# Patient Record
Sex: FEMALE | Race: WHITE | NOT HISPANIC OR LATINO | Employment: OTHER | URBAN - METROPOLITAN AREA
[De-identification: names, ages, dates, MRNs, and addresses within clinical notes are randomized per-mention and may not be internally consistent; named-entity substitution may affect disease eponyms.]

---

## 2017-03-24 ENCOUNTER — OFFICE VISIT (OUTPATIENT)
Dept: FAMILY MEDICINE | Facility: CLINIC | Age: 77
End: 2017-03-24
Payer: COMMERCIAL

## 2017-03-24 VITALS
RESPIRATION RATE: 17 BRPM | SYSTOLIC BLOOD PRESSURE: 157 MMHG | BODY MASS INDEX: 22.2 KG/M2 | DIASTOLIC BLOOD PRESSURE: 78 MMHG | OXYGEN SATURATION: 100 % | TEMPERATURE: 96.9 F | HEIGHT: 64 IN | WEIGHT: 130 LBS | HEART RATE: 58 BPM

## 2017-03-24 DIAGNOSIS — G47.30 SLEEP APNEA, UNSPECIFIED TYPE: ICD-10-CM

## 2017-03-24 DIAGNOSIS — I10 HYPERTENSION GOAL BP (BLOOD PRESSURE) < 140/90: Primary | ICD-10-CM

## 2017-03-24 LAB
ALBUMIN SERPL-MCNC: 4.2 G/DL (ref 3.4–5)
ALP SERPL-CCNC: 56 U/L (ref 40–150)
ALT SERPL W P-5'-P-CCNC: 23 U/L (ref 0–50)
ANION GAP SERPL CALCULATED.3IONS-SCNC: 5 MMOL/L (ref 3–14)
AST SERPL W P-5'-P-CCNC: 44 U/L (ref 0–45)
BASOPHILS # BLD AUTO: 0.1 10E9/L (ref 0–0.2)
BASOPHILS NFR BLD AUTO: 1.2 %
BILIRUB SERPL-MCNC: 0.8 MG/DL (ref 0.2–1.3)
BUN SERPL-MCNC: 10 MG/DL (ref 7–30)
CALCIUM SERPL-MCNC: 9.9 MG/DL (ref 8.5–10.1)
CHLORIDE SERPL-SCNC: 104 MMOL/L (ref 94–109)
CO2 SERPL-SCNC: 30 MMOL/L (ref 20–32)
CREAT SERPL-MCNC: 0.76 MG/DL (ref 0.52–1.04)
DIFFERENTIAL METHOD BLD: NORMAL
EOSINOPHIL # BLD AUTO: 0.4 10E9/L (ref 0–0.7)
EOSINOPHIL NFR BLD AUTO: 8 %
ERYTHROCYTE [DISTWIDTH] IN BLOOD BY AUTOMATED COUNT: 12.5 % (ref 10–15)
GFR SERPL CREATININE-BSD FRML MDRD: 75 ML/MIN/1.7M2
GLUCOSE SERPL-MCNC: 85 MG/DL (ref 70–99)
HCT VFR BLD AUTO: 41.3 % (ref 35–47)
HGB BLD-MCNC: 13.6 G/DL (ref 11.7–15.7)
LYMPHOCYTES # BLD AUTO: 1.8 10E9/L (ref 0.8–5.3)
LYMPHOCYTES NFR BLD AUTO: 36.3 %
MCH RBC QN AUTO: 30.2 PG (ref 26.5–33)
MCHC RBC AUTO-ENTMCNC: 32.9 G/DL (ref 31.5–36.5)
MCV RBC AUTO: 92 FL (ref 78–100)
MONOCYTES # BLD AUTO: 0.7 10E9/L (ref 0–1.3)
MONOCYTES NFR BLD AUTO: 13.3 %
NEUTROPHILS # BLD AUTO: 2 10E9/L (ref 1.6–8.3)
NEUTROPHILS NFR BLD AUTO: 41.2 %
PLATELET # BLD AUTO: 255 10E9/L (ref 150–450)
POTASSIUM SERPL-SCNC: 4.4 MMOL/L (ref 3.4–5.3)
PROT SERPL-MCNC: 7.6 G/DL (ref 6.8–8.8)
RBC # BLD AUTO: 4.5 10E12/L (ref 3.8–5.2)
SODIUM SERPL-SCNC: 139 MMOL/L (ref 133–144)
TSH SERPL DL<=0.005 MIU/L-ACNC: 2.31 MU/L (ref 0.4–4)
WBC # BLD AUTO: 4.9 10E9/L (ref 4–11)

## 2017-03-24 PROCEDURE — 80053 COMPREHEN METABOLIC PANEL: CPT | Performed by: PHYSICIAN ASSISTANT

## 2017-03-24 PROCEDURE — 36415 COLL VENOUS BLD VENIPUNCTURE: CPT | Performed by: PHYSICIAN ASSISTANT

## 2017-03-24 PROCEDURE — 84443 ASSAY THYROID STIM HORMONE: CPT | Performed by: PHYSICIAN ASSISTANT

## 2017-03-24 PROCEDURE — 85025 COMPLETE CBC W/AUTO DIFF WBC: CPT | Performed by: PHYSICIAN ASSISTANT

## 2017-03-24 PROCEDURE — 99213 OFFICE O/P EST LOW 20 MIN: CPT | Performed by: PHYSICIAN ASSISTANT

## 2017-03-24 RX ORDER — TRIAMTERENE AND HYDROCHLOROTHIAZIDE 37.5; 25 MG/1; MG/1
1 CAPSULE ORAL DAILY
Qty: 30 CAPSULE | Refills: 1 | Status: SHIPPED | OUTPATIENT
Start: 2017-03-24 | End: 2017-04-24

## 2017-03-24 NOTE — PROGRESS NOTES
"  SUBJECTIVE:                                                    Mely Guerra is a 77 year old female who presents to clinic today for the following health issues:      Hypertension Follow-up      Outpatient blood pressures 04/17/2017 at home 187//89,  fire station 150/96     Mainly taken in the afternoon 150-170/80-90    Low Salt Diet: no added salt       Amount of exercise or physical activity: 6-7 days/week for an average of 30-45 minutes    Problems taking medications regularly: No    Medication side effects: none    Diet: low salt no salt added           Problem list and histories reviewed & adjusted, as indicated.  Additional history: 78 y/o female here for blood pressure check up.  She does have hx of HYPERTENSION for the several years and is on ARB HTCZ combo.  She has noticed that her BP has been elevated at home when she checks.  She does think her machine may be giving slighting higher results.  She continues to feel well, no chest pains, SOB.  Is very active.    She does have hx of sleep apnea, has not had her machine checked or adjusted in many years.    BP Readings from Last 3 Encounters:   03/24/17 157/78   08/10/16 126/76   07/12/16 112/71    Wt Readings from Last 3 Encounters:   03/24/17 130 lb (59 kg)   08/10/16 130 lb (59 kg)   07/12/16 128 lb 1.6 oz (58.1 kg)                    Reviewed and updated as needed this visit by clinical staff  Allergies  Meds       Reviewed and updated as needed this visit by Provider         ROS:  Constitutional, HEENT, cardiovascular, pulmonary, gi and gu systems are negative, except as otherwise noted.    OBJECTIVE:                                                    /78  Pulse 58  Temp 96.9  F (36.1  C) (Oral)  Resp 17  Ht 5' 4\" (1.626 m)  Wt 130 lb (59 kg)  SpO2 100%  BMI 22.31 kg/m2  Body mass index is 22.31 kg/(m^2).  GENERAL: alert and no distress  EYES: Eyes grossly normal to inspection  HENT: ear canals and TM's normal, nose and mouth without " ulcers or lesions  NECK: no adenopathy, no asymmetry, masses, or scars and thyroid normal to palpation  RESP: lungs clear to auscultation - no rales, rhonchi or wheezes  CV: regular rate and rhythm, normal S1 S2, no S3 or S4, no murmur, click or rub, no peripheral edema and peripheral pulses strong  PSYCH: mentation appears normal, affect normal/bright    Diagnostic Test Results:  none      ASSESSMENT/PLAN:                                                            1. Hypertension goal BP (blood pressure) < 140/90  Not at goal.  Will stop hctz and start dyazide.    - CBC with platelets differential  - Comprehensive metabolic panel  - TSH with free T4 reflex  - triamterene-hydrochlorothiazide (DYAZIDE) 37.5-25 MG per capsule; Take 1 capsule by mouth daily  Dispense: 30 capsule; Refill: 1    2. Sleep apnea, unspecified type  Encouraged her to contact sleep health to see if machine needs adjusting, could be adding to her HYPERTENSION.  - CBC with platelets differential  - Comprehensive metabolic panel  - TSH with free T4 reflex        Chris Dhillon PA-C  [facility]

## 2017-03-24 NOTE — NURSING NOTE
"Chief Complaint   Patient presents with     Hypertension     /78  Pulse 58  Temp 96.9  F (36.1  C) (Oral)  Resp 17  Ht 5' 4\" (1.626 m)  Wt 130 lb (59 kg)  SpO2 100%  BMI 22.31 kg/m2 Estimated body mass index is 22.31 kg/(m^2) as calculated from the following:    Height as of this encounter: 5' 4\" (1.626 m).    Weight as of this encounter: 130 lb (59 kg).  bp completed using cuff size: regular       Health Maintenance addressed:  NONE    n/a    Azul Boucher MA     "

## 2017-03-24 NOTE — MR AVS SNAPSHOT
"              After Visit Summary   3/24/2017    Mely Guerra    MRN: [id number]           Patient Information     Date Of Birth          1940        Visit Information        Provider Department      3/24/2017 9:20 AM Chris Dhillon PA-C [facility]        Today's Diagnoses     Hypertension goal BP (blood pressure) < 140/90    -  1    Sleep apnea, unspecified type           Follow-ups after your visit        Who to contact     If you have questions or need follow up information about today's clinic visit or your schedule please contact [facility] directly at [phone].  Normal or non-critical lab and imaging results will be communicated to you by Bioxodeshart, letter or phone within 4 business days after the clinic has received the results. If you do not hear from us within 7 days, please contact the clinic through ONEPLEt or phone. If you have a critical or abnormal lab result, we will notify you by phone as soon as possible.  Submit refill requests through Piehole or call your pharmacy and they will forward the refill request to us. Please allow 3 business days for your refill to be completed.          Additional Information About Your Visit        MyChart Information     Piehole gives you secure access to your electronic health record. If you see a primary care provider, you can also send messages to your care team and make appointments. If you have questions, please call your primary care clinic.  If you do not have a primary care provider, please call [phone] and they will assist you.        Care EveryWhere ID     This is your Care EveryWhere ID. This could be used by other organizations to access your [place] medical records  [id number]        Your Vitals Were     Pulse Temperature Respirations Height Pulse Oximetry BMI (Body Mass Index)    58 96.9  F (36.1  C) (Oral) 17 5' 4\" (1.626 m) 100% 22.31 kg/m2       Blood Pressure from Last 3 Encounters:   03/24/17 157/78 "   08/10/16 126/76   07/12/16 112/71    Weight from Last 3 Encounters:   03/24/17 130 lb (59 kg)   08/10/16 130 lb (59 kg)   07/12/16 128 lb 1.6 oz (58.1 kg)              We Performed the Following     CBC with platelets differential     Comprehensive metabolic panel     TSH with free T4 reflex          Today's Medication Changes          These changes are accurate as of: 3/24/17  9:51 AM.  If you have any questions, ask your nurse or doctor.               Start taking these medicines.        Dose/Directions    triamterene-hydrochlorothiazide 37.5-25 MG per capsule   Commonly known as:  DYAZIDE   Used for:  Hypertension goal BP (blood pressure) < 140/90   Started by:  Chris Dhillon PA-C        Dose:  1 capsule   Take 1 capsule by mouth daily   Quantity:  30 capsule   Refills:  1         Stop taking these medicines if you haven't already. Please contact your care team if you have questions.     hydrochlorothiazide 25 MG tablet   Commonly known as:  HYDRODIURIL   Stopped by:  Chris Dhillon PA-C                Where to get your medicines      These medications were sent to [facility] [id number] [place] [address] & [address]  [address] [place] [address]     Phone:  [phone]     triamterene-hydrochlorothiazide 37.5-25 MG per capsule                Primary Care Provider Office Phone # Fax #    Sav Carpenter [phone]       [facility] [address] [address]        Thank you!     Thank you for choosing [facility]  for your care. Our goal is always to provide you with excellent care. Hearing back from our patients is one way we can continue to improve our services. Please take a few minutes to complete the written survey that you may receive in the mail after your visit with us. Thank you!             Your Updated Medication List - Protect others around you: Learn how to safely use,  store and throw away your medicines at www.disposemymeds.org.          This list is accurate as of: 3/24/17  9:51 AM.  Always use your most recent med list.                   Brand Name Dispense Instructions for use    ALEVE PO      Take 220 mg by mouth Takes every AM and PM when not taking ibuprofen.       calcium + D 600-200 MG-UNIT Tabs   Generic drug:  calcium carbonate-vitamin D      Take 2 tablets by mouth 2 times daily.       Chromium 200 MCG Tabs tablet      Take  by mouth.       fluticasone 50 MCG/ACT spray    FLONASE    48 g    Spray 2 sprays into both nostrils daily       GLUCOSAMINE 1500 COMPLEX Caps      Take 1 capsule by mouth daily.       hydrocortisone 1 % cream    CORTAID    30 g    Apply sparingly to affected area three times daily for 14 days.       IBUPROFEN PO      Takes every AM and PM when not taking Aleve.       levothyroxine 100 MCG tablet    SYNTHROID/LEVOTHROID    90 tablet    Take 1 tablet (100 mcg) by mouth daily       losartan 100 MG tablet    COZAAR    90 tablet    Take 1 tablet (100 mg) by mouth daily       multivitamin, therapeutic with minerals Tabs tablet     100 each    Take 1 tablet by mouth daily       triamterene-hydrochlorothiazide 37.5-25 MG per capsule    DYAZIDE    30 capsule    Take 1 capsule by mouth daily       ZANTAC PO      Take 150 mg by mouth

## 2017-03-27 NOTE — PROGRESS NOTES
Dear Mely    Your test results are attached, feel free to contact me via Wellcentivet.    Everything in your lab work looks great.      Everton Dhillon PA-C

## 2017-04-24 ENCOUNTER — OFFICE VISIT (OUTPATIENT)
Dept: FAMILY MEDICINE | Facility: CLINIC | Age: 77
End: 2017-04-24
Payer: COMMERCIAL

## 2017-04-24 VITALS
TEMPERATURE: 97.2 F | DIASTOLIC BLOOD PRESSURE: 82 MMHG | HEART RATE: 72 BPM | SYSTOLIC BLOOD PRESSURE: 120 MMHG | WEIGHT: 128 LBS | HEIGHT: 64 IN | OXYGEN SATURATION: 99 % | BODY MASS INDEX: 21.85 KG/M2

## 2017-04-24 DIAGNOSIS — I10 HYPERTENSION GOAL BP (BLOOD PRESSURE) < 140/90: ICD-10-CM

## 2017-04-24 PROCEDURE — 99213 OFFICE O/P EST LOW 20 MIN: CPT | Performed by: PHYSICIAN ASSISTANT

## 2017-04-24 RX ORDER — TRIAMTERENE AND HYDROCHLOROTHIAZIDE 37.5; 25 MG/1; MG/1
1 CAPSULE ORAL DAILY
Qty: 90 CAPSULE | Refills: 1 | Status: SHIPPED | OUTPATIENT
Start: 2017-04-24 | End: 2017-09-12

## 2017-04-24 RX ORDER — TRIAMTERENE AND HYDROCHLOROTHIAZIDE 37.5; 25 MG/1; MG/1
1 CAPSULE ORAL DAILY
Qty: 90 CAPSULE | Refills: 1 | Status: SHIPPED | OUTPATIENT
Start: 2017-04-24 | End: 2017-04-24

## 2017-04-24 NOTE — PROGRESS NOTES
"  SUBJECTIVE:                                                    Mely Guerra is a 77 year old female who presents to clinic today for the following health issues:      Hypertension Follow-up      Outpatient blood pressures are being checked at home.  Results are 140'S/70\"s.    Low Salt Diet: low salt       Amount of exercise or physical activity: Biking and walking everyday    Problems taking medications regularly: No    Medication side effects: none    Diet: low salt          Problem list and histories reviewed & adjusted, as indicated.  Additional history: 78 y/o female here for 1 month follow up.  We recently adjusted her BP meds to include dyazide along with her losartan.  She has been getting mostly 140s/70 in the am, rare checks other times.  No change in how she feels.  Still very active, exercises daily.    BP Readings from Last 3 Encounters:   04/24/17 120/82   03/24/17 157/78   08/10/16 126/76    Wt Readings from Last 3 Encounters:   04/24/17 128 lb (58.1 kg)   03/24/17 130 lb (59 kg)   08/10/16 130 lb (59 kg)                    Reviewed and updated as needed this visit by clinical staff       Reviewed and updated as needed this visit by Provider         ROS:  Constitutional, HEENT, cardiovascular, pulmonary, gi and gu systems are negative, except as otherwise noted.    OBJECTIVE:                                                    /82 (BP Location: Left arm, Patient Position: Left side, Cuff Size: Adult Regular)  Pulse 72  Temp 97.2  F (36.2  C) (Tympanic)  Ht 5' 4\" (1.626 m)  Wt 128 lb (58.1 kg)  SpO2 99%  Breastfeeding? No  BMI 21.97 kg/m2  Body mass index is 21.97 kg/(m^2).  GENERAL: alert and no distress  EYES: Eyes grossly normal to inspection  PSYCH: mentation appears normal, affect normal/bright    Diagnostic Test Results:  none      ASSESSMENT/PLAN:                                                            1. Hypertension goal BP (blood pressure) < 140/90  Will have her take her " losartan in the evening and dyazide in the am.  Since losartan is commonly not effective for full 24 hours, will move to pm to cover the important sleep wake blood pressure surge.  - triamterene-hydrochlorothiazide (DYAZIDE) 37.5-25 MG per capsule; Take 1 capsule by mouth daily  Dispense: 90 capsule; Refill: 1        Chris Dhillon PA-C  [facility]

## 2017-04-24 NOTE — MR AVS SNAPSHOT
"              After Visit Summary   4/24/2017    Mely Guerra    MRN: [id number]           Patient Information     Date Of Birth          1940        Visit Information        Provider Department      4/24/2017 8:40 AM Chris Dhillon PA-C [facility]        Today's Diagnoses     Hypertension goal BP (blood pressure) < 140/90           Follow-ups after your visit        Follow-up notes from your care team     Return in about 3 months (around 7/24/2017).      Who to contact     If you have questions or need follow up information about today's clinic visit or your schedule please contact [facility] directly at [phone].  Normal or non-critical lab and imaging results will be communicated to you by Voovio aka 3Ditizehart, letter or phone within 4 business days after the clinic has received the results. If you do not hear from us within 7 days, please contact the clinic through Voovio aka 3Ditizehart or phone. If you have a critical or abnormal lab result, we will notify you by phone as soon as possible.  Submit refill requests through Scary Mommy or call your pharmacy and they will forward the refill request to us. Please allow 3 business days for your refill to be completed.          Additional Information About Your Visit        MyChart Information     Scary Mommy gives you secure access to your electronic health record. If you see a primary care provider, you can also send messages to your care team and make appointments. If you have questions, please call your primary care clinic.  If you do not have a primary care provider, please call [phone] and they will assist you.        Care EveryWhere ID     This is your Care EveryWhere ID. This could be used by other organizations to access your [place] medical records  [id number]        Your Vitals Were     Pulse Temperature Height Pulse Oximetry Breastfeeding? BMI (Body Mass Index)    72 97.2  F (36.2  C) (Tympanic) 5' 4\" (1.626 m) 99% No 21.97 kg/m2       " Blood Pressure from Last 3 Encounters:   04/24/17 120/82   03/24/17 157/78   08/10/16 126/76    Weight from Last 3 Encounters:   04/24/17 128 lb (58.1 kg)   03/24/17 130 lb (59 kg)   08/10/16 130 lb (59 kg)              Today, you had the following     No orders found for display         Where to get your medicines      These medications were sent to [facility] [address]  [address], [facility] [address]     Phone:  [phone]     triamterene-hydrochlorothiazide 37.5-25 MG per capsule          Primary Care Provider Office Phone # Fax #    Sav Carpenter [phone]       [facility] [address] [address]        Thank you!     Thank you for choosing [facility]  for your care. Our goal is always to provide you with excellent care. Hearing back from our patients is one way we can continue to improve our services. Please take a few minutes to complete the written survey that you may receive in the mail after your visit with us. Thank you!             Your Updated Medication List - Protect others around you: Learn how to safely use, store and throw away your medicines at www.disposemymeds.org.          This list is accurate as of: 4/24/17  9:05 AM.  Always use your most recent med list.                   Brand Name Dispense Instructions for use    ALEVE PO      Take 220 mg by mouth Takes every AM and PM when not taking ibuprofen.       calcium + D 600-200 MG-UNIT Tabs   Generic drug:  calcium carbonate-vitamin D      Take 2 tablets by mouth 2 times daily.       Chromium 200 MCG Tabs tablet      Take  by mouth.       fluticasone 50 MCG/ACT spray    FLONASE    48 g    Spray 2 sprays into both nostrils daily       GLUCOSAMINE 1500 COMPLEX Caps      Take 1 capsule by mouth daily.       hydrocortisone 1 % cream    CORTAID    30 g    Apply sparingly to affected area three times daily for 14 days.        IBUPROFEN PO      Takes every AM and PM when not taking Aleve.       levothyroxine 100 MCG tablet    SYNTHROID/LEVOTHROID    90 tablet    Take 1 tablet (100 mcg) by mouth daily       losartan 100 MG tablet    COZAAR    90 tablet    Take 1 tablet (100 mg) by mouth daily       multivitamin, therapeutic with minerals Tabs tablet     100 each    Take 1 tablet by mouth daily       triamterene-hydrochlorothiazide 37.5-25 MG per capsule    DYAZIDE    90 capsule    Take 1 capsule by mouth daily       ZANTAC PO      Take 150 mg by mouth

## 2017-04-24 NOTE — NURSING NOTE
"Chief Complaint   Patient presents with     Medication Reconciliation     /82 (BP Location: Left arm, Patient Position: Left side, Cuff Size: Adult Regular)  Pulse 72  Temp 97.2  F (36.2  C) (Tympanic)  Ht 5' 4\" (1.626 m)  Wt 128 lb (58.1 kg)  SpO2 99%  Breastfeeding? No  BMI 21.97 kg/m2 Estimated body mass index is 21.97 kg/(m^2) as calculated from the following:    Height as of this encounter: 5' 4\" (1.626 m).    Weight as of this encounter: 128 lb (58.1 kg).  bp completed using cuff size: regular      Health Maintenance addressed:  NONE    n/a              "

## 2017-05-18 ENCOUNTER — OFFICE VISIT (OUTPATIENT)
Dept: FAMILY MEDICINE | Facility: CLINIC | Age: 77
End: 2017-05-18
Payer: COMMERCIAL

## 2017-05-18 VITALS
TEMPERATURE: 97.7 F | SYSTOLIC BLOOD PRESSURE: 130 MMHG | HEIGHT: 64 IN | DIASTOLIC BLOOD PRESSURE: 76 MMHG | WEIGHT: 127.1 LBS | BODY MASS INDEX: 21.7 KG/M2 | OXYGEN SATURATION: 98 % | HEART RATE: 72 BPM

## 2017-05-18 DIAGNOSIS — E03.9 ACQUIRED HYPOTHYROIDISM: ICD-10-CM

## 2017-05-18 DIAGNOSIS — I10 ESSENTIAL HYPERTENSION WITH GOAL BLOOD PRESSURE LESS THAN 140/90: ICD-10-CM

## 2017-05-18 DIAGNOSIS — L71.9 ROSACEA: Primary | ICD-10-CM

## 2017-05-18 PROCEDURE — 99213 OFFICE O/P EST LOW 20 MIN: CPT | Performed by: FAMILY MEDICINE

## 2017-05-18 RX ORDER — MINOCYCLINE HYDROCHLORIDE 100 MG/1
100 CAPSULE ORAL DAILY
Qty: 30 CAPSULE | Refills: 1 | Status: SHIPPED | OUTPATIENT
Start: 2017-05-18 | End: 2017-08-30

## 2017-05-18 RX ORDER — LOSARTAN POTASSIUM 100 MG/1
100 TABLET ORAL DAILY
Qty: 90 TABLET | Refills: 3 | Status: SHIPPED | OUTPATIENT
Start: 2017-05-18 | End: 2018-03-07

## 2017-05-18 RX ORDER — LEVOTHYROXINE SODIUM 100 UG/1
100 TABLET ORAL DAILY
Qty: 90 TABLET | Refills: 3 | Status: SHIPPED | OUTPATIENT
Start: 2017-05-18 | End: 2018-05-23

## 2017-05-18 NOTE — NURSING NOTE
"Chief Complaint   Patient presents with     Derm Problem     rash on face since Janurary - changed oup CPAP mask,, tried Metronidazole - has not been effective     /76  Pulse 72  Temp 97.7  F (36.5  C) (Oral)  Ht 5' 4\" (1.626 m)  Wt 127 lb 1.6 oz (57.7 kg)  SpO2 98%  BMI 21.82 kg/m2 Estimated body mass index is 21.82 kg/(m^2) as calculated from the following:    Height as of this encounter: 5' 4\" (1.626 m).    Weight as of this encounter: 127 lb 1.6 oz (57.7 kg).  BP completed using cuff size: regular       Health Maintenance due pending provider review:  NONE    n/a      Essence Santo CMA  "

## 2017-05-18 NOTE — PROGRESS NOTES
Subjective: This is a somewhat  Minor problem except for the fact that she has to wear a C Pap machine, and she has had a rash around her nose and it gets irritated. Apparently she saw dermatology who thought its rosacea and gave her metronidazole lotion but she has used it regularly and it hasn't made any improvement. She also needs refills of her thyroid medicine and blood pressure medicine and will begin next month for her physical.    Objective: There are reddish areas around her nose typical for mild rosacea.    Assessment and plan: Rosacea not responding to topical metronidazole. I would like her to try oral minocycline daily for a while and see if that calms it down. Perhaps this is something she could use periodically.  She can report when she comes back in for her physical as to whether it helped or not. If it doesn't she should go back to dermatology.

## 2017-05-18 NOTE — MR AVS SNAPSHOT
"              After Visit Summary   5/18/2017    Mely Guerra    MRN: [id number]           Patient Information     Date Of Birth          1940        Visit Information        Provider Department      5/18/2017 10:30 AM Sav Carpenter MD [facility]        Today's Diagnoses     Rosacea    -  1    Essential hypertension with goal blood pressure less than 140/90        Acquired hypothyroidism           Follow-ups after your visit        Who to contact     If you have questions or need follow up information about today's clinic visit or your schedule please contact [facility] directly at [phone].  Normal or non-critical lab and imaging results will be communicated to you by MyChart, letter or phone within 4 business days after the clinic has received the results. If you do not hear from us within 7 days, please contact the clinic through DesignLinehart or phone. If you have a critical or abnormal lab result, we will notify you by phone as soon as possible.  Submit refill requests through Cloverleaf Communications or call your pharmacy and they will forward the refill request to us. Please allow 3 business days for your refill to be completed.          Additional Information About Your Visit        MyChart Information     Cloverleaf Communications gives you secure access to your electronic health record. If you see a primary care provider, you can also send messages to your care team and make appointments. If you have questions, please call your primary care clinic.  If you do not have a primary care provider, please call [phone] and they will assist you.        Care EveryWhere ID     This is your Care EveryWhere ID. This could be used by other organizations to access your [place] medical records  [id number]        Your Vitals Were     Pulse Temperature Height Pulse Oximetry BMI (Body Mass Index)       72 97.7  F (36.5  C) (Oral) 5' 4\" (1.626 m) 98% 21.82 kg/m2        Blood Pressure from Last 3 Encounters: "   05/18/17 130/76   04/24/17 120/82   03/24/17 157/78    Weight from Last 3 Encounters:   05/18/17 127 lb 1.6 oz (57.7 kg)   04/24/17 128 lb (58.1 kg)   03/24/17 130 lb (59 kg)              Today, you had the following     No orders found for display         Today's Medication Changes          These changes are accurate as of: 5/18/17 11:06 AM.  If you have any questions, ask your nurse or doctor.               Start taking these medicines.        Dose/Directions    minocycline 100 MG capsule   Commonly known as:  MINOCIN/DYNACIN   Used for:  Rosacea   Started by:  Sav Carpenter MD        Dose:  100 mg   Take 1 capsule (100 mg) by mouth daily   Quantity:  30 capsule   Refills:  1            Where to get your medicines      These medications were sent to [facility] [id number] [place] [address] & [address]  [address] [place] [address]     Phone:  [phone]     minocycline 100 MG capsule         Some of these will need a paper prescription and others can be bought over the counter.  Ask your nurse if you have questions.     Bring a paper prescription for each of these medications     levothyroxine 100 MCG tablet    losartan 100 MG tablet                Primary Care Provider Office Phone # Fax #    Sav Carpenter [phone]       [facility] [address] [place] [address]        Thank you!     Thank you for choosing [facility]  for your care. Our goal is always to provide you with excellent care. Hearing back from our patients is one way we can continue to improve our services. Please take a few minutes to complete the written survey that you may receive in the mail after your visit with us. Thank you!             Your Updated Medication List - Protect others around you: Learn how to safely use, store and throw away your medicines at www.disposemymeds.org.          This list is accurate as of: 5/18/17  11:06 AM.  Always use your most recent med list.                   Brand Name Dispense Instructions for use    ALEVE PO      Take 220 mg by mouth Takes every AM and PM when not taking ibuprofen.       calcium + D 600-200 MG-UNIT Tabs   Generic drug:  calcium carbonate-vitamin D      Take 2 tablets by mouth 2 times daily.       Chromium 200 MCG Tabs tablet      Take  by mouth.       fluticasone 50 MCG/ACT spray    FLONASE    48 g    Spray 2 sprays into both nostrils daily       GLUCOSAMINE 1500 COMPLEX Caps      Take 1 capsule by mouth daily.       hydrocortisone 1 % cream    CORTAID    30 g    Apply sparingly to affected area three times daily for 14 days.       IBUPROFEN PO      Takes every AM and PM when not taking Aleve.       levothyroxine 100 MCG tablet    SYNTHROID/LEVOTHROID    90 tablet    Take 1 tablet (100 mcg) by mouth daily       losartan 100 MG tablet    COZAAR    90 tablet    Take 1 tablet (100 mg) by mouth daily       minocycline 100 MG capsule    MINOCIN/DYNACIN    30 capsule    Take 1 capsule (100 mg) by mouth daily       multivitamin, therapeutic with minerals Tabs tablet     100 each    Take 1 tablet by mouth daily       triamterene-hydrochlorothiazide 37.5-25 MG per capsule    DYAZIDE    90 capsule    Take 1 capsule by mouth daily       ZANTAC PO      Take 150 mg by mouth

## 2017-05-22 DIAGNOSIS — I10 HYPERTENSION GOAL BP (BLOOD PRESSURE) < 140/90: ICD-10-CM

## 2017-05-22 RX ORDER — TRIAMTERENE AND HYDROCHLOROTHIAZIDE 37.5; 25 MG/1; MG/1
1 CAPSULE ORAL DAILY
Start: 2017-05-22

## 2017-05-22 NOTE — TELEPHONE ENCOUNTER
Pending Prescriptions:                       Disp   Refills    triamterene-hydrochlorothiazide (DYAZIDE)*90 cap*1            Sig: Take 1 capsule by mouth daily          Last Written Prescription Date: 04/24/2017  Last Fill Quantity: 90, # refills: 1  Last Office Visit with [facility], [facility] or [facility] prescribing provider: 05/18/2017       Potassium   Date Value Ref Range Status   03/24/2017 4.4 3.4 - 5.3 mmol/L Final     Creatinine   Date Value Ref Range Status   03/24/2017 0.76 0.52 - 1.04 mg/dL Final     BP Readings from Last 3 Encounters:   05/18/17 130/76   04/24/17 120/82   03/24/17 157/78

## 2017-06-26 ENCOUNTER — OFFICE VISIT (OUTPATIENT)
Dept: FAMILY MEDICINE | Facility: CLINIC | Age: 77
End: 2017-06-26
Payer: COMMERCIAL

## 2017-06-26 VITALS
BODY MASS INDEX: 22.03 KG/M2 | SYSTOLIC BLOOD PRESSURE: 120 MMHG | HEIGHT: 64 IN | HEART RATE: 76 BPM | DIASTOLIC BLOOD PRESSURE: 74 MMHG | TEMPERATURE: 97.6 F | OXYGEN SATURATION: 100 % | WEIGHT: 129.06 LBS

## 2017-06-26 DIAGNOSIS — R13.10 SWALLOWING DISORDER: ICD-10-CM

## 2017-06-26 DIAGNOSIS — Z00.00 WELLNESS EXAMINATION: Primary | ICD-10-CM

## 2017-06-26 PROCEDURE — G0439 PPPS, SUBSEQ VISIT: HCPCS | Performed by: FAMILY MEDICINE

## 2017-06-26 NOTE — PROGRESS NOTES
Answers for HPI/ROS submitted by the patient on 6/23/2017   Annual Exam:  Getting at least 3 servings of Calcium per day:: Yes  Bi-annual eye exam:: Yes  Dental care twice a year:: Yes  Sleep apnea or symptoms of sleep apnea:: Sleep apnea  Diet:: Low salt  Frequency of exercise:: 4-5 days/week  Taking medications regularly:: Yes  Medication side effects:: Other  Additional concerns today:: YES  PHQ-2 Score: 0  Duration of exercise:: 45-60 minutes

## 2017-06-26 NOTE — NURSING NOTE
"Chief Complaint   Patient presents with     Medicare Visit     patient is fasting      /74 (BP Location: Right arm, Patient Position: Right side, Cuff Size: Adult Regular)  Pulse 76  Temp 97.6  F (36.4  C) (Oral)  Ht 5' 4\" (1.626 m)  Wt 129 lb 1 oz (58.5 kg)  SpO2 100%  Breastfeeding? No  BMI 22.15 kg/m2 Estimated body mass index is 22.15 kg/(m^2) as calculated from the following:    Height as of this encounter: 5' 4\" (1.626 m).    Weight as of this encounter: 129 lb 1 oz (58.5 kg).  bp completed using cuff size: regular      Health Maintenance addressed:  NONE    n/a              "

## 2017-06-26 NOTE — MR AVS SNAPSHOT
After Visit Summary   6/26/2017    Mely Guerra    MRN: [id number]           Patient Information     Date Of Birth          1940        Visit Information        Provider Department      6/26/2017 8:00 AM Nette Burk MD [facility] [place] [facility]        Today's Diagnoses     Wellness examination    -  1    Swallowing disorder          Care Instructions      Preventive Health Recommendations    Female Ages 65 +    Yearly exam:     See your health care provider every year in order to  o Review health changes.   o Discuss preventive care.    o Review your medicines if your doctor has prescribed any.      You no longer need a yearly Pap test unless you've had an abnormal Pap test in the past 10 years. If you have vaginal symptoms, such as bleeding or discharge, be sure to talk with your provider about a Pap test.      Every 1 to 2 years, have a mammogram.  If you are over 69, talk with your health care provider about whether or not you want to continue having screening mammograms.      Every 10 years, have a colonoscopy. Or, have a yearly FIT test (stool test). These exams will check for colon cancer.       Have a cholesterol test every 5 years, or more often if your doctor advises it.       Have a diabetes test (fasting glucose) every three years. If you are at risk for diabetes, you should have this test more often.       At age 65, have a bone density scan (DEXA) to check for osteoporosis (brittle bone disease).    Shots:    Get a flu shot each year.    Get a tetanus shot every 10 years.    Talk to your doctor about your pneumonia vaccines. There are now two you should receive - Pneumovax (PPSV 23) and Prevnar (PCV 13).    Talk to your doctor about the shingles vaccine.    Talk to your doctor about the hepatitis B vaccine.    Nutrition:     Eat at least 5 servings of fruits and vegetables each day.      Eat whole-grain bread, whole-wheat pasta and brown rice instead of white grains and  rice.      Talk to your provider about Calcium and Vitamin D.     Lifestyle    Exercise at least 150 minutes a week (30 minutes a day, 5 days a week). This will help you control your weight and prevent disease.      Limit alcohol to one drink per day.      No smoking.       Wear sunscreen to prevent skin cancer.       See your dentist twice a year for an exam and cleaning.      See your eye doctor every 1 to 2 years to screen for conditions such as glaucoma, macular degeneration and cataracts.          Follow-ups after your visit        Future tests that were ordered for you today     Open Future Orders        Priority Expected Expires Ordered    US Head Neck Soft Tissue Routine  6/26/2018 6/26/2017            Who to contact     If you have questions or need follow up information about today's clinic visit or your schedule please contact [facility] directly at [phone].  Normal or non-critical lab and imaging results will be communicated to you by [employer], letter or phone within 4 business days after the clinic has received the results. If you do not hear from us within 7 days, please contact the clinic through [employer] or phone. If you have a critical or abnormal lab result, we will notify you by phone as soon as possible.  Submit refill requests through [employer] or call your pharmacy and they will forward the refill request to us. Please allow 3 business days for your refill to be completed.          Additional Information About Your Visit        [employer] Information     [employer] gives you secure access to your electronic health record. If you see a primary care provider, you can also send messages to your care team and make appointments. If you have questions, please call your primary care clinic.  If you do not have a primary care provider, please call [phone] and they will assist you.        Care EveryWhere ID     This is your Care EveryWhere ID. This could be used by other organizations to  "access your [place] medical records  [id number]        Your Vitals Were     Pulse Temperature Height Pulse Oximetry Breastfeeding? BMI (Body Mass Index)    76 97.6  F (36.4  C) (Oral) 5' 4\" (1.626 m) 100% No 22.15 kg/m2       Blood Pressure from Last 3 Encounters:   06/26/17 120/74   05/18/17 130/76   04/24/17 120/82    Weight from Last 3 Encounters:   06/26/17 129 lb 1 oz (58.5 kg)   05/18/17 127 lb 1.6 oz (57.7 kg)   04/24/17 128 lb (58.1 kg)               Primary Care Provider Office Phone # Fax #    Sav Carpenter [phone]       [facility] [address] [address]        Equal Access to Services     CIRILO BAUER : Hadii chris davis hadasho Sogeovanna, waaxda luqadaha, qaybta kaalmada adeegyada, erik greene . So [facility] [phone].    ATENCIÓN: Si habla español, tiene a mcfadden disposición servicios gratuitos de asistencia lingüística. Llame al [phone].    We comply with applicable federal civil rights laws and [place] laws. We do not discriminate on the basis of race, color, national origin, age, disability sex, sexual orientation or gender identity.            Thank you!     Thank you for choosing [facility]  for your care. Our goal is always to provide you with excellent care. Hearing back from our patients is one way we can continue to improve our services. Please take a few minutes to complete the written survey that you may receive in the mail after your visit with us. Thank you!             Your Updated Medication List - Protect others around you: Learn how to safely use, store and throw away your medicines at www.disposemymeds.org.          This list is accurate as of: 6/26/17  8:39 AM.  Always use your most recent med list.                   Brand Name Dispense Instructions for use Diagnosis    ALEVE PO      Take 220 mg by mouth Takes every AM and PM when not taking ibuprofen.        calcium + D 600-200 " MG-UNIT Tabs   Generic drug:  calcium carbonate-vitamin D      Take 2 tablets by mouth 2 times daily.        Chromium 200 MCG Tabs tablet      Take  by mouth.        fluticasone 50 MCG/ACT spray    FLONASE    48 g    Spray 2 sprays into both nostrils daily    Other allergic rhinitis       GLUCOSAMINE 1500 COMPLEX Caps      Take 1 capsule by mouth daily.    Osteoporosis, unspecified       IBUPROFEN PO      Takes every AM and PM when not taking Aleve.        levothyroxine 100 MCG tablet    SYNTHROID/LEVOTHROID    90 tablet    Take 1 tablet (100 mcg) by mouth daily    Acquired hypothyroidism       losartan 100 MG tablet    COZAAR    90 tablet    Take 1 tablet (100 mg) by mouth daily    Essential hypertension with goal blood pressure less than 140/90       minocycline 100 MG capsule    MINOCIN/DYNACIN    30 capsule    Take 1 capsule (100 mg) by mouth daily    Rosacea       multivitamin, therapeutic with minerals Tabs tablet     100 each    Take 1 tablet by mouth daily    Nutritional deficiency       triamterene-hydrochlorothiazide 37.5-25 MG per capsule    DYAZIDE    90 capsule    Take 1 capsule by mouth daily    Hypertension goal BP (blood pressure) < 140/90       ZANTAC PO      Take 150 mg by mouth

## 2017-06-26 NOTE — PROGRESS NOTES
SUBJECTIVE:                                                            Mely Guerra is a 77 year old female who presents for Preventive Visit.  Are you in the first 12 months of your Medicare coverage?  No    Physical   Annual:     Getting at least 3 servings of Calcium per day::  Yes    Bi-annual eye exam::  Yes    Dental care twice a year::  Yes    Sleep apnea or symptoms of sleep apnea::  Sleep apnea    Diet::  Low salt    Frequency of exercise::  4-5 days/week    Duration of exercise::  45-60 minutes    Taking medications regularly::  Yes    Medication side effects::  Other    Additional concerns today::  YES    Pt concerned about:  1) had eye exam - she noted change in retina and are related MD noted.  Did genetic testing for this  Has F/U on this next week. [facility].    2)  Throat: has seen ENT for this  Has had concerns for enlarged thyroid  Us in the past shows nodule  Has a dry cough  Talking and eating causes cough  Has seen ENT specialty care     3)  Has FLAVIA: uses CPAP  Uses lotion for this - gets irritation from mask  switched to nasal device  Has used HC cream  And more recently given topical ABX cream to face - helps some  Still has this around rim of nostrils from CPAP nasal device      COGNITIVE SCREEN  1) Repeat 3 items (Banana, Sunrise, Chair)    2) Clock draw: NORMAL  3) 3 item recall: Recalls 3 objects  Results: 3 items recalled: COGNITIVE IMPAIRMENT LESS LIKELY    Mini-CogTM Copyright PINKY Mendieta. Licensed by the author for use in [facility]; reprinted with permission (aileen@.[email or web address]). All rights reserved.        Reviewed and updated as needed this visit by clinical staff         Reviewed and updated as needed this visit by Provider        Social History   Substance Use Topics     Smoking status: Never Smoker     Smokeless tobacco: Never Used     Alcohol use No       The patient does not drink >3 drinks per day nor >7 drinks per week.            Today's PHQ-2 Score:   PHQ-2  ( 1999 Pfizer) 6/23/2017   Q1: Little interest or pleasure in doing things 0   Q2: Feeling down, depressed or hopeless 0   PHQ-2 Score 0   Q1: Little interest or pleasure in doing things Not at all   Q2: Feeling down, depressed or hopeless Not at all   PHQ-2 Score 0       Do you feel safe in your environment - Yes    Do you have a Health Care Directive?: Yes: Patient states has Advance Directive and will bring in a copy to clinic.    Current providers sharing in care for this patient include:   Patient Care Team:  Sav Carpenter MD as PCP - General      Hearing impairment: No    Ability to successfully perform activities of daily living: Yes, no assistance needed     Fall risk:       Home safety:  none identified      The following health maintenance items are reviewed in Epic and correct as of today:  Health Maintenance   Topic Date Due     ADVANCE DIRECTIVE PLANNING Q5 YRS  04/02/2017     INFLUENZA VACCINE (SYSTEM ASSIGNED)  09/01/2017     TSH Q1 YEAR  03/24/2018     FALL RISK ASSESSMENT  04/24/2018     LIPID SCREEN Q5 YR FEMALE (SYSTEM ASSIGNED)  06/03/2019     TETANUS IMMUNIZATION (SYSTEM ASSIGNED)  01/10/2023     COLONOSCOPY Q10 YR  04/01/2024     DEXA SCAN SCREENING (SYSTEM ASSIGNED)  Completed     PNEUMOCOCCAL  Completed              ROS:  Constitutional, HEENT, cardiovascular, pulmonary, gi and gu systems are negative, except as otherwise noted.    Problem list, Medication list, Allergies, and Medical/Social/Surgical histories reviewed in EPIC and updated as appropriate.  BP Readings from Last 3 Encounters:   06/26/17 120/74   05/18/17 130/76   04/24/17 120/82    Wt Readings from Last 3 Encounters:   06/26/17 129 lb 1 oz (58.5 kg)   05/18/17 127 lb 1.6 oz (57.7 kg)   04/24/17 128 lb (58.1 kg)                  Patient Active Problem List   Diagnosis     Acquired hypothyroidism     Personal history of alcoholism (H)     Allergic rhinitis     Osteoporosis     Osteoarthritis     Hyperlipidemia LDL goal <160  "    Hypertension goal BP (blood pressure) < 140/90     Advanced directives, counseling/discussion     Otitis externa     Sleep apnea     Dermatitis     Rosacea     Past Surgical History:   Procedure Laterality Date     C NONSPECIFIC PROCEDURE  1974    s/p Vaginal hysterectomy       Social History   Substance Use Topics     Smoking status: Never Smoker     Smokeless tobacco: Never Used     Alcohol use No     Family History   Problem Relation Age of Onset     CANCER Sister          Current Outpatient Prescriptions   Medication Sig Dispense Refill     losartan (COZAAR) 100 MG tablet Take 1 tablet (100 mg) by mouth daily 90 tablet 3     levothyroxine (SYNTHROID/LEVOTHROID) 100 MCG tablet Take 1 tablet (100 mcg) by mouth daily 90 tablet 3     minocycline (MINOCIN/DYNACIN) 100 MG capsule Take 1 capsule (100 mg) by mouth daily 30 capsule 1     triamterene-hydrochlorothiazide (DYAZIDE) 37.5-25 MG per capsule Take 1 capsule by mouth daily 90 capsule 1     fluticasone (FLONASE) 50 MCG/ACT nasal spray Spray 2 sprays into both nostrils daily 48 g prn     Ranitidine HCl (ZANTAC PO) Take 150 mg by mouth       IBUPROFEN PO Takes every AM and PM when not taking Aleve.       Naproxen Sodium (ALEVE PO) Take 220 mg by mouth Takes every AM and PM when not taking ibuprofen.       multivitamin, therapeutic with minerals (MULTI-VITAMIN) TABS Take 1 tablet by mouth daily 100 each prn     Chromium Picolinate 200 MCG TABS Take  by mouth.       Glucosamine-Chondroit-Vit C-Mn (GLUCOSAMINE 1500 COMPLEX) CAPS Take 1 capsule by mouth daily.       Calcium Carbonate-Vitamin D (CALCIUM + D) 600-200 MG-UNIT per tablet Take 2 tablets by mouth 2 times daily.       OBJECTIVE:                                                            /74 (BP Location: Right arm, Patient Position: Right side, Cuff Size: Adult Regular)  Pulse 76  Temp 97.6  F (36.4  C) (Oral)  Ht 5' 4\" (1.626 m)  Wt 129 lb 1 oz (58.5 kg)  SpO2 100%  Breastfeeding? No  BMI " "22.15 kg/m2 Estimated body mass index is 21.82 kg/(m^2) as calculated from the following:    Height as of 5/18/17: 5' 4\" (1.626 m).    Weight as of 5/18/17: 127 lb 1.6 oz (57.7 kg).  EXAM:   GENERAL: healthy, alert and no distress  EYES: Eyes grossly normal to inspection, PERRL and conjunctivae and sclerae normal  HENT: ear canals and TM's normal, nose and mouth without ulcers or lesions  NECK: no adenopathy, no asymmetry, masses, or scars and thyroid normal to palpation  RESP: lungs clear to auscultation - no rales, rhonchi or wheezes  BREAST: normal without masses, tenderness or nipple discharge and no palpable axillary masses or adenopathy  CV: regular rate and rhythm, normal S1 S2, no S3 or S4, no murmur, click or rub, no peripheral edema and peripheral pulses strong  ABDOMEN: soft, nontender, no hepatosplenomegaly, no masses and bowel sounds normal  MS: no gross musculoskeletal defects noted, no edema  SKIN: no suspicious lesions or rashes  NEURO: Normal strength and tone, mentation intact and speech normal  PSYCH: mentation appears normal, affect normal/bright    ASSESSMENT / PLAN:                                                            1. Wellness examination  See AVS  UTD on vaccines and labs and imaging    2. Swallowing disorder  Will send for US given thyroid nodules in past  Consider seeing ENT again of US is not revealing  Increase fluids - diuretics can be drying as well as cpap  Has humidifier on device  - US Head Neck Soft Tissue; Future    End of Life Planning:  Patient currently has an advanced directive: Yes.  Practitioner is supportive of decision.    COUNSELING:  Reviewed preventive health counseling, as reflected in patient instructions        Estimated body mass index is 21.82 kg/(m^2) as calculated from the following:    Height as of 5/18/17: 5' 4\" (1.626 m).    Weight as of 5/18/17: 127 lb 1.6 oz (57.7 kg).     reports that she has never smoked. She has never used smokeless " tobacco.      Appropriate preventive services were discussed with this patient, including applicable screening as appropriate for cardiovascular disease, diabetes, osteopenia/osteoporosis, and glaucoma.  As appropriate for age/gender, discussed screening for colorectal cancer, prostate cancer, breast cancer, and cervical cancer. Checklist reviewing preventive services available has been given to the patient.    Reviewed patients plan of care and provided an AVS. The Basic Care Plan (routine screening as documented in Health Maintenance) for Mely meets the Care Plan requirement. This Care Plan has been established and reviewed with the Patient.    Counseling Resources:  ATP IV Guidelines  Pooled Cohorts Equation Calculator  Breast Cancer Risk Calculator  FRAX Risk Assessment  ICSI Preventive Guidelines  Dietary Guidelines for Americans, 2010  USDA's MyPlate  ASA Prophylaxis  Lung CA Screening    Nette Burk MD  [facility]  Answers for HPI/ROS submitted by the patient on 6/23/2017   PHQ-2 Score: 0

## 2017-07-08 ENCOUNTER — MYC MEDICAL ADVICE (OUTPATIENT)
Dept: FAMILY MEDICINE | Facility: CLINIC | Age: 77
End: 2017-07-08

## 2017-07-08 DIAGNOSIS — M94.9 DISORDER OF BONE AND CARTILAGE: Primary | ICD-10-CM

## 2017-07-08 DIAGNOSIS — M81.0 AGE-RELATED OSTEOPOROSIS WITHOUT CURRENT PATHOLOGICAL FRACTURE: ICD-10-CM

## 2017-07-08 DIAGNOSIS — M89.9 DISORDER OF BONE AND CARTILAGE: Primary | ICD-10-CM

## 2017-07-10 PROBLEM — M81.0 AGE-RELATED OSTEOPOROSIS WITHOUT CURRENT PATHOLOGICAL FRACTURE: Status: ACTIVE | Noted: 2017-07-10

## 2017-07-10 PROBLEM — M89.9 DISORDER OF BONE AND CARTILAGE: Status: ACTIVE | Noted: 2017-07-10

## 2017-07-10 PROBLEM — M94.9 DISORDER OF BONE AND CARTILAGE: Status: ACTIVE | Noted: 2017-07-10

## 2017-07-10 NOTE — TELEPHONE ENCOUNTER
SN,  Please see below and advise.  Pended dexa order, last done 8/5/14.  Please advise.  Thanks,  Luann Urias RN

## 2017-07-10 NOTE — TELEPHONE ENCOUNTER
Called and notified pt to call [place], [place] , [facility] or [facility] to schedule DEXA.PK Sanders CMA

## 2017-07-11 ENCOUNTER — HOSPITAL ENCOUNTER (OUTPATIENT)
Dept: ULTRASOUND IMAGING | Facility: CLINIC | Age: 77
Discharge: HOME | End: 2017-07-11
Attending: FAMILY MEDICINE | Admitting: FAMILY MEDICINE
Payer: MEDICARE

## 2017-07-11 DIAGNOSIS — R13.10 SWALLOWING DISORDER: ICD-10-CM

## 2017-07-11 PROCEDURE — 76536 US EXAM OF HEAD AND NECK: CPT

## 2017-07-13 ENCOUNTER — TELEPHONE (OUTPATIENT)
Dept: FAMILY MEDICINE | Facility: CLINIC | Age: 77
End: 2017-07-13

## 2017-07-13 DIAGNOSIS — E04.1 THYROID NODULE: Primary | ICD-10-CM

## 2017-07-13 NOTE — TELEPHONE ENCOUNTER
Reason for Call:  Returning Call    Detailed comments: Mely Guerra is returning phone call from Dr. Burk. Please give her a call back on her home phone when you have a minute.     Phone Number Patient can be reached at: Home number on file [phone] (home)    Best Time: ASAP     Can we leave a detailed message on this number? YES    Call taken on 7/13/2017 at 2:55 PM by Claudia Upton

## 2017-07-13 NOTE — TELEPHONE ENCOUNTER
I called pt and relayed ultrasound results  Nodule along thyroid is noted and follow up with endocrinology is advised    I placed referral for this    Can you send thi to her [email or web address] account?    SN

## 2017-07-17 ENCOUNTER — TRANSFERRED RECORDS (OUTPATIENT)
Dept: HEALTH INFORMATION MANAGEMENT | Facility: CLINIC | Age: 77
End: 2017-07-17

## 2017-08-10 ENCOUNTER — RADIANT APPOINTMENT (OUTPATIENT)
Dept: BONE DENSITY | Facility: CLINIC | Age: 77
End: 2017-08-10
Attending: FAMILY MEDICINE
Payer: COMMERCIAL

## 2017-08-10 DIAGNOSIS — M89.9 DISORDER OF BONE AND CARTILAGE: ICD-10-CM

## 2017-08-10 DIAGNOSIS — M94.9 DISORDER OF BONE AND CARTILAGE: ICD-10-CM

## 2017-08-10 DIAGNOSIS — M81.0 AGE-RELATED OSTEOPOROSIS WITHOUT CURRENT PATHOLOGICAL FRACTURE: ICD-10-CM

## 2017-08-10 PROCEDURE — 77080 DXA BONE DENSITY AXIAL: CPT | Performed by: INTERNAL MEDICINE

## 2017-08-23 NOTE — PROGRESS NOTES
Hello,    The bone density showed osteopenia:  Lumbar Spine (L1-L4)      T-score:  -0.2    Significant degenerative and/or osteosclerotic changes are present, falsely improving result.                Left Femoral Neck            T-score:  NA metal               Right Femoral Neck         T-score:  NA Metal               Forearm (radius 33%)      T-score:  -1.9    Continue the calcium and D and weight bearing    Nette Burk MD

## 2017-08-27 ASSESSMENT — ENCOUNTER SYMPTOMS
SHORTNESS OF BREATH: 0
FEVER: 0
SPUTUM PRODUCTION: 0
SINUS CONGESTION: 0
HOARSE VOICE: 0
COUGH DISTURBING SLEEP: 1
POLYPHAGIA: 0
ALTERED TEMPERATURE REGULATION: 1
EYE REDNESS: 0
RESPIRATORY PAIN: 0
DOUBLE VISION: 0
NECK MASS: 0
NIGHT SWEATS: 0
HEMOPTYSIS: 0
SNORES LOUDLY: 0
HALLUCINATIONS: 0
POLYDIPSIA: 0
INCREASED ENERGY: 0
SORE THROAT: 0
COUGH: 0
POSTURAL DYSPNEA: 0
TROUBLE SWALLOWING: 0
DECREASED APPETITE: 0
SINUS PAIN: 0
WHEEZING: 0
FATIGUE: 0
DYSPNEA ON EXERTION: 0
EYE WATERING: 0
TASTE DISTURBANCE: 0
WEIGHT GAIN: 1
SMELL DISTURBANCE: 0
EYE IRRITATION: 0
EYE PAIN: 0
WEIGHT LOSS: 0
CHILLS: 0

## 2017-08-30 ENCOUNTER — OFFICE VISIT (OUTPATIENT)
Dept: ENDOCRINOLOGY | Facility: CLINIC | Age: 77
End: 2017-08-30

## 2017-08-30 VITALS
SYSTOLIC BLOOD PRESSURE: 147 MMHG | WEIGHT: 133.3 LBS | BODY MASS INDEX: 22.76 KG/M2 | DIASTOLIC BLOOD PRESSURE: 75 MMHG | HEART RATE: 66 BPM | HEIGHT: 64 IN

## 2017-08-30 DIAGNOSIS — M81.0 SENILE OSTEOPOROSIS: ICD-10-CM

## 2017-08-30 DIAGNOSIS — E04.1 THYROID NODULE: Primary | ICD-10-CM

## 2017-08-30 PROBLEM — R13.10 DYSPHAGIA: Status: ACTIVE | Noted: 2017-08-30

## 2017-08-30 RX ORDER — MV-MIN/FA/VIT K/LUTEIN/ZEAXANT 200MCG-5MG
CAPSULE ORAL
COMMUNITY
Start: 2017-06-26 | End: 2018-01-11

## 2017-08-30 RX ORDER — ZOLEDRONIC ACID 5 MG/100ML
5 INJECTION, SOLUTION INTRAVENOUS ONCE
Status: CANCELLED
Start: 2017-08-30 | End: 2017-08-30

## 2017-08-30 ASSESSMENT — PAIN SCALES - GENERAL: PAINLEVEL: NO PAIN (0)

## 2017-08-30 NOTE — PROGRESS NOTES
- Endocrinology Initial Consultation -    Reason for visit/consult: thyroid nodule and osteopenia    Primary care provider: Nette Burk    HPI: A 76yo female here for the evaluation of her thyroid nodule.  She has symptoms of dysphagia over 5 years, she noted when she eats or talks. At that time, her previous PMD sent her to ENT where scope was done which was normal. She has new primary physician Dr. Burk recently and she was sent for thyroid sonogram, which showed solitary right lobe thyroid nodule sub centimeter.   Hypothyrodism since age 16 found out due to amenorrhea.   Appetite: good, gaining weight over past 6 month, energy level: good,   Exercise: bike 20 miles, Sleep: sleep apnea CPAP for 4 years  Hair loss: no, no constipation, forgetfullness: no      She has had osteopenia. Bone density was done this summer, currently taking OTC calcium.     Ethnicity background: [facility]  Prior fragility fracture:no  Parental history of hip fracture:no but hip and knee replacement done due to arthritis  Rheumatoid arthritis:no  Secondary cause of osteoporosis:no steroid  Body habitus (BMI less than or equal to 20):no     Sedentary lifestyle:no  Current tabacco smoking:no  History of thyroid disorder:yes  Calcuim and Vitmin D supplements:OTC    Past Medical/Surgical History:  Past Medical History:   Diagnosis Date     Allergic rhinitis, cause unspecified      Personal history of alcoholism (H)      Undiagnosed cardiac murmurs      Unspecified essential hypertension      Unspecified hypothyroidism        Past Surgical History:   Procedure Laterality Date     C NONSPECIFIC PROCEDURE  1974    s/p Vaginal hysterectomy   bilateral hip and knee 2007, 2011, 2013 hip replacement.     Allergies:  Allergies   Allergen Reactions     Cats      Hylan G-F 20      Seasonal Allergies      Vioxx        Current Medications   Current Outpatient Prescriptions   Medication  "    Omega-3 Fatty Acids (FISH OIL PO)     Multiple Vitamins-Minerals (PRESERVISION AREDS 2+MULTI VIT) CAPS     losartan (COZAAR) 100 MG tablet     levothyroxine (SYNTHROID/LEVOTHROID) 100 MCG tablet     triamterene-hydrochlorothiazide (DYAZIDE) 37.5-25 MG per capsule     fluticasone (FLONASE) 50 MCG/ACT nasal spray     IBUPROFEN PO     Naproxen Sodium (ALEVE PO)     multivitamin, therapeutic with minerals (MULTI-VITAMIN) TABS     Chromium Picolinate 200 MCG TABS     Glucosamine-Chondroit-Vit C-Mn (GLUCOSAMINE 1500 COMPLEX) CAPS     Calcium Carbonate-Vitamin D (CALCIUM + D) 600-200 MG-UNIT per tablet     No current facility-administered medications for this visit.        Family History:  Family History   Problem Relation Age of Onset     CANCER Sister    father: graves, paternal aunt: hypothyroidism,     Social History:  Social History   Substance Use Topics     Smoking status: Never Smoker     Smokeless tobacco: Never Used     Alcohol use No   Lives with , Job: catering, foods.     ROS:  Full review of systems taken with the help of the intake sheet. Otherwise a complete 14 point review of systems was taken and is negative unless stated in the history above.    Physical Exam:     Blood pressure 147/75, pulse 66, height 1.626 m (5' 4\"), weight 60.5 kg (133 lb 4.8 oz)  General: well appearing, no acute distress, pleasant and conversant,   Mental Status/neuro: alert and oriented  Face: symmetrical, normal facial color  Eyes: anicteric, PERRL, no proptosis or lid lag  Neck: suppler, no lymphadenopahty  Thyroid: normal size and texture, no nodule palpable, no bruits  Back: no kyphosis, no scoliosis  Heart: regular rhythm, S1S2, no murmur appreciated  Lung: clear to auscultation bilaterally  Abdomen: soft, NT/ND, no hepatomegaly  Legs: no swelling or edema      Labs : I reviewed data from epic and extract and summarize here.         6/1/2015 10:41 5/2/2016 15:42 3/24/2017 09:54   TSH 2.21 2.54 2.31     Lab Results "   Component Value Date     03/24/2017      Lab Results   Component Value Date    POTASSIUM 4.4 03/24/2017     Lab Results   Component Value Date    CHLORIDE 104 03/24/2017     Lab Results   Component Value Date    CEASAR 9.9 03/24/2017     Lab Results   Component Value Date    CO2 30 03/24/2017     Lab Results   Component Value Date    BUN 10 03/24/2017     Lab Results   Component Value Date    CR 0.76 03/24/2017     Lab Results   Component Value Date    GLC 85 03/24/2017     Lab Results   Component Value Date    TSH 2.31 03/24/2017     BONE DENSITOMETRY: I also reviewed the original images of bone density.   8/10/2017         RISK FACTORS:  Post-menopausal, Early menopause before age 45, Follow-up osteopenia  CURRENT TREATMENT:  Calcium with Vitamin D      FINDINGS:               Lumbar Spine (L1-L4)      T-score:  -0.2    Significant degenerative and/or osteosclerotic changes are present, falsely improving result.                Left Femoral Neck            T-score:  NA metal               Right Femoral Neck         T-score:  NA Metal               Forearm (radius 33%)      T-score:  -1.9                             Lumbar (L1-L4) BMD: 1.168            Previous: 1.142                                              Total Hip Mean BMD: na                 Previous: na      Comparison is made to another DXA performed on the same Lunar Prodigy  machine on 8/5/2014.        IMPRESSION  Osteopenia (low bone mass)  Degenerative changes of the spine     Compared to previous bone densitometry performed on this patient, there is the suggestion of no significant change of the lumbar spine     Thyroid sonogram 7/11/2017 I also reviewed original images and went over origianl images and explained to the patient.         PROCEDURE COMMENT: Ultrasound of the thyroid performed with grayscale  imaging and color Doppler.     FINDINGS:  The right lobe of the thyroid measures 2.8 x 1.3 x 1.2 cm. There is a  well-circumscribed, slightly  hyperechoic, solid-appearing nodule  within the right lobe of the thyroid measuring 0.5 x 0.6 x 0.5 cm.  There are no peripheral rim calcifications or internal echogenic foci.  The nodule demonstrates increased vascularity on color Doppler.      The left lobe of the thyroid measures 0.6 x 0.9 x 0.8 cm and is  homogeneous in echotexture without nodule or mass.      The isthmus measures 0.3 cm in width.      IMPRESSION:  Solid, hypervascular and subcentimeter nodule within the right thyroid  lobe, without microcalcifications. Follow-up recommended.     I have personally reviewed the examination and initial interpretation  and I agree with the findings.    Assessment and Plan  77 year old female with solitary sub centimeter thyroid nodule.     # thyroid nodule:  - It is small but tall shape, solid, she has symptoms for 5 years, we decided to do FNA, which she preferred rather than follow up.     # osteoporosis  - Caclium citrate 1200 mg Vitd 1000 ID daily  - Reclast infusion to set up  - Next bone density 1 year after Reclast  - RTC with me in 1 year.      I spent 60 minutes with this patient face to face and explained the conditions and plans (more than 50% of time was counseling/coordination of care, explained bone physiology, treatment plan, went over the thyroid images together) . The patient understood and is satisfied with today's visit. Return to clinic with me in 1 year.         Mirna Manning MD  Staff Physician  Endocrinology and Metabolism  License: [id number]

## 2017-08-30 NOTE — PATIENT INSTRUCTIONS
IMAGING [phone]    To expedite your medication refill(s), please contact your pharmacy and have them fax a refill request to: [phone].  *Please allow 3 business days for routine medication refills.  *Please allow 5 business days for controlled substance medication refills.  --------------------  For scheduling appointments (including lab work), please request an appointment through [facility], or call: [phone].    For questions for your provider or the endocrine nurse, please send a [facility] message.  For after-hours urgent issues, please dial [phone], and ask to speak with the Endocrinologist On-Call.  --------------------  Please Note: If you are active on [facility], all future test results will be sent by [facility] message only and will no longer be sent by mail. You may also receive communication directly from your physician.

## 2017-08-30 NOTE — MR AVS SNAPSHOT
After Visit Summary   2017    Mely Guerar    MRN: [id number]           Patient Information     Date Of Birth          1940        Visit Information        Provider Department      2017 9:00 AM Mirna Manning MD [facility]        Today's Diagnoses     Thyroid nodule    -  1      Care Instructions    IMAGING [phone]    To expedite your medication refill(s), please contact your pharmacy and have them fax a refill request to: [phone].  *Please allow 3 business days for routine medication refills.  *Please allow 5 business days for controlled substance medication refills.  --------------------  For scheduling appointments (including lab work), please request an appointment through [facility], or call: [phone].    For questions for your provider or the endocrine nurse, please send a [facility] message.  For after-hours urgent issues, please dial [phone], and ask to speak with the Endocrinologist On-Call.  --------------------  Please Note: If you are active on [facility], all future test results will be sent by [facility] message only and will no longer be sent by mail. You may also receive communication directly from your physician.            Follow-ups after your visit        Future tests that were ordered for you today     Open Future Orders        Priority Expected Expires Ordered    US guided thyroid FNA Routine  2018            Who to contact     Please call your clinic at [phone] to:    Ask questions about your health    Make or cancel appointments    Discuss your medicines    Learn about your test results    Speak to your doctor   If you have compliments or concerns about an experience at your clinic, or if you wish to file a complaint, please contact [facility] Patient Relations at [phone] or email us at [email or web address]         Additional Information About Your Visit       "  MyChart Information     The Online 401 gives you secure access to your electronic health record. If you see a primary care provider, you can also send messages to your care team and make appointments. If you have questions, please call your primary care clinic.  If you do not have a primary care provider, please call [phone] and they will assist you.      The Online 401 is an electronic gateway that provides easy, online access to your medical records. With [facility], you can request a clinic appointment, read your test results, renew a prescription or communicate with your care team.     To access your existing account, please contact your [facility] or call [phone] for assistance.        Care EveryWhere ID     This is your Care EveryWhere ID. This could be used by other organizations to access your [facility] medical records  [id number]        Your Vitals Were     Pulse Height BMI (Body Mass Index)             66 1.626 m (5' 4\") 22.88 kg/m2          Blood Pressure from Last 3 Encounters:   08/30/17 147/75   06/26/17 120/74   05/18/17 130/76    Weight from Last 3 Encounters:   08/30/17 60.5 kg (133 lb 4.8 oz)   06/26/17 58.5 kg (129 lb 1 oz)   05/18/17 57.7 kg (127 lb 1.6 oz)                 Today's Medication Changes          These changes are accurate as of: 8/30/17 10:04 AM.  If you have any questions, ask your nurse or doctor.               Stop taking these medicines if you haven't already. Please contact your care team if you have questions.     minocycline 100 MG capsule   Commonly known as:  MINOCIN/DYNACIN           ZANTAC PO                    Primary Care Provider Office Phone # Fax #    WyocenaAster Burk [phone] [address] [address]        Equal Access to Services     CIRILO BAUER AH: Eliot Hutchinson, africa sinclair, qaerik loving. So wa " [phone].    ATENCIÓN: Si rosemary gillespie, tiene a mcfadden disposición servicios gratuitos de asistencia lingüística. Jostin locke [phone].    We comply with applicable federal civil rights laws and [place] laws. We do not discriminate on the basis of race, color, national origin, age, disability sex, sexual orientation or gender identity.            Thank you!     Thank you for choosing [facility]  for your care. Our goal is always to provide you with excellent care. Hearing back from our patients is one way we can continue to improve our services. Please take a few minutes to complete the written survey that you may receive in the mail after your visit with us. Thank you!             Your Updated Medication List - Protect others around you: Learn how to safely use, store and throw away your medicines at www.disposemymeds.org.          This list is accurate as of: 8/30/17 10:04 AM.  Always use your most recent med list.                   Brand Name Dispense Instructions for use Diagnosis    ALEVE PO      Take 220 mg by mouth Takes every AM and PM when not taking ibuprofen.        calcium + D 600-200 MG-UNIT Tabs   Generic drug:  calcium carbonate-vitamin D      Take 2 tablets by mouth 2 times daily.        Chromium 200 MCG Tabs tablet      Take  by mouth.        FISH OIL PO      1,000 mg 2 times daily        fluticasone 50 MCG/ACT spray    FLONASE    48 g    Spray 2 sprays into both nostrils daily    Other allergic rhinitis       GLUCOSAMINE 1500 COMPLEX Caps      Take 1 capsule by mouth daily.    Osteoporosis, unspecified       IBUPROFEN PO      Takes every AM and PM when not taking Aleve.        levothyroxine 100 MCG tablet    SYNTHROID/LEVOTHROID    90 tablet    Take 1 tablet (100 mcg) by mouth daily    Acquired hypothyroidism       losartan 100 MG tablet    COZAAR    90 tablet    Take 1 tablet (100 mg) by mouth daily    Essential hypertension with goal blood pressure less than 140/90       *  multivitamin, therapeutic with minerals Tabs tablet     100 each    Take 1 tablet by mouth daily    Nutritional deficiency       * PRESERVISION AREDS 2+MULTI VIT Caps           triamterene-hydrochlorothiazide 37.5-25 MG per capsule    DYAZIDE    90 capsule    Take 1 capsule by mouth daily    Hypertension goal BP (blood pressure) < 140/90       * Notice:  This list has 2 medication(s) that are the same as other medications prescribed for you. Read the directions carefully, and ask your doctor or other care provider to review them with you.

## 2017-08-30 NOTE — LETTER
8/30/2017     RE: Melyjosé miguel [address]  [address]   [place] [address]     Dear Colleague,    Thank you for referring your patient, Mely Guerra, to the [facility] ENDOCRINOLOGY at [facility]. Please see a copy of my visit note below.                                                   - Endocrinology Initial Consultation -    Reason for visit/consult: thyroid nodule and osteopenia    Primary care provider: Nette Burk    HPI: A 76yo female here for the evaluation of her thyroid nodule.  She has symptoms of dysphagia over 5 years, she noted when she eats or talks. At that time, her previous PMD sent her to ENT where scope was done which was normal. She has new primary physician Dr. Burk recently and she was sent for thyroid sonogram, which showed solitary right lobe thyroid nodule sub centimeter.   Hypothyrodism since age 16 found out due to amenorrhea.   Appetite: good, gaining weight over past 6 month, energy level: good,   Exercise: bike 20 miles, Sleep: sleep apnea CPAP for 4 years  Hair loss: no, no constipation, forgetfullness: no      She has had osteopenia. Bone density was done this summer, currently taking OTC calcium.     Ethnicity background: [employer]  Prior fragility fracture:no  Parental history of hip fracture:no but hip and knee replacement done due to arthritis  Rheumatoid arthritis:no  Secondary cause of osteoporosis:no steroid  Body habitus (BMI less than or equal to 20):no     Sedentary lifestyle:no  Current tabacco smoking:no  History of thyroid disorder:yes  Calcuim and Vitmin D supplements:OTC    Past Medical/Surgical History:  Past Medical History:   Diagnosis Date     Allergic rhinitis, cause unspecified      Personal history of alcoholism (H)      Undiagnosed cardiac murmurs      Unspecified essential hypertension      Unspecified hypothyroidism        Past Surgical History:   Procedure Laterality Date     C NONSPECIFIC  "PROCEDURE  1974    s/p Vaginal hysterectomy   bilateral hip and knee 2007, 2011, 2013 hip replacement.     Allergies:  Allergies   Allergen Reactions     Cats      Hylan G-F 20      Seasonal Allergies      Vioxx        Current Medications   Current Outpatient Prescriptions   Medication     Omega-3 Fatty Acids (FISH OIL PO)     Multiple Vitamins-Minerals (PRESERVISION AREDS 2+MULTI VIT) CAPS     losartan (COZAAR) 100 MG tablet     levothyroxine (SYNTHROID/LEVOTHROID) 100 MCG tablet     triamterene-hydrochlorothiazide (DYAZIDE) 37.5-25 MG per capsule     fluticasone (FLONASE) 50 MCG/ACT nasal spray     IBUPROFEN PO     Naproxen Sodium (ALEVE PO)     multivitamin, therapeutic with minerals (MULTI-VITAMIN) TABS     Chromium Picolinate 200 MCG TABS     Glucosamine-Chondroit-Vit C-Mn (GLUCOSAMINE 1500 COMPLEX) CAPS     Calcium Carbonate-Vitamin D (CALCIUM + D) 600-200 MG-UNIT per tablet     No current facility-administered medications for this visit.        Family History:  Family History   Problem Relation Age of Onset     CANCER Sister    father: beata, paternal aunt: hypothyroidism,     Social History:  Social History   Substance Use Topics     Smoking status: Never Smoker     Smokeless tobacco: Never Used     Alcohol use No   Lives with , Job: [employer], foods.     ROS:  Full review of systems taken with the help of the intake sheet. Otherwise a complete 14 point review of systems was taken and is negative unless stated in the history above.    Physical Exam:     Blood pressure 147/75, pulse 66, height 1.626 m (5' 4\"), weight 60.5 kg (133 lb 4.8 oz)  General: well appearing, no acute distress, pleasant and conversant,   Mental Status/neuro: alert and oriented  Face: symmetrical, normal facial color  Eyes: anicteric, PERRL, no proptosis or lid lag  Neck: suppler, no lymphadenopahty  Thyroid: normal size and texture, no nodule palpable, no bruits  Back: no kyphosis, no scoliosis  Heart: regular rhythm, S1S2, no " murmur appreciated  Lung: clear to auscultation bilaterally  Abdomen: soft, NT/ND, no hepatomegaly  Legs: no swelling or edema      Labs : I reviewed data from epic and extract and summarize here.         6/1/2015 10:41 5/2/2016 15:42 3/24/2017 09:54   TSH 2.21 2.54 2.31     Lab Results   Component Value Date     03/24/2017      Lab Results   Component Value Date    POTASSIUM 4.4 03/24/2017     Lab Results   Component Value Date    CHLORIDE 104 03/24/2017     Lab Results   Component Value Date    CEASAR 9.9 03/24/2017     Lab Results   Component Value Date    CO2 30 03/24/2017     Lab Results   Component Value Date    BUN 10 03/24/2017     Lab Results   Component Value Date    CR 0.76 03/24/2017     Lab Results   Component Value Date    GLC 85 03/24/2017     Lab Results   Component Value Date    TSH 2.31 03/24/2017     BONE DENSITOMETRY: I also reviewed the original images of bone density.   8/10/2017         RISK FACTORS:  Post-menopausal, Early menopause before age 47, Follow-up osteopenia  CURRENT TREATMENT:  Calcium with Vitamin D      FINDINGS:               Lumbar Spine (L1-L4)      T-score:  -0.2    Significant degenerative and/or osteosclerotic changes are present, falsely improving result.                Left Femoral Neck            T-score:  NA metal               Right Femoral Neck         T-score:  NA Metal               Forearm (radius 33%)      T-score:  -1.9                             Lumbar (L1-L4) BMD: 1.168            Previous: 1.142                                              Total Hip Mean BMD: na                 Previous: na      Comparison is made to another DXA performed on the same Lunar [employer]dig[employer]  machine on 8/5/2014.        IMPRESSION  Osteopenia (low bone mass)  Degenerative changes of the spine     Compared to previous bone densitometry performed on this patient, there is the suggestion of no significant change of the lumbar spine     Thyroid sonogram 7/11/2017  I also reviewed  original images and went over origianl images and explained to the patient.         PROCEDURE COMMENT: Ultrasound of the thyroid performed with grayscale  imaging and color Doppler.     FINDINGS:  The right lobe of the thyroid measures 2.8 x 1.3 x 1.2 cm. There is a  well-circumscribed, slightly hyperechoic, solid-appearing nodule  within the right lobe of the thyroid measuring 0.5 x 0.6 x 0.5 cm.  There are no peripheral rim calcifications or internal echogenic foci.  The nodule demonstrates increased vascularity on color Doppler.      The left lobe of the thyroid measures 0.6 x 0.9 x 0.8 cm and is  homogeneous in echotexture without nodule or mass.      The isthmus measures 0.3 cm in width.      IMPRESSION:  Solid, hypervascular and subcentimeter nodule within the right thyroid  lobe, without microcalcifications. Follow-up recommended.     I have personally reviewed the examination and initial interpretation  and I agree with the findings.    Assessment and Plan  77 year old female with solitary sub centimeter thyroid nodule.     # thyroid nodule:  - It is small but tall shape, solid, she has symptoms for 5 years, we decided to do FNA, which she preferred rather than follow up.     # osteoporosis  - Caclium citrate 1200 mg Vitd 1000 ID daily  - Reclast infusion to set up  - Next bone density 1 year after Reclast  - RTC with me in 1 year.      I spent 60 minutes with this patient face to face and explained the conditions and plans (more than 50% of time was counseling/coordination of care, explained bone physiology, treatment plan, went over the thyroid images together) . The patient understood and is satisfied with today's visit. Return to clinic with me in 1 year.       Mirna Manning MD  Staff Physician  Endocrinology and Metabolism  License: [id number]

## 2017-08-30 NOTE — NURSING NOTE
"Chief Complaint   Patient presents with     Consult     NEW THYROID NODULE        Initial /75 (BP Location: Right arm, Patient Position: Sitting, Cuff Size: Adult Regular)  Pulse 66  Ht 1.626 m (5' 4\")  Wt 60.5 kg (133 lb 4.8 oz)  BMI 22.88 kg/m2 Estimated body mass index is 22.88 kg/(m^2) as calculated from the following:    Height as of this encounter: 1.626 m (5' 4\").    Weight as of this encounter: 60.5 kg (133 lb 4.8 oz).  Medication Reconciliation: complete       Baylee Parks CMA     "

## 2017-09-06 ENCOUNTER — OFFICE VISIT (OUTPATIENT)
Dept: FAMILY MEDICINE | Facility: CLINIC | Age: 77
End: 2017-09-06
Payer: COMMERCIAL

## 2017-09-06 VITALS
HEIGHT: 64 IN | WEIGHT: 130.6 LBS | SYSTOLIC BLOOD PRESSURE: 130 MMHG | BODY MASS INDEX: 22.3 KG/M2 | HEART RATE: 71 BPM | DIASTOLIC BLOOD PRESSURE: 78 MMHG | TEMPERATURE: 98.1 F | OXYGEN SATURATION: 100 %

## 2017-09-06 DIAGNOSIS — H91.92 ACUTE HEARING LOSS OF LEFT EAR: Primary | ICD-10-CM

## 2017-09-06 PROCEDURE — 99213 OFFICE O/P EST LOW 20 MIN: CPT | Performed by: FAMILY MEDICINE

## 2017-09-06 NOTE — PROGRESS NOTES
SUBJECTIVE:   Mely Guerra is a 77 year old female who presents to clinic today for the following health issues:      Ear problem      Duration: today    Description (location/character/radiation): L ear feels like plugged    Intensity:  moderate    Accompanying signs and symptoms: n/a    History (similar episodes/previous evaluation): None    Precipitating or alleviating factors: None    Therapies tried and outcome: None     Awoke this AM feeling like the left ear was plugged  Came to clinic thinking it was wax for removal  She states her hearing is less in the left ear.  She denies any HA -  No dizziness  No neurologic symptoms  No recent infections or colds  No tick bites but has outdoor exposure      HEARING FREQUENCY:   Right Ear:  500 Hz: 20 db HL   1000 Hz: 20 db HL   2000 Hz: 20 db HL   4000 Hz: 25 db HL  Left Ear:  500 Hz: 50 db HL   1000 Hz: 30 db HL   2000 Hz: 20 db HL   4000 Hz: 30 db HL      -------------------------------------    Problem list and histories reviewed & adjusted, as indicated.  Additional history: as documented    Patient Active Problem List   Diagnosis     Acquired hypothyroidism     Personal history of alcoholism (H)     Allergic rhinitis     Osteoporosis     Osteoarthritis     Hyperlipidemia LDL goal <160     Hypertension goal BP (blood pressure) < 140/90     Advanced directives, counseling/discussion     Otitis externa     Sleep apnea     Dermatitis     Rosacea     Age-related osteoporosis without current pathological fracture     Disorder of bone and cartilage     Dysphagia     Past Surgical History:   Procedure Laterality Date     C NONSPECIFIC PROCEDURE  1974    s/p Vaginal hysterectomy       Social History   Substance Use Topics     Smoking status: Never Smoker     Smokeless tobacco: Never Used     Alcohol use No     Family History   Problem Relation Age of Onset     CANCER Sister              Reviewed and updated as needed this visit by clinical staffTobacco  Allergies   "Meds  Problems  Med Hx  Surg Hx  Fam Hx  Soc Hx        Reviewed and updated as needed this visit by Provider  Allergies  Meds  Problems         ROS:  Constitutional, HEENT, cardiovascular, pulmonary, GI, , musculoskeletal, neuro, skin, endocrine and psych systems are negative, except as otherwise noted.      OBJECTIVE:   /78  Pulse 71  Temp 98.1  F (36.7  C) (Oral)  Ht 5' 4\" (1.626 m)  Wt 130 lb 9.6 oz (59.2 kg)  SpO2 100%  BMI 22.42 kg/m2  Body mass index is 22.42 kg/(m^2).  GENERAL: healthy, alert and no distress  HENT: ear canals and TM's normal, nose and mouth without ulcers or lesions  NECK: no adenopathy, no asymmetry, masses, or scars and thyroid normal to palpation   Domingo test - sound lateralizes to the right ear    Diagnostic Test Results:  none     ASSESSMENT/PLAN:     1. Acute hearing loss of left ear  Acute onset sensorineural hearing loss in the left ear   Etiology unclear - no specific or other symptoms that could be associated  Discussed having her be evaluated by ENT for possible glucosteroid injection into the left ear?  Scheduled pt for outpatient appt ASAP - 11:00 am tomorrow  Pt will call or RTC if symptoms worsen or do not improve.     Vika Cornelius, DO  [facility]  '  "

## 2017-09-06 NOTE — NURSING NOTE
"Chief Complaint   Patient presents with     Ear Problem     pt c/o loss of hearing in L ear, started this morning, does have hx of wax build up      /78  Pulse 71  Temp 98.1  F (36.7  C) (Oral)  Ht 5' 4\" (1.626 m)  Wt 130 lb 9.6 oz (59.2 kg)  SpO2 100%  BMI 22.42 kg/m2 Estimated body mass index is 22.42 kg/(m^2) as calculated from the following:    Height as of this encounter: 5' 4\" (1.626 m).    Weight as of this encounter: 130 lb 9.6 oz (59.2 kg).  Medication Reconciliation: complete      Health Maintenance due pending provider review:  NONE    n/a    Amita Velazquez CMA  "

## 2017-09-06 NOTE — MR AVS SNAPSHOT
After Visit Summary   9/6/2017    Mely Guerra    MRN: [id number]           Patient Information     Date Of Birth          1940        Visit Information        Provider Department      9/6/2017 4:00 PM Vika Cornelius, DO [facility]        Today's Diagnoses     Acute hearing loss of left ear    -  1       Follow-ups after your visit        Your next 10 appointments already scheduled     Oct 05, 2017  9:30 AM CDT   Nurse Only with [facility] NURSE   [facility] ([facility])    [address]  [facility] [address] [phone]              Who to contact     If you have questions or need follow up information about today's clinic visit or your schedule please contact [facility] directly at [phone].  Normal or non-critical lab and imaging results will be communicated to you by MyChart, letter or phone within 4 business days after the clinic has received the results. If you do not hear from us within 7 days, please contact the clinic through [facility] or phone. If you have a critical or abnormal lab result, we will notify you by phone as soon as possible.  Submit refill requests through [email or web address] or call your pharmacy and they will forward the refill request to us. Please allow 3 business days for your refill to be completed.          Additional Information About Your Visit        MyChart Information     [email or web address] gives you secure access to your electronic health record. If you see a primary care provider, you can also send messages to your care team and make appointments. If you have questions, please call your primary care clinic.  If you do not have a primary care provider, please call [phone] and they will assist you.        Care EveryWhere ID     This is your Care EveryWhere ID. This could be used by other organizations to access your [place] medical records  [id number]        Your Vitals Were     Pulse Temperature Height  "Pulse Oximetry BMI (Body Mass Index)       71 98.1  F (36.7  C) (Oral) 5' 4\" (1.626 m) 100% 22.42 kg/m2        Blood Pressure from Last 3 Encounters:   09/20/17 122/61   09/06/17 130/78   08/30/17 147/75    Weight from Last 3 Encounters:   09/20/17 132 lb 9.6 oz (60.1 kg)   09/06/17 130 lb 9.6 oz (59.2 kg)   08/30/17 133 lb 4.8 oz (60.5 kg)              Today, you had the following     No orders found for display         Today's Medication Changes          These changes are accurate as of: 9/6/17 11:59 PM.  If you have any questions, ask your nurse or doctor.               These medicines have changed or have updated prescriptions.        Dose/Directions    PRESERVISION AREDS 2+MULTI VIT Caps   This may have changed:  Another medication with the same name was removed. Continue taking this medication, and follow the directions you see here.   Changed by:  Mirna Manning MD        Refills:  0         Stop taking these medicines if you haven't already. Please contact your care team if you have questions.     calcium + D 600-200 MG-UNIT Tabs   Generic drug:  calcium carbonate-vitamin D   Stopped by:  Vika Cornelius,                     Primary Care Provider Office Phone # Fax #    [place] [phone] [address] [address]        Equal Access to Services     [facility] AH: Eliot gong Sogeovanna, waaxda luqadaha, qaybta kaalmaisaías zazueta, erik anderson adechantelle greene . So [facility] [phone].    ATENCIÓN: Si habla español, tiene a mcfadden disposición servicios gratuitos de asistencia lingüística. Llame al [phone].    We comply with applicable federal civil rights laws and [place] laws. We do not discriminate on the basis of race, color, national origin, age, disability, sex, sexual orientation, or gender identity.            Thank you!     Thank you for choosing [facility]  for your care. Our goal is always to provide you with " excellent care. Hearing back from our patients is one way we can continue to improve our services. Please take a few minutes to complete the written survey that you may receive in the mail after your visit with us. Thank you!             Your Updated Medication List - Protect others around you: Learn how to safely use, store and throw away your medicines at www.disposemymeds.org.          This list is accurate as of: 9/6/17 11:59 PM.  Always use your most recent med list.                   Brand Name Dispense Instructions for use Diagnosis    ALEVE PO      Take 220 mg by mouth Takes every AM and PM when not taking ibuprofen.        CALCIUM CITRATE + D PO      2 tablets 2 times daily        Chromium 200 MCG Tabs tablet      Take  by mouth.        FISH OIL PO      1,000 mg 2 times daily        fluticasone 50 MCG/ACT spray    FLONASE    48 g    Spray 2 sprays into both nostrils daily    Other allergic rhinitis       GLUCOSAMINE 1500 COMPLEX Caps      Take 1 capsule by mouth daily.    Osteoporosis, unspecified       IBUPROFEN PO      Takes every AM and PM when not taking Aleve.        levothyroxine 100 MCG tablet    SYNTHROID/LEVOTHROID    90 tablet    Take 1 tablet (100 mcg) by mouth daily    Acquired hypothyroidism       losartan 100 MG tablet    COZAAR    90 tablet    Take 1 tablet (100 mg) by mouth daily    Essential hypertension with goal blood pressure less than 140/90       PRESERVISION AREDS 2+MULTI VIT Caps

## 2017-09-07 ENCOUNTER — TRANSFERRED RECORDS (OUTPATIENT)
Dept: HEALTH INFORMATION MANAGEMENT | Facility: CLINIC | Age: 77
End: 2017-09-07

## 2017-09-19 RX ORDER — ZOLEDRONIC ACID 5 MG/100ML
5 INJECTION, SOLUTION INTRAVENOUS ONCE
Status: CANCELLED
Start: 2017-09-19 | End: 2017-09-19

## 2017-09-20 ENCOUNTER — APPOINTMENT (OUTPATIENT)
Dept: LAB | Facility: CLINIC | Age: 77
End: 2017-09-20
Attending: INTERNAL MEDICINE
Payer: MEDICARE

## 2017-09-20 ENCOUNTER — INFUSION THERAPY VISIT (OUTPATIENT)
Dept: INFUSION THERAPY | Facility: CLINIC | Age: 77
End: 2017-09-20
Attending: INTERNAL MEDICINE
Payer: MEDICARE

## 2017-09-20 VITALS
OXYGEN SATURATION: 95 % | DIASTOLIC BLOOD PRESSURE: 61 MMHG | TEMPERATURE: 98.8 F | HEART RATE: 67 BPM | RESPIRATION RATE: 18 BRPM | SYSTOLIC BLOOD PRESSURE: 122 MMHG | WEIGHT: 132.6 LBS | BODY MASS INDEX: 22.76 KG/M2

## 2017-09-20 DIAGNOSIS — M81.0 AGE-RELATED OSTEOPOROSIS WITHOUT CURRENT PATHOLOGICAL FRACTURE: ICD-10-CM

## 2017-09-20 DIAGNOSIS — R13.10 DYSPHAGIA: Primary | ICD-10-CM

## 2017-09-20 LAB
CALCIUM SERPL-MCNC: 10.2 MG/DL (ref 8.5–10.1)
CREAT SERPL-MCNC: 0.81 MG/DL (ref 0.52–1.04)
GFR SERPL CREATININE-BSD FRML MDRD: 69 ML/MIN/1.7M2

## 2017-09-20 PROCEDURE — 25000128 H RX IP 250 OP 636: Mod: ZF | Performed by: INTERNAL MEDICINE

## 2017-09-20 PROCEDURE — 82565 ASSAY OF CREATININE: CPT | Performed by: INTERNAL MEDICINE

## 2017-09-20 PROCEDURE — 96365 THER/PROPH/DIAG IV INF INIT: CPT

## 2017-09-20 PROCEDURE — 82310 ASSAY OF CALCIUM: CPT | Performed by: INTERNAL MEDICINE

## 2017-09-20 PROCEDURE — 36415 COLL VENOUS BLD VENIPUNCTURE: CPT

## 2017-09-20 RX ORDER — ZOLEDRONIC ACID 5 MG/100ML
5 INJECTION, SOLUTION INTRAVENOUS ONCE
Status: COMPLETED | OUTPATIENT
Start: 2017-09-20 | End: 2017-09-20

## 2017-09-20 RX ADMIN — ZOLEDRONIC ACID 5 MG: 0.05 INJECTION, SOLUTION INTRAVENOUS at 14:13

## 2017-09-20 ASSESSMENT — PAIN SCALES - GENERAL: PAINLEVEL: NO PAIN (0)

## 2017-09-20 NOTE — PATIENT INSTRUCTIONS
Dear Mely Guerra    Thank you for choosing [facility] ([facility]) for your infusion.  The following information is a summary of our appointment as well as important reminders.          Additional information: you received your reclast infusion today.       Patient Education    Zoledronic Acid Solution for injection    Zoledronic Acid Solution for injection [Hypercalcemia of Malignancy]    Zoledronic Acid Solution for injection [Pagets Disease]  Zoledronic Acid Solution for injection  What is this medicine?  ZOLEDRONIC ACID (JAYLEN le dron ik AS id) lowers the amount of calcium loss from bone. It is used to treat Paget's disease and osteoporosis in women.  This medicine may be used for other purposes; ask your health care provider or pharmacist if you have questions.  What should I tell my health care provider before I take this medicine?  They need to know if you have any of these conditions:    aspirin-sensitive asthma    cancer, especially if you are receiving medicines used to treat cancer    dental disease or wear dentures    infection    kidney disease    low levels of calcium in the blood    past surgery on the parathyroid gland or intestines    receiving corticosteroids like dexamethasone or prednisone    an unusual or allergic reaction to zoledronic acid, other medicines, foods, dyes, or preservatives    pregnant or trying to get pregnant    breast-feeding  How should I use this medicine?  This medicine is for infusion into a vein. It is given by a health care professional in a hospital or clinic setting.  Talk to your pediatrician regarding the use of this medicine in children. This medicine is not approved for use in children.  Overdosage: If you think you have taken too much of this medicine contact a poison control center or emergency room at once.  NOTE: This medicine is only for you. Do not share this medicine with others.  What if I miss a  dose?  It is important not to miss your dose. Call your doctor or health care professional if you are unable to keep an appointment.  What may interact with this medicine?    certain antibiotics given by injection    NSAIDs, medicines for pain and inflammation, like ibuprofen or naproxen    some diuretics like bumetanide, furosemide    teriparatide  This list may not describe all possible interactions. Give your health care provider a list of all the medicines, herbs, non-prescription drugs, or dietary supplements you use. Also tell them if you smoke, drink alcohol, or use illegal drugs. Some items may interact with your medicine.  What should I watch for while using this medicine?  Visit your doctor or health care professional for regular checkups. It may be some time before you see the benefit from this medicine. Do not stop taking your medicine unless your doctor tells you to. Your doctor may order blood tests or other tests to see how you are doing.  Women should inform their doctor if they wish to become pregnant or think they might be pregnant. There is a potential for serious side effects to an unborn child. Talk to your health care professional or pharmacist for more information.  You should make sure that you get enough calcium and vitamin D while you are taking this medicine. Discuss the foods you eat and the vitamins you take with your health care professional.  Some people who take this medicine have severe bone, joint, and/or muscle pain. This medicine may also increase your risk for jaw problems or a broken thigh bone. Tell your doctor right away if you have severe pain in your jaw, bones, joints, or muscles. Tell your doctor if you have any pain that does not go away or that gets worse.  Tell your dentist and dental surgeon that you are taking this medicine. You should not have major dental surgery while on this medicine. See your dentist to have a dental exam and fix any dental problems before  starting this medicine. Take good care of your teeth while on this medicine. Make sure you see your dentist for regular follow-up appointments.  What side effects may I notice from receiving this medicine?  Side effects that you should report to your doctor or health care professional as soon as possible:    allergic reactions like skin rash, itching or hives, swelling of the face, lips, or tongue    anxiety, confusion, or depression    breathing problems    changes in vision    eye pain    feeling faint or lightheaded, falls    jaw pain, especially after dental work    mouth sores    muscle cramps, stiffness, or weakness    redness, blistering, peeling or loosening of the skin, including inside the mouth    trouble passing urine or change in the amount of urine  Side effects that usually do not require medical attention (report to your doctor or health care professional if they continue or are bothersome):    bone, joint, or muscle pain    constipation    diarrhea    fever    hair loss    irritation at site where injected    loss of appetite    nausea, vomiting    stomach upset    trouble sleeping    trouble swallowing    weak or tired  This list may not describe all possible side effects. Call your doctor for medical advice about side effects. You may report side effects to FDA at 7-342-HXU-9703.  Where should I keep my medicine?  This drug is given in a hospital or clinic and will not be stored at home.  NOTE:This sheet is a summary. It may not cover all possible information. If you have questions about this medicine, talk to your doctor, pharmacist, or health care provider. Copyright  2016 Gold Standard            We look forward in seeing you on your next appointment here at [facility].  Please don t hesitate to call us at [phone] to reschedule any of your appointments or to speak with one of the [facility] registered nurses.  It was a pleasure taking care of you today.    Sincerely,    [facility]  [address] [place] [address]  [address]  Phone:  [phone]

## 2017-09-20 NOTE — PROGRESS NOTES
Nursing Note  Mely Guerra [facility] for:   Chief Complaint   Patient presents with     Blood Draw     Labs drawn via /PIV placed. VS taken. Patient was checked in for infusion.     During today's [facility] appointment, orders from Dr. Bradley were completed.  Frequency: once    Progress note:  Patient identification verified by name and date of birth.  Assessment completed.  Vitals recorded in Doc Flowsheets.  Patient was provided with education regarding infusion and possible side effects.  Patient verbalized understanding.      needed: No  Premedications: were not ordered.  Infusion Rates: infusion given over approximately 30 minutes.  Approximate Infusion length:30 minutes.   Labs: were [facility].  Vascular access: peripheral IV placed today.  Treatment Conditions: patient's creatinine and calcium WNL. Patient verified she is taking a calcium and Vitamin D supplement.   Patient tolerated infusion: well.    Discharge Plan:   Follow up plan of care with: primary care physician  Discharge instructions were reviewed with patient.  Patient/representative verbalized understanding of discharge instructions and all questions answered.  Patient discharged [facility] in stable condition.    Administrations This Visit     zoledronic Acid (RECLAST) infusion 5 mg     Admin Date Action Dose Rate Route Administered By          09/20/2017 New Bag 5 mg 200 mL/hr Intravenous Lyudmila Pollard, RN                         Lyudmila Pollard, RN        /73 (BP Location: Right arm, Patient Position: Sitting, Cuff Size: Adult Regular)  Pulse 91  Temp 98.8  F (37.1  C) (Oral)  Resp 18  Wt 60.1 kg (132 lb 9.6 oz)  SpO2 95%  BMI 22.76 kg/m2

## 2017-09-20 NOTE — NURSING NOTE
Chief Complaint   Patient presents with     Blood Draw     Labs drawn via /PIV placed. VS taken. Patient was checked in for infusion.     Sammie Mejia RN

## 2017-09-24 ENCOUNTER — DOCUMENTATION ONLY (OUTPATIENT)
Dept: ENDOCRINOLOGY | Facility: CLINIC | Age: 77
End: 2017-09-24

## 2017-09-24 ENCOUNTER — MYC MEDICAL ADVICE (OUTPATIENT)
Dept: ENDOCRINOLOGY | Facility: CLINIC | Age: 77
End: 2017-09-24

## 2017-09-24 DIAGNOSIS — E83.52 HYPERCALCEMIA: Primary | ICD-10-CM

## 2017-09-25 NOTE — TELEPHONE ENCOUNTER
Ref. Range 9/20/2017 13:22   Creatinine Latest Ref Range: 0.52 - 1.04 mg/dL 0.81   GFR Estimate Latest Ref Range: >60 mL/min/1.7m2 69   GFR Estimate If Black Latest Ref Range: >60 mL/min/1.7m2 83   Calcium Latest Ref Range: 8.5 - 10.1 mg/dL 10.2 (H)    She has osteoporosis, also previous record showed milld hypercalcemia.     Sent message as follows.      Hi    Blood work results on 9/20 showed slightly elevated Ca levels.     I would like to make sure    1. Whether you have history of kidney stone or not    2. Next blood work would be in 2month, we will check Ca and parathyroid hormone level.    Take care      Mirna Manning MD

## 2017-09-25 NOTE — PROGRESS NOTES
Resulted Orders   Fine needle aspiration   Result Value Ref Range    Copath Report       Patient Name: THELMA VELÁSQUEZ  MR#: [id number]  Specimen #: [id number]  Collected: 9/15/2017  Received: 9/15/2017  Reported: 9/18/2017 12:32  Ordering Phy(s): JM CASTAÑEDA    For improved result formatting, select 'View Enhanced Report Format'  under Linked Documents section.    SPECIMEN/STAIN PROCESS:  FNA-thyroid, right #1       Pap-Cyto x 3, Diff Quick Stain-cyto x 3    ----------------------------------------------------------------    CYTOLOGIC INTERPRETATION:    Thyroid, right, nodule #1, ultrasound guided fine needle aspiration:  Benign  - Consistent with a benign nodule (includes adenomatoid nodule, colloid  nodule, etc.)  Specimen Adequacy: Satisfactory for evaluation.    The [place] implied risk of malignancy and recommended clinical  management:  Benign has a 0-3% risk of malignancy, recommended management is clinical  follow-up    I have personally reviewed all specimens and/or slides, including the  listed special stains, and used them with my medical judgement to  determ ine or confirm the final diagnosis.    Electronically signed out by:  Derrick Garcia M.D., Physicians    Processed and screened at [facility]    CLINICAL HISTORY:  77-year-old female with a history of dysphagia and thyroid nodule.    ,    GROSS:  FNA-thyroid, right #1:  Received are 3 fixed slides, processed for Pap  stain, and 3 air dried slides, processed for Diff Quik stain. Afirma  tube held.    INTRAOPERATIVE CONSULTATION:  FNA Performance: Fine needle aspiration was not performed by [facility],   Pathology staff.    Immediate Adequacy: On site specimen adequacy evaluation was performed  by Dr. DAMIEN Dee MD via telepathology.    Onsite adequacy/interpretation:  Pass A1: Adequate; Pass A2: Put directly into Afirma tube; Passes A3-A4:  Adequate.    MICROSCOPIC:  Microscopic examination  is performed.    Zraia Dent MD, Pathology Resident; Neelam Dasilva MD, Cytopathology  Fellow; Derrick Garcia MD.    CPT Codes:   77682-UJF A-IMP, 34414-JJUX-MJY  A: 52563-DDOK    TESTING LAB LOCATION:  [facility], [address]   [id number] [phone]    COLLECTION SITE:  Client:  [facility]  Location:  [facility] (B)    Resident  [id number]       I sent a letter to the patient. Negative resutls.    Mirna Manning MD  Staff Physician  Endocrinology and Metabolism  [facility]  License: [place] [id number]  Pager: [phone]

## 2017-10-04 ENCOUNTER — TRANSFERRED RECORDS (OUTPATIENT)
Dept: HEALTH INFORMATION MANAGEMENT | Facility: CLINIC | Age: 77
End: 2017-10-04

## 2017-11-14 ENCOUNTER — MYC MEDICAL ADVICE (OUTPATIENT)
Dept: FAMILY MEDICINE | Facility: CLINIC | Age: 77
End: 2017-11-14

## 2017-11-27 DIAGNOSIS — E83.52 HYPERCALCEMIA: ICD-10-CM

## 2017-11-27 LAB
ANION GAP SERPL CALCULATED.3IONS-SCNC: 7 MMOL/L (ref 3–14)
BUN SERPL-MCNC: 10 MG/DL (ref 7–30)
CALCIUM SERPL-MCNC: 9.8 MG/DL (ref 8.5–10.1)
CHLORIDE SERPL-SCNC: 97 MMOL/L (ref 94–109)
CO2 SERPL-SCNC: 25 MMOL/L (ref 20–32)
CREAT SERPL-MCNC: 0.7 MG/DL (ref 0.52–1.04)
GFR SERPL CREATININE-BSD FRML MDRD: 82 ML/MIN/1.7M2
GLUCOSE SERPL-MCNC: 100 MG/DL (ref 70–99)
POTASSIUM SERPL-SCNC: 3.9 MMOL/L (ref 3.4–5.3)
PTH-INTACT SERPL-MCNC: 42 PG/ML (ref 12–72)
SODIUM SERPL-SCNC: 129 MMOL/L (ref 133–144)

## 2017-11-27 PROCEDURE — 80048 BASIC METABOLIC PNL TOTAL CA: CPT | Performed by: INTERNAL MEDICINE

## 2017-11-27 PROCEDURE — 83970 ASSAY OF PARATHORMONE: CPT | Performed by: INTERNAL MEDICINE

## 2017-11-27 NOTE — NURSING NOTE
Chief Complaint   Patient presents with     Labs Only     Labs drawn by RN from VPT.      SAMANTHA GAMING RN

## 2017-12-12 ENCOUNTER — OFFICE VISIT (OUTPATIENT)
Dept: FAMILY MEDICINE | Facility: CLINIC | Age: 77
End: 2017-12-12
Payer: COMMERCIAL

## 2017-12-12 VITALS
SYSTOLIC BLOOD PRESSURE: 157 MMHG | HEIGHT: 64 IN | BODY MASS INDEX: 22.2 KG/M2 | WEIGHT: 130 LBS | TEMPERATURE: 97 F | OXYGEN SATURATION: 100 % | HEART RATE: 66 BPM | RESPIRATION RATE: 18 BRPM | DIASTOLIC BLOOD PRESSURE: 92 MMHG

## 2017-12-12 DIAGNOSIS — E03.9 ACQUIRED HYPOTHYROIDISM: ICD-10-CM

## 2017-12-12 DIAGNOSIS — I10 HYPERTENSION GOAL BP (BLOOD PRESSURE) < 140/90: Primary | ICD-10-CM

## 2017-12-12 PROCEDURE — 84439 ASSAY OF FREE THYROXINE: CPT | Performed by: FAMILY MEDICINE

## 2017-12-12 PROCEDURE — 84443 ASSAY THYROID STIM HORMONE: CPT | Performed by: FAMILY MEDICINE

## 2017-12-12 PROCEDURE — 80048 BASIC METABOLIC PNL TOTAL CA: CPT | Performed by: FAMILY MEDICINE

## 2017-12-12 PROCEDURE — 36415 COLL VENOUS BLD VENIPUNCTURE: CPT | Performed by: FAMILY MEDICINE

## 2017-12-12 PROCEDURE — 84481 FREE ASSAY (FT-3): CPT | Performed by: FAMILY MEDICINE

## 2017-12-12 PROCEDURE — 99214 OFFICE O/P EST MOD 30 MIN: CPT | Performed by: FAMILY MEDICINE

## 2017-12-12 RX ORDER — AMLODIPINE BESYLATE 5 MG/1
5 TABLET ORAL DAILY
Qty: 30 TABLET | Refills: 3 | Status: SHIPPED | OUTPATIENT
Start: 2017-12-12 | End: 2018-01-11 | Stop reason: DRUGHIGH

## 2017-12-12 NOTE — MR AVS SNAPSHOT
After Visit Summary   12/12/2017    Mely Guerra    MRN: [id number]           Patient Information     Date Of Birth          1940        Visit Information        Provider Department      12/12/2017 11:30 AM Tiffany Messer MD [facility]        Today's Diagnoses     Hypertension goal BP (blood pressure) < 140/90    -  1    Acquired hypothyroidism           Follow-ups after your visit        Your next 10 appointments already scheduled     Jan 11, 2018  2:00 PM CST   Office Visit with Nette Burk MD   [facility] ([facility])    [address]  [facility] [id number] [phone]           Bring a current list of meds and any records pertaining to this visit. For Physicals, please bring immunization records and any forms needing to be filled out. Please arrive 10 minutes early to complete paperwork.            Jan 11, 2018  2:30 PM CST   Office Visit with Mary Bustos [facility] ([facility])    [address]  [address]  [facility] [id number] [phone]           Bring a current list of meds and any records pertaining to this visit. For Physicals, please bring immunization records and any forms needing to be filled out. Please arrive 10 minutes early to complete paperwork.              Who to contact     If you have questions or need follow up information about today's clinic visit or your schedule please contact [facility] directly at [phone].  Normal or non-critical lab and imaging results will be communicated to you by MyChart, letter or phone within 4 business days after the clinic has received the results. If you do not hear from us within 7 days, please contact the clinic through "[employer]hart or phone. If you have a critical or abnormal lab result, we will notify you by phone as soon as possible.  Submit refill requests through [employer] or call your pharmacy  "and they will forward the refill request to us. Please allow 3 business days for your refill to be completed.          Additional Information About Your Visit        [employer] Information     [employer] gives you secure access to your electronic health record. If you see a primary care provider, you can also send messages to your care team and make appointments. If you have questions, please call your primary care clinic.  If you do not have a primary care provider, please call [phone] and they will assist you.        Care EveryWhere ID     This is your Care EveryWhere ID. This could be used by other organizations to access your [place] medical records  [id number]        Your Vitals Were     Pulse Temperature Respirations Height Pulse Oximetry BMI (Body Mass Index)    66 97  F (36.1  C) 18 5' 4\" (1.626 m) 100% 22.31 kg/m2       Blood Pressure from Last 3 Encounters:   12/26/17 134/84   12/12/17 (!) 157/92   09/20/17 122/61    Weight from Last 3 Encounters:   12/26/17 131 lb 12.8 oz (59.8 kg)   12/12/17 130 lb (59 kg)   09/20/17 132 lb 9.6 oz (60.1 kg)              We Performed the Following     Basic metabolic panel  (Ca, Cl, CO2, Creat, Gluc, K, Na, BUN)     T3, Free     T4, free     TSH with free T4 reflex          Today's Medication Changes          These changes are accurate as of: 12/12/17 11:59 PM.  If you have any questions, ask your nurse or doctor.               Start taking these medicines.        Dose/Directions    amLODIPine 5 MG tablet   Commonly known as:  NORVASC   Used for:  Hypertension goal BP (blood pressure) < 140/90   Started by:  Tiffany Messer MD        Dose:  5 mg   Take 1 tablet (5 mg) by mouth daily   Quantity:  30 tablet   Refills:  3            Where to get your medicines      These medications were sent to [facility] [id number] [place] [address] & [address]  [address] [place] [id number]     Phone:  [phone]     amLODIPine 5 MG tablet "                Primary Care Provider Office Phone # Fax #    [place] [phone] [address] [address]        Equal Access to Services     CIRILO BAUER : Eliot davis farideh Sogeovanna, waaxda luqadaha, qaybta kaalmada adepolo, erik flores jc odom. So [facility] [phone].    ATENCIÓN: Si habla español, tiene a mcfadden disposición servicios gratuitos de asistencia lingüística. Lindaame al [phone].    We comply with applicable federal civil rights laws and [place] laws. We do not discriminate on the basis of race, color, national origin, age, disability, sex, sexual orientation, or gender identity.            Thank you!     Thank you for choosing [facility]  for your care. Our goal is always to provide you with excellent care. Hearing back from our patients is one way we can continue to improve our services. Please take a few minutes to complete the written survey that you may receive in the mail after your visit with us. Thank you!             Your Updated Medication List - Protect others around you: Learn how to safely use, store and throw away your medicines at www.disposemymeds.org.          This list is accurate as of: 12/12/17 11:59 PM.  Always use your most recent med list.                   Brand Name Dispense Instructions for use Diagnosis    ALEVE PO      Take 220 mg by mouth Takes every AM and PM when not taking ibuprofen.        amLODIPine 5 MG tablet    NORVASC    30 tablet    Take 1 tablet (5 mg) by mouth daily    Hypertension goal BP (blood pressure) < 140/90       CALCIUM CITRATE + D PO      2 tablets 2 times daily        Chromium 200 MCG Tabs tablet      Take  by mouth.        FISH OIL PO      1,000 mg 2 times daily        fluticasone 50 MCG/ACT spray    FLONASE    48 g    Spray 2 sprays into both nostrils daily    Other allergic rhinitis       GLUCOSAMINE 1500 COMPLEX Caps      Take 1 capsule by mouth daily.     Osteoporosis, unspecified       IBUPROFEN PO      Takes every AM and PM when not taking Aleve.        levothyroxine 100 MCG tablet    SYNTHROID/LEVOTHROID    90 tablet    Take 1 tablet (100 mcg) by mouth daily    Acquired hypothyroidism       losartan 100 MG tablet    COZAAR    90 tablet    Take 1 tablet (100 mg) by mouth daily    Essential hypertension with goal blood pressure less than 140/90       PRESERVISION AREDS 2+MULTI VIT Caps           triamterene-hydrochlorothiazide 37.5-25 MG per capsule    DYAZIDE    90 capsule    Take 1 capsule by mouth daily    Hypertension goal BP (blood pressure) < 140/90

## 2017-12-12 NOTE — PROGRESS NOTES
SUBJECTIVE:   Meyl Guerra is a 77 year old female who presents to clinic today for the following health issues:      Hypertension Follow-up      Outpatient blood pressures are being checked at home and cheyanne.  Results are 11/20/2017 161/86    12/10/0217 148/86    12/11/2017 129/85    12/12/2017 147/81.    Low Salt Diet: low salt    She does not have any symptoms, no headaches, no chest pressure, no vision changes etc     Amount of exercise or physical activity: 4-5 days/week for an average of 30-45 minutes    Problems taking medications regularly: No    Medication side effects: none    Diet: low salt            Problem list and histories reviewed & adjusted, as indicated.  Additional history: as documented    Patient Active Problem List   Diagnosis     Acquired hypothyroidism     Personal history of alcoholism (H)     Allergic rhinitis     Osteoporosis     Osteoarthritis     Hyperlipidemia LDL goal <160     Hypertension goal BP (blood pressure) < 140/90     Advanced directives, counseling/discussion     Otitis externa     Sleep apnea     Dermatitis     Rosacea     Age-related osteoporosis without current pathological fracture     Disorder of bone and cartilage     Dysphagia     Past Surgical History:   Procedure Laterality Date     C NONSPECIFIC PROCEDURE  1974    s/p Vaginal hysterectomy       Social History   Substance Use Topics     Smoking status: Never Smoker     Smokeless tobacco: Never Used     Alcohol use No     Family History   Problem Relation Age of Onset     CANCER Sister          Current Outpatient Prescriptions   Medication Sig Dispense Refill     amLODIPine (NORVASC) 5 MG tablet Take 1 tablet (5 mg) by mouth daily 30 tablet 3     triamterene-hydrochlorothiazide (DYAZIDE) 37.5-25 MG per capsule Take 1 capsule by mouth daily 90 capsule 1     Calcium Citrate-Vitamin D (CALCIUM CITRATE + D PO) 2 tablets 2 times daily       Omega-3 Fatty Acids (FISH OIL PO) 1,000 mg 2 times daily       Multiple  Vitamins-Minerals (PRESERVISION AREDS 2+MULTI VIT) CAPS        losartan (COZAAR) 100 MG tablet Take 1 tablet (100 mg) by mouth daily 90 tablet 3     levothyroxine (SYNTHROID/LEVOTHROID) 100 MCG tablet Take 1 tablet (100 mcg) by mouth daily 90 tablet 3     fluticasone (FLONASE) 50 MCG/ACT nasal spray Spray 2 sprays into both nostrils daily 48 g prn     IBUPROFEN PO Takes every AM and PM when not taking Aleve.       Naproxen Sodium (ALEVE PO) Take 220 mg by mouth Takes every AM and PM when not taking ibuprofen.       Chromium Picolinate 200 MCG TABS Take  by mouth.       Glucosamine-Chondroit-Vit C-Mn (GLUCOSAMINE 1500 COMPLEX) CAPS Take 1 capsule by mouth daily.       Allergies   Allergen Reactions     Cats      Hylan G-F 20      Seasonal Allergies      Vioxx      Recent Labs   Lab Test  12/12/17   1210  11/27/17   1119   03/24/17   0954   06/03/14   0908   03/30/11   0931  04/08/10   0937   LDL   --    --    --    --    --   126   --   140*  149*   HDL   --    --    --    --    --   99   --   86  89   TRIG   --    --    --    --    --   70   --   73  48   ALT   --    --    --   23   --    --    --    --    --    CR  0.69  0.70   < >  0.76   < >  0.78   < >  0.75  0.76   GFRESTIMATED  82  82   < >  75   < >  73   < >  77  76   GFRESTBLACK  >90  >90   < >  >90   GFR Calc     < >  88   < >  >90  >90   POTASSIUM  4.5  3.9   --   4.4   < >  4.3   < >  4.9  5.1   TSH  2.13   --    --   2.31   < >  2.87   < >  0.89  1.60    < > = values in this interval not displayed.      BP Readings from Last 3 Encounters:   12/26/17 134/84   12/12/17 (!) 157/92   09/20/17 122/61    Wt Readings from Last 3 Encounters:   12/26/17 131 lb 12.8 oz (59.8 kg)   12/12/17 130 lb (59 kg)   09/20/17 132 lb 9.6 oz (60.1 kg)                  Labs reviewed in EPIC          Reviewed and updated as needed this visit by clinical staff     Reviewed and updated as needed this visit by Provider     ROS:  Constitutional, HEENT,  "cardiovascular, pulmonary, GI, , musculoskeletal, neuro, skin, endocrine and psych systems are negative, except as otherwise noted.      OBJECTIVE:   BP (!) 157/92  Pulse 66  Temp 97  F (36.1  C)  Resp 18  Ht 5' 4\" (1.626 m)  Wt 130 lb (59 kg)  SpO2 100%  BMI 22.31 kg/m2  Body mass index is 22.31 kg/(m^2).  GENERAL: healthy, alert and no distress  EYES: Eyes grossly normal to inspection, PERRL and conjunctivae and sclerae normal  NECK: no adenopathy, no asymmetry, masses, or scars and thyroid normal to palpation  RESP: lungs clear to auscultation - no rales, rhonchi or wheezes  CV: regular rate and rhythm, normal S1 S2, no S3 or S4, no murmur, click or rub, no peripheral edema and peripheral pulses strong  ABDOMEN: soft, nontender, no hepatosplenomegaly, no masses and bowel sounds normal  MS: no gross musculoskeletal defects noted, no edema  NEURO: Normal strength and tone, mentation intact and speech normal    Diagnostic Test Results:  Results for orders placed or performed in visit on 12/12/17   TSH with free T4 reflex   Result Value Ref Range    TSH 2.13 0.40 - 4.00 mU/L   T4, free   Result Value Ref Range    T4 Free 1.42 0.76 - 1.46 ng/dL   T3, Free   Result Value Ref Range    Free T3 2.6 2.3 - 4.2 pg/mL   Basic metabolic panel  (Ca, Cl, CO2, Creat, Gluc, K, Na, BUN)   Result Value Ref Range    Sodium 135 133 - 144 mmol/L    Potassium 4.5 3.4 - 5.3 mmol/L    Chloride 99 94 - 109 mmol/L    Carbon Dioxide 28 20 - 32 mmol/L    Anion Gap 8 3 - 14 mmol/L    Glucose 82 70 - 99 mg/dL    Urea Nitrogen 10 7 - 30 mg/dL    Creatinine 0.69 0.52 - 1.04 mg/dL    GFR Estimate 82 >60 mL/min/1.7m2    GFR Estimate If Black >90 >60 mL/min/1.7m2    Calcium 10.1 8.5 - 10.1 mg/dL       ASSESSMENT/PLAN:       1. Hypertension goal BP (blood pressure) < 140/90  We discussed adding amlodipine as her BP is high and she has been on the losartan 100mg and also is on the Dyazide 37.5-25 daily and has been on ti for a while , will " need to come back for recheck in 2 to 3 weeks sooner if any concerns   - TSH with free T4 reflex  - Basic metabolic panel  (Ca, Cl, CO2, Creat, Gluc, K, Na, BUN)  - amLODIPine (NORVASC) 5 MG tablet; Take 1 tablet (5 mg) by mouth daily  Dispense: 30 tablet; Refill: 3    2. Acquired hypothyroidism  As above   - TSH with free T4 reflex  - T4, free  - T3, Free  - Basic metabolic panel  (Ca, Cl, CO2, Creat, Gluc, K, Na, BUN)    F/u in 2 to 3 weeks     Tiffany Messer MD  [facility]

## 2017-12-12 NOTE — NURSING NOTE
"Chief Complaint   Patient presents with     Hypertension     BP (!) 157/92  Pulse 66  Temp 97  F (36.1  C)  Resp 18  Ht 5' 4\" (1.626 m)  Wt 130 lb (59 kg)  SpO2 100%  BMI 22.31 kg/m2 Estimated body mass index is 22.31 kg/(m^2) as calculated from the following:    Height as of this encounter: 5' 4\" (1.626 m).    Weight as of this encounter: 130 lb (59 kg).  bp completed using cuff size: regular       Health Maintenance addressed:  BP was high, used pink card, recheck manually    Possibly completing today per provider review.    Azul Boucher MA       "

## 2017-12-13 LAB
ANION GAP SERPL CALCULATED.3IONS-SCNC: 8 MMOL/L (ref 3–14)
BUN SERPL-MCNC: 10 MG/DL (ref 7–30)
CALCIUM SERPL-MCNC: 10.1 MG/DL (ref 8.5–10.1)
CHLORIDE SERPL-SCNC: 99 MMOL/L (ref 94–109)
CO2 SERPL-SCNC: 28 MMOL/L (ref 20–32)
CREAT SERPL-MCNC: 0.69 MG/DL (ref 0.52–1.04)
GFR SERPL CREATININE-BSD FRML MDRD: 82 ML/MIN/1.7M2
GLUCOSE SERPL-MCNC: 82 MG/DL (ref 70–99)
POTASSIUM SERPL-SCNC: 4.5 MMOL/L (ref 3.4–5.3)
SODIUM SERPL-SCNC: 135 MMOL/L (ref 133–144)
T3FREE SERPL-MCNC: 2.6 PG/ML (ref 2.3–4.2)
T4 FREE SERPL-MCNC: 1.42 NG/DL (ref 0.76–1.46)
TSH SERPL DL<=0.005 MIU/L-ACNC: 2.13 MU/L (ref 0.4–4)

## 2017-12-26 ENCOUNTER — OFFICE VISIT (OUTPATIENT)
Dept: FAMILY MEDICINE | Facility: CLINIC | Age: 77
End: 2017-12-26
Payer: COMMERCIAL

## 2017-12-26 VITALS
WEIGHT: 131.8 LBS | SYSTOLIC BLOOD PRESSURE: 134 MMHG | DIASTOLIC BLOOD PRESSURE: 84 MMHG | HEIGHT: 64 IN | HEART RATE: 91 BPM | BODY MASS INDEX: 22.5 KG/M2 | OXYGEN SATURATION: 98 %

## 2017-12-26 DIAGNOSIS — I10 HYPERTENSION GOAL BP (BLOOD PRESSURE) < 140/90: Primary | ICD-10-CM

## 2017-12-26 PROCEDURE — 99214 OFFICE O/P EST MOD 30 MIN: CPT | Performed by: FAMILY MEDICINE

## 2017-12-26 NOTE — PROGRESS NOTES
SUBJECTIVE:   Mely Guerra is a 77 year old female who presents to clinic today for the following health issues:      Hypertension Follow-up- seems better than two weeks ago she started the Norvasc 5 mg on top of the HCTZ- Triamterene and losartan that she is already taking , she had her labs checked at the last OV and they are normal, creatinine , lytes etc .  She denies any side effects from the new medication, she has been checking her BPs  at home and for the most part they are under 150 over 90 but there were a few ( thee ) systolic in the 160 and diastolic was under 90. She denies any symptoms, not sure bout any FH of hTN as her parents  in their 50s.      Outpatient blood pressures are being checked at home.  Results are 156/82--averages, once daily.    Low Salt Diet: no added salt        Amount of exercise or physical activity: 4 days    Problems taking medications regularly: No    Medication side effects: none    Diet: regular (no restrictions)            Problem list and histories reviewed & adjusted, as indicated.  Additional history: as documented    Patient Active Problem List   Diagnosis     Acquired hypothyroidism     Personal history of alcoholism (H)     Allergic rhinitis     Osteoporosis     Osteoarthritis     Hyperlipidemia LDL goal <160     Hypertension goal BP (blood pressure) < 140/90     Advanced directives, counseling/discussion     Otitis externa     Sleep apnea     Dermatitis     Rosacea     Age-related osteoporosis without current pathological fracture     Disorder of bone and cartilage     Dysphagia     Past Surgical History:   Procedure Laterality Date     C NONSPECIFIC PROCEDURE      s/p Vaginal hysterectomy       Social History   Substance Use Topics     Smoking status: Never Smoker     Smokeless tobacco: Never Used     Alcohol use No     Family History   Problem Relation Age of Onset     CANCER Sister          Current Outpatient Prescriptions   Medication Sig Dispense  Refill     amLODIPine (NORVASC) 5 MG tablet Take 1 tablet (5 mg) by mouth daily 30 tablet 3     triamterene-hydrochlorothiazide (DYAZIDE) 37.5-25 MG per capsule Take 1 capsule by mouth daily 90 capsule 1     Calcium Citrate-Vitamin D (CALCIUM CITRATE + D PO) 2 tablets 2 times daily       Omega-3 Fatty Acids (FISH OIL PO) 1,000 mg 2 times daily       Multiple Vitamins-Minerals (PRESERVISION AREDS 2+MULTI VIT) CAPS        losartan (COZAAR) 100 MG tablet Take 1 tablet (100 mg) by mouth daily 90 tablet 3     levothyroxine (SYNTHROID/LEVOTHROID) 100 MCG tablet Take 1 tablet (100 mcg) by mouth daily 90 tablet 3     fluticasone (FLONASE) 50 MCG/ACT nasal spray Spray 2 sprays into both nostrils daily 48 g prn     IBUPROFEN PO Takes every AM and PM when not taking Aleve.       Naproxen Sodium (ALEVE PO) Take 220 mg by mouth Takes every AM and PM when not taking ibuprofen.       Glucosamine-Chondroit-Vit C-Mn (GLUCOSAMINE 1500 COMPLEX) CAPS Take 1 capsule by mouth daily.       Chromium Picolinate 200 MCG TABS Take  by mouth.       Allergies   Allergen Reactions     Cats      Hylan G-F 20      Seasonal Allergies      Vioxx      Recent Labs   Lab Test  12/12/17   1210  11/27/17   1119   03/24/17   0954   06/03/14   0908   03/30/11   0931  04/08/10   0937   LDL   --    --    --    --    --   126   --   140*  149*   HDL   --    --    --    --    --   99   --   86  89   TRIG   --    --    --    --    --   70   --   73  48   ALT   --    --    --   23   --    --    --    --    --    CR  0.69  0.70   < >  0.76   < >  0.78   < >  0.75  0.76   GFRESTIMATED  82  82   < >  75   < >  73   < >  77  76   GFRESTBLACK  >90  >90   < >  >90   GFR Calc     < >  88   < >  >90  >90   POTASSIUM  4.5  3.9   --   4.4   < >  4.3   < >  4.9  5.1   TSH  2.13   --    --   2.31   < >  2.87   < >  0.89  1.60    < > = values in this interval not displayed.      BP Readings from Last 3 Encounters:   12/26/17 134/84   12/12/17 (!) 157/92  "  09/20/17 122/61    Wt Readings from Last 3 Encounters:   12/26/17 131 lb 12.8 oz (59.8 kg)   12/12/17 130 lb (59 kg)   09/20/17 132 lb 9.6 oz (60.1 kg)                  Labs reviewed in EPIC          Reviewed and updated as needed this visit by clinical staff     Reviewed and updated as needed this visit by Provider         ROS:  Constitutional, HEENT, cardiovascular, pulmonary, GI, , musculoskeletal, neuro, skin, endocrine and psych systems are negative, except as otherwise noted.      OBJECTIVE:   /84  Pulse 91  Ht 5' 4\" (1.626 m)  Wt 131 lb 12.8 oz (59.8 kg)  SpO2 98%  BMI 22.62 kg/m2  Body mass index is 22.62 kg/(m^2).  GENERAL: healthy, alert and no distress  EYES: Eyes grossly normal to inspection, PERRL and conjunctivae and sclerae normal  NECK: no adenopathy, no asymmetry, masses, or scars and thyroid normal to palpation  RESP: lungs clear to auscultation - no rales, rhonchi or wheezes  CV: regular rate and rhythm, normal S1 S2, no S3 or S4, no murmur, click or rub, no peripheral edema and peripheral pulses strong  MS: no gross musculoskeletal defects noted, no edema  NEURO: Normal strength and tone, mentation intact and speech normal    Diagnostic Test Results:  none     ASSESSMENT/PLAN:       1. Hypertension goal BP (blood pressure) < 140/90  We went over her home BP readings , seems most of them are under 140 over 90 but there were a few in the 160s range , discussed continuing to monitor , she is currently on three different anti HTN meds, and recent one ( amlodipine ) she has had only for a couple of weeks, she denies any side effects form it .  She exercises regualrly , daily , does not smoke, no salty foods etc , will keep treck of the Bp at home for two weeks and then if she still has values over 140 and 90 would make an appointment with Fara Alexandra to discuss MTM  , sooner appointment with me if any new symptoms.   Pt is aware  and comfortable with the current plan.  Total time " spent with pt was over 25 min of which more than 50% in counselling    RTC if no improving or worsening.    Tiffany Messer MD  [facility]

## 2017-12-26 NOTE — MR AVS SNAPSHOT
"              After Visit Summary   12/26/2017    Mely Guerra    MRN: [id number]           Patient Information     Date Of Birth          1940        Visit Information        Provider Department      12/26/2017 2:30 PM Tiffany Messer MD [facility]        Today's Diagnoses     Hypertension goal BP (blood pressure) < 140/90    -  1       Follow-ups after your visit        Who to contact     If you have questions or need follow up information about today's clinic visit or your schedule please contact [facility] directly at [phone].  Normal or non-critical lab and imaging results will be communicated to you by Sefas Innovationhart, letter or phone within 4 business days after the clinic has received the results. If you do not hear from us within 7 days, please contact the clinic through QikServet or phone. If you have a critical or abnormal lab result, we will notify you by phone as soon as possible.  Submit refill requests through SaludFÃCIL or call your pharmacy and they will forward the refill request to us. Please allow 3 business days for your refill to be completed.          Additional Information About Your Visit        MyChart Information     SaludFÃCIL gives you secure access to your electronic health record. If you see a primary care provider, you can also send messages to your care team and make appointments. If you have questions, please call your primary care clinic.  If you do not have a primary care provider, please call [phone] and they will assist you.        Care EveryWhere ID     This is your Care EveryWhere ID. This could be used by other organizations to access your [facility] medical records  [id number]        Your Vitals Were     Pulse Height Pulse Oximetry BMI (Body Mass Index)          91 5' 4\" (1.626 m) 98% 22.62 kg/m2         Blood Pressure from Last 3 Encounters:   12/26/17 134/84   12/12/17 (!) 157/92   09/20/17 122/61    Weight from Last 3 Encounters:   12/26/17 131 lb " 12.8 oz (59.8 kg)   12/12/17 130 lb (59 kg)   09/20/17 132 lb 9.6 oz (60.1 kg)              Today, you had the following     No orders found for display       Primary Care Provider Office Phone # Fax #    Nette Burk [phone] [address] [address]        Equal Access to Services     CIRILO BAUER : Hadii aad ku hadasho Soomaali, waaxda luqadaha, qaybta kaalmada adeegyada, waxay idiin hayaan adeeg jeanettexiomara lathea . So [facility] [phone].    ATENCIÓN: Si habla espallison, tiene a mcfadden disposición servicios gratuitos de asistencia lingüística. Llame al [phone].    We comply with applicable federal civil rights laws and [place] laws. We do not discriminate on the basis of race, color, national origin, age, disability, sex, sexual orientation, or gender identity.            Thank you!     Thank you for choosing [facility]  for your care. Our goal is always to provide you with excellent care. Hearing back from our patients is one way we can continue to improve our services. Please take a few minutes to complete the written survey that you may receive in the mail after your visit with us. Thank you!             Your Updated Medication List - Protect others around you: Learn how to safely use, store and throw away your medicines at www.disposemymeds.org.          This list is accurate as of: 12/26/17  7:11 PM.  Always use your most recent med list.                   Brand Name Dispense Instructions for use Diagnosis    ALEVE PO      Take 220 mg by mouth Takes every AM and PM when not taking ibuprofen.        amLODIPine 5 MG tablet    NORVASC    30 tablet    Take 1 tablet (5 mg) by mouth daily    Hypertension goal BP (blood pressure) < 140/90       CALCIUM CITRATE + D PO      2 tablets 2 times daily        Chromium 200 MCG Tabs tablet      Take  by mouth.        FISH OIL PO      1,000 mg 2 times daily        fluticasone 50 MCG/ACT spray    FLONASE    48  g    Spray 2 sprays into both nostrils daily    Other allergic rhinitis       GLUCOSAMINE 1500 COMPLEX Caps      Take 1 capsule by mouth daily.    Osteoporosis, unspecified       IBUPROFEN PO      Takes every AM and PM when not taking Aleve.        levothyroxine 100 MCG tablet    SYNTHROID/LEVOTHROID    90 tablet    Take 1 tablet (100 mcg) by mouth daily    Acquired hypothyroidism       losartan 100 MG tablet    COZAAR    90 tablet    Take 1 tablet (100 mg) by mouth daily    Essential hypertension with goal blood pressure less than 140/90       PRESERVISION AREDS 2+MULTI VIT Caps           triamterene-hydrochlorothiazide 37.5-25 MG per capsule    DYAZIDE    90 capsule    Take 1 capsule by mouth daily    Hypertension goal BP (blood pressure) < 140/90

## 2017-12-26 NOTE — NURSING NOTE
"Chief Complaint   Patient presents with     Recheck Medication     blood pressure     There were no vitals taken for this visit. Estimated body mass index is 22.31 kg/(m^2) as calculated from the following:    Height as of 12/12/17: 5' 4\" (1.626 m).    Weight as of 12/12/17: 130 lb (59 kg).  Medication Reconciliation: complete      Health Maintenance due pending provider review:  NONE    n/a    Amita Velazquez CMA  "

## 2018-01-11 ENCOUNTER — OFFICE VISIT (OUTPATIENT)
Dept: PHARMACY | Facility: CLINIC | Age: 78
End: 2018-01-11
Payer: COMMERCIAL

## 2018-01-11 ENCOUNTER — OFFICE VISIT (OUTPATIENT)
Dept: FAMILY MEDICINE | Facility: CLINIC | Age: 78
End: 2018-01-11
Payer: COMMERCIAL

## 2018-01-11 VITALS
WEIGHT: 131.9 LBS | TEMPERATURE: 97.1 F | OXYGEN SATURATION: 98 % | DIASTOLIC BLOOD PRESSURE: 78 MMHG | SYSTOLIC BLOOD PRESSURE: 137 MMHG | HEIGHT: 64 IN | HEART RATE: 74 BPM | BODY MASS INDEX: 22.52 KG/M2

## 2018-01-11 DIAGNOSIS — H35.30 MACULAR DEGENERATION (SENILE) OF RETINA: ICD-10-CM

## 2018-01-11 DIAGNOSIS — E03.9 ACQUIRED HYPOTHYROIDISM: ICD-10-CM

## 2018-01-11 DIAGNOSIS — J30.89 OTHER ALLERGIC RHINITIS: ICD-10-CM

## 2018-01-11 DIAGNOSIS — I10 HYPERTENSION GOAL BP (BLOOD PRESSURE) < 140/90: Primary | ICD-10-CM

## 2018-01-11 DIAGNOSIS — M81.0 AGE-RELATED OSTEOPOROSIS WITHOUT CURRENT PATHOLOGICAL FRACTURE: Primary | ICD-10-CM

## 2018-01-11 DIAGNOSIS — I10 HYPERTENSION GOAL BP (BLOOD PRESSURE) < 140/90: ICD-10-CM

## 2018-01-11 DIAGNOSIS — M15.0 PRIMARY OSTEOARTHRITIS INVOLVING MULTIPLE JOINTS: ICD-10-CM

## 2018-01-11 DIAGNOSIS — G47.00 INSOMNIA, UNSPECIFIED TYPE: ICD-10-CM

## 2018-01-11 PROCEDURE — 99605 MTMS BY PHARM NP 15 MIN: CPT | Performed by: PHARMACIST

## 2018-01-11 PROCEDURE — 99607 MTMS BY PHARM ADDL 15 MIN: CPT | Performed by: PHARMACIST

## 2018-01-11 PROCEDURE — 99214 OFFICE O/P EST MOD 30 MIN: CPT | Performed by: FAMILY MEDICINE

## 2018-01-11 RX ORDER — FLUTICASONE PROPIONATE 50 MCG
2 SPRAY, SUSPENSION (ML) NASAL DAILY
Qty: 48 G | Status: SHIPPED | OUTPATIENT
Start: 2018-01-11 | End: 2019-06-06

## 2018-01-11 RX ORDER — AMLODIPINE BESYLATE 10 MG/1
10 TABLET ORAL DAILY
Qty: 90 TABLET | Refills: 1 | Status: SHIPPED | OUTPATIENT
Start: 2018-01-11 | End: 2018-06-12

## 2018-01-11 NOTE — PROGRESS NOTES
SUBJECTIVE/OBJECTIVE:                           Mely Guerra is a 77 year old female coming in for an initial visit for Medication Therapy Management.  She was referred to me from Dr. Messer.     Chief Complaint: Here for BP management.    Allergies/ADRs: Reviewed in Epic  Tobacco: No tobacco use    Alcohol: not currently using  Caffeine: 4 cups/day of coffee  Activity: very active  PMH: Reviewed in Epic    Medication Adherence/Access: No issues identified - uses a pill box, no missed doses.     Hypertension: Current medications include amlodipine 5mg daily, triamterene-HCTZ 37.5-25mg daily, and losartan 100mg daily. The amlodipine was started on 12/26/17 and pt has seen maybe a small improvement in BP's. Patient does self-monitor BP. Home BP monitoring in range of 120-170's systolic over 70-90's diastolic, average 148/81 mmHg over last 2 weeks. She has recently had her home BP cuff checked in clinic for accuracy. Patient reports no current medication side effects.    Osteopenia: Current therapy includes: calcium citrate/vitamin D 2 tabs BID and fish oil 1000mg daily (as recommended by endocrinologist). Pt also had Reclast infusion on 9/20/17. Pt is not experiencing side effects.  DEXA History: 8/10/17  Risk factors: post-menopausal    Macular Degeneration: Pt is currently taking Preservision Areds2 1 tab BID and separate MVI daily as recommended by ophthamologist. Pt is not experiencing side effects.    Hypothyroidism: Patient is taking levothyroxine 100 mcg daily. Patient is having the following symptoms: none. Pt was seen by endocrinology and found to have a nodule which was determined to be noncancerous.    Osteoarthritis/Insomnia: Pt is currently taking naproxen 2 tabs QAM and ibuprofen PM 2 tabs QHS.  She has trouble falling asleep and also has sleep apnea. She feels that this regimen is effective and denies side effects. She has tried melatonin in the past, but it was not helpful.     Current labs  include:  BP Readings from Last 3 Encounters:   01/11/18 137/78   12/26/17 134/84   12/12/17 (!) 157/92     Today's Vitals: There were no vitals taken for this visit. - taken at PCP appt just prior  Lab Results   Component Value Date    CHOL 239 06/03/2014     Lab Results   Component Value Date    TRIG 70 06/03/2014     Lab Results   Component Value Date    HDL 99 06/03/2014     Lab Results   Component Value Date     06/03/2014       Liver Function Studies -   Recent Labs   Lab Test  03/24/17   0954   PROTTOTAL  7.6   ALBUMIN  4.2   BILITOTAL  0.8   ALKPHOS  56   AST  44   ALT  23       Lab Results   Component Value Date    UCRR 70 06/06/2016    MICROL <5 06/06/2016    UMALCR Unable to calculate due to low value 06/06/2016       Last Basic Metabolic Panel:  Lab Results   Component Value Date     12/12/2017      Lab Results   Component Value Date    POTASSIUM 4.5 12/12/2017     Lab Results   Component Value Date    CHLORIDE 99 12/12/2017     Lab Results   Component Value Date    BUN 10 12/12/2017     Lab Results   Component Value Date    CR 0.69 12/12/2017     GFR Estimate   Date Value Ref Range Status   12/12/2017 82 >60 mL/min/1.7m2 Final     Comment:     Non  GFR Calc   11/27/2017 82 >60 mL/min/1.7m2 Final     Comment:     Non  GFR Calc   09/20/2017 69 >60 mL/min/1.7m2 Final     Comment:     Non  GFR Calc     TSH   Date Value Ref Range Status   12/12/2017 2.13 0.40 - 4.00 mU/L Final   ]    Most Recent Immunizations   Administered Date(s) Administered     HEPA 07/02/1998     Influenza (High Dose) 3 valent vaccine 09/28/2017     Influenza (IIV3) PF 09/09/2014     Pneumo Conj 13-V (2010&after) 06/01/2015     Pneumococcal 23 valent 04/17/2013     TD (ADULT, 7+) 02/13/2008     TDAP Vaccine (Adacel) 01/10/2013     Tetanus 01/23/1998     Zoster vaccine, live 02/27/2008       ASSESSMENT:                             Current medications were reviewed today.      Medication Adherence: no issues identified    Hypertension: Needs Improvement. Patient is currently using appropriate technique for testing BP and counseled on effect of caffeine on BP. Patient's BP fluctuates above and below goal of <140/90 mmHg, but is averaging above goal at home. Pt may benefit from additional therapy - discussed pros and cons of increasing amlodipine or triamterene-HCTZ. Pt would like to try increasing amlodipine to 10mg daily and will monitor for lower extremity edema.    Osteopenia: Stable.    Macular Degeneration: Stable.    Hypothyroidism: Stable.    Osteoarthritis/Insomnia: Needs improvement. Discussed the risks of diphenhydramine in older adults - pt doesn't feel ibuprofen PM interferes with her cognition and chooses to continue therapy at this time.    PLAN:                            1. Sent order for amlodipine 10mg daily.  2. Encouraged pt to limit use of diphenhydramine - pt declines today.    I spent 30 minutes with this patient today. All changes were made via verbal approval with Nette Burk. A copy of the visit note was provided to the patient's primary care provider.    Will follow up in 2 weeks by Joyce to check on amlodipine tolerability.    The patient was given a summary of these recommendations as an after visit summary.     Lilia Cole PharmD  Pharmaceutical Care Resident  Pager: [phone]    Tie-in: Mely Guerra was seen independently by Dr. Cole. I have reviewed the assessment and plan. Mary Bustos, FaraD, SYEDA, BCACP

## 2018-01-11 NOTE — NURSING NOTE
"Chief Complaint   Patient presents with     Hypertension       Initial /78  Pulse 74  Temp 97.1  F (36.2  C) (Oral)  Ht 5' 4\" (1.626 m)  Wt 131 lb 14.4 oz (59.8 kg)  SpO2 98%  BMI 22.64 kg/m2 Estimated body mass index is 22.64 kg/(m^2) as calculated from the following:    Height as of this encounter: 5' 4\" (1.626 m).    Weight as of this encounter: 131 lb 14.4 oz (59.8 kg).  Medication Reconciliation: complete      Health Maintenance that is potentially due pending provider review:  NONE    n/a    MORALES Naqvi  "

## 2018-01-11 NOTE — MR AVS SNAPSHOT
"              After Visit Summary   1/11/2018    Mely Guerra    MRN: [id number]           Patient Information     Date Of Birth          1940        Visit Information        Provider Department      1/11/2018 2:00 PM Nette Burk MD [facility]        Today's Diagnoses     Hypertension goal BP (blood pressure) < 140/90    -  1    Other allergic rhinitis           Follow-ups after your visit        Who to contact     If you have questions or need follow up information about today's clinic visit or your schedule please contact [facility] directly at [phone].  Normal or non-critical lab and imaging results will be communicated to you by MyChart, letter or phone within 4 business days after the clinic has received the results. If you do not hear from us within 7 days, please contact the clinic through ScalIThart or phone. If you have a critical or abnormal lab result, we will notify you by phone as soon as possible.  Submit refill requests through AppleTreeBook or call your pharmacy and they will forward the refill request to us. Please allow 3 business days for your refill to be completed.          Additional Information About Your Visit        MyChart Information     AppleTreeBook gives you secure access to your electronic health record. If you see a primary care provider, you can also send messages to your care team and make appointments. If you have questions, please call your primary care clinic.  If you do not have a primary care provider, please call [phone] and they will assist you.        Care EveryWhere ID     This is your Care EveryWhere ID. This could be used by other organizations to access your [place] medical records  [id number]        Your Vitals Were     Pulse Temperature Height Pulse Oximetry BMI (Body Mass Index)       74 97.1  F (36.2  C) (Oral) 5' 4\" (1.626 m) 98% 22.64 kg/m2        Blood Pressure from Last 3 Encounters:   01/11/18 137/78   12/26/17 134/84 "   12/12/17 (!) 157/92    Weight from Last 3 Encounters:   01/11/18 131 lb 14.4 oz (59.8 kg)   12/26/17 131 lb 12.8 oz (59.8 kg)   12/12/17 130 lb (59 kg)              Today, you had the following     No orders found for display         Today's Medication Changes          These changes are accurate as of: 1/11/18  3:55 PM.  If you have any questions, ask your nurse or doctor.               These medicines have changed or have updated prescriptions.        Dose/Directions    amLODIPine 10 MG tablet   Commonly known as:  NORVASC   This may have changed:    - medication strength  - how much to take   Used for:  Hypertension goal BP (blood pressure) < 140/90   Changed by:  Mary Bustos, [facility]        Dose:  10 mg   Take 1 tablet (10 mg) by mouth daily   Quantity:  90 tablet   Refills:  1            Where to get your medicines      These medications were sent to [facility] [address] [place] [address] & [address]  [address] [place] [id number]     Phone:  [phone]     amLODIPine 10 MG tablet         Some of these will need a paper prescription and others can be bought over the counter.  Ask your nurse if you have questions.     Bring a paper prescription for each of these medications     fluticasone 50 MCG/ACT spray                Primary Care Provider Office Phone # Fax #    [place] [phone] [address] [address]        Equal Access to Services     CIRILO BAUER AH: Hadii chris ku hadasho Soomaali, waaxda luqadaha, qaybta kaalmada adeegyada, waxay idibrian odom. So [facility] [phone].    ATENCIÓN: Si habla español, tiene a mcfadden disposición servicios gratuitos de asistencia lingüística. Llame al [phone].    We comply with applicable federal civil rights laws and [place] laws. We do not discriminate on the basis of race, color, national origin, age, disability, sex, sexual  orientation, or gender identity.            Thank you!     Thank you for choosing [facility]  for your care. Our goal is always to provide you with excellent care. Hearing back from our patients is one way we can continue to improve our services. Please take a few minutes to complete the written survey that you may receive in the mail after your visit with us. Thank you!             Your Updated Medication List - Protect others around you: Learn how to safely use, store and throw away your medicines at www.disposemymeds.org.          This list is accurate as of: 1/11/18  3:55 PM.  Always use your most recent med list.                   Brand Name Dispense Instructions for use Diagnosis    ALEVE PO      Take 220 mg by mouth Takes every AM and PM when not taking ibuprofen.        amLODIPine 10 MG tablet    NORVASC    90 tablet    Take 1 tablet (10 mg) by mouth daily    Hypertension goal BP (blood pressure) < 140/90       CALCIUM CITRATE + D PO      2 tablets 2 times daily        FISH OIL PO      1,000 mg 2 times daily        fluticasone 50 MCG/ACT spray    FLONASE    48 g    Spray 2 sprays into both nostrils daily    Other allergic rhinitis       IBUPROFEN PO      Takes every AM and PM when not taking Aleve.        levothyroxine 100 MCG tablet    SYNTHROID/LEVOTHROID    90 tablet    Take 1 tablet (100 mcg) by mouth daily    Acquired hypothyroidism       losartan 100 MG tablet    COZAAR    90 tablet    Take 1 tablet (100 mg) by mouth daily    Essential hypertension with goal blood pressure less than 140/90       PRESERVISION AREDS 2+MULTI VIT Caps           triamterene-hydrochlorothiazide 37.5-25 MG per capsule    DYAZIDE    90 capsule    Take 1 capsule by mouth daily    Hypertension goal BP (blood pressure) < 140/90

## 2018-01-11 NOTE — PROGRESS NOTES
SUBJECTIVE:   Mely Guerra is a 77 year old female who presents to clinic today for the following health issues:      Hypertension Follow-up      Outpatient blood pressures are being checked at home.  Results are averaging at 148/81.    Low Salt Diet: no added salt        Amount of exercise or physical activity: 5-7 days/week for an average of greater than 60 minutes    Problems taking medications regularly: No    Medication side effects: none    Diet: regular (no restrictions)      Tolerating medications well. Recently started amlodipine 5 mg tablet. No side effects.  Pressure still at upper limit of normal. She is active she cycles 12-15 miles several times a week. Is leaving town for a few months and we'll be cycling while she is out of town. Very active. Diet is healthy she has gained a little bit of weight but overall weight has been stable avoid salt. She has no lower extremity swelling no shortness of breath no exercise intolerance.        Problem list and histories reviewed & adjusted, as indicated.  Additional history: as documented    Patient Active Problem List   Diagnosis     Acquired hypothyroidism     Personal history of alcoholism (H)     Allergic rhinitis     Osteoporosis     Osteoarthritis     Hyperlipidemia LDL goal <160     Hypertension goal BP (blood pressure) < 140/90     Advanced directives, counseling/discussion     Otitis externa     Sleep apnea     Dermatitis     Rosacea     Age-related osteoporosis without current pathological fracture     Disorder of bone and cartilage     Dysphagia     Past Surgical History:   Procedure Laterality Date     C NONSPECIFIC PROCEDURE  1974    s/p Vaginal hysterectomy       Social History   Substance Use Topics     Smoking status: Never Smoker     Smokeless tobacco: Never Used     Alcohol use No     Family History   Problem Relation Age of Onset     CANCER Sister          Current Outpatient Prescriptions   Medication Sig Dispense Refill     [DISCONTINUED]  "amLODIPine (NORVASC) 5 MG tablet Take 1 tablet (5 mg) by mouth daily 30 tablet 3     triamterene-hydrochlorothiazide (DYAZIDE) 37.5-25 MG per capsule Take 1 capsule by mouth daily 90 capsule 1     Calcium Citrate-Vitamin D (CALCIUM CITRATE + D PO) 2 tablets 2 times daily       Omega-3 Fatty Acids (FISH OIL PO) 1,000 mg 2 times daily       Multiple Vitamins-Minerals (PRESERVISION AREDS 2+MULTI VIT) CAPS        losartan (COZAAR) 100 MG tablet Take 1 tablet (100 mg) by mouth daily 90 tablet 3     levothyroxine (SYNTHROID/LEVOTHROID) 100 MCG tablet Take 1 tablet (100 mcg) by mouth daily 90 tablet 3     fluticasone (FLONASE) 50 MCG/ACT nasal spray Spray 2 sprays into both nostrils daily 48 g prn     IBUPROFEN PO Takes every AM and PM when not taking Aleve.       Naproxen Sodium (ALEVE PO) Take 220 mg by mouth Takes every AM and PM when not taking ibuprofen.       amLODIPine (NORVASC) 10 MG tablet Take 1 tablet (10 mg) by mouth daily 90 tablet 1     BP Readings from Last 3 Encounters:   01/11/18 137/78   12/26/17 134/84   12/12/17 (!) 157/92    Wt Readings from Last 3 Encounters:   01/11/18 131 lb 14.4 oz (59.8 kg)   12/26/17 131 lb 12.8 oz (59.8 kg)   12/12/17 130 lb (59 kg)                        [facility] and [facility]  M[facility]       Reviewed and updated as needed this visit by Provider         ROS:  Constitutional, HEENT, cardiovascular, pulmonary, gi and gu systems are negative, except as otherwise noted.      OBJECTIVE:                                                    /78  Pulse 74  Temp 97.1  F (36.2  C) (Oral)  Ht 5' 4\" (1.626 m)  Wt 131 lb 14.4 oz (59.8 kg)  SpO2 98%  BMI 22.64 kg/m2  Body mass index is 22.64 kg/(m^2).  GENERAL APPEARANCE: healthy, alert and no distress  RESP: lungs clear to auscultation - no rales, rhonchi or wheezes  CV: regular rates and rhythm, normal S1 S2, no S3 or S4 and no murmur, click or rub    Diagnostic test " results:  Diagnostic Test Results:  Reviewed all labs       ASSESSMENT/PLAN:                                                    1. HTN:  Discussed that her blood pressure did have significant improvement with adding amlodipine. Could try increasing the dose to 10 mg to truly get pressures under control. Discussed possibility of lower extremity swelling. Tolerating other medications well including losartan triamterene hydrochlorothiazide.      Other allergic rhinitis    - fluticasone (FLONASE) 50 MCG/ACT spray; Spray 2 sprays into both nostrils daily  Dispense: 48 g; Refill: prn      Follow up with Provider - recheck blood pressure in the next 3-6 months when she is back in town     Nette Burk MD  [facility]

## 2018-01-11 NOTE — MR AVS SNAPSHOT
After Visit Summary   1/11/2018    Mely Guerra    MRN: [id number]           Patient Information     Date Of Birth          1940        Visit Information        Provider Department      1/11/2018 2:30 PM Mary Bustos [facility] MTM        Today's Diagnoses     Hypertension goal BP (blood pressure) < 140/90          Care Instructions    Recommendations from today's MTM visit:                                                    MTM (medication therapy management) is a service provided by a clinical pharmacist designed to help you get the most of out of your medicines.   Today we reviewed what your medicines are for, how to know if they are working, that your medicines are safe and how to make your medicine regimen as easy as possible.     1. Sent order for amlodipine 10mg daily to your pharmacy.    2. Try to limit your use of Benadryl (diphenhydramine) containing products. These can put you at increased risk for confusion and falls.     Next MTM visit: I will check in with you on MyChart in a couple weeks, sooner if needed.     To schedule another MTM appointment, please call the clinic directly or you may call the MTM scheduling line at [phone] or toll-free at [phone].     My Clinical Pharmacist's contact information:                                                      It was a pleasure seeing you today!  Please feel free to contact me with any questions or concerns you have.      Lilia Cole, PharmD  Pharmaceutical Care Resident  Pager: [phone]    You may receive a survey about the MTM services you received.  I would appreciate your feedback to help me serve you better in the future. Please fill it out and return it when you can. Your comments will be anonymous.            Follow-ups after your visit        Who to contact     If you have questions or need follow up information about today's clinic visit or your schedule please contact [facility]  CLIN[facility] directly at [phone].  Normal or non-critical lab and imaging results will be communicated to you by MyChart, letter or phone within 4 business days after the clinic has received the results. If you do not hear from us within 7 days, please contact the clinic through ITeamhart or phone. If you have a critical or abnormal lab result, we will notify you by phone as soon as possible.  Submit refill requests through [email or web address] or call your pharmacy and they will forward the refill request to us. Please allow 3 business days for your refill to be completed.          Additional Information About Your Visit        ITeamharTribunat Information     [email or web address] gives you secure access to your electronic health record. If you see a primary care provider, you can also send messages to your care team and make appointments. If you have questions, please call your primary care clinic.  If you do not have a primary care provider, please call [phone] and they will assist you.        Care EveryWhere ID     This is your Care EveryWhere ID. This could be used by other organizations to access your [place] medical records  [id number]         Blood Pressure from Last 3 Encounters:   01/11/18 137/78   12/26/17 134/84   12/12/17 (!) 157/92    Weight from Last 3 Encounters:   01/11/18 131 lb 14.4 oz (59.8 kg)   12/26/17 131 lb 12.8 oz (59.8 kg)   12/12/17 130 lb (59 kg)              Today, you had the following     No orders found for display         Today's Medication Changes          These changes are accurate as of: 1/11/18  2:59 PM.  If you have any questions, ask your nurse or doctor.               These medicines have changed or have updated prescriptions.        Dose/Directions    amLODIPine 10 MG tablet   Commonly known as:  NORVASC   This may have changed:    - medication strength  - how much to take   Used for:  Hypertension goal BP (blood pressure) < 140/90   Changed by:  Mary Bustos, [facility]        Dose:  10 mg   Take 1  tablet (10 mg) by mouth daily   Quantity:  90 tablet   Refills:  1            Where to get your medicines      These medications were sent to [id number] [address]  [address] [facility] [address]     Phone:  [phone]     amLODIPine 10 MG tablet                Primary Care Provider Office Phone # Fax #    Nette Burk [phone] [address] [address]        Equal Access to Services     [facility]: Hadii aad ku hadasho Soomaali, waaxda luqadaha, qaybta kaalmada adeegyada, waxkhushi greene . So [facility] [phone].    ATENCIÓN: Si habla español, tiene a mcfadden disposición servicios gratuitos de asistencia lingüística. Jostin al [phone].    We comply with applicable federal civil rights laws and [place] laws. We do not discriminate on the basis of race, color, national origin, age, disability, sex, sexual orientation, or gender identity.            Thank you!     Thank you for choosing [facility] [place] [facility]  for your care. Our goal is always to provide you with excellent care. Hearing back from our patients is one way we can continue to improve our services. Please take a few minutes to complete the written survey that you may receive in the mail after your visit with us. Thank you!             Your Updated Medication List - Protect others around you: Learn how to safely use, store and throw away your medicines at www.disposemymeds.org.          This list is accurate as of: 1/11/18  2:59 PM.  Always use your most recent med list.                   Brand Name Dispense Instructions for use Diagnosis    ALEVE PO      Take 220 mg by mouth Takes every AM and PM when not taking ibuprofen.        amLODIPine 10 MG tablet    NORVASC    90 tablet    Take 1 tablet (10 mg) by mouth daily    Hypertension goal BP (blood pressure) < 140/90       CALCIUM CITRATE + D PO       2 tablets 2 times daily        FISH OIL PO      1,000 mg 2 times daily        fluticasone 50 MCG/ACT spray    FLONASE    48 g    Spray 2 sprays into both nostrils daily    Other allergic rhinitis       IBUPROFEN PO      Takes every AM and PM when not taking Aleve.        levothyroxine 100 MCG tablet    SYNTHROID/LEVOTHROID    90 tablet    Take 1 tablet (100 mcg) by mouth daily    Acquired hypothyroidism       losartan 100 MG tablet    COZAAR    90 tablet    Take 1 tablet (100 mg) by mouth daily    Essential hypertension with goal blood pressure less than 140/90       PRESERVISION AREDS 2+MULTI VIT Caps           triamterene-hydrochlorothiazide 37.5-25 MG per capsule    DYAZIDE    90 capsule    Take 1 capsule by mouth daily    Hypertension goal BP (blood pressure) < 140/90

## 2018-01-11 NOTE — PATIENT INSTRUCTIONS
Recommendations from today's MTM visit:                                                    MTM (medication therapy management) is a service provided by a clinical pharmacist designed to help you get the most of out of your medicines.   Today we reviewed what your medicines are for, how to know if they are working, that your medicines are safe and how to make your medicine regimen as easy as possible.     1. Sent order for amlodipine 10mg daily to your pharmacy.    2. Try to limit your use of Benadryl (diphenhydramine) containing products. These can put you at increased risk for confusion and falls.     Next MTM visit: I will check in with you on MyChart in a couple weeks, sooner if needed.     To schedule another MTM appointment, please call the clinic directly or you may call the MTM scheduling line at [phone] or toll-free at [phone].     My Clinical Pharmacist's contact information:                                                      It was a pleasure seeing you today!  Please feel free to contact me with any questions or concerns you have.      Lilia Cole, PharmD  Pharmaceutical Care Resident  Pager: [phone]    You may receive a survey about the MTM services you received.  I would appreciate your feedback to help me serve you better in the future. Please fill it out and return it when you can. Your comments will be anonymous.

## 2018-02-07 ENCOUNTER — TELEPHONE (OUTPATIENT)
Dept: FAMILY MEDICINE | Facility: CLINIC | Age: 78
End: 2018-02-07

## 2018-02-07 DIAGNOSIS — E03.9 ACQUIRED HYPOTHYROIDISM: Primary | ICD-10-CM

## 2018-02-07 RX ORDER — LEVOTHYROXINE SODIUM 100 UG/1
100 TABLET ORAL DAILY
Qty: 30 TABLET | Refills: 0 | Status: SHIPPED | OUTPATIENT
Start: 2018-02-07 | End: 2018-03-21

## 2018-02-07 NOTE — TELEPHONE ENCOUNTER
Reason for Call:  Medication or medication refill:    Do you use a [facility]?  Name of the pharmacy and phone number for the current request:     [facility] Fax: [phone]      Name of the medication requested: Levothyroxine     Other request: pt is in [place] for a month and has lost her levothyroxine and would like a one month supply     Can we leave a detailed message on this number? YES    Phone number patient can be reached at: Cell number on file:    Telephone Information:   Mobile [phone]       Best Time: anytime     Call taken on 2/7/2018 at 10:03 AM by Vilam Millan

## 2018-03-07 ENCOUNTER — MYC MEDICAL ADVICE (OUTPATIENT)
Dept: FAMILY MEDICINE | Facility: CLINIC | Age: 78
End: 2018-03-07

## 2018-03-07 DIAGNOSIS — I10 ESSENTIAL HYPERTENSION WITH GOAL BLOOD PRESSURE LESS THAN 140/90: ICD-10-CM

## 2018-03-07 DIAGNOSIS — E03.9 ACQUIRED HYPOTHYROIDISM: ICD-10-CM

## 2018-03-07 RX ORDER — LOSARTAN POTASSIUM 100 MG/1
100 TABLET ORAL DAILY
Qty: 90 TABLET | Refills: 1 | Status: SHIPPED | OUTPATIENT
Start: 2018-03-07 | End: 2018-10-12

## 2018-03-07 RX ORDER — LEVOTHYROXINE SODIUM 100 UG/1
TABLET ORAL
Start: 2018-03-07

## 2018-03-07 NOTE — TELEPHONE ENCOUNTER
Has refills.  Rx 5/19/17 for #90 with 3 refills.  Was given one time Rx for #30 2/7 due to being in [place] and losing med.  Nayana Alcaraz RN

## 2018-03-21 DIAGNOSIS — E03.9 ACQUIRED HYPOTHYROIDISM: ICD-10-CM

## 2018-03-21 RX ORDER — LEVOTHYROXINE SODIUM 100 UG/1
TABLET ORAL
Qty: 30 TABLET | Refills: 8 | Status: SHIPPED | OUTPATIENT
Start: 2018-03-21 | End: 2018-03-22

## 2018-03-21 NOTE — TELEPHONE ENCOUNTER
LS,  Please authorize if appropriate.  Pt had multiple thyroid tests done on 12/12 but no notes associated.  Please authorize if appropriate.  Thanks,  Luann Urias RN

## 2018-03-22 ENCOUNTER — MYC MEDICAL ADVICE (OUTPATIENT)
Dept: FAMILY MEDICINE | Facility: CLINIC | Age: 78
End: 2018-03-22

## 2018-03-22 DIAGNOSIS — E03.9 ACQUIRED HYPOTHYROIDISM: ICD-10-CM

## 2018-03-22 RX ORDER — LEVOTHYROXINE SODIUM 100 UG/1
TABLET ORAL
Qty: 90 TABLET | Refills: 1 | Status: SHIPPED | OUTPATIENT
Start: 2018-03-22 | End: 2018-06-30

## 2018-04-18 ENCOUNTER — MYC REFILL (OUTPATIENT)
Dept: FAMILY MEDICINE | Facility: CLINIC | Age: 78
End: 2018-04-18

## 2018-04-18 DIAGNOSIS — I10 HYPERTENSION GOAL BP (BLOOD PRESSURE) < 140/90: ICD-10-CM

## 2018-04-18 RX ORDER — TRIAMTERENE AND HYDROCHLOROTHIAZIDE 37.5; 25 MG/1; MG/1
1 CAPSULE ORAL DAILY
Qty: 90 CAPSULE | Refills: 0 | Status: SHIPPED | OUTPATIENT
Start: 2018-04-18 | End: 2018-06-27

## 2018-04-18 NOTE — TELEPHONE ENCOUNTER
Message from MyChart:  Original authorizing provider: MAGDALENA Babcock would like a refill of the following medications:  triamterene-hydrochlorothiazide (DYAZIDE) 37.5-25 MG per capsule [Chris Dhillon PA-C]    Preferred pharmacy: [facility] MAIL [facility]    Comment:  No more refills so please fax [facility] in [place] for mail order refills. Thank you and please respond when this has been done. Mely Guerra

## 2018-04-18 NOTE — TELEPHONE ENCOUNTER
"Prescription approved per [facility] Refill Protocol.  Nayana Alcaraz RN  Requested Prescriptions   Signed Prescriptions Disp Refills     triamterene-hydrochlorothiazide (DYAZIDE) 37.5-25 MG per capsule 90 capsule 0     Sig: Take 1 capsule by mouth daily    Diuretics (Including Combos) Protocol Passed    4/18/2018  9:51 AM       Passed - Blood pressure under 140/90 in past 12 months    BP Readings from Last 3 Encounters:   01/11/18 137/78   12/26/17 134/84   12/12/17 (!) 157/92                Passed - Recent (12 mo) or future (30 days) visit within the authorizing provider's specialty    Patient had office visit in the last 12 months or has a visit in the next 30 days with authorizing provider or within the authorizing provider's specialty.  See \"Patient Info\" tab in inbasket, or \"Choose Columns\" in Meds & Orders section of the refill encounter.           Passed - Patient is age 18 or older       Passed - No active pregancy on record       Passed - Normal serum creatinine on file in past 12 months    Recent Labs   Lab Test  12/12/17   1210   CR  0.69             Passed - Normal serum potassium on file in past 12 months    Recent Labs   Lab Test  12/12/17   1210   POTASSIUM  4.5                   Passed - Normal serum sodium on file in past 12 months    Recent Labs   Lab Test  12/12/17   1210   NA  135             Passed - No positive pregnancy test in past 12 months          "

## 2018-05-23 ENCOUNTER — OFFICE VISIT (OUTPATIENT)
Dept: FAMILY MEDICINE | Facility: CLINIC | Age: 78
End: 2018-05-23
Payer: COMMERCIAL

## 2018-05-23 VITALS
HEIGHT: 64 IN | OXYGEN SATURATION: 97 % | BODY MASS INDEX: 22.36 KG/M2 | WEIGHT: 131 LBS | SYSTOLIC BLOOD PRESSURE: 106 MMHG | HEART RATE: 67 BPM | TEMPERATURE: 97.4 F | DIASTOLIC BLOOD PRESSURE: 64 MMHG | RESPIRATION RATE: 14 BRPM
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
## 2018-05-23 DIAGNOSIS — A69.20 ERYTHEMA MIGRANS (LYME DISEASE): ICD-10-CM

## 2018-05-23 DIAGNOSIS — S30.860A TICK BITE OF BUTTOCK, INITIAL ENCOUNTER: Primary | ICD-10-CM

## 2018-05-23 PROCEDURE — 99213 OFFICE O/P EST LOW 20 MIN: CPT | Performed by: FAMILY MEDICINE

## 2018-05-23 RX ORDER — DOXYCYCLINE 100 MG/1
100 CAPSULE ORAL 2 TIMES DAILY
Qty: 28 CAPSULE | Refills: 0 | Status: SHIPPED | OUTPATIENT
Start: 2018-05-23 | End: 2018-08-07

## 2018-05-23 NOTE — PROGRESS NOTES
SUBJECTIVE:   Mely Guerra is a 78 year old female who presents to clinic today for the following health issues:      Chief Complaint   Patient presents with     Insect Bites     tick bites left gluteus wear underwear rubs-fnoticed May 14-may 20th swelling, red, itch-saw tick on body, pulled off and thinks she did get the head-     Was [place] cabin in [place] [facility] [place] [facility]- on mothers day  Noted a itching at a local spt on left buttokc,  a size of a temi- & it itched  Followed more itching at a different spoke on left buttock & also pulled out a  5/20- but reports it looked like a wood tick  Now a very itchiy rash    PROBLEMS TO ADD ON...    Problem list and histories reviewed & adjusted, as indicated.  Additional history: as documented    Patient Active Problem List   Diagnosis     Acquired hypothyroidism     Personal history of alcoholism (H)     Allergic rhinitis     Osteoporosis     Osteoarthritis     Hyperlipidemia LDL goal <160     Hypertension goal BP (blood pressure) < 140/90     Advanced directives, counseling/discussion     Otitis externa     Sleep apnea     Dermatitis     Rosacea     Age-related osteoporosis without current pathological fracture     Disorder of bone and cartilage     Dysphagia     Past Surgical History:   Procedure Laterality Date     C NONSPECIFIC PROCEDURE  1974    s/p Vaginal hysterectomy       Social History   Substance Use Topics     Smoking status: Never Smoker     Smokeless tobacco: Never Used     Alcohol use No     Family History   Problem Relation Age of Onset     CANCER Sister            Reviewed and updated as needed this visit by clinical staff  Tobacco  Allergies  Meds  Problems  Med Hx  Surg Hx  Fam Hx  Soc Hx        Reviewed and updated as needed this visit by Provider         ROS:  Constitutional, HEENT, cardiovascular, pulmonary, gi and gu systems are negative, except as otherwise noted.    OBJECTIVE:     /64  Pulse 67  Temp 97.4  F (36.3  C) (Oral)  Resp  "14  Ht 5' 4\" (1.626 m)  Wt 131 lb (59.4 kg)  SpO2 97%  Breastfeeding? No  BMI 22.49 kg/m2  Body mass index is 22.49 kg/(m^2).  GENERAL: healthy, alert and no distress  SKIN: localized rash at tick bit on the left gluteal region  There are 2 annular  lesions with darkened induration and central clearing.-Typical bull eye rash- picture taken for patient on her iphone   NECK: no adenopathy  RESP: lungs clear to auscultation - no rales, rhonchi or wheezes  CV: regular rates and rhythm  ABDOMEN: soft, nontender, no hepatosplenomegaly, no masses and bowel sounds normal  MS: no gross musculoskeletal defects noted, no edema        ASSESSMENT/PLAN:   1. Tick bite of buttock, initial encounter- rash typical of  Erythema migrans   -rash on left gluteal region  Plan: doxycycline (VIBRAMYCIN) 100 MG capsule; Take 1 capsule (100 mg) by mouth 2 times daily  Dispense: 28 capsule; Refill: 0  -wear protective clothes  -whole body check if out in woods, or nature    -signs & symptoms of Lyme's disease discussed   If more concerns follow up as needed       Potential medication side effects were discussed with the patient; let me know if any occur.    Alessia Serrano MD  [facility]    "

## 2018-05-23 NOTE — MR AVS SNAPSHOT
After Visit Summary   5/23/2018    Mely Guerra    MRN: [id number]           Patient Information     Date Of Birth          1940        Visit Information        Provider Department      5/23/2018 2:40 PM Alessia Serrano MD [facility]        Today's Diagnoses     Tick bite of buttock, initial encounter    -  1    Erythema migrans (Lyme disease)          Care Instructions      - doxycycline (VIBRAMYCIN) 100 MG capsule; Take 1 capsule (100 mg) by mouth 2 times daily  Dispense: 28 capsule; Refill: 0  -wear protective clothes  -whole body check if out in woods, or nature    2. Erythema migrans (Lyme disease)  -rash on left gluteal region  -Typical bull eye rash- picture taken for patient on her iphone               Follow-ups after your visit        Who to contact     If you have questions or need follow up information about today's clinic visit or your schedule please contact [facility] directly at [phone].  Normal or non-critical lab and imaging results will be communicated to you by Internet REIThart, letter or phone within 4 business days after the clinic has received the results. If you do not hear from us within 7 days, please contact the clinic through OX FACTORYt or phone. If you have a critical or abnormal lab result, we will notify you by phone as soon as possible.  Submit refill requests through [employer] or call your pharmacy and they will forward the refill request to us. Please allow 3 business days for your refill to be completed.          Additional Information About Your Visit        MyChart Information     [employer] gives you secure access to your electronic health record. If you see a primary care provider, you can also send messages to your care team and make appointments. If you have questions, please call your primary care clinic.  If you do not have a primary care provider, please call [phone] and they will assist you.        Care EveryWhere ID     This is  "your Care EveryWhere ID. This could be used by other organizations to access your [place] medical records  [id number]        Your Vitals Were     Pulse Temperature Respirations Height Pulse Oximetry Breastfeeding?    67 97.4  F (36.3  C) (Oral) 14 5' 4\" (1.626 m) 97% No    BMI (Body Mass Index)                   22.49 kg/m2            Blood Pressure from Last 3 Encounters:   05/23/18 106/64   01/11/18 137/78   12/26/17 134/84    Weight from Last 3 Encounters:   05/23/18 131 lb (59.4 kg)   01/11/18 131 lb 14.4 oz (59.8 kg)   12/26/17 131 lb 12.8 oz (59.8 kg)              Today, you had the following     No orders found for display         Today's Medication Changes          These changes are accurate as of 5/23/18  6:48 PM.  If you have any questions, ask your nurse or doctor.               Start taking these medicines.        Dose/Directions    doxycycline 100 MG capsule   Commonly known as:  VIBRAMYCIN   Used for:  Tick bite of buttock, initial encounter   Started by:  Alessia Serrano MD        Dose:  100 mg   Take 1 capsule (100 mg) by mouth 2 times daily   Quantity:  28 capsule   Refills:  0            Where to get your medicines      These medications were sent to [facility] [id number] [place] [address] [address]  [address] [place] [address]     Phone:  [phone]     doxycycline 100 MG capsule                Primary Care Provider Office Phone # Fax #    [place] Lakes[place] Burk [phone] [address] [address]        Equal Access to Services     PLACIDO BAUER : Eliot Hutchinson, africa sinclair, erik adhikari. So [facility] [phone].    ATENCIÓN: Si habla español, tiene a mcfadden disposición servicios gratuitos de asistencia lingüística. Llame al [phone].    We comply with applicable federal civil rights laws and [place] laws. We do not " discriminate on the basis of race, color, national origin, age, disability, sex, sexual orientation, or gender identity.            Thank you!     Thank you for choosing [facility]  for your care. Our goal is always to provide you with excellent care. Hearing back from our patients is one way we can continue to improve our services. Please take a few minutes to complete the written survey that you may receive in the mail after your visit with us. Thank you!             Your Updated Medication List - Protect others around you: Learn how to safely use, store and throw away your medicines at www.disposemymeds.org.          This list is accurate as of 5/23/18  6:48 PM.  Always use your most recent med list.                   Brand Name Dispense Instructions for use Diagnosis    ADVIL PM PO      Take 2 tablets by mouth At Bedtime        ALEVE PO      Take 220 mg by mouth Takes every AM and PM when not taking ibuprofen.        amLODIPine 10 MG tablet    NORVASC    90 tablet    Take 1 tablet (10 mg) by mouth daily    Hypertension goal BP (blood pressure) < 140/90       CALCIUM CITRATE + D PO      Take 2 tablets by mouth 2 times daily        doxycycline 100 MG capsule    VIBRAMYCIN    28 capsule    Take 1 capsule (100 mg) by mouth 2 times daily    Tick bite of buttock, initial encounter       FISH OIL PO      1,000 mg 2 times daily        fluticasone 50 MCG/ACT spray    FLONASE    48 g    Spray 2 sprays into both nostrils daily    Other allergic rhinitis       levothyroxine 100 MCG tablet    SYNTHROID/LEVOTHROID    90 tablet    TAKE 1 TABLET (100 MCG) BY MOUTH DAILY    Acquired hypothyroidism       losartan 100 MG tablet    COZAAR    90 tablet    Take 1 tablet (100 mg) by mouth daily    Essential hypertension with goal blood pressure less than 140/90       * PRESERVISION AREDS 2 PO      Take 1 tablet by mouth 2 times daily        * MULTIVITAMIN ADULT PO      Take 1 tablet by mouth daily         triamterene-hydrochlorothiazide 37.5-25 MG per capsule    DYAZIDE    90 capsule    Take 1 capsule by mouth daily    Hypertension goal BP (blood pressure) < 140/90       * Notice:  This list has 2 medication(s) that are the same as other medications prescribed for you. Read the directions carefully, and ask your doctor or other care provider to review them with you.

## 2018-05-23 NOTE — PATIENT INSTRUCTIONS
- doxycycline (VIBRAMYCIN) 100 MG capsule; Take 1 capsule (100 mg) by mouth 2 times daily  Dispense: 28 capsule; Refill: 0  -wear protective clothes  -whole body check if out in woods, or nature    2. Erythema migrans (Lyme disease)  -rash on left gluteal region  -Typical bull eye rash- picture taken for patient on her iphone

## 2018-05-23 NOTE — NURSING NOTE
"Chief Complaint   Patient presents with     Insect Bites     tick bites left gluteus wear underwear rubs-fnoticed May 14-may 20th swelling, red, itch-saw tick on body, pulled off and thinks she did get the head-       Initial /64  Pulse 67  Temp 97.4  F (36.3  C) (Oral)  Resp 14  Ht 5' 4\" (1.626 m)  Wt 131 lb (59.4 kg)  SpO2 97%  Breastfeeding? No  BMI 22.49 kg/m2 Estimated body mass index is 22.49 kg/(m^2) as calculated from the following:    Height as of this encounter: 5' 4\" (1.626 m).    Weight as of this encounter: 131 lb (59.4 kg).  BP completed using cuff size: regular    Health Maintenance that is potentially due pending provider review:  Health Maintenance Due   Topic Date Due     ADVANCE DIRECTIVE PLANNING Q5 YRS  04/02/2017     FALL RISK ASSESSMENT  04/24/2018         Fall risk done  "

## 2018-06-12 ENCOUNTER — MYC MEDICAL ADVICE (OUTPATIENT)
Dept: FAMILY MEDICINE | Facility: CLINIC | Age: 78
End: 2018-06-12

## 2018-06-12 DIAGNOSIS — I10 HYPERTENSION GOAL BP (BLOOD PRESSURE) < 140/90: ICD-10-CM

## 2018-06-12 RX ORDER — AMLODIPINE BESYLATE 10 MG/1
10 TABLET ORAL DAILY
Qty: 90 TABLET | Refills: 1 | Status: SHIPPED | OUTPATIENT
Start: 2018-06-12 | End: 2018-10-12

## 2018-06-12 NOTE — TELEPHONE ENCOUNTER
"Prescription approved per [facility] Refill Protocol.  Chore[employer]ster message sent to pt.  Bernadine BROWN RN    Requested Prescriptions   Pending Prescriptions Disp Refills     amLODIPine (NORVASC) 10 MG tablet 90 tablet 1     Sig: Take 1 tablet (10 mg) by mouth daily    Calcium Channel Blockers Protocol  Passed    6/12/2018  1:53 PM       Passed - Blood pressure under 140/90 in past 12 months    BP Readings from Last 3 Encounters:   05/23/18 106/64   01/11/18 137/78   12/26/17 134/84                Passed - Recent (12 mo) or future (30 days) visit within the authorizing provider's specialty    Patient had office visit in the last 12 months or has a visit in the next 30 days with authorizing provider or within the authorizing provider's specialty.  See \"Patient Info\" tab in inbasket, or \"Choose Columns\" in Meds & Orders section of the refill encounter.           Passed - Patient is age 18 or older       Passed - No active pregnancy on record       Passed - Normal serum creatinine on file in past 12 months    Recent Labs   Lab Test  12/12/17   1210   CR  0.69            Passed - No positive pregnancy test in past 12 months            "

## 2018-06-27 DIAGNOSIS — I10 HYPERTENSION GOAL BP (BLOOD PRESSURE) < 140/90: ICD-10-CM

## 2018-06-28 RX ORDER — TRIAMTERENE AND HYDROCHLOROTHIAZIDE 37.5; 25 MG/1; MG/1
CAPSULE ORAL
Qty: 90 CAPSULE | Refills: 1 | Status: SHIPPED | OUTPATIENT
Start: 2018-06-28 | End: 2018-10-12

## 2018-06-28 NOTE — TELEPHONE ENCOUNTER
"Requested Prescriptions   Pending Prescriptions Disp Refills     triamterene-hydrochlorothiazide (DYAZIDE) 37.5-25 MG per capsule [Pharmacy Med Name: TRIAMTERENE/HYDROCHLOROTHIAZIDE 37.5-25 MG Capsule] 90 capsule 0     Sig: TAKE 1 CAPSULE EVERY DAY    Diuretics (Including Combos) Protocol Passed    6/27/2018  3:36 AM       Passed - Blood pressure under 140/90 in past 12 months    BP Readings from Last 3 Encounters:   05/23/18 106/64   01/11/18 137/78   12/26/17 134/84                Passed - Recent (12 mo) or future (30 days) visit within the authorizing provider's specialty    Patient had office visit in the last 12 months or has a visit in the next 30 days with authorizing provider or within the authorizing provider's specialty.  See \"Patient Info\" tab in inbasket, or \"Choose Columns\" in Meds & Orders section of the refill encounter.           Passed - Patient is age 18 or older       Passed - No active pregancy on record       Passed - Normal serum creatinine on file in past 12 months    Recent Labs   Lab Test  12/12/17   1210   CR  0.69             Passed - Normal serum potassium on file in past 12 months    Recent Labs   Lab Test  12/12/17   1210   POTASSIUM  4.5                   Passed - Normal serum sodium on file in past 12 months    Recent Labs   Lab Test  12/12/17   1210   NA  135             Passed - No positive pregnancy test in past 12 months      Prescription approved per [facility] Refill Protocol.  "

## 2018-06-30 ENCOUNTER — MYC REFILL (OUTPATIENT)
Dept: FAMILY MEDICINE | Facility: CLINIC | Age: 78
End: 2018-06-30

## 2018-06-30 DIAGNOSIS — E03.9 ACQUIRED HYPOTHYROIDISM: ICD-10-CM

## 2018-07-02 RX ORDER — LEVOTHYROXINE SODIUM 100 UG/1
TABLET ORAL
Qty: 90 TABLET | Refills: 0 | Status: SHIPPED | OUTPATIENT
Start: 2018-07-02 | End: 2018-09-04

## 2018-07-02 NOTE — TELEPHONE ENCOUNTER
Message from [employer]:  Original authorizing provider: MD Mely Benoit would like a refill of the following medications:  levothyroxine (SYNTHROID/LEVOTHROID) 100 MCG tablet [Nette Burk MD]    Preferred pharmacy: [facility] [address]    Comment:  My refills have  for my thyroid pills so please contact mail order [facility] to OK them. Kindly send a confirmation when this has been done. Mely Guerra

## 2018-07-25 DIAGNOSIS — I10 ESSENTIAL HYPERTENSION WITH GOAL BLOOD PRESSURE LESS THAN 140/90: ICD-10-CM

## 2018-07-25 RX ORDER — LOSARTAN POTASSIUM 100 MG/1
TABLET ORAL
Refills: 0
Start: 2018-07-25

## 2018-08-07 ENCOUNTER — OFFICE VISIT (OUTPATIENT)
Dept: PHARMACY | Facility: CLINIC | Age: 78
End: 2018-08-07
Payer: COMMERCIAL

## 2018-08-07 VITALS
SYSTOLIC BLOOD PRESSURE: 130 MMHG | BODY MASS INDEX: 22.31 KG/M2 | WEIGHT: 130 LBS | HEART RATE: 50 BPM | DIASTOLIC BLOOD PRESSURE: 70 MMHG

## 2018-08-07 DIAGNOSIS — M81.0 AGE-RELATED OSTEOPOROSIS WITHOUT CURRENT PATHOLOGICAL FRACTURE: ICD-10-CM

## 2018-08-07 DIAGNOSIS — E03.9 ACQUIRED HYPOTHYROIDISM: ICD-10-CM

## 2018-08-07 DIAGNOSIS — J30.9 ALLERGIC RHINITIS, UNSPECIFIED CHRONICITY, UNSPECIFIED SEASONALITY, UNSPECIFIED TRIGGER: ICD-10-CM

## 2018-08-07 DIAGNOSIS — M15.0 PRIMARY OSTEOARTHRITIS INVOLVING MULTIPLE JOINTS: ICD-10-CM

## 2018-08-07 DIAGNOSIS — H35.30 MACULAR DEGENERATION (SENILE) OF RETINA: ICD-10-CM

## 2018-08-07 DIAGNOSIS — I10 HYPERTENSION GOAL BP (BLOOD PRESSURE) < 140/90: Primary | ICD-10-CM

## 2018-08-07 DIAGNOSIS — G47.00 INSOMNIA, UNSPECIFIED TYPE: ICD-10-CM

## 2018-08-07 PROCEDURE — 99606 MTMS BY PHARM EST 15 MIN: CPT | Performed by: PHARMACIST

## 2018-08-07 PROCEDURE — 99607 MTMS BY PHARM ADDL 15 MIN: CPT | Performed by: PHARMACIST

## 2018-08-07 NOTE — MR AVS SNAPSHOT
After Visit Summary   8/7/2018    Mely Guerra    MRN: [id number]           Patient Information     Date Of Birth          1940        Visit Information        Provider Department      8/7/2018 1:30 PM Mary Bustos [facility] [place]        Today's Diagnoses     Hypertension goal BP (blood pressure) < 140/90    -  1    Age-related osteoporosis without current pathological fracture        Macular degeneration (senile) of retina        Acquired hypothyroidism        Primary osteoarthritis involving multiple joints        Insomnia, unspecified type        Allergic rhinitis, unspecified chronicity, unspecified seasonality, unspecified trigger           Follow-ups after your visit        Who to contact     If you have questions or need follow up information about today's clinic visit or your schedule please contact [facility] [place] directly at [phone].  Normal or non-critical lab and imaging results will be communicated to you by MyChart, letter or phone within 4 business days after the clinic has received the results. If you do not hear from us within 7 days, please contact the clinic through [employer] or phone. If you have a critical or abnormal lab result, we will notify you by phone as soon as possible.  Submit refill requests through [employer] or call your pharmacy and they will forward the refill request to us. Please allow 3 business days for your refill to be completed.          Additional Information About Your Visit        MyChart Information     [employer] gives you secure access to your electronic health record. If you see a primary care provider, you can also send messages to your care team and make appointments. If you have questions, please call your primary care clinic.  If you do not have a primary care provider, please call [phone] and they will assist you.        Care EveryWhere ID     This is your Care EveryWhere ID. This could be used by other  organizations to access your [facility] medical records  [id number]        Your Vitals Were     Pulse BMI (Body Mass Index)                50 22.31 kg/m2           Blood Pressure from Last 3 Encounters:   08/07/18 130/70   05/23/18 106/64   01/11/18 137/78    Weight from Last 3 Encounters:   08/07/18 130 lb (59 kg)   05/23/18 131 lb (59.4 kg)   01/11/18 131 lb 14.4 oz (59.8 kg)              Today, you had the following     No orders found for display       Primary Care Provider Office Phone # Fax #    [place] [phone] [address] [address]        Equal Access to Services     CIRILO BAUER : Hadii chris sánchezo Socitlalyali, waaxda luqadaha, qaybta kaalmada adeegyada, erik greene . So [facility] [phone].    ATENCIÓN: Si habla español, tiene a mcfadden disposición servicios gratuitos de asistencia lingüística. Llame al [phone].    We comply with applicable federal civil rights laws and [place] laws. We do not discriminate on the basis of race, color, national origin, age, disability, sex, sexual orientation, or gender identity.            Thank you!     Thank you for choosing [facility]  for your care. Our goal is always to provide you with excellent care. Hearing back from our patients is one way we can continue to improve our services. Please take a few minutes to complete the written survey that you may receive in the mail after your visit with us. Thank you!             Your Updated Medication List - Protect others around you: Learn how to safely use, store and throw away your medicines at www.disposemymeds.org.          This list is accurate as of 8/7/18 11:59 PM.  Always use your most recent med list.                   Brand Name Dispense Instructions for use Diagnosis    ADVIL PM PO      Take 2 tablets by mouth At Bedtime        ALEVE PO      Take 220 mg by mouth Takes every AM and PM when not taking  ibuprofen.        amLODIPine 10 MG tablet    NORVASC    90 tablet    Take 1 tablet (10 mg) by mouth daily    Hypertension goal BP (blood pressure) < 140/90       CALCIUM CITRATE + D PO      Take 2 tablets by mouth 2 times daily        FISH OIL PO      1,000 mg 2 times daily        fluticasone 50 MCG/ACT spray    FLONASE    48 g    Spray 2 sprays into both nostrils daily    Other allergic rhinitis       levothyroxine 100 MCG tablet    SYNTHROID/LEVOTHROID    90 tablet    TAKE 1 TABLET (100 MCG) BY MOUTH DAILY    Acquired hypothyroidism       losartan 100 MG tablet    COZAAR    90 tablet    Take 1 tablet (100 mg) by mouth daily    Essential hypertension with goal blood pressure less than 140/90       * PRESERVISION AREDS 2 PO      Take 1 tablet by mouth 2 times daily        * MULTIVITAMIN ADULT PO      Take 1 tablet by mouth daily        triamterene-hydrochlorothiazide 37.5-25 MG per capsule    DYAZIDE    90 capsule    TAKE 1 CAPSULE EVERY DAY    Hypertension goal BP (blood pressure) < 140/90       * Notice:  This list has 2 medication(s) that are the same as other medications prescribed for you. Read the directions carefully, and ask your doctor or other care provider to review them with you.

## 2018-08-07 NOTE — PROGRESS NOTES
SUBJECTIVE/OBJECTIVE:                Mely Guerra is a 78 year old female coming in for a follow-up visit for Medication Therapy Management.  She was referred to me from Dr. Burk.     Chief Complaint: Follow up from our visit on 1/11.  Frustrated by the number of medications she takes for her blood pressure.     Tobacco: No tobacco use  Alcohol: not currently using    Medication Adherence/Access:  no issues reported  Patient takes medications directly from bottles.  Patient takes medications 2 time(s) per day.   Per patient, misses medication 0 times per week.   Medication barriers: none.     Hypertension: Current medications include amlodipine 10mg daily, triamterene-HCTZ 37.5-25mg daily, and losartan 100mg daily. Patient does self-monitor BP. Patient reports no current medication side effects.  Home readings over the last few weeks:   136/76  139/79  139/70  113/64  138/75  139/83  In discussing lifestyle interventions - she is exercising regularly, watching salt in her diet and avoiding alcohol use. She feels she is getting a good amount of fruits/vegetables in her diet.     Osteopenia: Current therapy includes: calcium citrate/vitamin D 2 tabs BID and fish oil 1000mg daily (as recommended by endocrinologist). Pt also had Reclast infusion on 9/20/17. Pt is not experiencing side effects.  DEXA History: 8/10/17  Risk factors: post-menopausal    Macular Degeneration: Pt is currently taking Preservision Areds2 1 tab BID and separate MVI daily, as well as fish oil as recommended by ophthamologist. Pt is not experiencing side effects.    Hypothyroidism: Patient is taking levothyroxine 100 mcg daily. She denies SE from levothyroxine, or any s/sx of hypo/hyperthyroidism.     Osteoarthritis/Insomnia: Pt is currently taking naproxen 2 tabs QAM and ibuprofen PM 2 tabs QHS.  She has trouble falling asleep and also has sleep apnea. She feels that this regimen is effective and denies side effects. She has tried  melatonin in the past, but it was not helpful.     Allergies: Uses Flonase to help with allergy sx without problems. Denies SE    Today's Vitals: /70  Pulse 50  Wt 130 lb (59 kg)  BMI 22.31 kg/m2    ASSESSMENT:              Current medications were reviewed today as discussed above.      Medication Adherence: no issues identified    Hypertension: BP at goal today. Many of her home readings are just below the goal of <140/90 but given her age, will not make changes to her BP meds. Some guidelines indicate that a BP of <150/90 is acceptable for her age range - she is consistently meeting this goal.     Osteopenia: Stable - should have another DEXA in 2020 or later    Macular Degeneration: Stable    Hypothyroidism: Stable - next set of labs due in December    Osteoarthritis/Insomnia: We discussed role NSAIDs may play in increasing her blood pressure, but she feels these are necessary for her to continue to function during the day. We also discussed diphenhydramine again, but she is not willing to stop this.     Allergies: Stable     PLAN:                  1. No changes today - follow-up in one year.   2. Pt was advised regarding risks of diphenhydramine use    I spent 30 minutes with this patient today. A copy of the visit note was provided to the patient's primary care provider.     The patient declined a summary of these recommendations as an after visit summary.    Mary Bustos, FaraD, SYEDA, BCACP  [facility] Pharmacist, [facility]

## 2018-09-04 DIAGNOSIS — E03.9 ACQUIRED HYPOTHYROIDISM: ICD-10-CM

## 2018-09-05 RX ORDER — LEVOTHYROXINE SODIUM 100 UG/1
TABLET ORAL
Qty: 90 TABLET | Refills: 0 | Status: SHIPPED | OUTPATIENT
Start: 2018-09-05 | End: 2018-10-12

## 2018-09-05 NOTE — TELEPHONE ENCOUNTER
"Requested Prescriptions   Pending Prescriptions Disp Refills     levothyroxine (SYNTHROID/LEVOTHROID) 100 MCG tablet [Pharmacy Med Name: LEVOTHYROXINE SODIUM 100 MCG Tablet] 90 tablet 0     Sig: TAKE 1 TABLET EVERY DAY    Thyroid Protocol Passed    9/4/2018  8:05 PM       Passed - Patient is 12 years or older       Passed - Recent (12 mo) or future (30 days) visit within the authorizing provider's specialty    Patient had office visit in the last 12 months or has a visit in the next 30 days with authorizing provider or within the authorizing provider's specialty.  See \"Patient Info\" tab in inbasket, or \"Choose Columns\" in Meds & Orders section of the refill encounter.           Passed - Normal TSH on file in past 12 months    Recent Labs   Lab Test  12/12/17   1210   TSH  2.13             Passed - No active pregnancy on record    If patient is pregnant or has had a positive pregnancy test, please check TSH.         Passed - No positive pregnancy test in past 12 months    If patient is pregnant or has had a positive pregnancy test, please check TSH.        Prescription approved per [facility] Refill Protocol.  "

## 2018-10-12 ENCOUNTER — RADIANT APPOINTMENT (OUTPATIENT)
Dept: GENERAL RADIOLOGY | Facility: CLINIC | Age: 78
End: 2018-10-12
Attending: FAMILY MEDICINE
Payer: COMMERCIAL

## 2018-10-12 ENCOUNTER — OFFICE VISIT (OUTPATIENT)
Dept: FAMILY MEDICINE | Facility: CLINIC | Age: 78
End: 2018-10-12
Payer: COMMERCIAL

## 2018-10-12 VITALS
SYSTOLIC BLOOD PRESSURE: 126 MMHG | BODY MASS INDEX: 21.8 KG/M2 | OXYGEN SATURATION: 99 % | HEART RATE: 64 BPM | TEMPERATURE: 97.8 F | DIASTOLIC BLOOD PRESSURE: 69 MMHG | HEIGHT: 64 IN | WEIGHT: 127.7 LBS

## 2018-10-12 DIAGNOSIS — R00.2 PALPITATIONS: ICD-10-CM

## 2018-10-12 DIAGNOSIS — K21.00 GASTROESOPHAGEAL REFLUX DISEASE WITH ESOPHAGITIS: ICD-10-CM

## 2018-10-12 DIAGNOSIS — I10 ESSENTIAL HYPERTENSION WITH GOAL BLOOD PRESSURE LESS THAN 140/90: ICD-10-CM

## 2018-10-12 DIAGNOSIS — R19.7 DIARRHEA, UNSPECIFIED TYPE: ICD-10-CM

## 2018-10-12 DIAGNOSIS — R06.2 WHEEZING: ICD-10-CM

## 2018-10-12 DIAGNOSIS — E03.9 ACQUIRED HYPOTHYROIDISM: ICD-10-CM

## 2018-10-12 DIAGNOSIS — Z00.00 ENCOUNTER FOR ROUTINE ADULT HEALTH EXAMINATION WITHOUT ABNORMAL FINDINGS: Primary | ICD-10-CM

## 2018-10-12 DIAGNOSIS — E78.5 HYPERLIPIDEMIA LDL GOAL <160: ICD-10-CM

## 2018-10-12 LAB
ALBUMIN SERPL-MCNC: 3.8 G/DL (ref 3.4–5)
ALP SERPL-CCNC: 64 U/L (ref 40–150)
ALT SERPL W P-5'-P-CCNC: 32 U/L (ref 0–50)
ANION GAP SERPL CALCULATED.3IONS-SCNC: 9 MMOL/L (ref 3–14)
AST SERPL W P-5'-P-CCNC: 55 U/L (ref 0–45)
BILIRUB SERPL-MCNC: 0.6 MG/DL (ref 0.2–1.3)
BUN SERPL-MCNC: 9 MG/DL (ref 7–30)
CALCIUM SERPL-MCNC: 10.5 MG/DL (ref 8.5–10.1)
CHLORIDE SERPL-SCNC: 99 MMOL/L (ref 94–109)
CHOLEST SERPL-MCNC: 191 MG/DL
CO2 SERPL-SCNC: 26 MMOL/L (ref 20–32)
CREAT SERPL-MCNC: 0.73 MG/DL (ref 0.52–1.04)
GFR SERPL CREATININE-BSD FRML MDRD: 78 ML/MIN/1.7M2
GLUCOSE SERPL-MCNC: 80 MG/DL (ref 70–99)
HDLC SERPL-MCNC: 85 MG/DL
LDLC SERPL CALC-MCNC: 97 MG/DL
NONHDLC SERPL-MCNC: 106 MG/DL
POTASSIUM SERPL-SCNC: 4.3 MMOL/L (ref 3.4–5.3)
PROT SERPL-MCNC: 8.1 G/DL (ref 6.8–8.8)
SODIUM SERPL-SCNC: 134 MMOL/L (ref 133–144)
T3FREE SERPL-MCNC: 2.2 PG/ML (ref 2.3–4.2)
T4 FREE SERPL-MCNC: 1.5 NG/DL (ref 0.76–1.46)
TRIGL SERPL-MCNC: 39 MG/DL
TSH SERPL DL<=0.005 MIU/L-ACNC: 1.59 MU/L (ref 0.4–4)

## 2018-10-12 PROCEDURE — 80053 COMPREHEN METABOLIC PANEL: CPT | Performed by: FAMILY MEDICINE

## 2018-10-12 PROCEDURE — 99397 PER PM REEVAL EST PAT 65+ YR: CPT | Mod: 25 | Performed by: FAMILY MEDICINE

## 2018-10-12 PROCEDURE — 99214 OFFICE O/P EST MOD 30 MIN: CPT | Mod: 25 | Performed by: FAMILY MEDICINE

## 2018-10-12 PROCEDURE — 36415 COLL VENOUS BLD VENIPUNCTURE: CPT | Performed by: FAMILY MEDICINE

## 2018-10-12 PROCEDURE — 84443 ASSAY THYROID STIM HORMONE: CPT | Performed by: FAMILY MEDICINE

## 2018-10-12 PROCEDURE — 90662 IIV NO PRSV INCREASED AG IM: CPT | Performed by: FAMILY MEDICINE

## 2018-10-12 PROCEDURE — 84481 FREE ASSAY (FT-3): CPT | Performed by: FAMILY MEDICINE

## 2018-10-12 PROCEDURE — 93000 ELECTROCARDIOGRAM COMPLETE: CPT | Performed by: FAMILY MEDICINE

## 2018-10-12 PROCEDURE — G0008 ADMIN INFLUENZA VIRUS VAC: HCPCS | Performed by: FAMILY MEDICINE

## 2018-10-12 PROCEDURE — 84439 ASSAY OF FREE THYROXINE: CPT | Performed by: FAMILY MEDICINE

## 2018-10-12 PROCEDURE — 71046 X-RAY EXAM CHEST 2 VIEWS: CPT | Mod: FY

## 2018-10-12 PROCEDURE — 80061 LIPID PANEL: CPT | Performed by: FAMILY MEDICINE

## 2018-10-12 RX ORDER — LEVOTHYROXINE SODIUM 100 UG/1
TABLET ORAL
Qty: 90 TABLET | Refills: 0 | Status: SHIPPED | OUTPATIENT
Start: 2018-10-12 | End: 2019-01-07

## 2018-10-12 RX ORDER — LOSARTAN POTASSIUM 100 MG/1
100 TABLET ORAL DAILY
Qty: 90 TABLET | Refills: 1 | Status: SHIPPED | OUTPATIENT
Start: 2018-10-12 | End: 2019-05-15

## 2018-10-12 RX ORDER — FLUTICASONE PROPIONATE 110 UG/1
1 AEROSOL, METERED RESPIRATORY (INHALATION) 2 TIMES DAILY
Qty: 1 INHALER | Refills: 1 | Status: SHIPPED | OUTPATIENT
Start: 2018-10-12 | End: 2018-10-15

## 2018-10-12 RX ORDER — AMLODIPINE BESYLATE 10 MG/1
10 TABLET ORAL DAILY
Qty: 90 TABLET | Refills: 1 | Status: SHIPPED | OUTPATIENT
Start: 2018-10-12 | End: 2019-03-26

## 2018-10-12 RX ORDER — TRIAMTERENE AND HYDROCHLOROTHIAZIDE 37.5; 25 MG/1; MG/1
CAPSULE ORAL
Qty: 90 CAPSULE | Refills: 1 | Status: SHIPPED | OUTPATIENT
Start: 2018-10-12 | End: 2019-08-08

## 2018-10-12 ASSESSMENT — ACTIVITIES OF DAILY LIVING (ADL)
CURRENT_FUNCTION: NO ASSISTANCE NEEDED
I_NEED_ASSISTANCE_FOR_THE_FOLLOWING_DAILY_ACTIVITIES:: NO ASSISTANCE IS NEEDED

## 2018-10-12 NOTE — PATIENT INSTRUCTIONS

## 2018-10-12 NOTE — PROGRESS NOTES
SUBJECTIVE:   Mely Guerra is a 78 year old female who presents for Preventive Visit.    Are you in the first 12 months of your Medicare coverage?  No    Physical   Annual:     Getting at least 3 servings of Calcium per day:  Yes    Bi-annual eye exam:  Yes    Dental care twice a year:  Yes    Sleep apnea or symptoms of sleep apnea:  Sleep apnea    Frequency of exercise:  2-3 days/week    Duration of exercise:  Greater than 60 minutes    Taking medications regularly:  Yes    Additional concerns today:  No    Ability to successfully perform activities of daily living: no assistance needed    Home Safety:  Throw rugs in the hallway and lack of grab bars in the bathroom    Hearing Impairment: difficulty following a conversation in a noisy restaurant or crowded room, need to ask people to speak up or repeat themselves and find that men's voices are easier to understand than woman's        Fall risk:       COGNITIVE SCREEN  1) Repeat 3 items (Leader, Season, Table)    2) Clock draw: NORMAL  3) 3 item recall: Recalls 2 objects   Results: NORMAL clock, 1-2 items recalled: COGNITIVE IMPAIRMENT LESS LIKELY    Mini-CogTM Copyright PINKY Mendieta. Licensed by the author for use in [facility]; reprinted with permission (aileen@[facility]). All rights reserved.        Reviewed and updated as needed this visit by clinical staff         Reviewed and updated as needed this visit by Provider        Social History   Substance Use Topics     Smoking status: Never Smoker     Smokeless tobacco: Never Used     Alcohol use No       1) diarrhea , for three weeks, has two to three loose stools a day , she denies any abd pain, no recent travels , no recent Abx  No one is sick around her . She does not have any fever no chills , no vomiting , no nausea.  2) GERD , she has had that in the past but now feels that she has to cough when or after eating and this is not a sensation of choking , just a light cough , even when she is starting to  talk , no SOB, no sputum expectoration  3) HTN- she is on norvasc 10 mg , cozaar 100mg  Triamterene 37.5 mg -hydrochlorothiazide 25 mg. Her BP is well controlled on those     Today's PHQ-2 Score:   PHQ-2 ( 1999 [employer]) 8/7/2018   Q1: Little interest or pleasure in doing things 0   Q2: Feeling down, depressed or hopeless 0   PHQ-2 Score 0   Q1: Little interest or pleasure in doing things -   Q2: Feeling down, depressed or hopeless -   PHQ-2 Score -       Do you feel safe in your environment - Yes    Do you have a Health Care Directive?: Yes: Patient states has Advance Directive and will bring in a copy to clinic.    Current providers sharing in care for this patient include:   Patient Care Team:  Nette Burk MD as PCP - General (Family Practice)  Mirna Manning MD as MD (INTERNAL MEDICINE - ENDOCRINOLOGY, DIABETES & METABOLISM)    The following health maintenance items are reviewed in Epic and correct as of today:  Health Maintenance   Topic Date Due     ADVANCE DIRECTIVE PLANNING Q5 YRS  04/02/2017     INFLUENZA VACCINE (1) 09/01/2018     TSH Q1 YEAR  12/12/2018     LIPID SCREEN Q5 YR FEMALE (SYSTEM ASSIGNED)  06/03/2019     FALL RISK ASSESSMENT  08/07/2019     PHQ-2 Q1 YR  08/07/2019     TETANUS IMMUNIZATION (SYSTEM ASSIGNED)  01/10/2023     COLONOSCOPY Q10 YR  04/01/2024     DEXA SCAN SCREENING (SYSTEM ASSIGNED)  Completed     PNEUMOCOCCAL  Completed     Labs reviewed in EPIC  BP Readings from Last 3 Encounters:   10/12/18 126/69   08/07/18 130/70   05/23/18 106/64    Wt Readings from Last 3 Encounters:   10/12/18 127 lb 11.2 oz (57.9 kg)   08/07/18 130 lb (59 kg)   05/23/18 131 lb (59.4 kg)                  Patient Active Problem List   Diagnosis     Acquired hypothyroidism     Personal history of alcoholism (H)     Allergic rhinitis     Osteoporosis     Osteoarthritis     Hyperlipidemia LDL goal <160     Hypertension goal BP (blood pressure) < 140/90     Advanced directives, counseling/discussion      Otitis externa     Sleep apnea     Dermatitis     Rosacea     Age-related osteoporosis without current pathological fracture     Disorder of bone and cartilage     Dysphagia     Past Surgical History:   Procedure Laterality Date     C NONSPECIFIC PROCEDURE  1974    s/p Vaginal hysterectomy       Social History   Substance Use Topics     Smoking status: Never Smoker     Smokeless tobacco: Never Used     Alcohol use No     Family History   Problem Relation Age of Onset     Cancer Sister          Current Outpatient Prescriptions   Medication Sig Dispense Refill     amLODIPine (NORVASC) 10 MG tablet Take 1 tablet (10 mg) by mouth daily 90 tablet 1     Calcium Citrate-Vitamin D (CALCIUM CITRATE + D PO) Take 2 tablets by mouth 2 times daily        fluticasone (FLONASE) 50 MCG/ACT spray Spray 2 sprays into both nostrils daily 48 g prn     Ibuprofen-Diphenhydramine Cit (ADVIL PM PO) Take 2 tablets by mouth At Bedtime       levothyroxine (SYNTHROID/LEVOTHROID) 100 MCG tablet TAKE 1 TABLET EVERY DAY 90 tablet 0     losartan (COZAAR) 100 MG tablet Take 1 tablet (100 mg) by mouth daily 90 tablet 1     Multiple Vitamins-Minerals (MULTIVITAMIN ADULT PO) Take 1 tablet by mouth daily       Multiple Vitamins-Minerals (PRESERVISION AREDS 2 PO) Take 1 tablet by mouth 2 times daily       Naproxen Sodium (ALEVE PO) Take 220 mg by mouth Takes every AM and PM when not taking ibuprofen.       Omega-3 Fatty Acids (FISH OIL PO) 1,000 mg 2 times daily       RaNITidine HCl (ZANTAC 150 MAXIMUM STRENGTH PO)        triamterene-hydrochlorothiazide (DYAZIDE) 37.5-25 MG per capsule TAKE 1 CAPSULE EVERY DAY 90 capsule 1     fluticasone (FLOVENT HFA) 110 MCG/ACT Inhaler Inhale 1 puff into the lungs 2 times daily 1 Inhaler 1     Allergies   Allergen Reactions     Cats      Hylan G-F 20      Lisinopril Cough     Seasonal Allergies      Vioxx      Recent Labs   Lab Test  10/12/18   1103  12/12/17   1210   03/24/17   0954   06/03/14   0908   03/30/11    "0931   LDL  97   --    --    --    --   126   --   140*   HDL  85   --    --    --    --   99   --   86   TRIG  39   --    --    --    --   70   --   73   ALT  32   --    --   23   --    --    --    --    CR  0.73  0.69   < >  0.76   < >  0.78   < >  0.75   GFRESTIMATED  78  82   < >  75   < >  73   < >  77   GFRESTBLACK  >90  >90   < >  >90   GFR Calc     < >  88   < >  >90   POTASSIUM  4.3  4.5   < >  4.4   < >  4.3   < >  4.9   TSH  1.59  2.13   --   2.31   < >  2.87   < >  0.89    < > = values in this interval not displayed.        Pneumonia Vaccine:up to date   Mammogram Screening: Patient over age 75, has elected to continue with mammography screening.    Review of Systems  Constitutional, HEENT, cardiovascular, pulmonary, GI, , musculoskeletal, neuro, skin, endocrine and psych systems are negative, except as otherwise noted.    OBJECTIVE:   There were no vitals taken for this visit. Estimated body mass index is 22.31 kg/(m^2) as calculated from the following:    Height as of 5/23/18: 5' 4\" (1.626 m).    Weight as of 8/7/18: 130 lb (59 kg).  Physical Exam  GENERAL: healthy, alert and no distress  EYES: Eyes grossly normal to inspection, PERRL and conjunctivae and sclerae normal  HENT: ear canals and TM's normal, nose and mouth without ulcers or lesions  NECK: no adenopathy, no asymmetry, masses, or scars and thyroid normal to palpation  RESP: lungs clear to auscultation - no rales, rhonchi or wheezes  BREAST: normal without masses, tenderness or nipple discharge and no palpable axillary masses or adenopathy  CV: regular rate and rhythm ( except occasional extra beats ) , normal S1 S2, no S3 or S4, no murmur, click or rub, no peripheral edema and peripheral pulses strong  ABDOMEN: soft, nontender, no hepatosplenomegaly, no masses and bowel sounds normal  MS: no gross musculoskeletal defects noted, no edema  SKIN: no suspicious lesions or rashes  NEURO: Normal strength and tone, mentation " intact and speech normal  PSYCH: mentation appears normal, affect normal/bright    Diagnostic Test Results:  No results found for this or any previous visit (from the past 24 hour(s)).    ASSESSMENT / PLAN:   1. Encounter for routine adult health examination without abnormal findings  Discussed diet,calcium,exercise.Went over self breast exam.Thin prep was NOT done.Eyes and teeth UTD.No immunizations needed today.See orders below for tests ordered and screening needed.    - VACCINE ADMINISTRATION, INITIAL  - FLU VACCINE, INCREASED ANTIGEN, PRESV FREE    2. Acquired hypothyroidism  She is on synthroid and would need to check thyroid levels as she has palpitations to make sure there is no need to adjust the dose.  Also, she will need to see cardiology as well , see below.   - TSH  - T3, Free  - T4, free  - levothyroxine (SYNTHROID/LEVOTHROID) 100 MCG tablet; TAKE 1 TABLET EVERY DAY  Dispense: 90 tablet; Refill: 0    3. Hyperlipidemia LDL goal <160  Will check her fasting lipids today .  - Lipid Profile (Chol, Trig, HDL, LDL calc)    4. Essential hypertension with goal blood pressure less than 140/90  Seems that BP is well controlled - continue with current meds , will check her CMP today.  - triamterene-hydrochlorothiazide (DYAZIDE) 37.5-25 MG per capsule; TAKE 1 CAPSULE EVERY DAY  Dispense: 90 capsule; Refill: 1  - losartan (COZAAR) 100 MG tablet; Take 1 tablet (100 mg) by mouth daily  Dispense: 90 tablet; Refill: 1  - amLODIPine (NORVASC) 10 MG tablet; Take 1 tablet (10 mg) by mouth daily  Dispense: 90 tablet; Refill: 1  - Comprehensive metabolic panel (BMP + Alb, Alk Phos, ALT, AST, Total. Bili, TP)    5. Diarrhea, unspecified type  She has not traveled out of the country and will check stool studies as diarrhea for over three weeks now , no recent Abx use   - Ova and Parasite Exam Routine; Future  - Enteric Bacteria and Virus Panel by ANNEI Stool; Future  - Clostridium difficile Toxin B PCR; Future  -  "GASTROENTEROLOGY ADULT REF PROCEDURE ONLY [facility] [phone]; No Provider Preference    6. Gastroesophageal reflux disease with esophagitis  Will do the upper endoscopy as of worsening GERD   - GASTROENTEROLOGY ADULT REF PROCEDURE ONLY [facility] [phone]; No Provider Preference    7. Wheezing  Normal CXR , discussed using Claritin for her allergies , also flovent as this is a bad allergy season for her .RTC if no improving or worsening.    - XR Chest 2 Views; Future  - CARDIOLOGY EVAL ADULT REFERRAL  - fluticasone (FLOVENT HFA) 110 MCG/ACT Inhaler; Inhale 1 puff into the lungs 2 times daily  Dispense: 1 Inhaler; Refill: 1    8. Palpitations  She does have occasional PACs but overall sinus rhythm, I have discussed the values of her TSH ( normal ) , free T4 ( slightly elevated ) and free T 3 ( slightly low) with endocrinology and they recommend to not change her synthroid levels. She denies any chest pain, no SOB , but I have referred her to cardiology , could be that the wheezing is causing the rare PACs , she is asymptomatic   - EKG 12-lead complete w/read - Clinics  - CARDIOLOGY EVAL ADULT REFERRAL    End of Life Planning:  Patient currently has an advanced directive: Yes.  Practitioner is supportive of decision.    COUNSELING:  Reviewed preventive health counseling, as reflected in patient instructions       Regular exercise       Healthy diet/nutrition       Vision screening       Hearing screening       Dental care       Osteoporosis Prevention/Bone Health    BP Readings from Last 1 Encounters:   08/07/18 130/70     Estimated body mass index is 22.31 kg/(m^2) as calculated from the following:    Height as of 5/23/18: 5' 4\" (1.626 m).    Weight as of 8/7/18: 130 lb (59 kg).           reports that she has never smoked. She has never used smokeless tobacco.      Appropriate preventive services were discussed with this patient, including applicable screening as appropriate for " cardiovascular disease, diabetes, osteopenia/osteoporosis, and glaucoma.  As appropriate for age/gender, discussed screening for colorectal cancer, prostate cancer, breast cancer, and cervical cancer. Checklist reviewing preventive services available has been given to the patient.    Reviewed patients plan of care and provided an AVS. The Intermediate Care Plan ( asthma action plan, low back pain action plan, and migraine action plan) for Mely meets the Care Plan requirement. This Care Plan has been established and reviewed with the Patient.    Counseling Resources:  ATP IV Guidelines  Pooled Cohorts Equation Calculator  Breast Cancer Risk Calculator  FRAX Risk Assessment  ICSI Preventive Guidelines  Dietary Guidelines for Americans, 2010  USDA's MyPlate  ASA Prophylaxis  Lung CA Screening    Tiffany Messer MD  [facility]  Answers for HPI/ROS submitted by the patient on 10/12/2018   PHQ-2 Score: 0

## 2018-10-12 NOTE — NURSING NOTE
"Chief Complaint   Patient presents with     Medicare Visit     /69  Pulse 64  Temp 97.8  F (36.6  C) (Oral)  Ht 5' 4\" (1.626 m)  Wt 127 lb 11.2 oz (57.9 kg)  SpO2 99%  BMI 21.92 kg/m2 Estimated body mass index is 21.92 kg/(m^2) as calculated from the following:    Height as of this encounter: 5' 4\" (1.626 m).    Weight as of this encounter: 127 lb 11.2 oz (57.9 kg).  Medication Reconciliation: complete      Health Maintenance that is potentially due pending provider review:  NONE    n/a    MORALES Naqvi  "

## 2018-10-12 NOTE — MR AVS SNAPSHOT
After Visit Summary   10/12/2018    Mely Guerra    MRN: [id number]           Patient Information     Date Of Birth          1940        Visit Information        Provider Department      10/12/2018 10:00 AM Tiffany Messer MD [facility]        Today's Diagnoses     Encounter for routine adult health examination without abnormal findings    -  1    Acquired hypothyroidism        Hyperlipidemia LDL goal <160        Essential hypertension with goal blood pressure less than 140/90        Diarrhea, unspecified type        Gastroesophageal reflux disease with esophagitis        Wheezing        Palpitations          Care Instructions      Preventive Health Recommendations    Female Ages 65 +    Yearly exam:     See your health care provider every year in order to  o Review health changes.   o Discuss preventive care.    o Review your medicines if your doctor has prescribed any.      You no longer need a yearly Pap test unless you've had an abnormal Pap test in the past 10 years. If you have vaginal symptoms, such as bleeding or discharge, be sure to talk with your provider about a Pap test.      Every 1 to 2 years, have a mammogram.  If you are over 69, talk with your health care provider about whether or not you want to continue having screening mammograms.      Every 10 years, have a colonoscopy. Or, have a yearly FIT test (stool test). These exams will check for colon cancer.       Have a cholesterol test every 5 years, or more often if your doctor advises it.       Have a diabetes test (fasting glucose) every three years. If you are at risk for diabetes, you should have this test more often.       At age 65, have a bone density scan (DEXA) to check for osteoporosis (brittle bone disease).    Shots:    Get a flu shot each year.    Get a tetanus shot every 10 years.    Talk to your doctor about your pneumonia vaccines. There are now two you should receive - Pneumovax (PPSV 23) and Prevnar  (PCV 13).    Talk to your pharmacist about the shingles vaccine.    Talk to your doctor about the hepatitis B vaccine.    Nutrition:     Eat at least 5 servings of fruits and vegetables each day.      Eat whole-grain bread, whole-wheat pasta and brown rice instead of white grains and rice.      Get adequate Calcium and Vitamin D.     Lifestyle    Exercise at least 150 minutes a week (30 minutes a day, 5 days a week). This will help you control your weight and prevent disease.      Limit alcohol to one drink per day.      No smoking.       Wear sunscreen to prevent skin cancer.       See your dentist twice a year for an exam and cleaning.      See your eye doctor every 1 to 2 years to screen for conditions such as glaucoma, macular degeneration and cataracts.    Preventive Health Recommendations    Female Ages 65 +    Yearly exam:     See your health care provider every year in order to  o Review health changes.   o Discuss preventive care.    o Review your medicines if your doctor has prescribed any.      You no longer need a yearly Pap test unless you've had an abnormal Pap test in the past 10 years. If you have vaginal symptoms, such as bleeding or discharge, be sure to talk with your provider about a Pap test.      Every 1 to 2 years, have a mammogram.  If you are over 69, talk with your health care provider about whether or not you want to continue having screening mammograms.      Every 10 years, have a colonoscopy. Or, have a yearly FIT test (stool test). These exams will check for colon cancer.       Have a cholesterol test every 5 years, or more often if your doctor advises it.       Have a diabetes test (fasting glucose) every three years. If you are at risk for diabetes, you should have this test more often.       At age 65, have a bone density scan (DEXA) to check for osteoporosis (brittle bone disease).    Shots:    Get a flu shot each year.    Get a tetanus shot every 10 years.    Talk to your doctor  about your pneumonia vaccines. There are now two you should receive - Pneumovax (PPSV 23) and Prevnar (PCV 13).    Talk to your pharmacist about the shingles vaccine.    Talk to your doctor about the hepatitis B vaccine.    Nutrition:     Eat at least 5 servings of fruits and vegetables each day.      Eat whole-grain bread, whole-wheat pasta and brown rice instead of white grains and rice.      Get adequate Calcium and Vitamin D.     Lifestyle    Exercise at least 150 minutes a week (30 minutes a day, 5 days a week). This will help you control your weight and prevent disease.      Limit alcohol to one drink per day.      No smoking.       Wear sunscreen to prevent skin cancer.       See your dentist twice a year for an exam and cleaning.      See your eye doctor every 1 to 2 years to screen for conditions such as glaucoma, macular degeneration and cataracts.          Follow-ups after your visit        Additional Services     CARDIOLOGY EVAL ADULT REFERRAL       Preferred location:  [facility] [phone]   [email or web address]    Please be aware that coverage of these services is subject to the terms and limitations of your health insurance plan.  Call member services at your health plan with any benefit or coverage questions.      Please bring the following to your appointment:  Any x-rays, CTs or MRIs which have been performed. Contact the facility where they were done to arrange for  prior to your scheduled appointment.    List of current medications  This referral request   Any documents/labs given to you for this referral            GASTROENTEROLOGY ADULT REF PROCEDURE ONLY [facility] [phone]; No Provider Preference       Last Lab Result: Creatinine (mg/dL)       Date                     Value                 12/12/2017               0.69             ----------  Body mass index is 21.92 kg/(m^2).      Patient will be  contacted to schedule procedure.     Please be aware that coverage of these services is subject to the terms and limitations of your health insurance plan.  Call member services at your health plan with any benefit or coverage questions.  Any procedures must be performed at a [facility] facility OR coordinated by your clinic's referral office.    Please bring the following with you to your appointment:    (1) Any X-Rays, CTs or MRIs which have been performed.  Contact the facility where they were done to arrange for  prior to your scheduled appointment.    (2) List of current medications   (3) This referral request   (4) Any documents/labs given to you for this referral                  Future tests that were ordered for you today     Open Future Orders        Priority Expected Expires Ordered    Ova and Parasite Exam Routine Routine  10/12/2019 10/12/2018    Enteric Bacteria and Virus Panel by ANNIE Stool Routine  10/12/2019 10/12/2018    Clostridium difficile Toxin B PCR Routine  11/11/2018 10/12/2018            Who to contact     If you have questions or need follow up information about today's clinic visit or your schedule please contact [facility] directly at [phone].  Normal or non-critical lab and imaging results will be communicated to you by [employer], letter or phone within 4 business days after the clinic has received the results. If you do not hear from us within 7 days, please contact the clinic through [employer] or phone. If you have a critical or abnormal lab result, we will notify you by phone as soon as possible.  Submit refill requests through [employer] or call your pharmacy and they will forward the refill request to us. Please allow 3 business days for your refill to be completed.          Additional Information About Your Visit        [employer] Information     [employer] gives you secure access to your electronic health record. If you see a primary care provider, you can also send  "messages to your care team and make appointments. If you have questions, please call your primary care clinic.  If you do not have a primary care provider, please call [phone] and they will assist you.        Care EveryWhere ID     This is your Care EveryWhere ID. This could be used by other organizations to access your [facility] medical records  [id number]        Your Vitals Were     Pulse Temperature Height Pulse Oximetry BMI (Body Mass Index)       64 97.8  F (36.6  C) (Oral) 5' 4\" (1.626 m) 99% 21.92 kg/m2        Blood Pressure from Last 3 Encounters:   10/12/18 126/69   08/07/18 130/70   05/23/18 106/64    Weight from Last 3 Encounters:   10/12/18 127 lb 11.2 oz (57.9 kg)   08/07/18 130 lb (59 kg)   05/23/18 131 lb (59.4 kg)              We Performed the Following     CARDIOLOGY EVAL ADULT REFERRAL     Comprehensive metabolic panel (BMP + Alb, Alk Phos, ALT, AST, Total. Bili, TP)     EKG 12-lead complete w/read - Clinics     FLU VACCINE, INCREASED ANTIGEN, PRESV FREE     GASTROENTEROLOGY ADULT REF PROCEDURE ONLY [facility] [phone]; No Provider Preference     Lipid Profile (Chol, Trig, HDL, LDL calc)     T3, Free     T4, free     TSH     VACCINE ADMINISTRATION, INITIAL          Where to get your medicines      These medications were sent to [facility] - [facility] [address]  [address] [address]     Phone:  [phone]     amLODIPine 10 MG tablet    levothyroxine 100 MCG tablet    losartan 100 MG tablet    triamterene-hydrochlorothiazide 37.5-25 MG per capsule          Primary Care Provider Office Phone # Fax #    Nette Burk [phone] [address] [address]        Equal Access to Services     CIRILO BAUER : Eliot Hutchinson, africa sinclair, qaerik loving. [facility] [phone].    ATENCIÓN: Si rosemary gillespie, " tiene a mcfadden disposición servicios gratuitos de asistencia lingüística. Jostin locke [phone].    We comply with applicable federal civil rights laws and [place] laws. We do not discriminate on the basis of race, color, national origin, age, disability, sex, sexual orientation, or gender identity.            Thank you!     Thank you for choosing [facility]  for your care. Our goal is always to provide you with excellent care. Hearing back from our patients is one way we can continue to improve our services. Please take a few minutes to complete the written survey that you may receive in the mail after your visit with us. Thank you!             Your Updated Medication List - Protect others around you: Learn how to safely use, store and throw away your medicines at www.disposemymeds.org.          This list is accurate as of 10/12/18 11:17 AM.  Always use your most recent med list.                   Brand Name Dispense Instructions for use Diagnosis    ADVIL PM PO      Take 2 tablets by mouth At Bedtime        ALEVE PO      Take 220 mg by mouth Takes every AM and PM when not taking ibuprofen.        amLODIPine 10 MG tablet    NORVASC    90 tablet    Take 1 tablet (10 mg) by mouth daily    Essential hypertension with goal blood pressure less than 140/90       CALCIUM CITRATE + D PO      Take 2 tablets by mouth 2 times daily        FISH OIL PO      1,000 mg 2 times daily        fluticasone 50 MCG/ACT spray    FLONASE    48 g    Spray 2 sprays into both nostrils daily    Other allergic rhinitis       levothyroxine 100 MCG tablet    SYNTHROID/LEVOTHROID    90 tablet    TAKE 1 TABLET EVERY DAY    Acquired hypothyroidism       losartan 100 MG tablet    COZAAR    90 tablet    Take 1 tablet (100 mg) by mouth daily    Essential hypertension with goal blood pressure less than 140/90       * PRESERVISION AREDS 2 PO      Take 1 tablet by mouth 2 times daily        * MULTIVITAMIN ADULT PO      Take 1 tablet by mouth  daily        triamterene-hydrochlorothiazide 37.5-25 MG per capsule    DYAZIDE    90 capsule    TAKE 1 CAPSULE EVERY DAY    Essential hypertension with goal blood pressure less than 140/90       ZANTAC 150 MAXIMUM STRENGTH PO           * Notice:  This list has 2 medication(s) that are the same as other medications prescribed for you. Read the directions carefully, and ask your doctor or other care provider to review them with you.

## 2018-10-15 ENCOUNTER — TELEPHONE (OUTPATIENT)
Dept: FAMILY MEDICINE | Facility: CLINIC | Age: 78
End: 2018-10-15

## 2018-10-15 DIAGNOSIS — R06.2 WHEEZING: ICD-10-CM

## 2018-10-15 DIAGNOSIS — R19.7 DIARRHEA, UNSPECIFIED TYPE: ICD-10-CM

## 2018-10-15 PROCEDURE — 87506 IADNA-DNA/RNA PROBE TQ 6-11: CPT | Performed by: FAMILY MEDICINE

## 2018-10-15 RX ORDER — FLUTICASONE PROPIONATE 110 UG/1
1 AEROSOL, METERED RESPIRATORY (INHALATION) 2 TIMES DAILY
Qty: 1 INHALER | Refills: 1 | Status: SHIPPED | OUTPATIENT
Start: 2018-10-15 | End: 2019-10-10

## 2018-10-15 NOTE — TELEPHONE ENCOUNTER
Reason for Call:  Other records    Detailed comments: patient had stopped in clinic and had questions about her records. She said at her last visit with DR Messer she was told in the records her last EKG was from 5 years ago.  Per patient she had and ER visit on 7/17/17 at [facility] and had an EKG and it should be in her records, and she would like to speak to someone about this.     Phone Number Patient can be reached at: Home number on file [phone] (home)    Best Time: any    Can we leave a detailed message on this number? YES    Call taken on 10/15/2018 at 11:09 AM by Kathryn High

## 2018-10-15 NOTE — TELEPHONE ENCOUNTER
Pt will return call -  states she wasn't available at the moment.    EKG from 7/17/2017 is in her chart in Care Everywhere. (Kari)    Recently had EKG here 10/12/2018.    Not sure why pt needs this info.    Will wait for call back / Any available MA can take this call.    Thanks!    Hawa Ricketts MA

## 2018-10-17 NOTE — TELEPHONE ENCOUNTER
Pt is calling in because she went to the pharmacy to  this rx and it is $250.  It is unaffordable.  Please do a PA or suggest different medication.  Please inform pt.

## 2018-10-18 ENCOUNTER — TELEPHONE (OUTPATIENT)
Dept: FAMILY MEDICINE | Facility: CLINIC | Age: 78
End: 2018-10-18

## 2018-10-18 NOTE — TELEPHONE ENCOUNTER
Spoke with Natasha pharmacist [address] [facility] ph# [phone].  Flovent inhaler is covered by [employer] insurance and no PA needed.  Cost of Flovent $248 was applied to the pt's deductible.  Tried calling pt's cell #. No answer and no VM.

## 2018-10-31 ENCOUNTER — HOSPITAL ENCOUNTER (OUTPATIENT)
Facility: CLINIC | Age: 78
Discharge: HOME | End: 2018-10-31
Attending: INTERNAL MEDICINE | Admitting: INTERNAL MEDICINE
Payer: MEDICARE

## 2018-10-31 ENCOUNTER — SURGERY (OUTPATIENT)
Age: 78
End: 2018-10-31

## 2018-10-31 VITALS
WEIGHT: 124 LBS | HEART RATE: 62 BPM | OXYGEN SATURATION: 98 % | HEIGHT: 65 IN | DIASTOLIC BLOOD PRESSURE: 83 MMHG | RESPIRATION RATE: 39 BRPM | BODY MASS INDEX: 20.66 KG/M2 | SYSTOLIC BLOOD PRESSURE: 119 MMHG

## 2018-10-31 LAB
COLONOSCOPY: NORMAL
UPPER GI ENDOSCOPY: NORMAL

## 2018-10-31 PROCEDURE — G0500 MOD SEDAT ENDO SERVICE >5YRS: HCPCS | Performed by: INTERNAL MEDICINE

## 2018-10-31 PROCEDURE — 45380 COLONOSCOPY AND BIOPSY: CPT | Performed by: INTERNAL MEDICINE

## 2018-10-31 PROCEDURE — 43239 EGD BIOPSY SINGLE/MULTIPLE: CPT | Performed by: INTERNAL MEDICINE

## 2018-10-31 PROCEDURE — 88305 TISSUE EXAM BY PATHOLOGIST: CPT | Mod: 26 | Performed by: INTERNAL MEDICINE

## 2018-10-31 PROCEDURE — 25000128 H RX IP 250 OP 636: Performed by: INTERNAL MEDICINE

## 2018-10-31 PROCEDURE — 88305 TISSUE EXAM BY PATHOLOGIST: CPT | Performed by: INTERNAL MEDICINE

## 2018-10-31 RX ORDER — FENTANYL CITRATE 50 UG/ML
INJECTION, SOLUTION INTRAMUSCULAR; INTRAVENOUS PRN
Status: DISCONTINUED | OUTPATIENT
Start: 2018-10-31 | End: 2018-10-31 | Stop reason: HOSPADM

## 2018-10-31 RX ADMIN — FENTANYL CITRATE 100 MCG: 50 INJECTION, SOLUTION INTRAMUSCULAR; INTRAVENOUS at 13:49

## 2018-10-31 RX ADMIN — MIDAZOLAM 1 MG: 1 INJECTION INTRAMUSCULAR; INTRAVENOUS at 13:57

## 2018-10-31 RX ADMIN — MIDAZOLAM 2 MG: 1 INJECTION INTRAMUSCULAR; INTRAVENOUS at 13:49

## 2018-11-01 LAB — COPATH REPORT: NORMAL

## 2018-11-21 ENCOUNTER — OFFICE VISIT (OUTPATIENT)
Dept: FAMILY MEDICINE | Facility: CLINIC | Age: 78
End: 2018-11-21
Payer: COMMERCIAL

## 2018-11-21 VITALS
RESPIRATION RATE: 14 BRPM | BODY MASS INDEX: 21.49 KG/M2 | WEIGHT: 129 LBS | SYSTOLIC BLOOD PRESSURE: 112 MMHG | DIASTOLIC BLOOD PRESSURE: 64 MMHG | OXYGEN SATURATION: 99 % | HEART RATE: 56 BPM | HEIGHT: 65 IN | TEMPERATURE: 98.1 F

## 2018-11-21 DIAGNOSIS — J22 LRTI (LOWER RESPIRATORY TRACT INFECTION): Primary | ICD-10-CM

## 2018-11-21 DIAGNOSIS — J01.90 ACUTE SINUSITIS, RECURRENCE NOT SPECIFIED, UNSPECIFIED LOCATION: ICD-10-CM

## 2018-11-21 PROCEDURE — 99213 OFFICE O/P EST LOW 20 MIN: CPT | Performed by: INTERNAL MEDICINE

## 2018-11-21 RX ORDER — DOXYCYCLINE 100 MG/1
CAPSULE ORAL
Qty: 14 CAPSULE | Refills: 0 | Status: SHIPPED | OUTPATIENT
Start: 2018-11-21 | End: 2018-12-17

## 2018-11-21 NOTE — PROGRESS NOTES
SUBJECTIVE:   Mely Guerra is a 78 year old female who presents to clinic today for the following health issues:        RESPIRATORY SYMPTOMS      Duration: X2 days    Description  nasal congestion, facial pain/pressure, cough and wheezing    Severity: moderate    Accompanying signs and symptoms: See above    History (predisposing factors):  none    Precipitating or alleviating factors: None    Therapies tried and outcome:  guaifenesin                    Symptoms as above.    This patient is coughing up green mucus, along with facial pain and pressure.  No fever, chills, or body aches.  She did have influenza vaccine.         No shortness of breath, or any wheezing.                     Non-smoker.  Has never smoked.  She has been using Mucinex.  Problem list and histories reviewed & adjusted, as indicated.  Additional history: as documented    Current Outpatient Prescriptions   Medication Sig Dispense Refill     amLODIPine (NORVASC) 10 MG tablet Take 1 tablet (10 mg) by mouth daily 90 tablet 1     Calcium Citrate-Vitamin D (CALCIUM CITRATE + D PO) Take 2 tablets by mouth 2 times daily        fluticasone (FLOVENT HFA) 110 MCG/ACT Inhaler Inhale 1 puff into the lungs 2 times daily 1 Inhaler 1     Ibuprofen-Diphenhydramine Cit (ADVIL PM PO) Take 2 tablets by mouth At Bedtime       levothyroxine (SYNTHROID/LEVOTHROID) 100 MCG tablet TAKE 1 TABLET EVERY DAY 90 tablet 0     losartan (COZAAR) 100 MG tablet Take 1 tablet (100 mg) by mouth daily 90 tablet 1     Multiple Vitamins-Minerals (MULTIVITAMIN ADULT PO) Take 1 tablet by mouth daily       Multiple Vitamins-Minerals (PRESERVISION AREDS 2 PO) Take 1 tablet by mouth 2 times daily       Naproxen Sodium (ALEVE PO) Take 220 mg by mouth Takes every AM and PM when not taking ibuprofen.       Omega-3 Fatty Acids (FISH OIL PO) 1,000 mg 2 times daily       RaNITidine HCl (ZANTAC 150 MAXIMUM STRENGTH PO)        triamterene-hydrochlorothiazide (DYAZIDE) 37.5-25 MG per capsule  "TAKE 1 CAPSULE EVERY DAY 90 capsule 1             Allergies   Allergen Reactions     Cats      Hylan G-F 20      Lisinopril Cough     Seasonal Allergies      Vioxx      BP Readings from Last 3 Encounters:   11/21/18 112/64   10/31/18 119/83   10/12/18 126/69    Wt Readings from Last 3 Encounters:   11/21/18 129 lb (58.5 kg)   10/31/18 124 lb (56.2 kg)   10/12/18 127 lb 11.2 oz (57.9 kg)                    Reviewed and updated as needed this visit by clinical staff  Tobacco  Allergies  Meds  Med Hx  Surg Hx  Fam Hx  Soc Hx      Reviewed and updated as needed this visit by Provider         ROS:  CONSTITUTIONAL:NEGATIVE for fever, chills, change in weight  RESP:NEGATIVE for hemoptysis, Hx asthma and Hx chronic bronchitis  CV: NEGATIVE for chest pain, palpitations or peripheral edema    OBJECTIVE:                                                    /64 (Cuff Size: Adult Regular)  Pulse 56  Temp 98.1  F (36.7  C) (Tympanic)  Resp 14  Ht 5' 5\" (1.651 m)  Wt 129 lb (58.5 kg)  SpO2 99%  Breastfeeding? No  BMI 21.47 kg/m2  Body mass index is 21.47 kg/(m^2).  GENERAL APPEARANCE: alert, no distress and occasionally coughing  HENT: maxillary sinus tenderness bilateral  RESP: no rales or rhonchi  CV: regular rates and rhythm, normal S1 S2, no S3 or S4 and no murmur, click or rub    Diagnostic test results:  none      ASSESSMENT/PLAN:                                                        ICD-10-CM    1. LRTI (lower respiratory tract infection) J22 doxycycline monohydrate (MONDOXYNE NL) 100 MG capsule   2. Acute sinusitis, recurrence not specified, unspecified location J01.90 doxycycline monohydrate (MONDOXYNE NL) 100 MG capsule       Rx as above.  Patient Instructions   Do not take the antibiotic at the same time as the vitamins and minerals.                      Keep taking the Mucinex.             Mihir Garcia MD  [facility]  "

## 2018-11-21 NOTE — MR AVS SNAPSHOT
After Visit Summary   11/21/2018    Mely Guerra    MRN: [id number]           Patient Information     Date Of Birth          1940        Visit Information        Provider Department      11/21/2018 11:30 AM Mihir Garcia MD [facility]        Today's Diagnoses     LRTI (lower respiratory tract infection)    -  1    Acute sinusitis, recurrence not specified, unspecified location          Care Instructions    Do not take the antibiotic at the same time as the vitamins and minerals.                      Keep taking the Mucinex.                 Follow-ups after your visit        Follow-up notes from your care team     Return if symptoms worsen or fail to improve.      Your next 10 appointments already scheduled     Nov 23, 2018  9:00 AM CST   (Arrive by 8:00 AM)   CT ENTEROGRAPHY WITH CONTRAST with URCT1   [facility], [place], Radiology ([facility] [place] [facility])    [address] [place] [id number] [phone]           You will have oral contrast for this exam.  You will drink the contrast at the imaging site your exam is at. Please arrive 1 hour early.  How do I prepare for my exam? (Food and drink instructions) To prepare:  The day before your exam, drink extra fluids at least six 8-ounce glasses (unless your doctor tells you to restrict your fluids). Do not eat or drink for 6 hours before your exam. If you need to take medicine, you may take it with small sips of water. (We may ask you to take liquid medicine as well.)  How do I prepare for my exam? (Other instructions) If you have diabetes continue to take your metformin medication on the day of your exam  What should I wear: Please wear loose clothing, such as a sweat suit or jogging clothes. Avoid snaps, zippers and other metal. We may ask you to undress and put on a hospital gown.  How long does the exam take: The entire exam will take about 30 minutes or less.   What should I bring: Please bring any scans or X-rays taken at other hospitals, if similar tests were done. Also bring a list of your medicines, including vitamins, minerals and over-the-counter drugs. It is safest to leave personal items at home.  Do I need a : No  is needed.  What do I need to tell my doctor? Be sure to tell your doctor: * If you have any allergies. * If there s any chance you are pregnant. * If you are breastfeeding.  What should I do after the exam: No restrictions, You may resume normal activities.  What is this test: A CT (computed tomography) scan is a series of pictures that allows us to look inside your body. The scanner creates images of the body in cross sections, much like slices of bread. This helps us see any problems more clearly. You may receive contrast (X-ray dye) before or during your scan. Contrast is given through an IV (small needle in your arm).  Who should I call with questions: If you have any questions, please call the Imaging Department where you will have your exam. Directions, parking instructions, and other information is available on our website, [email or web address].            Nov 26, 2018  9:15 AM CST   New Visit with Juan York MD   [facility] ([facility])    [address] [place] [id number] [phone] OPT 2              Who to contact     If you have questions or need follow up information about today's clinic visit or your schedule please contact [facility] directly at [phone].  Normal or non-critical lab and imaging results will be communicated to you by MyChart, letter or phone within 4 business days after the clinic has received the results. If you do not hear from us within 7 days, please contact the clinic through MyChart or phone. If you have a critical or abnormal lab result, we will notify you by phone as soon as possible.  Submit  "refill requests through [employer] or call your pharmacy and they will forward the refill request to us. Please allow 3 business days for your refill to be completed.          Additional Information About Your Visit        [employer] Information     [employer] gives you secure access to your electronic health record. If you see a primary care provider, you can also send messages to your care team and make appointments. If you have questions, please call your primary care clinic.  If you do not have a primary care provider, please call [phone] and they will assist you.        Care EveryWhere ID     This is your Care EveryWhere ID. This could be used by other organizations to access your [facility] medical records  [id number]        Your Vitals Were     Pulse Temperature Respirations Height Pulse Oximetry Breastfeeding?    56 98.1  F (36.7  C) (Tympanic) 14 5' 5\" (1.651 m) 99% No    BMI (Body Mass Index)                   21.47 kg/m2            Blood Pressure from Last 3 Encounters:   11/21/18 112/64   10/31/18 119/83   10/12/18 126/69    Weight from Last 3 Encounters:   11/21/18 129 lb (58.5 kg)   10/31/18 124 lb (56.2 kg)   10/12/18 127 lb 11.2 oz (57.9 kg)              Today, you had the following     No orders found for display         Today's Medication Changes          These changes are accurate as of 11/21/18 11:39 AM.  If you have any questions, ask your nurse or doctor.               Start taking these medicines.        Dose/Directions    doxycycline monohydrate 100 MG capsule   Commonly known as:  MONDOXYNE NL   Used for:  LRTI (lower respiratory tract infection), Acute sinusitis, recurrence not specified, unspecified location   Started by:  Mihir Garcia MD        Take bid for 7 days   Quantity:  14 capsule   Refills:  0            Where to get your medicines      These medications were sent to [facility] [address] [place] [address] - [address] [place] [address] & [address]  [address] [place] [address] " [place] [address]     Phone:  [phone]     doxycycline monohydrate 100 MG capsule                Primary Care Provider Office Phone # Fax #    Nette Burk [phone] [address] [facility] [address] [address]        Equal Access to Services     CIRILO BAUER : Hadii aad ku hadasho Soomaali, waaxda luqadaha, qaybta kaalmada adeegyada, erik dumasin hayviviann adechantelle flores lateha . So [facility] [phone].    ATENCIÓN: Si habla español, tiene a mcfadden disposición servicios gratuitos de asistencia lingüística. Jostin al [phone].    We comply with applicable federal civil rights laws and [place] laws. We do not discriminate on the basis of race, color, national origin, age, disability, sex, sexual orientation, or gender identity.            Thank you!     Thank you for choosing [facility]  for your care. Our goal is always to provide you with excellent care. Hearing back from our patients is one way we can continue to improve our services. Please take a few minutes to complete the written survey that you may receive in the mail after your visit with us. Thank you!             Your Updated Medication List - Protect others around you: Learn how to safely use, store and throw away your medicines at www.disposemymeds.org.          This list is accurate as of 11/21/18 11:39 AM.  Always use your most recent med list.                   Brand Name Dispense Instructions for use Diagnosis    ADVIL PM PO      Take 2 tablets by mouth At Bedtime        ALEVE PO      Take 220 mg by mouth Takes every AM and PM when not taking ibuprofen.        amLODIPine 10 MG tablet    NORVASC    90 tablet    Take 1 tablet (10 mg) by mouth daily    Essential hypertension with goal blood pressure less than 140/90       CALCIUM CITRATE + D PO      Take 2 tablets by mouth 2 times daily        doxycycline monohydrate 100 MG capsule    MONDOXYNE NL    14 capsule    Take bid for 7 days    LRTI  (lower respiratory tract infection), Acute sinusitis, recurrence not specified, unspecified location       FISH OIL PO      1,000 mg 2 times daily        fluticasone 110 MCG/ACT Inhaler    FLOVENT HFA    1 Inhaler    Inhale 1 puff into the lungs 2 times daily    Wheezing       fluticasone 50 MCG/ACT spray    FLONASE    48 g    Spray 2 sprays into both nostrils daily    Other allergic rhinitis       levothyroxine 100 MCG tablet    SYNTHROID/LEVOTHROID    90 tablet    TAKE 1 TABLET EVERY DAY    Acquired hypothyroidism       losartan 100 MG tablet    COZAAR    90 tablet    Take 1 tablet (100 mg) by mouth daily    Essential hypertension with goal blood pressure less than 140/90       * PRESERVISION AREDS 2 PO      Take 1 tablet by mouth 2 times daily        * MULTIVITAMIN ADULT PO      Take 1 tablet by mouth daily        triamterene-hydrochlorothiazide 37.5-25 MG per capsule    DYAZIDE    90 capsule    TAKE 1 CAPSULE EVERY DAY    Essential hypertension with goal blood pressure less than 140/90       ZANTAC 150 MAXIMUM STRENGTH PO           * Notice:  This list has 2 medication(s) that are the same as other medications prescribed for you. Read the directions carefully, and ask your doctor or other care provider to review them with you.

## 2018-11-21 NOTE — PATIENT INSTRUCTIONS
Do not take the antibiotic at the same time as the vitamins and minerals.                      Keep taking the Mucinex.

## 2018-11-23 ENCOUNTER — HOSPITAL ENCOUNTER (OUTPATIENT)
Dept: CT IMAGING | Facility: CLINIC | Age: 78
Discharge: HOME | End: 2018-11-23
Attending: INTERNAL MEDICINE | Admitting: INTERNAL MEDICINE
Payer: MEDICARE

## 2018-11-23 DIAGNOSIS — K50.90 CROHN'S DISEASE (H): ICD-10-CM

## 2018-11-23 PROCEDURE — 25000128 H RX IP 250 OP 636: Performed by: INTERNAL MEDICINE

## 2018-11-23 PROCEDURE — 25000125 ZZHC RX 250: Performed by: INTERNAL MEDICINE

## 2018-11-23 PROCEDURE — 74177 CT ABD & PELVIS W/CONTRAST: CPT

## 2018-11-23 RX ORDER — IOPAMIDOL 755 MG/ML
64 INJECTION, SOLUTION INTRAVASCULAR ONCE
Status: COMPLETED | OUTPATIENT
Start: 2018-11-23 | End: 2018-11-23

## 2018-11-23 RX ADMIN — IOPAMIDOL 64 ML: 755 INJECTION, SOLUTION INTRAVENOUS at 09:16

## 2018-11-23 RX ADMIN — SODIUM CHLORIDE 64 ML: 9 INJECTION, SOLUTION INTRAVENOUS at 09:17

## 2018-11-26 ENCOUNTER — OFFICE VISIT (OUTPATIENT)
Dept: CARDIOLOGY | Facility: CLINIC | Age: 78
End: 2018-11-26
Attending: FAMILY MEDICINE
Payer: COMMERCIAL

## 2018-11-26 VITALS
WEIGHT: 129.7 LBS | BODY MASS INDEX: 21.58 KG/M2 | DIASTOLIC BLOOD PRESSURE: 74 MMHG | OXYGEN SATURATION: 99 % | HEART RATE: 64 BPM | SYSTOLIC BLOOD PRESSURE: 135 MMHG

## 2018-11-26 DIAGNOSIS — I44.1 WENCKEBACH SECOND DEGREE AV BLOCK: ICD-10-CM

## 2018-11-26 DIAGNOSIS — R00.2 PALPITATIONS: Primary | ICD-10-CM

## 2018-11-26 PROCEDURE — 93000 ELECTROCARDIOGRAM COMPLETE: CPT | Performed by: INTERNAL MEDICINE

## 2018-11-26 PROCEDURE — 99204 OFFICE O/P NEW MOD 45 MIN: CPT | Performed by: INTERNAL MEDICINE

## 2018-11-26 NOTE — PROGRESS NOTES
Service Date: 11/26/2018      HISTORY OF PRESENT ILLNESS:  It is a pleasure for me to see Mrs. Guerra, who is a very pleasant 78-year-old lady for evaluation of abnormal EKG.  This was found on a routine physical with her primary physician several weeks ago.  I personally reviewed her EKG done at the time.  On initial inspection, I certainly thought she had a first-degree AV block, but on closer inspection it appears that she may be in Wenckebach.  EKG today shows very similar findings.  I reviewed it with my electrophysiology colleague, Dr. Raman.  Dr. 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  Heart rate is between 50-60.  In terms of symptoms, she tells me that recently she has felt an occasional pounding sensation in her neck but otherwise she has no dizzy spells, syncopal episodes, chest pains or shortness of breath.  This is a lady who despite having hip and knee replacements has remained very physically active.  She hikes and she also belongs to a very active cycling group, and they do anywhere from 10 to 25 miles on the bike trails in the summer.      There is no history of any coronary artery disease or heart failure.  She is hypertensive and blood pressure appears under good control.  No history of any substance abuse.  She has a long history of hypothyroidism which has always been under control.  Indeed, her most recent TSH appears normal.      PHYSICAL EXAMINATION:  Cardiac examination does reveal an irregular heartbeat due to her arrhythmia, but otherwise it is completely normal.      IMPRESSION:  I do wonder if her occasional pounding sensation in the neck may be related to guadarrama waves.  Perhaps she may have high degree of AV block.  The first thing I would like to do is to do a Holter monitor.  The next thing would be a stress echocardiogram to see if her heart rate appropriately increases with exertion.  This will also serve to exclude the small likelihood of her having  hemodynamically significant coronary artery disease.  I have explained my clinical findings and proposed treatment to this lady, who understands.  Followup will be arranged once we have the results of her cardiac tests.         ANNA BHAKTA MD, [facility] [place] [facility]             D: 2018   T: 2018   MT: PIPER      Name:     THELMA VELÁSQUEZ   MRN:      [id number]        Account:      [id number]   :      1940           Service Date: 2018      Document: [id number]

## 2018-11-26 NOTE — LETTER
11/26/2018    Tiffany Messer MD  [address] [facility] [address]  [facility] [address]    RE: Mely Guerra       Dear Colleague,    I had the pleasure of seeing Mely Guerra in the [facility].    HPI and Plan:   See dictation    Orders Placed This Encounter   Procedures     EKG 12-lead complete w/read - Clinics (performed today)     Holter Monitor 24 hour - Adult     Exercise Stress Echocardiogram       No orders of the defined types were placed in this encounter.      Encounter Diagnoses   Name Primary?     Palpitations Yes     Wenckebach second degree AV block        CURRENT MEDICATIONS:  Current Outpatient Prescriptions   Medication Sig Dispense Refill     amLODIPine (NORVASC) 10 MG tablet Take 1 tablet (10 mg) by mouth daily 90 tablet 1     Calcium Citrate-Vitamin D (CALCIUM CITRATE + D PO) Take 2 tablets by mouth 2 times daily        fluticasone (FLONASE) 50 MCG/ACT spray Spray 2 sprays into both nostrils daily 48 g prn     fluticasone (FLOVENT HFA) 110 MCG/ACT Inhaler Inhale 1 puff into the lungs 2 times daily 1 Inhaler 1     Ibuprofen-Diphenhydramine Cit (ADVIL PM PO) Take 2 tablets by mouth At Bedtime       levothyroxine (SYNTHROID/LEVOTHROID) 100 MCG tablet TAKE 1 TABLET EVERY DAY 90 tablet 0     losartan (COZAAR) 100 MG tablet Take 1 tablet (100 mg) by mouth daily 90 tablet 1     Multiple Vitamins-Minerals (MULTIVITAMIN ADULT PO) Take 1 tablet by mouth daily       Multiple Vitamins-Minerals (PRESERVISION AREDS 2 PO) Take 1 tablet by mouth 2 times daily       Naproxen Sodium (ALEVE PO) Take 220 mg by mouth Takes every AM and PM when not taking ibuprofen.       Omega-3 Fatty Acids (FISH OIL PO) 1,000 mg 2 times daily       RaNITidine HCl (ZANTAC 150 MAXIMUM STRENGTH PO)        triamterene-hydrochlorothiazide (DYAZIDE) 37.5-25 MG per capsule TAKE 1 CAPSULE EVERY DAY 90 capsule 1     doxycycline monohydrate (MONDOXYNE NL) 100 MG capsule Take bid for 7 days 14 capsule 0        ALLERGIES     Allergies   Allergen Reactions     Cats      Hylan G-F 20      Lisinopril Cough     Seasonal Allergies      Vioxx        PAST MEDICAL HISTORY:  Past Medical History:   Diagnosis Date     Allergic rhinitis, cause unspecified      Personal history of alcoholism (H)      Sleep apnea      Undiagnosed cardiac murmurs      Unspecified essential hypertension      Unspecified hypothyroidism        PAST SURGICAL HISTORY:  Past Surgical History:   Procedure Laterality Date     C NONSPECIFIC PROCEDURE  1974    s/p Vaginal hysterectomy     COLONOSCOPY N/A 10/31/2018    Procedure: COMBINED COLONOSCOPY, SINGLE OR MULTIPLE BIOPSY/POLYPECTOMY BY BIOPSY;  Surgeon: Leon Cosme MD;  Location:  GI     ESOPHAGOSCOPY, GASTROSCOPY, DUODENOSCOPY (EGD), COMBINED N/A 10/31/2018    Procedure: GASTROSCOPY;  Surgeon: Leon Cosme MD;  Location:  GI     HYSTERECTOMY       JOINT REPLACEMENT Bilateral      knee replacement Bilateral      ORTHOPEDIC SURGERY         FAMILY HISTORY:  Family History   Problem Relation Age of Onset     Cancer Sister      HEART DISEASE Father        SOCIAL HISTORY:  Social History     Social History     Marital status:      Spouse name: N/A     Number of children: N/A     Years of education: N/A     Social History Main Topics     Smoking status: Never Smoker     Smokeless tobacco: Never Used     Alcohol use No     Drug use: No     Sexual activity: Yes     Partners: Male     Other Topics Concern      Service No     Blood Transfusions No     Caffeine Concern No     Occupational Exposure No     Hobby Hazards No     Sleep Concern Yes     Stress Concern No     Weight Concern No     Special Diet No     Back Care Yes     Exercise No     Bike Helmet Yes     Seat Belt Yes     Self-Exams Yes     Parent/Sibling W/ Cabg, Mi Or Angioplasty Before 65f 55m? Yes     Social History Narrative    Social Documentation:4/10        Balanced Diet: YES    Calcium intake: supplement  per day    Caffeine: 4cups per day    Exercise:  type of activity varies;  3 times per week    Sunscreen: Yes    Seatbelts:  Yes    Self Breast Exam:  Yes    Self Testicular Exam: No - na    Physical/Emotional/Sexual Abuse: No -     Do you feel safe in your environment? Yes        Cholesterol screen up to date: yes    Eye Exam up to date: Yes    Dental Exam up to date: Yes    Pap smear up to date: Does Not Apply    Mammogram up to date: utd    Dexa Scan up to date: Yes    Colonoscopy up to date: Yes    Immunizations up to date: Yes    Glucose screen if over 40:  No -     Fredy Carroll ma               Review of Systems:  Skin:  Negative     Eyes:  Positive for glasses  ENT:  Positive for nasal congestion  Respiratory:  Positive for cough;wheezing;sleep apnea;CPAP  Cardiovascular:    Positive for;palpitations  Gastroenterology: not assessed    Genitourinary:  Negative    Musculoskeletal:  Positive for arthritis  Neurologic:  Negative for headaches;stroke;seizures  Psychiatric:  Negative    Heme/Lymph/Imm:  Negative    Endocrine:  Positive for thyroid disorder    Physical Exam:  Vitals: /74  Pulse 64  Wt 58.8 kg (129 lb 11.2 oz)  SpO2 99%  BMI 21.58 kg/m2    Constitutional:  cooperative, alert and oriented, well developed, well nourished, in no acute distress        Skin:  warm and dry to the touch, no apparent skin lesions or masses noted          Head:  normocephalic, no masses or lesions        Eyes:  pupils equal and round, conjunctivae and lids unremarkable, sclera white, no xanthalasma, EOMS intact, no nystagmus        Lymph:No Cervical lymphadenopathy present     ENT:  no pallor or cyanosis, dentition good        Neck:  carotid pulses are full and equal bilaterally, JVP normal, no carotid bruit        Respiratory:  normal breath sounds, clear to auscultation, normal A-P diameter, normal symmetry, normal respiratory excursion, no use of accessory muscles         Cardiac: regular rhythm, normal  S1/S2, no S3 or S4, apical impulse not displaced, no murmurs, gallops or rubs                                                         GI:  abdomen soft, non-tender, BS normoactive, no mass, no HSM, no bruits        Extremities and Muscular Skeletal:  no deformities, clubbing, cyanosis, erythema observed              Neurological:  no gross motor deficits        Psych:  Alert and Oriented x 3        Recent Lab Results:  LIPID RESULTS:  Lab Results   Component Value Date    CHOL 191 10/12/2018    HDL 85 10/12/2018    LDL 97 10/12/2018    TRIG 39 10/12/2018    CHOLHDLRATIO 2.4 06/03/2014       LIVER ENZYME RESULTS:  Lab Results   Component Value Date    AST 55 (H) 10/12/2018    ALT 32 10/12/2018       CBC RESULTS:  Lab Results   Component Value Date    WBC 4.9 03/24/2017    RBC 4.50 03/24/2017    HGB 13.6 03/24/2017    HCT 41.3 03/24/2017    MCV 92 03/24/2017    MCH 30.2 03/24/2017    MCHC 32.9 03/24/2017    RDW 12.5 03/24/2017     03/24/2017       BMP RESULTS:  Lab Results   Component Value Date     10/12/2018    POTASSIUM 4.3 10/12/2018    CHLORIDE 99 10/12/2018    CO2 26 10/12/2018    ANIONGAP 9 10/12/2018    GLC 80 10/12/2018    BUN 9 10/12/2018    CR 0.73 10/12/2018    GFRESTIMATED 78 10/12/2018    GFRESTBLACK >90 10/12/2018    CEASAR 10.5 (H) 10/12/2018        A1C RESULTS:  No results found for: A1C    INR RESULTS:  Lab Results   Component Value Date    INR 1.7 02/12/2013    INR 1.9 02/08/2013           CC  Tiffany Messer MD  [address] [place] [address]                    Thank you for allowing me to participate in the care of your patient.      Sincerely,     DR ANNA BHAKTA MD     [facility]    cc:   Tiffany Messer MD  [address] [place] [address]

## 2018-11-26 NOTE — LETTER
11/26/2018      Tiffany Messer MD  [address] [facility]  [facility] [place] [address]      RE: Mely Guerra       Dear Colleague,    I had the pleasure of seeing Mely Guerra in the [facility].    Service Date: 11/26/2018      HISTORY OF PRESENT ILLNESS:  It is a pleasure for me to see Mrs. Guerra, who is a very pleasant 78-year-old lady for evaluation of abnormal EKG.  This was found on a routine physical with her primary physician several weeks ago.  I personally reviewed her EKG done at the time.  On initial inspection, I certainly thought she had a first-degree AV block, but on closer inspection it appears that she may be in [place]enckebach.  EKG today shows very similar findings.  I reviewed it with my electrophysiology colleague, Dr. Raman.  Dr. 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  Heart rate is between 50-60.  In terms of symptoms, she tells me that recently she has felt an occasional pounding sensation in her neck but otherwise she has no dizzy spells, syncopal episodes, chest pains or shortness of breath.  This is a lady who despite having hip and knee replacements has remained very physically active.  She hikes and she also belongs to a very active cycling group, and they do anywhere from 10 to 25 miles on the bike trails in the summer.      There is no history of any coronary artery disease or heart failure.  She is hypertensive and blood pressure appears under good control.  No history of any substance abuse.  She has a long history of hypothyroidism which has always been under control.  Indeed, her most recent TSH appears normal.      PHYSICAL EXAMINATION:  Cardiac examination does reveal an irregular heartbeat due to her arrhythmia, but otherwise it is completely normal.      IMPRESSION:  I do wonder if her occasional pounding sensation in the neck may be related to guadarrama waves.  Perhaps she may have high degree of AV block.  The first thing I  would like to do is to do a Holter monitor.  The next thing would be a stress echocardiogram to see if her heart rate appropriately increases with exertion.  This will also serve to exclude the small likelihood of her having hemodynamically significant coronary artery disease.  I have explained my clinical findings and proposed treatment to this lady, who understands.  Followup will be arranged once we have the results of her cardiac tests.         ANNA BHAKTA MD, [facility]             D: 2018   T: 2018   MT: PIPER      Name:     THELMA VELÁSQUEZ   MRN:      [id number]        Account:      [id number]   :      1940           Service Date: 2018      Document: [id number]         Outpatient Encounter Prescriptions as of 2018   Medication Sig Dispense Refill     amLODIPine (NORVASC) 10 MG tablet Take 1 tablet (10 mg) by mouth daily 90 tablet 1     Calcium Citrate-Vitamin D (CALCIUM CITRATE + D PO) Take 2 tablets by mouth 2 times daily        fluticasone (FLONASE) 50 MCG/ACT spray Spray 2 sprays into both nostrils daily 48 g prn     fluticasone (FLOVENT HFA) 110 MCG/ACT Inhaler Inhale 1 puff into the lungs 2 times daily 1 Inhaler 1     Ibuprofen-Diphenhydramine Cit (ADVIL PM PO) Take 2 tablets by mouth At Bedtime       levothyroxine (SYNTHROID/LEVOTHROID) 100 MCG tablet TAKE 1 TABLET EVERY DAY 90 tablet 0     losartan (COZAAR) 100 MG tablet Take 1 tablet (100 mg) by mouth daily 90 tablet 1     Multiple Vitamins-Minerals (MULTIVITAMIN ADULT PO) Take 1 tablet by mouth daily       Multiple Vitamins-Minerals (PRESERVISION AREDS 2 PO) Take 1 tablet by mouth 2 times daily       Naproxen Sodium (ALEVE PO) Take 220 mg by mouth Takes every AM and PM when not taking ibuprofen.       Omega-3 Fatty Acids (FISH OIL PO) 1,000 mg 2 times daily       RaNITidine HCl (ZANTAC 150 MAXIMUM STRENGTH PO)        triamterene-hydrochlorothiazide (DYAZIDE) 37.5-25 MG per capsule TAKE 1 CAPSULE EVERY DAY 90 capsule 1      doxycycline monohydrate (MONDOXYNE NL) 100 MG capsule Take bid for 7 days 14 capsule 0     No facility-administered encounter medications on file as of 11/26/2018.        Again, thank you for allowing me to participate in the care of your patient.      Sincerely,    DR ANNA BHAKTA MD     [facility]

## 2018-11-26 NOTE — PROGRESS NOTES
HPI and Plan:   See dictation    Orders Placed This Encounter   Procedures     EKG 12-lead complete w/read - Clinics (performed today)     Holter Monitor 24 hour - Adult     Exercise Stress Echocardiogram       No orders of the defined types were placed in this encounter.      Encounter Diagnoses   Name Primary?     Palpitations Yes     Wenckebach second degree AV block        CURRENT MEDICATIONS:  Current Outpatient Prescriptions   Medication Sig Dispense Refill     amLODIPine (NORVASC) 10 MG tablet Take 1 tablet (10 mg) by mouth daily 90 tablet 1     Calcium Citrate-Vitamin D (CALCIUM CITRATE + D PO) Take 2 tablets by mouth 2 times daily        fluticasone (FLONASE) 50 MCG/ACT spray Spray 2 sprays into both nostrils daily 48 g prn     fluticasone (FLOVENT HFA) 110 MCG/ACT Inhaler Inhale 1 puff into the lungs 2 times daily 1 Inhaler 1     Ibuprofen-Diphenhydramine Cit (ADVIL PM PO) Take 2 tablets by mouth At Bedtime       levothyroxine (SYNTHROID/LEVOTHROID) 100 MCG tablet TAKE 1 TABLET EVERY DAY 90 tablet 0     losartan (COZAAR) 100 MG tablet Take 1 tablet (100 mg) by mouth daily 90 tablet 1     Multiple Vitamins-Minerals (MULTIVITAMIN ADULT PO) Take 1 tablet by mouth daily       Multiple Vitamins-Minerals (PRESERVISION AREDS 2 PO) Take 1 tablet by mouth 2 times daily       Naproxen Sodium (ALEVE PO) Take 220 mg by mouth Takes every AM and PM when not taking ibuprofen.       Omega-3 Fatty Acids (FISH OIL PO) 1,000 mg 2 times daily       RaNITidine HCl (ZANTAC 150 MAXIMUM STRENGTH PO)        triamterene-hydrochlorothiazide (DYAZIDE) 37.5-25 MG per capsule TAKE 1 CAPSULE EVERY DAY 90 capsule 1     doxycycline monohydrate (MONDOXYNE NL) 100 MG capsule Take bid for 7 days 14 capsule 0       ALLERGIES     Allergies   Allergen Reactions     Cats      Hylan G-F 20      Lisinopril Cough     Seasonal Allergies      Vioxx        PAST MEDICAL HISTORY:  Past Medical History:   Diagnosis Date     Allergic rhinitis, cause  unspecified      Personal history of alcoholism (H)      Sleep apnea      Undiagnosed cardiac murmurs      Unspecified essential hypertension      Unspecified hypothyroidism        PAST SURGICAL HISTORY:  Past Surgical History:   Procedure Laterality Date     C NONSPECIFIC PROCEDURE  1974    s/p Vaginal hysterectomy     COLONOSCOPY N/A 10/31/2018    Procedure: COMBINED COLONOSCOPY, SINGLE OR MULTIPLE BIOPSY/POLYPECTOMY BY BIOPSY;  Surgeon: Leon Cosme MD;  Location:  GI     ESOPHAGOSCOPY, GASTROSCOPY, DUODENOSCOPY (EGD), COMBINED N/A 10/31/2018    Procedure: GASTROSCOPY;  Surgeon: Leon Cosme MD;  Location:  GI     HYSTERECTOMY       JOINT REPLACEMENT Bilateral      knee replacement Bilateral      ORTHOPEDIC SURGERY         FAMILY HISTORY:  Family History   Problem Relation Age of Onset     Cancer Sister      HEART DISEASE Father        SOCIAL HISTORY:  Social History     Social History     Marital status:      Spouse name: N/A     Number of children: N/A     Years of education: N/A     Social History Main Topics     Smoking status: Never Smoker     Smokeless tobacco: Never Used     Alcohol use No     Drug use: No     Sexual activity: Yes     Partners: Male     Other Topics Concern      Service No     Blood Transfusions No     Caffeine Concern No     Occupational Exposure No     Hobby Hazards No     Sleep Concern Yes     Stress Concern No     Weight Concern No     Special Diet No     Back Care Yes     Exercise No     Bike Helmet Yes     Seat Belt Yes     Self-Exams Yes     Parent/Sibling W/ Cabg, Mi Or Angioplasty Before 65f 55m? Yes     Social History Narrative    Social Documentation:4/10        Balanced Diet: YES    Calcium intake: supplement per day    Caffeine: 4cups per day    Exercise:  type of activity varies;  3 times per week    Sunscreen: Yes    Seatbelts:  Yes    Self Breast Exam:  Yes    Self Testicular Exam: No - na    Physical/Emotional/Sexual Abuse: No -      Do you feel safe in your environment? Yes        Cholesterol screen up to date: yes    Eye Exam up to date: Yes    Dental Exam up to date: Yes    Pap smear up to date: Does Not Apply    Mammogram up to date: utd    Dexa Scan up to date: Yes    Colonoscopy up to date: Yes    Immunizations up to date: Yes    Glucose screen if over 40:  No -     Fredy Carroll ma               Review of Systems:  Skin:  Negative     Eyes:  Positive for glasses  ENT:  Positive for nasal congestion  Respiratory:  Positive for cough;wheezing;sleep apnea;CPAP  Cardiovascular:    Positive for;palpitations  Gastroenterology: not assessed    Genitourinary:  Negative    Musculoskeletal:  Positive for arthritis  Neurologic:  Negative for headaches;stroke;seizures  Psychiatric:  Negative    Heme/Lymph/Imm:  Negative    Endocrine:  Positive for thyroid disorder    Physical Exam:  Vitals: /74  Pulse 64  Wt 58.8 kg (129 lb 11.2 oz)  SpO2 99%  BMI 21.58 kg/m2    Constitutional:  cooperative, alert and oriented, well developed, well nourished, in no acute distress        Skin:  warm and dry to the touch, no apparent skin lesions or masses noted          Head:  normocephalic, no masses or lesions        Eyes:  pupils equal and round, conjunctivae and lids unremarkable, sclera white, no xanthalasma, EOMS intact, no nystagmus        Lymph:No Cervical lymphadenopathy present     ENT:  no pallor or cyanosis, dentition good        Neck:  carotid pulses are full and equal bilaterally, JVP normal, no carotid bruit        Respiratory:  normal breath sounds, clear to auscultation, normal A-P diameter, normal symmetry, normal respiratory excursion, no use of accessory muscles         Cardiac: regular rhythm, normal S1/S2, no S3 or S4, apical impulse not displaced, no murmurs, gallops or rubs                                                         GI:  abdomen soft, non-tender, BS normoactive, no mass, no HSM, no bruits        Extremities and  Muscular Skeletal:  no deformities, clubbing, cyanosis, erythema observed              Neurological:  no gross motor deficits        Psych:  Alert and Oriented x 3        Recent Lab Results:  LIPID RESULTS:  Lab Results   Component Value Date    CHOL 191 10/12/2018    HDL 85 10/12/2018    LDL 97 10/12/2018    TRIG 39 10/12/2018    CHOLHDLRATIO 2.4 06/03/2014       LIVER ENZYME RESULTS:  Lab Results   Component Value Date    AST 55 (H) 10/12/2018    ALT 32 10/12/2018       CBC RESULTS:  Lab Results   Component Value Date    WBC 4.9 03/24/2017    RBC 4.50 03/24/2017    HGB 13.6 03/24/2017    HCT 41.3 03/24/2017    MCV 92 03/24/2017    MCH 30.2 03/24/2017    MCHC 32.9 03/24/2017    RDW 12.5 03/24/2017     03/24/2017       BMP RESULTS:  Lab Results   Component Value Date     10/12/2018    POTASSIUM 4.3 10/12/2018    CHLORIDE 99 10/12/2018    CO2 26 10/12/2018    ANIONGAP 9 10/12/2018    GLC 80 10/12/2018    BUN 9 10/12/2018    CR 0.73 10/12/2018    GFRESTIMATED 78 10/12/2018    GFRESTBLACK >90 10/12/2018    CEASAR 10.5 (H) 10/12/2018        A1C RESULTS:  No results found for: A1C    INR RESULTS:  Lab Results   Component Value Date    INR 1.7 02/12/2013    INR 1.9 02/08/2013           CC  Tiffany Messer MD  [address] [facility] [place] [id number]

## 2018-12-03 ENCOUNTER — TRANSFERRED RECORDS (OUTPATIENT)
Dept: HEALTH INFORMATION MANAGEMENT | Facility: CLINIC | Age: 78
End: 2018-12-03

## 2018-12-06 ENCOUNTER — HOSPITAL ENCOUNTER (OUTPATIENT)
Dept: CARDIOLOGY | Facility: CLINIC | Age: 78
Discharge: HOME | End: 2018-12-06
Attending: INTERNAL MEDICINE | Admitting: INTERNAL MEDICINE
Payer: MEDICARE

## 2018-12-06 ENCOUNTER — HOSPITAL ENCOUNTER (OUTPATIENT)
Dept: CARDIOLOGY | Facility: CLINIC | Age: 78
End: 2018-12-06
Attending: INTERNAL MEDICINE
Payer: MEDICARE

## 2018-12-06 DIAGNOSIS — R00.2 PALPITATIONS: ICD-10-CM

## 2018-12-06 DIAGNOSIS — I44.1 WENCKEBACH SECOND DEGREE AV BLOCK: ICD-10-CM

## 2018-12-06 PROCEDURE — 93321 DOPPLER ECHO F-UP/LMTD STD: CPT | Mod: 26 | Performed by: INTERNAL MEDICINE

## 2018-12-06 PROCEDURE — 93225 XTRNL ECG REC<48 HRS REC: CPT

## 2018-12-06 PROCEDURE — 93016 CV STRESS TEST SUPVJ ONLY: CPT | Performed by: INTERNAL MEDICINE

## 2018-12-06 PROCEDURE — 93227 XTRNL ECG REC<48 HR R&I: CPT | Performed by: INTERNAL MEDICINE

## 2018-12-06 PROCEDURE — 93350 STRESS TTE ONLY: CPT | Mod: 26 | Performed by: INTERNAL MEDICINE

## 2018-12-06 PROCEDURE — 93325 DOPPLER ECHO COLOR FLOW MAPG: CPT | Mod: 26 | Performed by: INTERNAL MEDICINE

## 2018-12-06 PROCEDURE — 93018 CV STRESS TEST I&R ONLY: CPT | Performed by: INTERNAL MEDICINE

## 2018-12-06 PROCEDURE — 93325 DOPPLER ECHO COLOR FLOW MAPG: CPT | Mod: TC

## 2018-12-13 DIAGNOSIS — I10 HYPERTENSION GOAL BP (BLOOD PRESSURE) < 140/90: ICD-10-CM

## 2018-12-13 RX ORDER — AMLODIPINE BESYLATE 10 MG/1
TABLET ORAL
Start: 2018-12-13

## 2018-12-13 NOTE — TELEPHONE ENCOUNTER
"Too soon  Rx sent 10/12/18 for #90 with 1 refill.  Nayana Alcaraz RN    Requested Prescriptions   Refused Prescriptions Disp Refills     amLODIPine (NORVASC) 10 MG tablet [Pharmacy Med Name: AMLODIPINE BESYLATE 10MG TABLETS]       Sig: TAKE 1 TABLET(10 MG) BY MOUTH DAILY    Calcium Channel Blockers Protocol  Passed - 12/13/2018  3:30 AM       Passed - Blood pressure under 140/90 in past 12 months    BP Readings from Last 3 Encounters:   11/26/18 135/74   11/21/18 112/64   10/31/18 119/83                Passed - Recent (12 mo) or future (30 days) visit within the authorizing provider's specialty    Patient had office visit in the last 12 months or has a visit in the next 30 days with authorizing provider or within the authorizing provider's specialty.  See \"Patient Info\" tab in inbasket, or \"Choose Columns\" in Meds & Orders section of the refill encounter.             Passed - Patient is age 18 or older       Passed - No active pregnancy on record       Passed - Normal serum creatinine on file in past 12 months    Recent Labs   Lab Test 10/12/18  1103   CR 0.73            Passed - No positive pregnancy test in past 12 months          "

## 2018-12-17 ENCOUNTER — OFFICE VISIT (OUTPATIENT)
Dept: CARDIOLOGY | Facility: CLINIC | Age: 78
End: 2018-12-17
Payer: COMMERCIAL

## 2018-12-17 VITALS
BODY MASS INDEX: 21.66 KG/M2 | HEART RATE: 60 BPM | SYSTOLIC BLOOD PRESSURE: 128 MMHG | HEIGHT: 65 IN | WEIGHT: 130 LBS | DIASTOLIC BLOOD PRESSURE: 72 MMHG

## 2018-12-17 DIAGNOSIS — I10 HYPERTENSION GOAL BP (BLOOD PRESSURE) < 140/90: Primary | ICD-10-CM

## 2018-12-17 DIAGNOSIS — R00.2 PALPITATIONS: ICD-10-CM

## 2018-12-17 DIAGNOSIS — I44.1 WENCKEBACH SECOND DEGREE AV BLOCK: ICD-10-CM

## 2018-12-17 PROCEDURE — 99213 OFFICE O/P EST LOW 20 MIN: CPT | Performed by: INTERNAL MEDICINE

## 2018-12-17 ASSESSMENT — MIFFLIN-ST. JEOR: SCORE: 1080.56

## 2018-12-17 NOTE — PROGRESS NOTES
HPI and Plan:   See dictation    Orders Placed This Encounter   Procedures     Follow-Up with Cardiologist     Holter Monitor 24 hour Adult Pediatric       No orders of the defined types were placed in this encounter.      Encounter Diagnoses   Name Primary?     Hypertension goal BP (blood pressure) < 140/90 Yes     Palpitations      Wenckebach second degree AV block        CURRENT MEDICATIONS:  Current Outpatient Medications   Medication Sig Dispense Refill     amLODIPine (NORVASC) 10 MG tablet Take 1 tablet (10 mg) by mouth daily 90 tablet 1     Calcium Citrate-Vitamin D (CALCIUM CITRATE + D PO) Take 2 tablets by mouth 2 times daily        fluticasone (FLONASE) 50 MCG/ACT spray Spray 2 sprays into both nostrils daily 48 g prn     fluticasone (FLOVENT HFA) 110 MCG/ACT Inhaler Inhale 1 puff into the lungs 2 times daily 1 Inhaler 1     Ibuprofen-Diphenhydramine Cit (ADVIL PM PO) Take 2 tablets by mouth At Bedtime       levothyroxine (SYNTHROID/LEVOTHROID) 100 MCG tablet TAKE 1 TABLET EVERY DAY 90 tablet 0     losartan (COZAAR) 100 MG tablet Take 1 tablet (100 mg) by mouth daily 90 tablet 1     Multiple Vitamins-Minerals (MULTIVITAMIN ADULT PO) Take 1 tablet by mouth daily       Multiple Vitamins-Minerals (PRESERVISION AREDS 2 PO) Take 1 tablet by mouth 2 times daily       Naproxen Sodium (ALEVE PO) Take 220 mg by mouth Takes every AM and PM when not taking ibuprofen.       Omega-3 Fatty Acids (FISH OIL PO) 1,000 mg 2 times daily       RaNITidine HCl (ZANTAC 150 MAXIMUM STRENGTH PO)        triamterene-hydrochlorothiazide (DYAZIDE) 37.5-25 MG per capsule TAKE 1 CAPSULE EVERY DAY 90 capsule 1       ALLERGIES     Allergies   Allergen Reactions     Cats      Hylan G-F 20      Lisinopril Cough     Seasonal Allergies      Vioxx        PAST MEDICAL HISTORY:  Past Medical History:   Diagnosis Date     Allergic rhinitis, cause unspecified      Personal history of alcoholism (H)      Sleep apnea      Undiagnosed cardiac murmurs       Unspecified essential hypertension      Unspecified hypothyroidism        PAST SURGICAL HISTORY:  Past Surgical History:   Procedure Laterality Date     C NONSPECIFIC PROCEDURE  1974    s/p Vaginal hysterectomy     COLONOSCOPY N/A 10/31/2018    Procedure: COMBINED COLONOSCOPY, SINGLE OR MULTIPLE BIOPSY/POLYPECTOMY BY BIOPSY;  Surgeon: Leon Cosme MD;  Location:  GI     ESOPHAGOSCOPY, GASTROSCOPY, DUODENOSCOPY (EGD), COMBINED N/A 10/31/2018    Procedure: GASTROSCOPY;  Surgeon: Leon Cosme MD;  Location:  GI     HYSTERECTOMY       JOINT REPLACEMENT Bilateral      knee replacement Bilateral      ORTHOPEDIC SURGERY         FAMILY HISTORY:  Family History   Problem Relation Age of Onset     Cancer Sister      Heart Disease Father        SOCIAL HISTORY:  Social History     Socioeconomic History     Marital status:      Spouse name: None     Number of children: None     Years of education: None     Highest education level: None   Social Needs     Financial resource strain: None     Food insecurity - worry: None     Food insecurity - inability: None     Transportation needs - medical: None     Transportation needs - non-medical: None   Occupational History     None   Tobacco Use     Smoking status: Never Smoker     Smokeless tobacco: Never Used   Substance and Sexual Activity     Alcohol use: No     Alcohol/week: 0.0 oz     Drug use: No     Sexual activity: Yes     Partners: Male   Other Topics Concern      Service No     Blood Transfusions No     Caffeine Concern No     Occupational Exposure No     Hobby Hazards No     Sleep Concern Yes     Stress Concern No     Weight Concern No     Special Diet No     Back Care Yes     Exercise No     Bike Helmet Yes     Seat Belt Yes     Self-Exams Yes     Parent/sibling w/ CABG, MI or angioplasty before 65F 55M? Yes   Social History Narrative    Social Documentation:4/10        Balanced Diet: YES    Calcium intake: supplement per day     "Caffeine: 4cups per day    Exercise:  type of activity varies;  3 times per week    Sunscreen: Yes    Seatbelts:  Yes    Self Breast Exam:  Yes    Self Testicular Exam: No - na    Physical/Emotional/Sexual Abuse: No -     Do you feel safe in your environment? Yes        Cholesterol screen up to date: yes    Eye Exam up to date: Yes    Dental Exam up to date: Yes    Pap smear up to date: Does Not Apply    Mammogram up to date: utd    Dexa Scan up to date: Yes    Colonoscopy up to date: Yes    Immunizations up to date: Yes    Glucose screen if over 40:  No -     Fredy Carroll ma               Review of Systems:  Skin:  Negative     Eyes:  Positive for glasses  ENT:  Negative    Respiratory:  Negative    Cardiovascular:  Negative    Gastroenterology: Negative    Genitourinary:  Negative    Musculoskeletal:  Positive for arthritis  Neurologic:  Negative    Psychiatric:  Negative    Heme/Lymph/Imm:  Positive for allergies  Endocrine:  Positive for thyroid disorder    Physical Exam:  Vitals: /72   Pulse 60   Ht 1.651 m (5' 5\")   Wt 59 kg (130 lb)   BMI 21.63 kg/m      Constitutional:  cooperative, alert and oriented, well developed, well nourished, in no acute distress        Skin:  warm and dry to the touch, no apparent skin lesions or masses noted          Head:  normocephalic, no masses or lesions        Eyes:  pupils equal and round, conjunctivae and lids unremarkable, sclera white, no xanthalasma, EOMS intact, no nystagmus        Lymph:No Cervical lymphadenopathy present     ENT:  no pallor or cyanosis, dentition good        Neck:  carotid pulses are full and equal bilaterally, JVP normal, no carotid bruit        Respiratory:  normal breath sounds, clear to auscultation, normal A-P diameter, normal symmetry, normal respiratory excursion, no use of accessory muscles         Cardiac: normal S1 and S2;apical impulse not displaced irregular rhythm   no presence of murmur                                      "              GI:  abdomen soft, non-tender, BS normoactive, no mass, no HSM, no bruits        Extremities and Muscular Skeletal:  no deformities, clubbing, cyanosis, erythema observed              Neurological:  no gross motor deficits        Psych:  Alert and Oriented x 3        Recent Lab Results:  LIPID RESULTS:  Lab Results   Component Value Date    CHOL 191 10/12/2018    HDL 85 10/12/2018    LDL 97 10/12/2018    TRIG 39 10/12/2018    CHOLHDLRATIO 2.4 06/03/2014       LIVER ENZYME RESULTS:  Lab Results   Component Value Date    AST 55 (H) 10/12/2018    ALT 32 10/12/2018       CBC RESULTS:  Lab Results   Component Value Date    WBC 4.9 03/24/2017    RBC 4.50 03/24/2017    HGB 13.6 03/24/2017    HCT 41.3 03/24/2017    MCV 92 03/24/2017    MCH 30.2 03/24/2017    MCHC 32.9 03/24/2017    RDW 12.5 03/24/2017     03/24/2017       BMP RESULTS:  Lab Results   Component Value Date     10/12/2018    POTASSIUM 4.3 10/12/2018    CHLORIDE 99 10/12/2018    CO2 26 10/12/2018    ANIONGAP 9 10/12/2018    GLC 80 10/12/2018    BUN 9 10/12/2018    CR 0.73 10/12/2018    GFRESTIMATED 78 10/12/2018    GFRESTBLACK >90 10/12/2018    CEASAR 10.5 (H) 10/12/2018        A1C RESULTS:  No results found for: A1C    INR RESULTS:  Lab Results   Component Value Date    INR 1.7 02/12/2013    INR 1.9 02/08/2013           CC  No referring provider defined for this encounter.

## 2018-12-17 NOTE — LETTER
12/17/2018    Tiffany Messer MD  [address] [facility] [address]  [facility] [address]    RE: Mely Guerra       Dear Colleague,    I had the pleasure of seeing Mely Guerra in the [facility].    HPI and Plan:   See dictation    Orders Placed This Encounter   Procedures     Follow-Up with Cardiologist     Holter Monitor 24 hour Adult Pediatric       No orders of the defined types were placed in this encounter.      Encounter Diagnoses   Name Primary?     Hypertension goal BP (blood pressure) < 140/90 Yes     Palpitations      Wenckebach second degree AV block        CURRENT MEDICATIONS:  Current Outpatient Medications   Medication Sig Dispense Refill     amLODIPine (NORVASC) 10 MG tablet Take 1 tablet (10 mg) by mouth daily 90 tablet 1     Calcium Citrate-Vitamin D (CALCIUM CITRATE + D PO) Take 2 tablets by mouth 2 times daily        fluticasone (FLONASE) 50 MCG/ACT spray Spray 2 sprays into both nostrils daily 48 g prn     fluticasone (FLOVENT HFA) 110 MCG/ACT Inhaler Inhale 1 puff into the lungs 2 times daily 1 Inhaler 1     Ibuprofen-Diphenhydramine Cit (ADVIL PM PO) Take 2 tablets by mouth At Bedtime       levothyroxine (SYNTHROID/LEVOTHROID) 100 MCG tablet TAKE 1 TABLET EVERY DAY 90 tablet 0     losartan (COZAAR) 100 MG tablet Take 1 tablet (100 mg) by mouth daily 90 tablet 1     Multiple Vitamins-Minerals (MULTIVITAMIN ADULT PO) Take 1 tablet by mouth daily       Multiple Vitamins-Minerals (PRESERVISION AREDS 2 PO) Take 1 tablet by mouth 2 times daily       Naproxen Sodium (ALEVE PO) Take 220 mg by mouth Takes every AM and PM when not taking ibuprofen.       Omega-3 Fatty Acids (FISH OIL PO) 1,000 mg 2 times daily       RaNITidine HCl (ZANTAC 150 MAXIMUM STRENGTH PO)        triamterene-hydrochlorothiazide (DYAZIDE) 37.5-25 MG per capsule TAKE 1 CAPSULE EVERY DAY 90 capsule 1       ALLERGIES     Allergies   Allergen Reactions     Cats      Hylan G-F 20      Lisinopril Cough      Seasonal Allergies      Vioxx        PAST MEDICAL HISTORY:  Past Medical History:   Diagnosis Date     Allergic rhinitis, cause unspecified      Personal history of alcoholism (H)      Sleep apnea      Undiagnosed cardiac murmurs      Unspecified essential hypertension      Unspecified hypothyroidism        PAST SURGICAL HISTORY:  Past Surgical History:   Procedure Laterality Date     C NONSPECIFIC PROCEDURE  1974    s/p Vaginal hysterectomy     COLONOSCOPY N/A 10/31/2018    Procedure: COMBINED COLONOSCOPY, SINGLE OR MULTIPLE BIOPSY/POLYPECTOMY BY BIOPSY;  Surgeon: Leon Cosme MD;  Location:  GI     ESOPHAGOSCOPY, GASTROSCOPY, DUODENOSCOPY (EGD), COMBINED N/A 10/31/2018    Procedure: GASTROSCOPY;  Surgeon: Leon Cosme MD;  Location:  GI     HYSTERECTOMY       JOINT REPLACEMENT Bilateral      knee replacement Bilateral      ORTHOPEDIC SURGERY         FAMILY HISTORY:  Family History   Problem Relation Age of Onset     Cancer Sister      Heart Disease Father        SOCIAL HISTORY:  Social History     Socioeconomic History     Marital status:      Spouse name: None     Number of children: None     Years of education: None     Highest education level: None   Social Needs     Financial resource strain: None     Food insecurity - worry: None     Food insecurity - inability: None     Transportation needs - medical: None     Transportation needs - non-medical: None   Occupational History     None   Tobacco Use     Smoking status: Never Smoker     Smokeless tobacco: Never Used   Substance and Sexual Activity     Alcohol use: No     Alcohol/week: 0.0 oz     Drug use: No     Sexual activity: Yes     Partners: Male   Other Topics Concern      Service No     Blood Transfusions No     Caffeine Concern No     Occupational Exposure No     Hobby Hazards No     Sleep Concern Yes     Stress Concern No     Weight Concern No     Special Diet No     Back Care Yes     Exercise No     Bike Helmet Yes  "    Seat Belt Yes     Self-Exams Yes     Parent/sibling w/ CABG, MI or angioplasty before 65F 55M? Yes   Social History Narrative    Social Documentation:4/10        Balanced Diet: YES    Calcium intake: supplement per day    Caffeine: 4cups per day    Exercise:  type of activity varies;  3 times per week    Sunscreen: Yes    Seatbelts:  Yes    Self Breast Exam:  Yes    Self Testicular Exam: No - na    Physical/Emotional/Sexual Abuse: No -     Do you feel safe in your environment? Yes        Cholesterol screen up to date: yes    Eye Exam up to date: Yes    Dental Exam up to date: Yes    Pap smear up to date: Does Not Apply    Mammogram up to date: utd    Dexa Scan up to date: Yes    Colonoscopy up to date: Yes    Immunizations up to date: Yes    Glucose screen if over 40:  No -     Fredy Carroll ma               Review of Systems:  Skin:  Negative     Eyes:  Positive for glasses  ENT:  Negative    Respiratory:  Negative    Cardiovascular:  Negative    Gastroenterology: Negative    Genitourinary:  Negative    Musculoskeletal:  Positive for arthritis  Neurologic:  Negative    Psychiatric:  Negative    Heme/Lymph/Imm:  Positive for allergies  Endocrine:  Positive for thyroid disorder    Physical Exam:  Vitals: /72   Pulse 60   Ht 1.651 m (5' 5\")   Wt 59 kg (130 lb)   BMI 21.63 kg/m       Constitutional:  cooperative, alert and oriented, well developed, well nourished, in no acute distress        Skin:  warm and dry to the touch, no apparent skin lesions or masses noted          Head:  normocephalic, no masses or lesions        Eyes:  pupils equal and round, conjunctivae and lids unremarkable, sclera white, no xanthalasma, EOMS intact, no nystagmus        Lymph:No Cervical lymphadenopathy present     ENT:  no pallor or cyanosis, dentition good        Neck:  carotid pulses are full and equal bilaterally, JVP normal, no carotid bruit        Respiratory:  normal breath sounds, clear to auscultation, normal " A-P diameter, normal symmetry, normal respiratory excursion, no use of accessory muscles         Cardiac: normal S1 and S2;apical impulse not displaced irregular rhythm   no presence of murmur                                                   GI:  abdomen soft, non-tender, BS normoactive, no mass, no HSM, no bruits        Extremities and Muscular Skeletal:  no deformities, clubbing, cyanosis, erythema observed              Neurological:  no gross motor deficits        Psych:  Alert and Oriented x 3        Recent Lab Results:  LIPID RESULTS:  Lab Results   Component Value Date    CHOL 191 10/12/2018    HDL 85 10/12/2018    LDL 97 10/12/2018    TRIG 39 10/12/2018    CHOLHDLRATIO 2.4 06/03/2014       LIVER ENZYME RESULTS:  Lab Results   Component Value Date    AST 55 (H) 10/12/2018    ALT 32 10/12/2018       CBC RESULTS:  Lab Results   Component Value Date    WBC 4.9 03/24/2017    RBC 4.50 03/24/2017    HGB 13.6 03/24/2017    HCT 41.3 03/24/2017    MCV 92 03/24/2017    MCH 30.2 03/24/2017    MCHC 32.9 03/24/2017    RDW 12.5 03/24/2017     03/24/2017       BMP RESULTS:  Lab Results   Component Value Date     10/12/2018    POTASSIUM 4.3 10/12/2018    CHLORIDE 99 10/12/2018    CO2 26 10/12/2018    ANIONGAP 9 10/12/2018    GLC 80 10/12/2018    BUN 9 10/12/2018    CR 0.73 10/12/2018    GFRESTIMATED 78 10/12/2018    GFRESTBLACK >90 10/12/2018    CEASAR 10.5 (H) 10/12/2018        A1C RESULTS:  No results found for: A1C    INR RESULTS:  Lab Results   Component Value Date    INR 1.7 02/12/2013    INR 1.9 02/08/2013           CC  No referring provider defined for this encounter.                  Thank you for allowing me to participate in the care of your patient.      Sincerely,     DR ANNA BHAKTA MD     [facility]    cc:   No referring provider defined for this encounter.

## 2018-12-17 NOTE — LETTER
2018      Tiffany Messer MD  [address]  [facility] [address]      RE: Mely Velásquez       Dear Colleague,    I had the pleasure of seeing Mely Velásquez in the [facility].    Service Date: 2018      HISTORY OF PRESENT ILLNESS:  It is a pleasure for me to follow up with Mrs. Velásquez.  She is a very pleasant lady who had arrhythmia seen on a recent EKG.  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  She had a sensation of pounding but no other cardiac symptoms.  She is extremely physically active.      I am happy to see that her stress echocardiogram showed no evidence of inducible ischemia and in addition she has preserved chronotropic competence.  Exercise tolerance is well above average.  Her 24-hour Holter showed a first-degree AV block, presence of multiple APCs, but no pauses greater than 2.2 seconds.  There were short runs of PSVT as well, but these were not correlated with any symptoms.      I reviewed these findings with my electrophysiology colleague, Dr. Barber.  The presence of APCs may predispose her to atrial fibrillation, but we do not feel that any cardiac intervention in terms of device or medications is needed at this time.      It is highly likely that she has tachybrady syndrome.  We discussed what symptoms to look out for should these arrhythmias become hemodynamically significant.  Otherwise, I plan to follow up again in a year, with repeat Holter monitor prior to our clinic visit.         AN[facility] BHAKTA MD, [facility]             D: 2018   T: 2018   MT: PIPER      Name:     MELY VELÁSQUEZ   MRN:      [id number]        Account:      [id number]   :      1940           Service Date: 2018      Document: [id number]         Outpatient Encounter Medications as of 2018   Medication Sig Dispense Refill     amLODIPine (NORVASC) 10 MG tablet Take 1 tablet (10 mg) by mouth daily 90  tablet 1     Calcium Citrate-Vitamin D (CALCIUM CITRATE + D PO) Take 2 tablets by mouth 2 times daily        fluticasone (FLONASE) 50 MCG/ACT spray Spray 2 sprays into both nostrils daily 48 g prn     fluticasone (FLOVENT HFA) 110 MCG/ACT Inhaler Inhale 1 puff into the lungs 2 times daily 1 Inhaler 1     Ibuprofen-Diphenhydramine Cit (ADVIL PM PO) Take 2 tablets by mouth At Bedtime       levothyroxine (SYNTHROID/LEVOTHROID) 100 MCG tablet TAKE 1 TABLET EVERY DAY 90 tablet 0     losartan (COZAAR) 100 MG tablet Take 1 tablet (100 mg) by mouth daily 90 tablet 1     Multiple Vitamins-Minerals (MULTIVITAMIN ADULT PO) Take 1 tablet by mouth daily       Multiple Vitamins-Minerals (PRESERVISION AREDS 2 PO) Take 1 tablet by mouth 2 times daily       Naproxen Sodium (ALEVE PO) Take 220 mg by mouth Takes every AM and PM when not taking ibuprofen.       Omega-3 Fatty Acids (FISH OIL PO) 1,000 mg 2 times daily       RaNITidine HCl (ZANTAC 150 MAXIMUM STRENGTH PO)        triamterene-hydrochlorothiazide (DYAZIDE) 37.5-25 MG per capsule TAKE 1 CAPSULE EVERY DAY 90 capsule 1     [DISCONTINUED] doxycycline monohydrate (MONDOXYNE NL) 100 MG capsule Take bid for 7 days 14 capsule 0     No facility-administered encounter medications on file as of 12/17/2018.                Again, thank you for allowing me to participate in the care of your patient.      Sincerely,    DR ANNA BHAKTA MD     [facility]

## 2018-12-18 NOTE — PROGRESS NOTES
Service Date: 2018      HISTORY OF PRESENT ILLNESS:  It is a pleasure for me to follow up with Mrs. Velásquez.  She is a very pleasant lady who had arrhythmia seen on a recent EKG.  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  She had a sensation of pounding but no other cardiac symptoms.  She is extremely physically active.      I am happy to see that her stress echocardiogram showed no evidence of inducible ischemia and in addition she has preserved chronotropic competence.  Exercise tolerance is well above average.  Her 24-hour Holter showed a first-degree AV block, presence of multiple APCs, but no pauses greater than 2.2 seconds.  There were short runs of PSVT as well, but these were not correlated with any symptoms.      I reviewed these findings with my electrophysiology colleague, Dr. Barber.  The presence of APCs may predispose her to atrial fibrillation, but we do not feel that any cardiac intervention in terms of device or medications is needed at this time.      It is highly likely that she has tachybrady syndrome.  We discussed what symptoms to look out for should these arrhythmias become hemodynamically significant.  Otherwise, I plan to follow up again in a year, with repeat Holter monitor prior to our clinic visit.         ANNA BHAKTA MD, FACC             D: 2018   T: 2018   MT: PIPER      Name:     THELMA VELÁSQUEZ   MRN:      [id number]        Account:      [id number]   :      1940           Service Date: 2018      Document: [id number]

## 2019-01-05 ENCOUNTER — OFFICE VISIT (OUTPATIENT)
Dept: URGENT CARE | Facility: URGENT CARE | Age: 79
End: 2019-01-05
Payer: MEDICARE

## 2019-01-05 VITALS
TEMPERATURE: 97.4 F | OXYGEN SATURATION: 98 % | SYSTOLIC BLOOD PRESSURE: 136 MMHG | DIASTOLIC BLOOD PRESSURE: 67 MMHG | HEART RATE: 69 BPM

## 2019-01-05 DIAGNOSIS — J20.9 ACUTE BRONCHITIS, UNSPECIFIED ORGANISM: Primary | ICD-10-CM

## 2019-01-05 PROCEDURE — 99213 OFFICE O/P EST LOW 20 MIN: CPT

## 2019-01-05 RX ORDER — AZITHROMYCIN 250 MG/1
TABLET, FILM COATED ORAL
Qty: 6 TABLET | Refills: 0 | Status: SHIPPED | OUTPATIENT
Start: 2019-01-05 | End: 2019-01-10

## 2019-01-05 NOTE — PROGRESS NOTES
SUBJECTIVE:   Mely Guerra is a 78 year old female who presents to clinic today for the following health issues:    HPI     Presents with productive cough x 11 days.  No fever or chills.  Nonsmoker.  No SOB.  Had been put on Flovent at annual wellness visit- but could not tolerate this.  Has been using Mucinex with no relief.    Problem list and histories reviewed & adjusted, as indicated.  Additional history: as documented        Patient Active Problem List   Diagnosis     Acquired hypothyroidism     Personal history of alcoholism (H)     Allergic rhinitis     Osteoporosis     Osteoarthritis     Hyperlipidemia LDL goal <160     Hypertension goal BP (blood pressure) < 140/90     Advanced directives, counseling/discussion     Otitis externa     Sleep apnea     Dermatitis     Rosacea     Age-related osteoporosis without current pathological fracture     Disorder of bone and cartilage     Dysphagia     Past Surgical History:   Procedure Laterality Date     C NONSPECIFIC PROCEDURE  1974    s/p Vaginal hysterectomy     COLONOSCOPY N/A 10/31/2018    Procedure: COMBINED COLONOSCOPY, SINGLE OR MULTIPLE BIOPSY/POLYPECTOMY BY BIOPSY;  Surgeon: Leon Cosme MD;  Location:  GI     ESOPHAGOSCOPY, GASTROSCOPY, DUODENOSCOPY (EGD), COMBINED N/A 10/31/2018    Procedure: GASTROSCOPY;  Surgeon: Leon Cosme MD;  Location:  GI     HYSTERECTOMY       JOINT REPLACEMENT Bilateral      knee replacement Bilateral      ORTHOPEDIC SURGERY         Social History     Tobacco Use     Smoking status: Never Smoker     Smokeless tobacco: Never Used   Substance Use Topics     Alcohol use: No     Alcohol/week: 0.0 oz     Family History   Problem Relation Age of Onset     Cancer Sister      Heart Disease Father          Current Outpatient Medications   Medication Sig Dispense Refill     amLODIPine (NORVASC) 10 MG tablet Take 1 tablet (10 mg) by mouth daily 90 tablet 1     Calcium Citrate-Vitamin D (CALCIUM CITRATE + D PO)  Take 2 tablets by mouth 2 times daily        fluticasone (FLONASE) 50 MCG/ACT spray Spray 2 sprays into both nostrils daily 48 g prn     fluticasone (FLOVENT HFA) 110 MCG/ACT Inhaler Inhale 1 puff into the lungs 2 times daily 1 Inhaler 1     Ibuprofen-Diphenhydramine Cit (ADVIL PM PO) Take 2 tablets by mouth At Bedtime       levothyroxine (SYNTHROID/LEVOTHROID) 100 MCG tablet TAKE 1 TABLET EVERY DAY 90 tablet 0     losartan (COZAAR) 100 MG tablet Take 1 tablet (100 mg) by mouth daily 90 tablet 1     Multiple Vitamins-Minerals (MULTIVITAMIN ADULT PO) Take 1 tablet by mouth daily       Multiple Vitamins-Minerals (PRESERVISION AREDS 2 PO) Take 1 tablet by mouth 2 times daily       Naproxen Sodium (ALEVE PO) Take 220 mg by mouth Takes every AM and PM when not taking ibuprofen.       Omega-3 Fatty Acids (FISH OIL PO) 1,000 mg 2 times daily       RaNITidine HCl (ZANTAC 150 MAXIMUM STRENGTH PO)        triamterene-hydrochlorothiazide (DYAZIDE) 37.5-25 MG per capsule TAKE 1 CAPSULE EVERY DAY 90 capsule 1     Allergies   Allergen Reactions     Cats      Hylan G-F 20      Lisinopril Cough     Seasonal Allergies      Vioxx      Recent Labs   Lab Test 10/12/18  1103 12/12/17  1210  03/24/17  0954  06/03/14  0908  03/30/11  0931   LDL 97  --   --   --   --  126  --  140*   HDL 85  --   --   --   --  99  --  86   TRIG 39  --   --   --   --  70  --  73   ALT 32  --   --  23  --   --   --   --    CR 0.73 0.69   < > 0.76   < > 0.78   < > 0.75   GFRESTIMATED 78 82   < > 75   < > 73   < > 77   GFRESTBLACK >90 >90   < > >90  African American GFR Calc     < > 88   < > >90   POTASSIUM 4.3 4.5   < > 4.4   < > 4.3   < > 4.9   TSH 1.59 2.13  --  2.31   < > 2.87   < > 0.89    < > = values in this interval not displayed.      BP Readings from Last 3 Encounters:   01/05/19 136/67   12/17/18 128/72   11/26/18 135/74    Wt Readings from Last 3 Encounters:   12/17/18 59 kg (130 lb)   11/26/18 58.8 kg (129 lb 11.2 oz)   11/21/18 58.5 kg (129 lb)            ROS:  Constitutional, HEENT, cardiovascular, pulmonary, gi and gu systems are negative, except as otherwise noted.    OBJECTIVE:     /67   Pulse 69   Temp 97.4  F (36.3  C) (Oral)   SpO2 98%   There is no height or weight on file to calculate BMI.  GENERAL: healthy, alert and no distress  EYES: Eyes grossly normal to inspection, PERRL and conjunctivae and sclerae normal  NECK: no adenopathy, no asymmetry, masses, or scars and thyroid normal to palpation  RESP: lungs clear to auscultation - no rales, rhonchi or wheezes, harsh congested cough  CV: regular rate and rhythm, normal S1 S2, no S3 or S4, no murmur, click or rub, no peripheral edema and peripheral pulses strong  ABDOMEN: soft, nontender, no hepatosplenomegaly, no masses and bowel sounds normal  MS: no gross musculoskeletal defects noted, no edema  PSYCH: mentation appears normal, affect normal/bright    ASSESSMENT/PLAN:     1. Acute bronchitis, unspecified organism    - azithromycin (ZITHROMAX) 250 MG tablet; Take 2 tablets (500 mg) by mouth daily for 1 day, THEN 1 tablet (250 mg) daily for 4 days.  Dispense: 6 tablet; Refill: 0    Will treat with Zpak.  Continue with increased fluids and rest.  Close Follow-up if no change or worsening symptoms.     CS Urgent Care Provider  [facility]

## 2019-01-07 DIAGNOSIS — E03.9 ACQUIRED HYPOTHYROIDISM: ICD-10-CM

## 2019-01-08 RX ORDER — LEVOTHYROXINE SODIUM 100 UG/1
TABLET ORAL
Qty: 90 TABLET | Refills: 2 | Status: SHIPPED | OUTPATIENT
Start: 2019-01-08 | End: 2019-08-12

## 2019-01-08 NOTE — TELEPHONE ENCOUNTER
"Prescription approved per [facility] Refill Protocol.  Nayana Alcaraz RN    Requested Prescriptions   Signed Prescriptions Disp Refills     levothyroxine (SYNTHROID/LEVOTHROID) 100 MCG tablet 90 tablet 2     Sig: TAKE 1 TABLET EVERY DAY    Thyroid Protocol Passed - 1/7/2019  7:59 PM       Passed - Patient is 12 years or older       Passed - Recent (12 mo) or future (30 days) visit within the authorizing provider's specialty    Patient had office visit in the last 12 months or has a visit in the next 30 days with authorizing provider or within the authorizing provider's specialty.  See \"Patient Info\" tab in inbasket, or \"Choose Columns\" in Meds & Orders section of the refill encounter.             Passed - Medication is active on med list       Passed - Normal TSH on file in past 12 months    Recent Labs   Lab Test 10/12/18  1103   TSH 1.59             Passed - No active pregnancy on record    If patient is pregnant or has had a positive pregnancy test, please check TSH.         Passed - No positive pregnancy test in past 12 months    If patient is pregnant or has had a positive pregnancy test, please check TSH.            "

## 2019-03-26 DIAGNOSIS — I10 ESSENTIAL HYPERTENSION WITH GOAL BLOOD PRESSURE LESS THAN 140/90: ICD-10-CM

## 2019-03-27 RX ORDER — AMLODIPINE BESYLATE 10 MG/1
TABLET ORAL
Qty: 90 TABLET | Refills: 1 | Status: SHIPPED | OUTPATIENT
Start: 2019-03-27 | End: 2019-08-12

## 2019-03-27 NOTE — TELEPHONE ENCOUNTER
"Prescription approved per [facility] Refill Protocol.  Bernadine BROWN RN    Requested Prescriptions   Pending Prescriptions Disp Refills     amLODIPine (NORVASC) 10 MG tablet [Pharmacy Med Name: AMLODIPINE BESYLATE 10 MG Tablet] 90 tablet 1     Sig: TAKE 1 TABLET EVERY DAY    Calcium Channel Blockers Protocol  Passed - 3/26/2019  7:06 PM       Passed - Blood pressure under 140/90 in past 12 months    BP Readings from Last 3 Encounters:   01/05/19 136/67   12/17/18 128/72   11/26/18 135/74                Passed - Recent (12 mo) or future (30 days) visit within the authorizing provider's specialty    Patient had office visit in the last 12 months or has a visit in the next 30 days with authorizing provider or within the authorizing provider's specialty.  See \"Patient Info\" tab in inbasket, or \"Choose Columns\" in Meds & Orders section of the refill encounter.             Passed - Medication is active on med list       Passed - Patient is age 18 or older       Passed - No active pregnancy on record       Passed - Normal serum creatinine on file in past 12 months    Recent Labs   Lab Test 10/12/18  1103   CR 0.73            Passed - No positive pregnancy test in past 12 months            "

## 2019-05-02 ENCOUNTER — OFFICE VISIT (OUTPATIENT)
Dept: CARDIOLOGY | Facility: CLINIC | Age: 79
End: 2019-05-02
Payer: MEDICARE

## 2019-05-02 VITALS
HEIGHT: 65 IN | DIASTOLIC BLOOD PRESSURE: 68 MMHG | SYSTOLIC BLOOD PRESSURE: 113 MMHG | BODY MASS INDEX: 22.16 KG/M2 | WEIGHT: 133 LBS | HEART RATE: 69 BPM

## 2019-05-02 DIAGNOSIS — R00.2 PALPITATIONS: Primary | ICD-10-CM

## 2019-05-02 PROCEDURE — 99214 OFFICE O/P EST MOD 30 MIN: CPT | Performed by: PHYSICIAN ASSISTANT

## 2019-05-02 PROCEDURE — 93000 ELECTROCARDIOGRAM COMPLETE: CPT | Performed by: PHYSICIAN ASSISTANT

## 2019-05-02 ASSESSMENT — MIFFLIN-ST. JEOR: SCORE: 1089.16

## 2019-05-02 NOTE — LETTER
5/2/2019    Tiffany Messer MD  [address]  [facility] [address]    RE: Mely Guerra       Dear Colleague,    I had the pleasure of seeing Mely Guerra in the [facility].      Cardiology Progress Note    Date of Service: 05/02/2019  Patient seen today in follow up of: palpitations  Primary cardiologist: Dr. York    HPI:  Mely Guerra is a very pleasant 79 year old female to discuss some recent symptoms of palpitations, hypertension, and sleep apnea. She was initially referred to Dr. York in November of 2018 for an abnormal EKG. Her EKG showed possible Wenckebach. Dr. York sent her for a stress echocardiogram as well as a holter monitor for further evaluation. Her stress echocardiogram showed no evidence of ischemia and preserved chronotropic competence. She tolerated above average exercise. She also wore a 24 hour holter monitor which showed a first degree AV block and multiple APCs. She had many pauses, although none were greater than 2.2 seconds. Short runs of PSVT were noted but these did not correlate with any symptoms. No further cardiac intervention was recommended at that time.    She is here today for early cardiology follow up as she recently has been experiencing increasing palpitations. She notes palpitations several times a day on a daily basis. She feels like her heart is racing. This sensation typically lasts several seconds and resolves on it's own. Her palpitations are not associated with any chest discomfort, shortness of breath, dizziness, lightheadedness, presyncope or any other symptoms. She continues to be very active and biked 12 miles the other day without symptoms. She has also been hiking several miles without difficulty. She does though note occasional shortness of breath with less exertion such as climbing the stairs.    ASSESSMENT/PLAN:  1.  Palpitations. She notes increasing frequency of palpitations over the past few weeks. These happen  daily but fortunately are not associated with any other symptoms. She had multiple runs of PSVT noted during her prior Holter monitor although these were not associated with symptoms. Additionally, she had many pauses and per Dr. York's last note likely has tachybrady syndrome. I recommended we investigate things further with a repeat holter monitor. I have ordered a repeat 48 hour Holter monitor and will have her follow up after the results of this are available. Depending on these results, she may require referral to EP. We did discuss signs/symptoms that would warrant urgent evaluation, and I encouraged her to call the clinic with any questions or concerns.  2.  Hypertension. This is well controlled today on her current regimen.  3.  Sleep apnea.     This note was completed in part using Dragon voice recognition software. Although reviewed after completion, some word and grammatical errors may occur.    Orders this Visit:  Orders Placed This Encounter   Procedures     Follow-Up with Cardiac Advanced Practice Provider     EKG 12-lead complete w/read - Clinics (performed today)     Holter Monitor 48 hour Adult Pediatric     No orders of the defined types were placed in this encounter.    There are no discontinued medications.    CURRENT MEDICATIONS:  Current Outpatient Medications   Medication Sig Dispense Refill     amLODIPine (NORVASC) 10 MG tablet TAKE 1 TABLET EVERY DAY 90 tablet 1     Calcium Citrate-Vitamin D (CALCIUM CITRATE + D PO) Take 2 tablets by mouth 2 times daily        fluticasone (FLONASE) 50 MCG/ACT spray Spray 2 sprays into both nostrils daily 48 g prn     fluticasone (FLOVENT HFA) 110 MCG/ACT Inhaler Inhale 1 puff into the lungs 2 times daily 1 Inhaler 1     levothyroxine (SYNTHROID/LEVOTHROID) 100 MCG tablet TAKE 1 TABLET EVERY DAY 90 tablet 2     losartan (COZAAR) 100 MG tablet Take 1 tablet (100 mg) by mouth daily 90 tablet 1     Multiple Vitamins-Minerals (MULTIVITAMIN ADULT PO) Take 1 tablet  by mouth daily       Multiple Vitamins-Minerals (PRESERVISION AREDS 2 PO) Take 1 tablet by mouth 2 times daily       Naproxen Sodium (ALEVE PO) Take 220 mg by mouth Takes every AM and PM when not taking ibuprofen.       Omega-3 Fatty Acids (FISH OIL PO) 1,000 mg 2 times daily       triamterene-hydrochlorothiazide (DYAZIDE) 37.5-25 MG per capsule TAKE 1 CAPSULE EVERY DAY 90 capsule 1     Ibuprofen-Diphenhydramine Cit (ADVIL PM PO) Take 2 tablets by mouth At Bedtime       RaNITidine HCl (ZANTAC 150 MAXIMUM STRENGTH PO)        ALLERGIES  Allergies   Allergen Reactions     Cats      Hylan G-F 20      Lisinopril Cough     Seasonal Allergies      Vioxx      PAST MEDICAL HISTORY:  Past Medical History:   Diagnosis Date     Allergic rhinitis, cause unspecified      Personal history of alcoholism (H)      Sleep apnea      Undiagnosed cardiac murmurs      Unspecified essential hypertension      Unspecified hypothyroidism      PAST SURGICAL HISTORY:  Past Surgical History:   Procedure Laterality Date     C NONSPECIFIC PROCEDURE  1974    s/p Vaginal hysterectomy     COLONOSCOPY N/A 10/31/2018    Procedure: COMBINED COLONOSCOPY, SINGLE OR MULTIPLE BIOPSY/POLYPECTOMY BY BIOPSY;  Surgeon: Leon Cosme MD;  Location:  GI     ESOPHAGOSCOPY, GASTROSCOPY, DUODENOSCOPY (EGD), COMBINED N/A 10/31/2018    Procedure: GASTROSCOPY;  Surgeon: Leon Cosme MD;  Location:  GI     HYSTERECTOMY       JOINT REPLACEMENT Bilateral      knee replacement Bilateral      ORTHOPEDIC SURGERY       FAMILY HISTORY:  Family History   Problem Relation Age of Onset     Cancer Sister      Heart Disease Father      SOCIAL HISTORY:  Social History     Socioeconomic History     Marital status:      Spouse name: None     Number of children: None     Years of education: None     Highest education level: None   Occupational History     None   Social Needs     Financial resource strain: None     Food insecurity:     Worry: None      Inability: None     Transportation needs:     Medical: None     Non-medical: None   Tobacco Use     Smoking status: Never Smoker     Smokeless tobacco: Never Used   Substance and Sexual Activity     Alcohol use: No     Alcohol/week: 0.0 oz     Drug use: No     Sexual activity: Yes     Partners: Male   Lifestyle     Physical activity:     Days per week: None     Minutes per session: None     Stress: None   Relationships     Social connections:     Talks on phone: None     Gets together: None     Attends Hoahaoism service: None     Active member of club or organization: None     Attends meetings of clubs or organizations: None     Relationship status: None     Intimate partner violence:     Fear of current or ex partner: None     Emotionally abused: None     Physically abused: None     Forced sexual activity: None   Other Topics Concern      Service No     Blood Transfusions No     Caffeine Concern No     Occupational Exposure No     Hobby Hazards No     Sleep Concern Yes     Stress Concern No     Weight Concern No     Special Diet No     Back Care Yes     Exercise No     Bike Helmet Yes     Seat Belt Yes     Self-Exams Yes     Parent/sibling w/ CABG, MI or angioplasty before 65F 55M? Yes   Social History Narrative    Social Documentation:4/10        Balanced Diet: YES    Calcium intake: supplement per day    Caffeine: 4cups per day    Exercise:  type of activity varies;  3 times per week    Sunscreen: Yes    Seatbelts:  Yes    Self Breast Exam:  Yes    Self Testicular Exam: No - na    Physical/Emotional/Sexual Abuse: No -     Do you feel safe in your environment? Yes        Cholesterol screen up to date: yes    Eye Exam up to date: Yes    Dental Exam up to date: Yes    Pap smear up to date: Does Not Apply    Mammogram up to date: utd    Dexa Scan up to date: Yes    Colonoscopy up to date: Yes    Immunizations up to date: Yes    Glucose screen if over 40:  No -     Fredy Carroll ma             Review of  "Systems:  Skin:  Negative     Eyes:  Positive for glasses  ENT:  Negative    Respiratory:  Positive for sleep apnea;CPAP  Cardiovascular:  Negative Positive for;palpitations;lightheadedness  Gastroenterology: Negative    Genitourinary:  Negative    Musculoskeletal:  Positive for arthritis  Neurologic:  Negative    Psychiatric:  Negative    Heme/Lymph/Imm:  Positive for allergies  Endocrine:  Positive for thyroid disorder     Physical Exam:  Vitals: /68   Pulse 69   Ht 1.651 m (5' 5\")   Wt 60.3 kg (133 lb)   BMI 22.13 kg/m      Wt Readings from Last 4 Encounters:   05/02/19 60.3 kg (133 lb)   12/17/18 59 kg (130 lb)   11/26/18 58.8 kg (129 lb 11.2 oz)   11/21/18 58.5 kg (129 lb)     GEN: well nourished, in no acute distress.  HEENT:  Pupils equal, round. Sclerae nonicteric.   C/V:  Regular rate and rhythm, no murmur, rub or gallop.   RESP: Respirations are unlabored. Clear to auscultation bilaterally without wheezing, rales, or rhonchi.  GI: Abdomen soft, nontender.  EXTREM: No LE edema.  NEURO: Alert and oriented, cooperative.  SKIN: Warm and dry.     Recent Lab Results:  LIPID RESULTS:  Lab Results   Component Value Date    CHOL 191 10/12/2018    HDL 85 10/12/2018    LDL 97 10/12/2018    TRIG 39 10/12/2018    CHOLHDLRATIO 2.4 06/03/2014     LIVER ENZYME RESULTS:  Lab Results   Component Value Date    AST 55 (H) 10/12/2018    ALT 32 10/12/2018     CBC RESULTS:  Lab Results   Component Value Date    WBC 4.9 03/24/2017    RBC 4.50 03/24/2017    HGB 13.6 03/24/2017    HCT 41.3 03/24/2017    MCV 92 03/24/2017    MCH 30.2 03/24/2017    MCHC 32.9 03/24/2017    RDW 12.5 03/24/2017     03/24/2017     BMP RESULTS:  Lab Results   Component Value Date     10/12/2018    POTASSIUM 4.3 10/12/2018    CHLORIDE 99 10/12/2018    CO2 26 10/12/2018    ANIONGAP 9 10/12/2018    GLC 80 10/12/2018    BUN 9 10/12/2018    CR 0.73 10/12/2018    GFRESTIMATED 78 10/12/2018    GFRESTBLACK >90 10/12/2018    CEASAR 10.5 (H) " 10/12/2018      A1C RESULTS:  No results found for: A1C  INR RESULTS:  Lab Results   Component Value Date    INR 1.7 02/12/2013    INR 1.9 02/08/2013       Bonnie Klein PA-C  [facility]    Thank you for allowing me to participate in the care of your patient.    Sincerely,     Bonnie Klein PA-C     [facility]

## 2019-05-02 NOTE — PATIENT INSTRUCTIONS
Thank you for your [facility] visit today. Your provider has recommended the following:  Recommendations:  Let's scheduled a holter monitor to see what's going on with your heart rhythm.  Follow-up:  See us back for cardiology follow up after the holter monitor.   Reminder:  Please bring in all current medications, over the counter supplements and vitamin bottles to your next appointment.            [facility]

## 2019-05-02 NOTE — PROGRESS NOTES
Cardiology Progress Note    Date of Service: 05/02/2019  Patient seen today in follow up of: palpitations  Primary cardiologist: Dr. York    HPI:  Mely Guerra is a very pleasant 79 year old female to discuss some recent symptoms of palpitations, hypertension, and sleep apnea. She was initially referred to Dr. York in November of 2018 for an abnormal EKG. Her EKG showed possible Wenckebach. Dr. York sent her for a stress echocardiogram as well as a holter monitor for further evaluation. Her stress echocardiogram showed no evidence of ischemia and preserved chronotropic competence. She tolerated above average exercise. She also wore a 24 hour holter monitor which showed a first degree AV block and multiple APCs. She had many pauses, although none were greater than 2.2 seconds. Short runs of PSVT were noted but these did not correlate with any symptoms. No further cardiac intervention was recommended at that time.    She is here today for early cardiology follow up as she recently has been experiencing increasing palpitations. She notes palpitations several times a day on a daily basis. She feels like her heart is racing. This sensation typically lasts several seconds and resolves on it's own. Her palpitations are not associated with any chest discomfort, shortness of breath, dizziness, lightheadedness, presyncope or any other symptoms. She continues to be very active and biked 12 miles the other day without symptoms. She has also been hiking several miles without difficulty. She does though note occasional shortness of breath with less exertion such as climbing the stairs.    ASSESSMENT/PLAN:  1.  Palpitations. She notes increasing frequency of palpitations over the past few weeks. These happen daily but fortunately are not associated with any other symptoms. She had multiple runs of PSVT noted during her prior Holter monitor although these were not associated with symptoms. Additionally, she had many pauses and per  Dr. York's last note likely has tachybrady syndrome. I recommended we investigate things further with a repeat holter monitor. I have ordered a repeat 48 hour Holter monitor and will have her follow up after the results of this are available. Depending on these results, she may require referral to EP. We did discuss signs/symptoms that would warrant urgent evaluation, and I encouraged her to call the clinic with any questions or concerns.  2.  Hypertension. This is well controlled today on her current regimen.  3.  Sleep apnea.     This note was completed in part using Dragon voice recognition software. Although reviewed after completion, some word and grammatical errors may occur.    Orders this Visit:  Orders Placed This Encounter   Procedures     Follow-Up with Cardiac Advanced Practice Provider     EKG 12-lead complete w/read - Clinics (performed today)     Holter Monitor 48 hour Adult Pediatric     No orders of the defined types were placed in this encounter.    There are no discontinued medications.    CURRENT MEDICATIONS:  Current Outpatient Medications   Medication Sig Dispense Refill     amLODIPine (NORVASC) 10 MG tablet TAKE 1 TABLET EVERY DAY 90 tablet 1     Calcium Citrate-Vitamin D (CALCIUM CITRATE + D PO) Take 2 tablets by mouth 2 times daily        fluticasone (FLONASE) 50 MCG/ACT spray Spray 2 sprays into both nostrils daily 48 g prn     fluticasone (FLOVENT HFA) 110 MCG/ACT Inhaler Inhale 1 puff into the lungs 2 times daily 1 Inhaler 1     levothyroxine (SYNTHROID/LEVOTHROID) 100 MCG tablet TAKE 1 TABLET EVERY DAY 90 tablet 2     losartan (COZAAR) 100 MG tablet Take 1 tablet (100 mg) by mouth daily 90 tablet 1     Multiple Vitamins-Minerals (MULTIVITAMIN ADULT PO) Take 1 tablet by mouth daily       Multiple Vitamins-Minerals (PRESERVISION AREDS 2 PO) Take 1 tablet by mouth 2 times daily       Naproxen Sodium (ALEVE PO) Take 220 mg by mouth Takes every AM and PM when not taking ibuprofen.       Omega-3  Fatty Acids (FISH OIL PO) 1,000 mg 2 times daily       triamterene-hydrochlorothiazide (DYAZIDE) 37.5-25 MG per capsule TAKE 1 CAPSULE EVERY DAY 90 capsule 1     Ibuprofen-Diphenhydramine Cit (ADVIL PM PO) Take 2 tablets by mouth At Bedtime       RaNITidine HCl (ZANTAC 150 MAXIMUM STRENGTH PO)        ALLERGIES  Allergies   Allergen Reactions     Cats      Hylan G-F 20      Lisinopril Cough     Seasonal Allergies      Vioxx      PAST MEDICAL HISTORY:  Past Medical History:   Diagnosis Date     Allergic rhinitis, cause unspecified      Personal history of alcoholism (H)      Sleep apnea      Undiagnosed cardiac murmurs      Unspecified essential hypertension      Unspecified hypothyroidism      PAST SURGICAL HISTORY:  Past Surgical History:   Procedure Laterality Date     C NONSPECIFIC PROCEDURE  1974    s/p Vaginal hysterectomy     COLONOSCOPY N/A 10/31/2018    Procedure: COMBINED COLONOSCOPY, SINGLE OR MULTIPLE BIOPSY/POLYPECTOMY BY BIOPSY;  Surgeon: Leon Cosme MD;  Location:  GI     ESOPHAGOSCOPY, GASTROSCOPY, DUODENOSCOPY (EGD), COMBINED N/A 10/31/2018    Procedure: GASTROSCOPY;  Surgeon: Leon Cosme MD;  Location:  GI     HYSTERECTOMY       JOINT REPLACEMENT Bilateral      knee replacement Bilateral      ORTHOPEDIC SURGERY       FAMILY HISTORY:  Family History   Problem Relation Age of Onset     Cancer Sister      Heart Disease Father      SOCIAL HISTORY:  Social History     Socioeconomic History     Marital status:      Spouse name: None     Number of children: None     Years of education: None     Highest education level: None   Occupational History     None   Social Needs     Financial resource strain: None     Food insecurity:     Worry: None     Inability: None     Transportation needs:     Medical: None     Non-medical: None   Tobacco Use     Smoking status: Never Smoker     Smokeless tobacco: Never Used   Substance and Sexual Activity     Alcohol use: No     Alcohol/week:  0.0 oz     Drug use: No     Sexual activity: Yes     Partners: Male   Lifestyle     Physical activity:     Days per week: None     Minutes per session: None     Stress: None   Relationships     Social connections:     Talks on phone: None     Gets together: None     Attends Pent[employer]al service: None     Active member of club or organization: None     Attends meetings of clubs or organizations: None     Relationship status: None     Intimate partner violence:     Fear of current or ex partner: None     Emotionally abused: None     Physically abused: None     Forced sexual activity: None   Other Topics Concern      Service No     Blood Transfusions No     Caffeine Concern No     Occupational Exposure No     Hobby Hazards No     Sleep Concern Yes     Stress Concern No     Weight Concern No     Special Diet No     Back Care Yes     Exercise No     Bike Helmet Yes     Seat Belt Yes     Self-Exams Yes     Parent/sibling w/ CABG, MI or angioplasty before 65F 55M? Yes   Social History Narrative    Social Documentation:4/10        Balanced Diet: YES    Calcium intake: supplement per day    Caffeine: 4cups per day    Exercise:  type of activity varies;  3 times per week    Sunscreen: Yes    Seatbelts:  Yes    Self Breast Exam:  Yes    Self Testicular Exam: No - na    Physical/Emotional/Sexual Abuse: No -     Do you feel safe in your environment? Yes        Cholesterol screen up to date: yes    Eye Exam up to date: Yes    Dental Exam up to date: Yes    Pap smear up to date: Does Not Apply    Mammogram up to date: utd    Dexa Scan up to date: Yes    Colonoscopy up to date: Yes    Immunizations up to date: Yes    Glucose screen if over 40:  No -     Fredy Carroll ma             Review of Systems:  Skin:  Negative     Eyes:  Positive for glasses  ENT:  Negative    Respiratory:  Positive for sleep apnea;CPAP  Cardiovascular:  Negative Positive for;palpitations;lightheadedness  Gastroenterology: Negative    Genitourinary:  " Negative    Musculoskeletal:  Positive for arthritis  Neurologic:  Negative    Psychiatric:  Negative    Heme/Lymph/Imm:  Positive for allergies  Endocrine:  Positive for thyroid disorder     Physical Exam:  Vitals: /68   Pulse 69   Ht 1.651 m (5' 5\")   Wt 60.3 kg (133 lb)   BMI 22.13 kg/m     Wt Readings from Last 4 Encounters:   05/02/19 60.3 kg (133 lb)   12/17/18 59 kg (130 lb)   11/26/18 58.8 kg (129 lb 11.2 oz)   11/21/18 58.5 kg (129 lb)     GEN: well nourished, in no acute distress.  HEENT:  Pupils equal, round. Sclerae nonicteric.   C/V:  Regular rate and rhythm, no murmur, rub or gallop.   RESP: Respirations are unlabored. Clear to auscultation bilaterally without wheezing, rales, or rhonchi.  GI: Abdomen soft, nontender.  EXTREM: No LE edema.  NEURO: Alert and oriented, cooperative.  SKIN: Warm and dry.     Recent Lab Results:  LIPID RESULTS:  Lab Results   Component Value Date    CHOL 191 10/12/2018    HDL 85 10/12/2018    LDL 97 10/12/2018    TRIG 39 10/12/2018    CHOLHDLRATIO 2.4 06/03/2014     LIVER ENZYME RESULTS:  Lab Results   Component Value Date    AST 55 (H) 10/12/2018    ALT 32 10/12/2018     CBC RESULTS:  Lab Results   Component Value Date    WBC 4.9 03/24/2017    RBC 4.50 03/24/2017    HGB 13.6 03/24/2017    HCT 41.3 03/24/2017    MCV 92 03/24/2017    MCH 30.2 03/24/2017    MCHC 32.9 03/24/2017    RDW 12.5 03/24/2017     03/24/2017     BMP RESULTS:  Lab Results   Component Value Date     10/12/2018    POTASSIUM 4.3 10/12/2018    CHLORIDE 99 10/12/2018    CO2 26 10/12/2018    ANIONGAP 9 10/12/2018    GLC 80 10/12/2018    BUN 9 10/12/2018    CR 0.73 10/12/2018    GFRESTIMATED 78 10/12/2018    GFRESTBLACK >90 10/12/2018    CEASAR 10.5 (H) 10/12/2018      A1C RESULTS:  No results found for: A1C  INR RESULTS:  Lab Results   Component Value Date    INR 1.7 02/12/2013    INR 1.9 02/08/2013       Bonnie Klein PA-C  [facility]    "

## 2019-05-13 ENCOUNTER — HOSPITAL ENCOUNTER (OUTPATIENT)
Dept: CARDIOLOGY | Facility: CLINIC | Age: 79
Discharge: HOME | End: 2019-05-13
Attending: PHYSICIAN ASSISTANT | Admitting: PHYSICIAN ASSISTANT
Payer: MEDICARE

## 2019-05-13 DIAGNOSIS — R00.2 PALPITATIONS: ICD-10-CM

## 2019-05-13 PROCEDURE — 93227 XTRNL ECG REC<48 HR R&I: CPT | Performed by: INTERNAL MEDICINE

## 2019-05-13 PROCEDURE — 93226 XTRNL ECG REC<48 HR SCAN A/R: CPT

## 2019-05-15 ENCOUNTER — MYC REFILL (OUTPATIENT)
Dept: FAMILY MEDICINE | Facility: CLINIC | Age: 79
End: 2019-05-15

## 2019-05-15 DIAGNOSIS — I10 ESSENTIAL HYPERTENSION WITH GOAL BLOOD PRESSURE LESS THAN 140/90: ICD-10-CM

## 2019-05-15 RX ORDER — LOSARTAN POTASSIUM 100 MG/1
100 TABLET ORAL DAILY
Qty: 90 TABLET | Refills: 1 | Status: SHIPPED | OUTPATIENT
Start: 2019-05-15 | End: 2019-11-21

## 2019-05-15 NOTE — TELEPHONE ENCOUNTER
"Prescription approved per [facility] Refill Protocol.  Nayana Alcaraz RN    Requested Prescriptions   Pending Prescriptions Disp Refills     losartan (COZAAR) 100 MG tablet 90 tablet 1     Sig: Take 1 tablet (100 mg) by mouth daily       Angiotensin-II Receptors Passed - 5/15/2019  8:42 AM        Passed - Blood pressure under 140/90 in past 12 months     BP Readings from Last 3 Encounters:   05/02/19 113/68   01/05/19 136/67   12/17/18 128/72                 Passed - Recent (12 mo) or future (30 days) visit within the authorizing provider's specialty     Patient had office visit in the last 12 months or has a visit in the next 30 days with authorizing provider or within the authorizing provider's specialty.  See \"Patient Info\" tab in inbasket, or \"Choose Columns\" in Meds & Orders section of the refill encounter.              Passed - Medication is active on med list        Passed - Patient is age 18 or older        Passed - No active pregnancy on record        Passed - Normal serum creatinine on file in past 12 months     Recent Labs   Lab Test 10/12/18  1103   CR 0.73             Passed - Normal serum potassium on file in past 12 months     Recent Labs   Lab Test 10/12/18  1103   POTASSIUM 4.3                    Passed - No positive pregnancy test in past 12 months          "

## 2019-05-29 ENCOUNTER — OFFICE VISIT (OUTPATIENT)
Dept: CARDIOLOGY | Facility: CLINIC | Age: 79
End: 2019-05-29
Payer: MEDICARE

## 2019-05-29 VITALS
SYSTOLIC BLOOD PRESSURE: 112 MMHG | HEART RATE: 61 BPM | DIASTOLIC BLOOD PRESSURE: 72 MMHG | HEIGHT: 65 IN | BODY MASS INDEX: 21.99 KG/M2 | WEIGHT: 132 LBS

## 2019-05-29 DIAGNOSIS — R00.2 PALPITATIONS: ICD-10-CM

## 2019-05-29 DIAGNOSIS — I47.10 PAROXYSMAL SUPRAVENTRICULAR TACHYCARDIA (H): Primary | ICD-10-CM

## 2019-05-29 PROCEDURE — 99214 OFFICE O/P EST MOD 30 MIN: CPT | Performed by: PHYSICIAN ASSISTANT

## 2019-05-29 RX ORDER — METOPROLOL TARTRATE 25 MG/1
12.5 TABLET, FILM COATED ORAL 2 TIMES DAILY
Qty: 30 TABLET | Refills: 1 | Status: SHIPPED | OUTPATIENT
Start: 2019-05-29 | End: 2019-06-14

## 2019-05-29 ASSESSMENT — MIFFLIN-ST. JEOR: SCORE: 1084.63

## 2019-05-29 NOTE — PATIENT INSTRUCTIONS
Thank you for your [facility] visit today. Your provider has recommended the following:  Medication Changes:  START metoprolol tartrate 12.5 mg (1/2 tablet) twice daily.   Recommendations:  - Your holter monitor showed that you are having many extra beats from the top of the heart (premature atrial contractions) as well as several runs of SVT (supraventricular tachycardia). Your symptoms of palpitations are associated with these fast runs of supraventricular tachycardia. We'll try a medication to see if that helps but if not, we'll have you see one of the electrophysiologist to discuss other options for treatments.   -Stop the medication and please call us if you have any dizziness, lightheadedness, feeling like your going to pass out, etc once you start.   -If you are still have frequent symptoms despite starting the medication then please call us and we will refer you to one of the electrophysiologist ( doctors).   Reminder:  Please bring in all current medications, over the counter supplements and vitamin bottles to your next appointment.            [facility]

## 2019-05-29 NOTE — PROGRESS NOTES
Cardiology Progress Note    Date of Service: 05/29/2019  Patient seen today in follow up of: palpitations  Primary cardiologist: Dr. York    HPI:  Mely Guerra is a very pleasant 79 year old female with a history of hypertension, palpitations, and sleep apnea treated with CPAP who is here today to follow up on recent symptoms of increasing palpitations.     She was initially referred to Dr. York in November of 2018 for an abnormal EKG. Her EKG showed possible Wenckebach. Dr. York sent her for a stress echocardiogram as well as a holter monitor for further evaluation. Her stress echocardiogram showed no evidence of ischemia and preserved chronotropic competence. She tolerated above average exercise. She also wore a 24 hour holter monitor which showed a first degree AV block and multiple APCs. She had many pauses, although none were greater than 2.2 seconds. Short runs of PSVT were noted but these did not correlate with any symptoms. No further cardiac intervention was recommended at that time.    I saw her on 5/2/19 as she had note some increasing palpitations occurring several times a day on a daily basis. She has no associated shortness of breath, chest pain, palpitations or other symptoms. I ordered a 48 hour holter monitor for further evaluation.     This showed primarily normal sinus rhythm with a first degree block and frequent supraventricular ectomy. She had 34,835 supraventricular ectopies (23,706 being PACs). She had 900 SVE runs the longest of which was 536 beats at a rate of 169 BPM. She was also noted to have several pauses, the maximum being 2.08 seconds long. Her symptoms of palpitations correlated with supraventricular tachycardia with HRs in the 150s.    She returns today to review her holter monitor results. She continues to have symptoms of palpitations most days of the week. At times, they occur several times a day. She denies any other new symptoms.     ASSESSMENT/PLAN:  1.  Palpitations. She had  over 900 runs of SVT and a significant amount of PACs on her recent holter monitor in a 48 hour period. Her symptoms of palpitations and fluttering correlated with runs of SVT with HRs in the 150s. Her average HR is 69. She did have several pauses overnight up to two seconds. I reviewed her holter with Dr. York and discussed with her today. We will plan to start low dose metoprolol tartrate. I have asked her to stop the medication if she experiences any dizziness, lightheadedness, presyncope or other issues after starting. We certainly will not be able to push her beta blocker dose significantly. If she continues to have frequent symptoms and palpitations I asked her to call us and we will refer her to electrophysiology for further discussion and treatment options.   2.  FLAVIA. Treated with CPAP.  3.  Hypertension. Well controlled on her current regimen.     Orders this Visit:  No orders of the defined types were placed in this encounter.    Orders Placed This Encounter   Medications     metoprolol tartrate (LOPRESSOR) 25 MG tablet     Sig: Take 0.5 tablets (12.5 mg) by mouth 2 times daily     Dispense:  30 tablet     Refill:  1     There are no discontinued medications.    CURRENT MEDICATIONS:  Current Outpatient Medications   Medication Sig Dispense Refill     amLODIPine (NORVASC) 10 MG tablet TAKE 1 TABLET EVERY DAY 90 tablet 1     Calcium Citrate-Vitamin D (CALCIUM CITRATE + D PO) Take 2 tablets by mouth 2 times daily        fluticasone (FLONASE) 50 MCG/ACT spray Spray 2 sprays into both nostrils daily 48 g prn     fluticasone (FLOVENT HFA) 110 MCG/ACT Inhaler Inhale 1 puff into the lungs 2 times daily 1 Inhaler 1     Ibuprofen-Diphenhydramine Cit (ADVIL PM PO) Take 2 tablets by mouth At Bedtime       levothyroxine (SYNTHROID/LEVOTHROID) 100 MCG tablet TAKE 1 TABLET EVERY DAY 90 tablet 2     losartan (COZAAR) 100 MG tablet Take 1 tablet (100 mg) by mouth daily 90 tablet 1     metoprolol tartrate (LOPRESSOR) 25 MG  tablet Take 0.5 tablets (12.5 mg) by mouth 2 times daily 30 tablet 1     Multiple Vitamins-Minerals (MULTIVITAMIN ADULT PO) Take 1 tablet by mouth daily       Multiple Vitamins-Minerals (PRESERVISION AREDS 2 PO) Take 1 tablet by mouth 2 times daily       Naproxen Sodium (ALEVE PO) Take 220 mg by mouth Takes every AM and PM when not taking ibuprofen.       Omega-3 Fatty Acids (FISH OIL PO) 1,000 mg 2 times daily       RaNITidine HCl (ZANTAC 150 MAXIMUM STRENGTH PO)        triamterene-hydrochlorothiazide (DYAZIDE) 37.5-25 MG per capsule TAKE 1 CAPSULE EVERY DAY 90 capsule 1     ALLERGIES  Allergies   Allergen Reactions     Cats      Hylan G-F 20      Lisinopril Cough     Seasonal Allergies      Vioxx      PAST MEDICAL HISTORY:  Past Medical History:   Diagnosis Date     Allergic rhinitis, cause unspecified      Personal history of alcoholism (H)      Sleep apnea      Undiagnosed cardiac murmurs      Unspecified essential hypertension      Unspecified hypothyroidism      PAST SURGICAL HISTORY:  Past Surgical History:   Procedure Laterality Date     C NONSPECIFIC PROCEDURE  1974    s/p Vaginal hysterectomy     COLONOSCOPY N/A 10/31/2018    Procedure: COMBINED COLONOSCOPY, SINGLE OR MULTIPLE BIOPSY/POLYPECTOMY BY BIOPSY;  Surgeon: Leon Cosme MD;  Location:  GI     ESOPHAGOSCOPY, GASTROSCOPY, DUODENOSCOPY (EGD), COMBINED N/A 10/31/2018    Procedure: GASTROSCOPY;  Surgeon: Leon Cosme MD;  Location:  GI     HYSTERECTOMY       JOINT REPLACEMENT Bilateral      knee replacement Bilateral      ORTHOPEDIC SURGERY       FAMILY HISTORY:  Family History   Problem Relation Age of Onset     Cancer Sister      Heart Disease Father      SOCIAL HISTORY:  Social History     Socioeconomic History     Marital status:      Spouse name: None     Number of children: None     Years of education: None     Highest education level: None   Occupational History     None   Social Needs     Financial resource  strain: None     Food insecurity:     Worry: None     Inability: None     Transportation needs:     Medical: None     Non-medical: None   Tobacco Use     Smoking status: Never Smoker     Smokeless tobacco: Never Used   Substance and Sexual Activity     Alcohol use: No     Alcohol/week: 0.0 oz     Drug use: No     Sexual activity: Yes     Partners: Male   Lifestyle     Physical activity:     Days per week: None     Minutes per session: None     Stress: None   Relationships     Social connections:     Talks on phone: None     Gets together: None     Attends Christianity service: None     Active member of club or organization: None     Attends meetings of clubs or organizations: None     Relationship status: None     Intimate partner violence:     Fear of current or ex partner: None     Emotionally abused: None     Physically abused: None     Forced sexual activity: None   Other Topics Concern      Service No     Blood Transfusions No     Caffeine Concern No     Occupational Exposure No     Hobby Hazards No     Sleep Concern Yes     Stress Concern No     Weight Concern No     Special Diet No     Back Care Yes     Exercise No     Bike Helmet Yes     Seat Belt Yes     Self-Exams Yes     Parent/sibling w/ CABG, MI or angioplasty before 65F 55M? Yes   Social History Narrative    Social Documentation:4/10        Balanced Diet: YES    Calcium intake: supplement per day    Caffeine: 4cups per day    Exercise:  type of activity varies;  3 times per week    Sunscreen: Yes    Seatbelts:  Yes    Self Breast Exam:  Yes    Self Testicular Exam: No - na    Physical/Emotional/Sexual Abuse: No -     Do you feel safe in your environment? Yes        Cholesterol screen up to date: yes    Eye Exam up to date: Yes    Dental Exam up to date: Yes    Pap smear up to date: Does Not Apply    Mammogram up to date: utd    Dexa Scan up to date: Yes    Colonoscopy up to date: Yes    Immunizations up to date: Yes    Glucose screen if over 40:   "Adelina Carroll ma             Review of Systems:  Skin:  Negative     Eyes:  Positive for glasses  ENT:  Negative    Respiratory:  Positive for sleep apnea;CPAP  Cardiovascular:  Negative Positive for;palpitations;lightheadedness  Gastroenterology: Negative    Genitourinary:  Negative    Musculoskeletal:  Positive for arthritis  Neurologic:  Negative    Psychiatric:  Negative    Heme/Lymph/Imm:  Positive for allergies  Endocrine:  Positive for thyroid disorder     Physical Exam:  Vitals: /72   Pulse 61   Ht 1.651 m (5' 5\")   Wt 59.9 kg (132 lb)   BMI 21.97 kg/m     Wt Readings from Last 4 Encounters:   05/29/19 59.9 kg (132 lb)   05/02/19 60.3 kg (133 lb)   12/17/18 59 kg (130 lb)   11/26/18 58.8 kg (129 lb 11.2 oz)     GEN: well nourished, in no acute distress.  HEENT:  Pupils equal, round. Sclerae nonicteric.   EXTREM: no LE edema.  NEURO: Alert and oriented, cooperative.  SKIN: Warm and dry.     Recent Lab Results:  LIPID RESULTS:  Lab Results   Component Value Date    CHOL 191 10/12/2018    HDL 85 10/12/2018    LDL 97 10/12/2018    TRIG 39 10/12/2018    CHOLHDLRATIO 2.4 06/03/2014     LIVER ENZYME RESULTS:  Lab Results   Component Value Date    AST 55 (H) 10/12/2018    ALT 32 10/12/2018     CBC RESULTS:  Lab Results   Component Value Date    WBC 4.9 03/24/2017    RBC 4.50 03/24/2017    HGB 13.6 03/24/2017    HCT 41.3 03/24/2017    MCV 92 03/24/2017    MCH 30.2 03/24/2017    MCHC 32.9 03/24/2017    RDW 12.5 03/24/2017     03/24/2017     BMP RESULTS:  Lab Results   Component Value Date     10/12/2018    POTASSIUM 4.3 10/12/2018    CHLORIDE 99 10/12/2018    CO2 26 10/12/2018    ANIONGAP 9 10/12/2018    GLC 80 10/12/2018    BUN 9 10/12/2018    CR 0.73 10/12/2018    GFRESTIMATED 78 10/12/2018    GFRESTBLACK >90 10/12/2018    CEASAR 10.5 (H) 10/12/2018      A1C RESULTS:  No results found for: A1C  INR RESULTS:  Lab Results   Component Value Date    INR 1.7 02/12/2013    INR 1.9 " 02/08/2013     Bonnie Klein PA-C  [facility]

## 2019-05-29 NOTE — LETTER
5/29/2019    Tiffany Messer MD  [address]  [place] [address]    RE: Mely Timmonschencho       Dear Colleague,    I had the pleasure of seeing Mely Guerra in the [facility].      Cardiology Progress Note    Date of Service: 05/29/2019  Patient seen today in follow up of: palpitations  Primary cardiologist: Dr. York    HPI:  Mely Guerra is a very pleasant 79 year old female  with a history of hypertension, palpitations, and sleep apnea treated with CPAP who is here today to follow up on recent symptoms of increasing palpitations.     She was initially referred to Dr. York in November of 2018 for an abnormal EKG. Her EKG showed possible Wenckebach. Dr. York sent her for a stress echocardiogram as well as a holter monitor for further evaluation. Her stress echocardiogram showed no evidence of ischemia and preserved chronotropic competence. She tolerated above average exercise. She also wore a 24 hour holter monitor which showed a first degree AV block and multiple APCs. She had many pauses, although none were greater than 2.2 seconds. Short runs of PSVT were noted but these did not correlate with any symptoms. No further cardiac intervention was recommended at that time.    I saw her on 5/2/19 as she had note some increasing palpitations occurring several times a day on a daily basis. She has no associated shortness of breath, chest pain, palpitations or other symptoms. I ordered a 48 hour holter monitor for further evaluation.     This showed primarily normal sinus rhythm with a first degree block and frequent supraventricular ectomy. She had 34,835 supraventricular ectopies (23,706 being PACs). She had 900 SVE runs the longest of which was 536 beats at a rate of 169 BPM. She was also noted to have several pauses, the maximum being 2.08 seconds long. Her symptoms of palpitations correlated with supraventricular tachycardia with HRs in the 150s.    She returns today to  review her holter monitor results. She continues to have symptoms of palpitations most days of the week. At times, they occur several times a day. She denies any other new symptoms.     ASSESSMENT/PLAN:  1.  Palpitations. She had over 900 runs of SVT and a significant amount of PACs on her recent holter monitor in a 48 hour period. Her symptoms of palpitations and fluttering correlated with runs of SVT with HRs in the 150s. Her average HR is 69. She did have several pauses overnight up to two seconds. I reviewed her holter with Dr. York and discussed with her today. We will plan to start low dose metoprolol tartrate. I have asked her to stop the medication if she experiences any dizziness, lightheadedness, presyncope or other issues after starting. We certainly will not be able to push her beta blocker dose significantly. If she continues to have frequent symptoms and palpitations I asked her to call us and we will refer her to electrophysiology for further discussion and treatment options.   2.  FLAVIA. Treated with CPAP.  3.  Hypertension. Well controlled on her current regimen.     Orders this Visit:  No orders of the defined types were placed in this encounter.    Orders Placed This Encounter   Medications     metoprolol tartrate (LOPRESSOR) 25 MG tablet     Sig: Take 0.5 tablets (12.5 mg) by mouth 2 times daily     Dispense:  30 tablet     Refill:  1     There are no discontinued medications.    CURRENT MEDICATIONS:  Current Outpatient Medications   Medication Sig Dispense Refill     amLODIPine (NORVASC) 10 MG tablet TAKE 1 TABLET EVERY DAY 90 tablet 1     Calcium Citrate-Vitamin D (CALCIUM CITRATE + D PO) Take 2 tablets by mouth 2 times daily        fluticasone (FLONASE) 50 MCG/ACT spray Spray 2 sprays into both nostrils daily 48 g prn     fluticasone (FLOVENT HFA) 110 MCG/ACT Inhaler Inhale 1 puff into the lungs 2 times daily 1 Inhaler 1     Ibuprofen-Diphenhydramine Cit (ADVIL PM PO) Take 2 tablets by mouth At  Bedtime       levothyroxine (SYNTHROID/LEVOTHROID) 100 MCG tablet TAKE 1 TABLET EVERY DAY 90 tablet 2     losartan (COZAAR) 100 MG tablet Take 1 tablet (100 mg) by mouth daily 90 tablet 1     metoprolol tartrate (LOPRESSOR) 25 MG tablet Take 0.5 tablets (12.5 mg) by mouth 2 times daily 30 tablet 1     Multiple Vitamins-Minerals (MULTIVITAMIN ADULT PO) Take 1 tablet by mouth daily       Multiple Vitamins-Minerals (PRESERVISION AREDS 2 PO) Take 1 tablet by mouth 2 times daily       Naproxen Sodium (ALEVE PO) Take 220 mg by mouth Takes every AM and PM when not taking ibuprofen.       Omega-3 Fatty Acids (FISH OIL PO) 1,000 mg 2 times daily       RaNITidine HCl (ZANTAC 150 MAXIMUM STRENGTH PO)        triamterene-hydrochlorothiazide (DYAZIDE) 37.5-25 MG per capsule TAKE 1 CAPSULE EVERY DAY 90 capsule 1     ALLERGIES  Allergies   Allergen Reactions     Cats      Hylan G-F 20      Lisinopril Cough     Seasonal Allergies      Vioxx      PAST MEDICAL HISTORY:  Past Medical History:   Diagnosis Date     Allergic rhinitis, cause unspecified      Personal history of alcoholism (H)      Sleep apnea      Undiagnosed cardiac murmurs      Unspecified essential hypertension      Unspecified hypothyroidism      PAST SURGICAL HISTORY:  Past Surgical History:   Procedure Laterality Date     C NONSPECIFIC PROCEDURE  1974    s/p Vaginal hysterectomy     COLONOSCOPY N/A 10/31/2018    Procedure: COMBINED COLONOSCOPY, SINGLE OR MULTIPLE BIOPSY/POLYPECTOMY BY BIOPSY;  Surgeon: Leon Cosme MD;  Location:  GI     ESOPHAGOSCOPY, GASTROSCOPY, DUODENOSCOPY (EGD), COMBINED N/A 10/31/2018    Procedure: GASTROSCOPY;  Surgeon: Leon Cosme MD;  Location:  GI     HYSTERECTOMY       JOINT REPLACEMENT Bilateral      knee replacement Bilateral      ORTHOPEDIC SURGERY       FAMILY HISTORY:  Family History   Problem Relation Age of Onset     Cancer Sister      Heart Disease Father      SOCIAL HISTORY:  Social History      Socioeconomic History     Marital status:      Spouse name: None     Number of children: None     Years of education: None     Highest education level: None   Occupational History     None   Social Needs     Financial resource strain: None     Food insecurity:     Worry: None     Inability: None     Transportation needs:     Medical: None     Non-medical: None   Tobacco Use     Smoking status: Never Smoker     Smokeless tobacco: Never Used   Substance and Sexual Activity     Alcohol use: No     Alcohol/week: 0.0 oz     Drug use: No     Sexual activity: Yes     Partners: Male   Lifestyle     Physical activity:     Days per week: None     Minutes per session: None     Stress: None   Relationships     Social connections:     Talks on phone: None     Gets together: None     Attends Quaker service: None     Active member of club or organization: None     Attends meetings of clubs or organizations: None     Relationship status: None     Intimate partner violence:     Fear of current or ex partner: None     Emotionally abused: None     Physically abused: None     Forced sexual activity: None   Other Topics Concern      Service No     Blood Transfusions No     Caffeine Concern No     Occupational Exposure No     Hobby Hazards No     Sleep Concern Yes     Stress Concern No     Weight Concern No     Special Diet No     Back Care Yes     Exercise No     Bike Helmet Yes     Seat Belt Yes     Self-Exams Yes     Parent/sibling w/ CABG, MI or angioplasty before 65F 55M? Yes   Social History Narrative    Social Documentation:4/10        Balanced Diet: YES    Calcium intake: supplement per day    Caffeine: 4cups per day    Exercise:  type of activity varies;  3 times per week    Sunscreen: Yes    Seatbelts:  Yes    Self Breast Exam:  Yes    Self Testicular Exam: No - na    Physical/Emotional/Sexual Abuse: No -     Do you feel safe in your environment? Yes        Cholesterol screen up to date: yes    Eye Exam up  "to date: Yes    Dental Exam up to date: Yes    Pap smear up to date: Does Not Apply    Mammogram up to date: utd    Dexa Scan up to date: Yes    Colonoscopy up to date: Yes    Immunizations up to date: Yes    Glucose screen if over 40:  No -     Fredy Carroll ma             Review of Systems:  Skin:  Negative     Eyes:  Positive for glasses  ENT:  Negative    Respiratory:  Positive for sleep apnea;CPAP  Cardiovascular:  Negative Positive for;palpitations;lightheadedness  Gastroenterology: Negative    Genitourinary:  Negative    Musculoskeletal:  Positive for arthritis  Neurologic:  Negative    Psychiatric:  Negative    Heme/Lymph/Imm:  Positive for allergies  Endocrine:  Positive for thyroid disorder     Physical Exam:  Vitals: /72   Pulse 61   Ht 1.651 m (5' 5\")   Wt 59.9 kg (132 lb)   BMI 21.97 kg/m      Wt Readings from Last 4 Encounters:   05/29/19 59.9 kg (132 lb)   05/02/19 60.3 kg (133 lb)   12/17/18 59 kg (130 lb)   11/26/18 58.8 kg (129 lb 11.2 oz)     GEN: well nourished, in no acute distress.  HEENT:  Pupils equal, round. Sclerae nonicteric.   EXTREM: no LE edema.  NEURO: Alert and oriented, cooperative.  SKIN: Warm and dry.     Recent Lab Results:  LIPID RESULTS:  Lab Results   Component Value Date    CHOL 191 10/12/2018    HDL 85 10/12/2018    LDL 97 10/12/2018    TRIG 39 10/12/2018    CHOLHDLRATIO 2.4 06/03/2014     LIVER ENZYME RESULTS:  Lab Results   Component Value Date    AST 55 (H) 10/12/2018    ALT 32 10/12/2018     CBC RESULTS:  Lab Results   Component Value Date    WBC 4.9 03/24/2017    RBC 4.50 03/24/2017    HGB 13.6 03/24/2017    HCT 41.3 03/24/2017    MCV 92 03/24/2017    MCH 30.2 03/24/2017    MCHC 32.9 03/24/2017    RDW 12.5 03/24/2017     03/24/2017     BMP RESULTS:  Lab Results   Component Value Date     10/12/2018    POTASSIUM 4.3 10/12/2018    CHLORIDE 99 10/12/2018    CO2 26 10/12/2018    ANIONGAP 9 10/12/2018    GLC 80 10/12/2018    BUN 9 10/12/2018    CR " 0.73 10/12/2018    GFRESTIMATED 78 10/12/2018    GFRESTBLACK >90 10/12/2018    CEASAR 10.5 (H) 10/12/2018      A1C RESULTS:  No results found for: A1C  INR RESULTS:  Lab Results   Component Value Date    INR 1.7 02/12/2013    INR 1.9 02/08/2013     Bonnie Klein PA-C  [facility]      Thank you for allowing me to participate in the care of your patient.      Sincerely,     Bonnie Klein PA-C     [facility]    cc:   Tiffany Messer MD  [address] [phone]

## 2019-06-06 DIAGNOSIS — J30.89 OTHER ALLERGIC RHINITIS: ICD-10-CM

## 2019-06-07 RX ORDER — FLUTICASONE PROPIONATE 50 MCG
SPRAY, SUSPENSION (ML) NASAL
Qty: 48 G | Refills: 1 | Status: SHIPPED | OUTPATIENT
Start: 2019-06-07 | End: 2019-10-29

## 2019-06-07 NOTE — TELEPHONE ENCOUNTER
Prescription approved per [facility] Refill Protocol.  Bernadine BROWN RN

## 2019-06-07 NOTE — TELEPHONE ENCOUNTER
"Fluticasone  Last Written Prescription Date:  10/15/18  Last Fill Quantity: 1,  # refills: 1   Last office visit: 11/21/2018 with prescribing provider:  ZORAIDA   Future Office Visit:    Requested Prescriptions   Pending Prescriptions Disp Refills     fluticasone (FLONASE) 50 MCG/ACT nasal spray [Pharmacy Med Name: FLUTICASONE PROPIONATE 50 MCG/ACT Suspension] 48 g 98     Sig: USE 2 SPRAYS IN EACH NOSTRIL EVERY DAY       Inhaled Steroids Protocol Passed - 6/6/2019  5:51 PM        Passed - Patient is age 12 or older        Passed - Recent (12 mo) or future (30 days) visit within the authorizing provider's specialty     Patient had office visit in the last 12 months or has a visit in the next 30 days with authorizing provider or within the authorizing provider's specialty.  See \"Patient Info\" tab in inbasket, or \"Choose Columns\" in Meds & Orders section of the refill encounter.              Passed - Medication is active on med list        "

## 2019-06-12 ENCOUNTER — NURSE TRIAGE (OUTPATIENT)
Dept: FAMILY MEDICINE | Facility: CLINIC | Age: 79
End: 2019-06-12

## 2019-06-12 ENCOUNTER — TRANSFERRED RECORDS (OUTPATIENT)
Dept: HEALTH INFORMATION MANAGEMENT | Facility: CLINIC | Age: 79
End: 2019-06-12

## 2019-06-12 LAB
CREAT SERPL-MCNC: 0.7 MG/DL (ref 0.57–1.11)
GFR SERPL CREATININE-BSD FRML MDRD: >60 ML/MIN/1.73M2
GLUCOSE SERPL-MCNC: 97 MG/DL (ref 65–100)
POTASSIUM SERPL-SCNC: 4.2 MMOL/L (ref 3.5–5)

## 2019-06-12 NOTE — TELEPHONE ENCOUNTER
Left VM on pt's cell that she needs to go to ER and will cancel appt for today  Called home # -  answered  Left non detailed VM with him too that would be cancelling appt and she should go to ER  He said to call pt's cell again   She was going to bike over here to appt and she's already gone from home  Called pt again - no answer  Bernadine BROWN RN

## 2019-06-12 NOTE — TELEPHONE ENCOUNTER
Reason for call:  Patient reporting a symptom    Symptom or request: Chest Pain     Duration (how long have symptoms been present): Last 4 nights     Have you been treated for this before? No    Additional comments: Pt called states that she has had chest pain for the last 4 nights. Pt was scheduled for appt today with PCP.     Phone Number patient can be reached at:  Cell number on file:    Telephone Information:   Mobile [phone]       Best Time:  Anytime     Can we leave a detailed message on this number:  YES    Call taken on 6/12/2019 at 8:03 AM by Pedro Carreno

## 2019-06-12 NOTE — TELEPHONE ENCOUNTER
Reason for Disposition    SEVERE chest pain    Additional Information    Negative: Severe difficulty breathing (e.g., struggling for each breath, speaks in single words)    Negative: Passed out (i.e., fainted, collapsed and was not responding)    Negative: Chest pain lasting longer than 5 minutes and ANY of the following:* Over 50 years old* Over 30 years old and at least one cardiac risk factor (i.e., high blood pressure, diabetes, high cholesterol, obesity, smoker or strong family history of heart disease)* Pain is crushing, pressure-like, or heavy * Took nitroglycerin and chest pain was not relieved* History of heart disease (i.e., angina, heart attack, bypass surgery, angioplasty, CHF)    Negative: Visible sweat on face or sweat dripping down face    Negative: Sounds like a life-threatening emergency to the triager    Negative: Followed an injury to chest    Negative: Pain also present in shoulder(s) or arm(s) or jaw    Negative: Difficulty breathing    Negative: Cocaine use within last 3 days    Negative: History of prior 'blood clot' in leg or lungs (i.e., deep vein thrombosis, pulmonary embolism)    Negative: Recent illness requiring prolonged bed rest (i.e., immobilization)    Negative: Major surgery in the past month    Negative: Recent long-distance travel with prolonged time in car, bus, plane, or train (i.e., within past 2 weeks; 6 or more hours duration)    Negative: Hip or leg fracture in past 2 months (e.g, or had cast on leg or ankle)    Negative: Heart beating irregularly or very rapidly    Chest pain lasting longer than 5 minutes    Negative: Intermittent chest pain and pain has been increasing in severity or frequency    Negative: Dizziness or lightheadedness    Negative: Coughing up blood    Negative: Patient sounds very sick or weak to the triager    Patient wants to be seen    Negative: All other patients with chest pain    Negative: Fever > 100.5 F (38.1 C)    Negative: Intermittent chest  "pains persist > 3 days    Answer Assessment - Initial Assessment Questions  1. LOCATION: \"Where does it hurt?\"        Front (L) side of chest and back (L) side - states if you put a bertha through her chest/back pain would be in exact same spot on chest and to back  2. RADIATION: \"Does the pain go anywhere else?\" (e.g., into neck, jaw, arms, back)      Back - denies jaw, neck, and arm pain  3. ONSET: \"When did the chest pain begin?\" (Minutes, hours or days)       4 nights ago - only notices at night  4. PATTERN \"Does the pain come and go, or has it been constant since it started?\"  \"Does it get worse with exertion?\"       Constant at night - getting only 4 hours of sleep/night  5. DURATION: \"How long does it last\" (e.g., seconds, minutes, hours)      Constant  6. SEVERITY: \"How bad is the pain?\"  (e.g., Scale 1-10; mild, moderate, or severe)     - MILD (1-3): doesn't interfere with normal activities      - MODERATE (4-7): interferes with normal activities or awakens from sleep     - SEVERE (8-10): excruciating pain, unable to do any normal activities        Moderate to severe  7. CARDIAC RISK FACTORS: \"Do you have any history of heart problems or risk factors for heart disease?\" (e.g., prior heart attack, angina; high blood pressure, diabetes, being overweight, high cholesterol, smoking, or strong family history of heart disease)      Sees cardiology for arrhythmia she said   8. PULMONARY RISK FACTORS: \"Do you have any history of lung disease?\"  (e.g., blood clots in lung, asthma, emphysema, birth control pills)      No   9. CAUSE: \"What do you think is causing the chest pain?\"      Unknown - biking a lot but this is normal, otherwise no new activities that would have caused pain  10. OTHER SYMPTOMS: \"Do you have any other symptoms?\" (e.g., dizziness, nausea, vomiting, sweating, fever, difficulty breathing, cough)        No  11. PREGNANCY: \"Is there any chance you are pregnant?\" \"When was your last menstrual period?\"   "      NA    Protocols used: CHEST PAIN-A-[place]

## 2019-06-12 NOTE — TELEPHONE ENCOUNTER
This pt is better going to the ER , even if I see her in clinic most likely I cannot do anything but send her to the ER   Can you ,please let her know this ?  Thanks

## 2019-06-12 NOTE — TELEPHONE ENCOUNTER
LS,   FYI:  Patient called with c/o chest pain  See below  Chest and back pain  No radiation of pain  No pain to arm, jaw, neck  Denies SOB, dizziness, N&V, etc  Recently saw cardiology - states she's on new beta blocker  Patient wants to come see you for chest pain  Doesn't feel it's severe enough to go to ER    Next 5 appointments (look out 90 days)    Jun 12, 2019  2:00 PM CDT  Office Visit with Tiffany Messer MD  [facility] ([facility]) [address] [id number] [phone]        Bernadine BROWN RN

## 2019-06-14 ENCOUNTER — TELEPHONE (OUTPATIENT)
Dept: FAMILY MEDICINE | Facility: CLINIC | Age: 79
End: 2019-06-14

## 2019-06-14 ENCOUNTER — OFFICE VISIT (OUTPATIENT)
Dept: FAMILY MEDICINE | Facility: CLINIC | Age: 79
End: 2019-06-14
Payer: MEDICARE

## 2019-06-14 VITALS
DIASTOLIC BLOOD PRESSURE: 73 MMHG | OXYGEN SATURATION: 98 % | HEIGHT: 64 IN | WEIGHT: 130 LBS | HEART RATE: 56 BPM | BODY MASS INDEX: 22.2 KG/M2 | TEMPERATURE: 98.5 F | SYSTOLIC BLOOD PRESSURE: 131 MMHG | RESPIRATION RATE: 16 BRPM

## 2019-06-14 DIAGNOSIS — M79.10 MYALGIA: Primary | ICD-10-CM

## 2019-06-14 DIAGNOSIS — J98.01 BRONCHOSPASM: ICD-10-CM

## 2019-06-14 DIAGNOSIS — I47.10 PAROXYSMAL SUPRAVENTRICULAR TACHYCARDIA (H): ICD-10-CM

## 2019-06-14 DIAGNOSIS — M25.50 ARTHRALGIA, UNSPECIFIED JOINT: ICD-10-CM

## 2019-06-14 PROCEDURE — 86666 EHRLICHIA ANTIBODY: CPT | Mod: 90 | Performed by: PHYSICIAN ASSISTANT

## 2019-06-14 PROCEDURE — 86618 LYME DISEASE ANTIBODY: CPT | Performed by: PHYSICIAN ASSISTANT

## 2019-06-14 PROCEDURE — 86753 PROTOZOA ANTIBODY NOS: CPT | Mod: 90 | Performed by: PHYSICIAN ASSISTANT

## 2019-06-14 PROCEDURE — 99214 OFFICE O/P EST MOD 30 MIN: CPT | Performed by: PHYSICIAN ASSISTANT

## 2019-06-14 PROCEDURE — 36415 COLL VENOUS BLD VENIPUNCTURE: CPT | Performed by: PHYSICIAN ASSISTANT

## 2019-06-14 PROCEDURE — 99000 SPECIMEN HANDLING OFFICE-LAB: CPT | Performed by: PHYSICIAN ASSISTANT

## 2019-06-14 RX ORDER — METOPROLOL TARTRATE 25 MG/1
12.5 TABLET, FILM COATED ORAL 2 TIMES DAILY
Qty: 90 TABLET | Refills: 1 | Status: SHIPPED | OUTPATIENT
Start: 2019-06-14 | End: 2019-10-10

## 2019-06-14 RX ORDER — DOXYCYCLINE 100 MG/1
100 CAPSULE ORAL 2 TIMES DAILY
Qty: 20 CAPSULE | Refills: 0 | Status: SHIPPED | OUTPATIENT
Start: 2019-06-14 | End: 2019-09-19

## 2019-06-14 ASSESSMENT — MIFFLIN-ST. JEOR: SCORE: 1051.74

## 2019-06-14 NOTE — PROGRESS NOTES
"Subjective     Mely Guerra is a 79 year old female who presents to clinic today for the following health issues:    HPI      ED/UC Followup:    Facility:  [facility]   Date of visit: 6/12/19  Reason for visit: Chest Pain   Current Status: Patient initially called in complaining about chest pain and was instructed to go to the ED--patient was seen at [facility] and after a workup ED provider determined that this was not related to her heart. Last night patient felt that the pain changed and spread across her whole chest. Patient then remembered that she was up at her cabin and she felt something on her back--she touch it with her hand and it may had fallen off. Could this had been a tick and can these be related?      She requests spacer for her inhaler.      BP Readings from Last 3 Encounters:   06/14/19 131/73   05/29/19 112/72   05/02/19 113/68    Wt Readings from Last 3 Encounters:   06/14/19 59 kg (130 lb)   05/29/19 59.9 kg (132 lb)   05/02/19 60.3 kg (133 lb)                      Reviewed and updated as needed this visit by Provider         Review of Systems   ROS COMP: Constitutional, HEENT, cardiovascular, pulmonary, gi and gu systems are negative, except as otherwise noted.      Objective    /73 (BP Location: Left arm, Patient Position: Sitting, Cuff Size: Adult Regular)   Pulse 56   Temp 98.5  F (36.9  C) (Oral)   Resp 16   Ht 1.613 m (5' 3.5\")   Wt 59 kg (130 lb)   SpO2 98%   Breastfeeding? No   BMI 22.67 kg/m    Body mass index is 22.67 kg/m .  Physical Exam   GENERAL: alert and no distress  EYES: Eyes grossly normal to inspection  RESP: lungs clear to auscultation - no rales, rhonchi or wheezes  CV: regular rate and rhythm, normal S1 S2, no S3 or S4, no murmur, click or rub, no peripheral edema and peripheral pulses strong  SKIN: no suspicious lesions or rashes  PSYCH: mentation appears normal, affect normal/bright    Diagnostic Test Results:  Labs reviewed in Epic        Assessment & " Plan     1. Myalgia  Will screen for tick related conditions.  Can start antibiotic, and if all negative can stop.  - Lyme Disease Susan with reflex to WB Serum  - Babesia antibody IgG IgM  - Anaplasma phagocytoph antibody IgG IgM  - Ehrlichia chaffeenis Abys IgG and IgM  - doxycycline hyclate (VIBRAMYCIN) 100 MG capsule; Take 1 capsule (100 mg) by mouth 2 times daily  Dispense: 20 capsule; Refill: 0    2. Arthralgia, unspecified joint    - Lyme Disease Susan with reflex to WB Serum  - Babesia antibody IgG IgM  - Anaplasma phagocytoph antibody IgG IgM  - Ehrlichia chaffeenis Abys IgG and IgM  - doxycycline hyclate (VIBRAMYCIN) 100 MG capsule; Take 1 capsule (100 mg) by mouth 2 times daily  Dispense: 20 capsule; Refill: 0    3. Bronchospasm    - AEROCHAMBER           Return in about 4 weeks (around 7/12/2019) for If symptoms persist or worsen.    Chris Dhillon PA-C  [facility]

## 2019-06-14 NOTE — TELEPHONE ENCOUNTER
Reason for call:  Patient reporting a symptom    Symptom or request: Nerve Pain radiating in her chest    Duration (how long have symptoms been present): 5 Nights    Have you been treated for this before? Yes    Additional comments: she did go to the ER and they said it is not her Heart    Phone Number patient can be reached at:  Home number on file [phone] (home)    Best Time:  anytime  (I couldn't addendum the 6/12 encounter re: Chest Pain)    Can we leave a detailed message on this number:  YES    Call taken on 6/14/2019 at 8:22 AM by Pk Sanchez

## 2019-06-14 NOTE — TELEPHONE ENCOUNTER
See nurse triage encounter from 6/12/2019  Patient was in the ER for chest pain  Nothing found to be wrong with heart  States chest pain very painful last night as well as back pain  States her son reminded her she thought she got a tick bite  Wonders if this is more so nerve pain related to a tick bite?  Advised f/u appt    Next 5 appointments (look out 90 days)    Jun 14, 2019 11:20 AM CDT  Office Visit with Chris Dhillon PA-C  [facility] ([facility] [address]  [facility] [id number] [phone]        Bernadine BROWN, RN

## 2019-06-16 LAB
A PHAGOCYTOPH IGG TITR SER IF: NORMAL {TITER}
A PHAGOCYTOPH IGM TITR SER IF: NORMAL {TITER}
E CHAFFEENSIS IGG TITR SER: NORMAL {TITER}
E CHAFFEENSIS IGM TITR SER: NORMAL {TITER}

## 2019-06-17 LAB
B BURGDOR IGG+IGM SER QL: 0.01 (ref 0–0.89)
B MICROTI IGG TITR SER: NORMAL {TITER}
B MICROTI IGM TITR SER: NORMAL {TITER}

## 2019-06-18 NOTE — RESULT ENCOUNTER NOTE
Dear Mely    Your test results are attached, feel free to contact me via [employer]     All of your tick born diseases came back negative.  I do not think you need to continue the antibiotic.      Everton Dhillon PA-C

## 2019-08-08 DIAGNOSIS — I10 ESSENTIAL HYPERTENSION WITH GOAL BLOOD PRESSURE LESS THAN 140/90: ICD-10-CM

## 2019-08-08 RX ORDER — TRIAMTERENE AND HYDROCHLOROTHIAZIDE 37.5; 25 MG/1; MG/1
CAPSULE ORAL
Qty: 90 CAPSULE | Refills: 0 | Status: SHIPPED | OUTPATIENT
Start: 2019-08-08 | End: 2019-10-16

## 2019-08-08 NOTE — TELEPHONE ENCOUNTER
Prescription approved per [facility] Refill Protocol.  Due for physical October 2019  Bernadine BROWN RN

## 2019-08-08 NOTE — TELEPHONE ENCOUNTER
"TRIAMTERENE/HYDROCHLOROTHIAZIDE 37.5-25 MG Capsule  Last Written Prescription Date:  10/12/2018  Last Fill Quantity: 90,  # refills: 1   Last office visit: 6/14/2019 with prescribing provider:  JOVANNY   Future Office Visit:  Nothing at this time scheduled.    Requested Prescriptions   Pending Prescriptions Disp Refills     triamterene-HCTZ (DYAZIDE) 37.5-25 MG capsule [Pharmacy Med Name: TRIAMTERENE/HYDROCHLOROTHIAZIDE 37.5-25 MG Capsule] 90 capsule 1     Sig: TAKE 1 CAPSULE EVERY DAY       Diuretics (Including Combos) Protocol Passed - 8/8/2019 10:11 AM        Passed - Blood pressure under 140/90 in past 12 months     BP Readings from Last 3 Encounters:   06/14/19 131/73   05/29/19 112/72   05/02/19 113/68                 Passed - Recent (12 mo) or future (30 days) visit within the authorizing provider's specialty     Patient had office visit in the last 12 months or has a visit in the next 30 days with authorizing provider or within the authorizing provider's specialty.  See \"Patient Info\" tab in inbasket, or \"Choose Columns\" in Meds & Orders section of the refill encounter.              Passed - Medication is active on med list        Passed - Patient is age 18 or older        Passed - No active pregancy on record        Passed - Normal serum creatinine on file in past 12 months     Recent Labs   Lab Test 06/12/19   CR 0.70              Passed - Normal serum potassium on file in past 12 months     Recent Labs   Lab Test 06/12/19   POTASSIUM 4.2                    Passed - Normal serum sodium on file in past 12 months     Recent Labs   Lab Test 10/12/18  1103                 Passed - No positive pregnancy test in past 12 months        "

## 2019-08-12 DIAGNOSIS — E03.9 ACQUIRED HYPOTHYROIDISM: ICD-10-CM

## 2019-08-12 DIAGNOSIS — I10 ESSENTIAL HYPERTENSION WITH GOAL BLOOD PRESSURE LESS THAN 140/90: ICD-10-CM

## 2019-08-13 RX ORDER — LEVOTHYROXINE SODIUM 100 UG/1
TABLET ORAL
Qty: 90 TABLET | Refills: 0 | Status: SHIPPED | OUTPATIENT
Start: 2019-08-13 | End: 2019-10-16

## 2019-08-13 RX ORDER — AMLODIPINE BESYLATE 10 MG/1
TABLET ORAL
Qty: 90 TABLET | Refills: 0 | Status: SHIPPED | OUTPATIENT
Start: 2019-08-13 | End: 2019-10-16

## 2019-08-13 NOTE — TELEPHONE ENCOUNTER
"Prescription approved per [facility] Refill Protocol.  Due for physical October 2019  Bernadine BROWN RN    Last Written Prescription Date:    Last Fill Quantity: ,  # refills:    Last office visit: 6/14/2019 with prescribing provider:     Future Office Visit:    Requested Prescriptions   Pending Prescriptions Disp Refills     amLODIPine (NORVASC) 10 MG tablet [Pharmacy Med Name: AMLODIPINE BESYLATE 10 MG Tablet] 90 tablet 1     Sig: TAKE 1 TABLET EVERY DAY       Calcium Channel Blockers Protocol  Passed - 8/12/2019  8:39 PM        Passed - Blood pressure under 140/90 in past 12 months     BP Readings from Last 3 Encounters:   06/14/19 131/73   05/29/19 112/72   05/02/19 113/68                 Passed - Recent (12 mo) or future (30 days) visit within the authorizing provider's specialty     Patient had office visit in the last 12 months or has a visit in the next 30 days with authorizing provider or within the authorizing provider's specialty.  See \"Patient Info\" tab in inAllihubet, or \"Choose Columns\" in Meds & Orders section of the refill encounter.              Passed - Medication is active on med list        Passed - Patient is age 18 or older        Passed - No active pregnancy on record        Passed - Normal serum creatinine on file in past 12 months     Recent Labs   Lab Test 06/12/19   CR 0.70             Passed - No positive pregnancy test in past 12 months        levothyroxine (SYNTHROID/LEVOTHROID) 100 MCG tablet [Pharmacy Med Name: LEVOTHYROXINE SODIUM 100 MCG Tablet] 90 tablet 2     Sig: TAKE 1 TABLET EVERY DAY       Thyroid Protocol Passed - 8/12/2019  8:39 PM        Passed - Patient is 12 years or older        Passed - Recent (12 mo) or future (30 days) visit within the authorizing provider's specialty     Patient had office visit in the last 12 months or has a visit in the next 30 days with authorizing provider or within the authorizing provider's specialty.  See \"Patient Info\" tab in inAllihubet, or \"Choose Columns\" " in Meds & Orders section of the refill encounter.              Passed - Medication is active on med list        Passed - Normal TSH on file in past 12 months     Recent Labs   Lab Test 10/12/18  1103   TSH 1.59              Passed - No active pregnancy on record     If patient is pregnant or has had a positive pregnancy test, please check TSH.          Passed - No positive pregnancy test in past 12 months     If patient is pregnant or has had a positive pregnancy test, please check TSH.

## 2019-09-11 ENCOUNTER — TELEPHONE (OUTPATIENT)
Dept: CARDIOLOGY | Facility: CLINIC | Age: 79
End: 2019-09-11

## 2019-09-11 NOTE — TELEPHONE ENCOUNTER
Patient saw Waleska Klein 5-29-19 started metoprolol 12.5mg bid and was to call back if symptoms have increased.  She has noticed that by 6pm she has increased palpitations and it is uncomfortable.  Waleska had mentioned may need to see EP for further evaluation.    5-29-19 OV note  ASSESSMENT/PLAN:  1.  Palpitations. She had over 900 runs of SVT and a significant amount of PACs on her recent holter monitor in a 48 hour period. Her symptoms of palpitations and fluttering correlated with runs of SVT with HRs in the 150s. Her average HR is 69. She did have several pauses overnight up to two seconds. I reviewed her holter with Dr. York and discussed with her today. We will plan to start low dose metoprolol tartrate. I have asked her to stop the medication if she experiences any dizziness, lightheadedness, presyncope or other issues after starting. We certainly will not be able to push her beta blocker dose significantly. If she continues to have frequent symptoms and palpitations I asked her to call us and we will refer her to electrophysiology for further discussion and treatment options.   2.  FLAVIA. Treated with CPAP.  3.  Hypertension. Well controlled on her current regimen.

## 2019-09-12 NOTE — TELEPHONE ENCOUNTER
Transferred to Scheduling for new patient consult with EP for palpitations and no relief with metoprolol.

## 2019-09-19 ENCOUNTER — OFFICE VISIT (OUTPATIENT)
Dept: CARDIOLOGY | Facility: CLINIC | Age: 79
End: 2019-09-19
Payer: MEDICARE

## 2019-09-19 VITALS
BODY MASS INDEX: 22.71 KG/M2 | DIASTOLIC BLOOD PRESSURE: 78 MMHG | HEART RATE: 58 BPM | HEIGHT: 64 IN | SYSTOLIC BLOOD PRESSURE: 127 MMHG | WEIGHT: 133 LBS

## 2019-09-19 DIAGNOSIS — I47.10 SVT (SUPRAVENTRICULAR TACHYCARDIA) (H): ICD-10-CM

## 2019-09-19 DIAGNOSIS — I10 HYPERTENSION GOAL BP (BLOOD PRESSURE) < 140/90: Primary | ICD-10-CM

## 2019-09-19 PROCEDURE — 93000 ELECTROCARDIOGRAM COMPLETE: CPT | Performed by: INTERNAL MEDICINE

## 2019-09-19 PROCEDURE — 99215 OFFICE O/P EST HI 40 MIN: CPT | Performed by: INTERNAL MEDICINE

## 2019-09-19 ASSESSMENT — MIFFLIN-ST. JEOR: SCORE: 1065.34

## 2019-09-19 NOTE — LETTER
9/19/2019      Tiffany Messer MD  [address]  [place] [address]      RE: Mely Persaudmaddison       Dear Colleague,    I had the pleasure of seeing Mely Guerra in the [facility].    Service Date: 09/19/2019      HISTORY OF PRESENT ILLNESS:  I saw Ms. Guerra for evaluation of recurrent SVT.  She is a 79-year-old white female who has had recurrent palpitations for over a year.  Previous EKG showed frequent PACs and nonsustained atrial tachycardia with a first-degree AV block.  Previous Holter also showed relatively frequent PVCs.  The patient has had persistent and recurrent palpitation over the last several months.  She has been put on Lopressor 12.5 mg p.o. b.i.d. but continued to be bothered with recurrent palpitations.  A 48-hour Holter around 05/13 of this year showed frequent PACs.  There were 900 episodes of SVT at the rate of 169 beats per minute.  In addition, the patient had intermittent sinus bradycardia.  The underlying sinus rhythm showed first-degree AV block.      PAST MEDICAL HISTORY:  Remarkable for sleep apnea, hypertension and hypothyroidism.      PHYSICAL EXAMINATION:   VITAL SIGNS:  Blood pressure was 127/78, heart rate 58 beats per minute, body weight 133 pounds.   HEENT:  Eyes and ENT were unremarkable.   LUNGS:  Clear.   CARDIAC:  Rhythm was regular, and the heart sounds were normal with no murmur.   ABDOMEN:  Examination showed no hepatomegaly.   EXTREMITIES:  There was no pedal edema.      ASSESSMENT AND RECOMMENDATIONS:  Ms. Guerra is a 79-year-old white female who is having recurrent palpitations.  Metoprolol is not helping her symptoms significantly.  There is a limitation for more metoprolol because of sinus bradycardia and first-degree AV block.  It is my suspicion that she has atrial tachycardia, probably from the coronary sinus orifice area.  Since she is not doing well on medical therapy, I have recommended EP study and possible  ablation.  The risks and benefits of the EP study and ablation were explained to the patient, who expressed understanding and consented for the procedure.  She is asked to stop metoprolol 3 days before the EP procedure.      If she has no inducible SVT or PACs for ablation, I would then consider low-dose flecainide, perhaps 50 mg p.o. b.i.d.  If she has severe brardycardia on flecainide, then she may need pacemaker implantation with a higher dose of flecainide as a last resort.      cc:   Tiffany Messer MD    [facility]    [address], [address]    [place]  [address]         PER SALCEDO MD             D: 2019   T: 2019   MT: PIPER      Name:     THELMA VELÁSQUEZ   MRN:      [id number]        Account:      [id number]   :      1940           Service Date: 2019      Document: [id number]           Outpatient Encounter Medications as of 2019   Medication Sig Dispense Refill     amLODIPine (NORVASC) 10 MG tablet TAKE 1 TABLET EVERY DAY 90 tablet 0     Calcium Citrate-Vitamin D (CALCIUM CITRATE + D PO) Take 2 tablets by mouth 2 times daily        fluticasone (FLONASE) 50 MCG/ACT nasal spray USE 2 SPRAYS IN EACH NOSTRIL EVERY DAY 48 g 1     fluticasone (FLOVENT HFA) 110 MCG/ACT Inhaler Inhale 1 puff into the lungs 2 times daily 1 Inhaler 1     Ibuprofen-Diphenhydramine Cit (ADVIL PM PO) Take 2 tablets by mouth At Bedtime       levothyroxine (SYNTHROID/LEVOTHROID) 100 MCG tablet TAKE 1 TABLET EVERY DAY 90 tablet 0     losartan (COZAAR) 100 MG tablet Take 1 tablet (100 mg) by mouth daily 90 tablet 1     metoprolol tartrate (LOPRESSOR) 25 MG tablet Take 0.5 tablets (12.5 mg) by mouth 2 times daily 90 tablet 1     Multiple Vitamins-Minerals (MULTIVITAMIN ADULT PO) Take 1 tablet by mouth daily       Multiple Vitamins-Minerals (PRESERVISION AREDS 2 PO) Take 1 tablet by mouth 2 times daily       Naproxen Sodium (ALEVE PO) Take 220 mg by mouth Takes every AM and PM when not  taking ibuprofen.       Omega-3 Fatty Acids (FISH OIL PO) 1,000 mg 2 times daily       RaNITidine HCl (ZANTAC 150 MAXIMUM STRENGTH PO)        triamterene-HCTZ (DYAZIDE) 37.5-25 MG capsule TAKE 1 CAPSULE EVERY DAY 90 capsule 0     [DISCONTINUED] doxycycline hyclate (VIBRAMYCIN) 100 MG capsule Take 1 capsule (100 mg) by mouth 2 times daily (Patient not taking: Reported on 9/19/2019) 20 capsule 0     No facility-administered encounter medications on file as of 9/19/2019.        Again, thank you for allowing me to participate in the care of your patient.      Sincerely,    Laney Arriaza MD     [facility]

## 2019-09-19 NOTE — PROGRESS NOTES
HPI and Plan:   See dictation    Orders Placed This Encounter   Procedures     EKG 12-lead complete w/read - Clinics (performed today)       No orders of the defined types were placed in this encounter.      Medications Discontinued During This Encounter   Medication Reason     doxycycline hyclate (VIBRAMYCIN) 100 MG capsule Therapy completed         Encounter Diagnoses   Name Primary?     Hypertension goal BP (blood pressure) < 140/90 Yes     SVT (supraventricular tachycardia) (H)        CURRENT MEDICATIONS:  Current Outpatient Medications   Medication Sig Dispense Refill     amLODIPine (NORVASC) 10 MG tablet TAKE 1 TABLET EVERY DAY 90 tablet 0     Calcium Citrate-Vitamin D (CALCIUM CITRATE + D PO) Take 2 tablets by mouth 2 times daily        fluticasone (FLONASE) 50 MCG/ACT nasal spray USE 2 SPRAYS IN EACH NOSTRIL EVERY DAY 48 g 1     fluticasone (FLOVENT HFA) 110 MCG/ACT Inhaler Inhale 1 puff into the lungs 2 times daily 1 Inhaler 1     Ibuprofen-Diphenhydramine Cit (ADVIL PM PO) Take 2 tablets by mouth At Bedtime       levothyroxine (SYNTHROID/LEVOTHROID) 100 MCG tablet TAKE 1 TABLET EVERY DAY 90 tablet 0     losartan (COZAAR) 100 MG tablet Take 1 tablet (100 mg) by mouth daily 90 tablet 1     metoprolol tartrate (LOPRESSOR) 25 MG tablet Take 0.5 tablets (12.5 mg) by mouth 2 times daily 90 tablet 1     Multiple Vitamins-Minerals (MULTIVITAMIN ADULT PO) Take 1 tablet by mouth daily       Multiple Vitamins-Minerals (PRESERVISION AREDS 2 PO) Take 1 tablet by mouth 2 times daily       Naproxen Sodium (ALEVE PO) Take 220 mg by mouth Takes every AM and PM when not taking ibuprofen.       Omega-3 Fatty Acids (FISH OIL PO) 1,000 mg 2 times daily       RaNITidine HCl (ZANTAC 150 MAXIMUM STRENGTH PO)        triamterene-HCTZ (DYAZIDE) 37.5-25 MG capsule TAKE 1 CAPSULE EVERY DAY 90 capsule 0       ALLERGIES     Allergies   Allergen Reactions     Cats      Hylan G-F 20      Lisinopril Cough     Seasonal Allergies      Vioxx         PAST MEDICAL HISTORY:  Past Medical History:   Diagnosis Date     Allergic rhinitis, cause unspecified      Personal history of alcoholism (H)      Sleep apnea      SVT (supraventricular tachycardia) (H)      Unspecified essential hypertension      Unspecified hypothyroidism        PAST SURGICAL HISTORY:  Past Surgical History:   Procedure Laterality Date     C NONSPECIFIC PROCEDURE  1974    s/p Vaginal hysterectomy     COLONOSCOPY N/A 10/31/2018    Procedure: COMBINED COLONOSCOPY, SINGLE OR MULTIPLE BIOPSY/POLYPECTOMY BY BIOPSY;  Surgeon: Leon Cosme MD;  Location:  GI     ESOPHAGOSCOPY, GASTROSCOPY, DUODENOSCOPY (EGD), COMBINED N/A 10/31/2018    Procedure: GASTROSCOPY;  Surgeon: Leon Cosme MD;  Location:  GI     HYSTERECTOMY       JOINT REPLACEMENT Bilateral      knee replacement Bilateral      ORTHOPEDIC SURGERY         FAMILY HISTORY:  Family History   Problem Relation Age of Onset     Cancer Sister      Heart Disease Father        SOCIAL HISTORY:  Social History     Socioeconomic History     Marital status:      Spouse name: None     Number of children: None     Years of education: None     Highest education level: None   Occupational History     None   Social Needs     Financial resource strain: None     Food insecurity:     Worry: None     Inability: None     Transportation needs:     Medical: None     Non-medical: None   Tobacco Use     Smoking status: Never Smoker     Smokeless tobacco: Never Used   Substance and Sexual Activity     Alcohol use: No     Alcohol/week: 0.0 oz     Drug use: No     Sexual activity: Yes     Partners: Male   Lifestyle     Physical activity:     Days per week: None     Minutes per session: None     Stress: None   Relationships     Social connections:     Talks on phone: None     Gets together: None     Attends Rastafari service: None     Active member of club or organization: None     Attends meetings of clubs or organizations: None      "Relationship status: None     Intimate partner violence:     Fear of current or ex partner: None     Emotionally abused: None     Physically abused: None     Forced sexual activity: None   Other Topics Concern      Service No     Blood Transfusions No     Caffeine Concern No     Occupational Exposure No     Hobby Hazards No     Sleep Concern Yes     Stress Concern No     Weight Concern No     Special Diet No     Back Care Yes     Exercise No     Bike Helmet Yes     Seat Belt Yes     Self-Exams Yes     Parent/sibling w/ CABG, MI or angioplasty before 65F 55M? Yes   Social History Narrative    Social Documentation:4/10        Balanced Diet: YES    Calcium intake: supplement per day    Caffeine: 4cups per day    Exercise:  type of activity varies;  3 times per week    Sunscreen: Yes    Seatbelts:  Yes    Self Breast Exam:  Yes    Self Testicular Exam: No - na    Physical/Emotional/Sexual Abuse: No -     Do you feel safe in your environment? Yes        Cholesterol screen up to date: yes    Eye Exam up to date: Yes    Dental Exam up to date: Yes    Pap smear up to date: Does Not Apply    Mammogram up to date: utd    Dexa Scan up to date: Yes    Colonoscopy up to date: Yes    Immunizations up to date: Yes    Glucose screen if over 40:  No -     Fredy Carroll ma               Review of Systems:  Skin:  Negative       Eyes:  Positive for glasses    ENT:  Negative nasal congestion    Respiratory:  Positive for sleep apnea;CPAP occassional SOB   Cardiovascular:  Negative      Gastroenterology: Negative      Genitourinary:  Negative      Musculoskeletal:  Positive for arthritis    Neurologic:  Negative      Psychiatric:  Negative      Heme/Lymph/Imm:  Positive for allergies    Endocrine:  Positive for thyroid disorder      Physical Exam:  Vitals: /78   Pulse 58   Ht 1.613 m (5' 3.5\")   Wt 60.3 kg (133 lb)   BMI 23.19 kg/m      Constitutional:  cooperative, alert and oriented, well developed, well " nourished, in no acute distress        Skin:  warm and dry to the touch, no apparent skin lesions or masses noted          Head:  normocephalic, no masses or lesions        Eyes:  pupils equal and round, conjunctivae and lids unremarkable, sclera white, no xanthalasma, EOMS intact, no nystagmus        Lymph:No Cervical lymphadenopathy present     ENT:  no pallor or cyanosis, dentition good        Neck:  carotid pulses are full and equal bilaterally, JVP normal, no carotid bruit        Respiratory:  normal breath sounds, clear to auscultation, normal A-P diameter, normal symmetry, normal respiratory excursion, no use of accessory muscles         Cardiac: normal S1 and S2;apical impulse not displaced irregular rhythm   no presence of murmur                                                   GI:  abdomen soft, non-tender, BS normoactive, no mass, no HSM, no bruits        Extremities and Muscular Skeletal:  no deformities, clubbing, cyanosis, erythema observed              Neurological:  no gross motor deficits        Psych:  Alert and Oriented x 3        CC  No referring provider defined for this encounter.

## 2019-09-19 NOTE — LETTER
9/19/2019    Tiffany Messer MD  [address]  [facility] [place] [address]    RE: Mely Guerra       Dear Colleague,    I had the pleasure of seeing Mely Guerra in the [facility].    HPI and Plan:   See dictation    Orders Placed This Encounter   Procedures     EKG 12-lead complete w/read - Clinics (performed today)       No orders of the defined types were placed in this encounter.      Medications Discontinued During This Encounter   Medication Reason     doxycycline hyclate (VIBRAMYCIN) 100 MG capsule Therapy completed         Encounter Diagnoses   Name Primary?     Hypertension goal BP (blood pressure) < 140/90 Yes     SVT (supraventricular tachycardia) (H)        CURRENT MEDICATIONS:  Current Outpatient Medications   Medication Sig Dispense Refill     amLODIPine (NORVASC) 10 MG tablet TAKE 1 TABLET EVERY DAY 90 tablet 0     Calcium Citrate-Vitamin D (CALCIUM CITRATE + D PO) Take 2 tablets by mouth 2 times daily        fluticasone (FLONASE) 50 MCG/ACT nasal spray USE 2 SPRAYS IN EACH NOSTRIL EVERY DAY 48 g 1     fluticasone (FLOVENT HFA) 110 MCG/ACT Inhaler Inhale 1 puff into the lungs 2 times daily 1 Inhaler 1     Ibuprofen-Diphenhydramine Cit (ADVIL PM PO) Take 2 tablets by mouth At Bedtime       levothyroxine (SYNTHROID/LEVOTHROID) 100 MCG tablet TAKE 1 TABLET EVERY DAY 90 tablet 0     losartan (COZAAR) 100 MG tablet Take 1 tablet (100 mg) by mouth daily 90 tablet 1     metoprolol tartrate (LOPRESSOR) 25 MG tablet Take 0.5 tablets (12.5 mg) by mouth 2 times daily 90 tablet 1     Multiple Vitamins-Minerals (MULTIVITAMIN ADULT PO) Take 1 tablet by mouth daily       Multiple Vitamins-Minerals (PRESERVISION AREDS 2 PO) Take 1 tablet by mouth 2 times daily       Naproxen Sodium (ALEVE PO) Take 220 mg by mouth Takes every AM and PM when not taking ibuprofen.       Omega-3 Fatty Acids (FISH OIL PO) 1,000 mg 2 times daily       RaNITidine HCl (ZANTAC 150 MAXIMUM STRENGTH PO)         triamterene-HCTZ (DYAZIDE) 37.5-25 MG capsule TAKE 1 CAPSULE EVERY DAY 90 capsule 0       ALLERGIES     Allergies   Allergen Reactions     Cats      Hylan G-F 20      Lisinopril Cough     Seasonal Allergies      Vioxx        PAST MEDICAL HISTORY:  Past Medical History:   Diagnosis Date     Allergic rhinitis, cause unspecified      Personal history of alcoholism (H)      Sleep apnea      SVT (supraventricular tachycardia) (H)      Unspecified essential hypertension      Unspecified hypothyroidism        PAST SURGICAL HISTORY:  Past Surgical History:   Procedure Laterality Date     C NONSPECIFIC PROCEDURE  1974    s/p Vaginal hysterectomy     COLONOSCOPY N/A 10/31/2018    Procedure: COMBINED COLONOSCOPY, SINGLE OR MULTIPLE BIOPSY/POLYPECTOMY BY BIOPSY;  Surgeon: Leon Cosme MD;  Location:  GI     ESOPHAGOSCOPY, GASTROSCOPY, DUODENOSCOPY (EGD), COMBINED N/A 10/31/2018    Procedure: GASTROSCOPY;  Surgeon: Leon Cosme MD;  Location:  GI     HYSTERECTOMY       JOINT REPLACEMENT Bilateral      knee replacement Bilateral      ORTHOPEDIC SURGERY         FAMILY HISTORY:  Family History   Problem Relation Age of Onset     Cancer Sister      Heart Disease Father        SOCIAL HISTORY:  Social History     Socioeconomic History     Marital status:      Spouse name: None     Number of children: None     Years of education: None     Highest education level: None   Occupational History     None   Social Needs     Financial resource strain: None     Food insecurity:     Worry: None     Inability: None     Transportation needs:     Medical: None     Non-medical: None   Tobacco Use     Smoking status: Never Smoker     Smokeless tobacco: Never Used   Substance and Sexual Activity     Alcohol use: No     Alcohol/week: 0.0 oz     Drug use: No     Sexual activity: Yes     Partners: Male   Lifestyle     Physical activity:     Days per week: None     Minutes per session: None     Stress: None    Relationships     Social connections:     Talks on phone: None     Gets together: None     Attends Cheondoism service: None     Active member of club or organization: None     Attends meetings of clubs or organizations: None     Relationship status: None     Intimate partner violence:     Fear of current or ex partner: None     Emotionally abused: None     Physically abused: None     Forced sexual activity: None   Other Topics Concern      Service No     Blood Transfusions No     Caffeine Concern No     Occupational Exposure No     Hobby Hazards No     Sleep Concern Yes     Stress Concern No     Weight Concern No     Special Diet No     Back Care Yes     Exercise No     Bike Helmet Yes     Seat Belt Yes     Self-Exams Yes     Parent/sibling w/ CABG, MI or angioplasty before 65F 55M? Yes   Social History Narrative    Social Documentation:4/10        Balanced Diet: YES    Calcium intake: supplement per day    Caffeine: 4cups per day    Exercise:  type of activity varies;  3 times per week    Sunscreen: Yes    Seatbelts:  Yes    Self Breast Exam:  Yes    Self Testicular Exam: No - na    Physical/Emotional/Sexual Abuse: No -     Do you feel safe in your environment? Yes        Cholesterol screen up to date: yes    Eye Exam up to date: Yes    Dental Exam up to date: Yes    Pap smear up to date: Does Not Apply    Mammogram up to date: utd    Dexa Scan up to date: Yes    Colonoscopy up to date: Yes    Immunizations up to date: Yes    Glucose screen if over 40:  No -     Fredy Carroll ma               Review of Systems:  Skin:  Negative       Eyes:  Positive for glasses    ENT:  Negative nasal congestion    Respiratory:  Positive for sleep apnea;CPAP occassional SOB   Cardiovascular:  Negative      Gastroenterology: Negative      Genitourinary:  Negative      Musculoskeletal:  Positive for arthritis    Neurologic:  Negative      Psychiatric:  Negative      Heme/Lymph/Imm:  Positive for allergies    Endocrine:   "Positive for thyroid disorder      Physical Exam:  Vitals: /78   Pulse 58   Ht 1.613 m (5' 3.5\")   Wt 60.3 kg (133 lb)   BMI 23.19 kg/m       Constitutional:  cooperative, alert and oriented, well developed, well nourished, in no acute distress        Skin:  warm and dry to the touch, no apparent skin lesions or masses noted          Head:  normocephalic, no masses or lesions        Eyes:  pupils equal and round, conjunctivae and lids unremarkable, sclera white, no xanthalasma, EOMS intact, no nystagmus        Lymph:No Cervical lymphadenopathy present     ENT:  no pallor or cyanosis, dentition good        Neck:  carotid pulses are full and equal bilaterally, JVP normal, no carotid bruit        Respiratory:  normal breath sounds, clear to auscultation, normal A-P diameter, normal symmetry, normal respiratory excursion, no use of accessory muscles         Cardiac: normal S1 and S2;apical impulse not displaced irregular rhythm   no presence of murmur                                                   GI:  abdomen soft, non-tender, BS normoactive, no mass, no HSM, no bruits        Extremities and Muscular Skeletal:  no deformities, clubbing, cyanosis, erythema observed              Neurological:  no gross motor deficits        Psych:  Alert and Oriented x 3        CC  No referring provider defined for this encounter.                Thank you for allowing me to participate in the care of your patient.      Sincerely,     Laney Arriaza MD     [facility]    cc:   No referring provider defined for this encounter.        "

## 2019-09-20 NOTE — PROGRESS NOTES
Service Date: 09/19/2019      HISTORY OF PRESENT ILLNESS:  I saw Ms. Guerra for evaluation of recurrent SVT.  She is a 79-year-old white female who has had recurrent palpitations for over a year.  Previous EKG showed frequent PACs and nonsustained atrial tachycardia with a first-degree AV block.  Previous Holter also showed relatively frequent PVCs.  The patient has had persistent and recurrent palpitation over the last several months.  She has been put on Lopressor 12.5 mg p.o. b.i.d. but continued to be bothered with recurrent palpitations.  A 48-hour Holter around 05/13 of this year showed frequent PACs.  There were 900 episodes of SVT at the rate of 169 beats per minute.  In addition, the patient had intermittent sinus bradycardia.  The underlying sinus rhythm showed first-degree AV block.      PAST MEDICAL HISTORY:  Remarkable for sleep apnea, hypertension and hypothyroidism.      PHYSICAL EXAMINATION:   VITAL SIGNS:  Blood pressure was 127/78, heart rate 58 beats per minute, body weight 133 pounds.   HEENT:  Eyes and ENT were unremarkable.   LUNGS:  Clear.   CARDIAC:  Rhythm was regular, and the heart sounds were normal with no murmur.   ABDOMEN:  Examination showed no hepatomegaly.   EXTREMITIES:  There was no pedal edema.      ASSESSMENT AND RECOMMENDATIONS:  Ms. Guerra is a 79-year-old white female who is having recurrent palpitations.  Metoprolol is not helping her symptoms significantly.  There is a limitation for more metoprolol because of sinus bradycardia and first-degree AV block.  It is my suspicion that she has atrial tachycardia, probably from the coronary sinus orifice area.  Since she is not doing well on medical therapy, I have recommended EP study and possible ablation.  The risks and benefits of the EP study and ablation were explained to the patient, who expressed understanding and consented for the procedure.  She is asked to stop metoprolol 3 days before the EP procedure.      If she has  no inducible SVT or PACs for ablation, I would then consider low-dose flecainide, perhaps 50 mg p.o. b.i.d.  If she has severe brardycardia on flecainide, then she may need pacemaker implantation with a higher dose of flecainide as a last resort.      cc:   Tiffany Messer MD    [facility]    [address], [address]    [place]  [address]         PER SALCEDO MD             D: 2019   T: 2019   MT: PIPER      Name:     THELMA VELÁSQUEZ   MRN:      [id number]        Account:      [id number]   :      1940           Service Date: 2019      Document: [id number]

## 2019-09-29 ENCOUNTER — HEALTH MAINTENANCE LETTER (OUTPATIENT)
Age: 79
End: 2019-09-29

## 2019-10-01 ENCOUNTER — OFFICE VISIT (OUTPATIENT)
Dept: FAMILY MEDICINE | Facility: CLINIC | Age: 79
End: 2019-10-01
Payer: MEDICARE

## 2019-10-01 VITALS
WEIGHT: 133.06 LBS | OXYGEN SATURATION: 99 % | HEART RATE: 79 BPM | DIASTOLIC BLOOD PRESSURE: 71 MMHG | RESPIRATION RATE: 14 BRPM | HEIGHT: 64 IN | BODY MASS INDEX: 22.71 KG/M2 | SYSTOLIC BLOOD PRESSURE: 118 MMHG | TEMPERATURE: 97.8 F

## 2019-10-01 DIAGNOSIS — E03.9 ACQUIRED HYPOTHYROIDISM: ICD-10-CM

## 2019-10-01 DIAGNOSIS — I10 HYPERTENSION GOAL BP (BLOOD PRESSURE) < 140/90: ICD-10-CM

## 2019-10-01 DIAGNOSIS — H25.89 OTHER AGE-RELATED CATARACT OF BOTH EYES: ICD-10-CM

## 2019-10-01 DIAGNOSIS — M81.0 AGE-RELATED OSTEOPOROSIS WITHOUT CURRENT PATHOLOGICAL FRACTURE: ICD-10-CM

## 2019-10-01 DIAGNOSIS — Z01.818 PRE-OPERATIVE GENERAL PHYSICAL EXAMINATION: Primary | ICD-10-CM

## 2019-10-01 DIAGNOSIS — I47.10 PAROXYSMAL SUPRAVENTRICULAR TACHYCARDIA (H): ICD-10-CM

## 2019-10-01 PROCEDURE — 99214 OFFICE O/P EST MOD 30 MIN: CPT | Performed by: FAMILY MEDICINE

## 2019-10-01 ASSESSMENT — MIFFLIN-ST. JEOR: SCORE: 1065.63

## 2019-10-01 ASSESSMENT — PAIN SCALES - GENERAL: PAINLEVEL: NO PAIN (0)

## 2019-10-01 NOTE — PROGRESS NOTES
q  [facility]  [address]  [facility] [id number] [phone]  Dept: [phone]    PRE-OP EVALUATION:  Today's date: 10/1/2019    Mely Guerra (: 1940) presents for pre-operative evaluation assessment as requested by Dr. Muhammad.  She requires evaluation and anesthesia risk assessment prior to undergoing surgery/procedure for treatment of Right Eye- Cataract .    Fax number for surgical facility: [facility] Fax # : [phone]  Primary Physician: Tiffany Messer  Type of Anesthesia Anticipated: to be determined    Patient has a Health Care Directive or Living Will:  YES     Preop Questions 10/1/2019   Who is doing your surgery? dr Muhammad [facility]   What are you having done? cataract surgery   Date of Surgery/Procedure: 10-15-19   Facility or Hospital where procedure/surgery will be performed: [facility]   1.  Do you have a history of Heart attack, stroke, stent, coronary bypass surgery, or other heart surgery? No   2.  Do you ever have any pain or discomfort in your chest? No pain but palipitation   3.  Do you have a history of  Heart Failure? No   4.   Are you troubled by shortness of breath when:  walking on a level surface, or up a slight hill, or at night? YES - on walking the dog   5.  Do you currently have a cold, bronchitis or other respiratory infection? No   6.  Do you have a cough, shortness of breath, or wheezing? No   7.  Do you sometimes get pains in the calves of your legs when you walk? No   8. Do you or anyone in your family have previous history of blood clots? No   9.  Do you or does anyone in your family have a serious bleeding problem such as prolonged bleeding following surgeries or cuts? No   10. Have you ever had problems with anemia or been told to take iron pills? No   11. Have you had any abnormal blood loss such as black, tarry or bloody stools, or abnormal vaginal bleeding? No   12. Have you ever had a blood transfusion? No   13.  Have you or any of your relatives ever had problems with anesthesia? No   14. Do you have sleep apnea, excessive snoring or daytime drowsiness? YES - uses CPAP since past 5 years    15. Do you have any prosthetic heart valves? No   16. Do you have prosthetic joints? YES - both knee & both hip replacement   17. Is there any chance that you may be pregnant? No         HPI:     HPI related to upcoming procedure:bilateral cataract- but the right eye vision is affected more- and she is going for right eye surgery.  Past medical history is significant for SVT, & requiring ablation- scheduled for  10/15/19.  She is  having recurrent palpitations.    Metoprolol is not helping her symptoms completely, and dose can not be increased further because of  sinus bradycardia and first-degree AV block. She has seen cardiologist on 9/19/19 & recommended EP study and possible ablation.  She is asked to stop metoprolol 3 days before the EP procedure    She reports active life style biking & walking the dogs     HYPERTENSION - Patient has longstanding history of HTN , currently denies any symptoms referable to elevated blood pressure. Specifically denies chest pain, palpitations, dyspnea, orthopnea, PND or peripheral edema. Blood pressure readings have been in normal range. Current medication regimen is as listed below. Patient denies any side effects of medication.     HYPOTHYROIDISM - Patient has a longstanding history of chronic Hypothyroidism. Patient has been doing well, noting no tremor, insomnia, hair loss or changes in skin texture. Continues to take medications as directed, without adverse reactions or side effects. Last TSH   Lab Results   Component Value Date    TSH 1.59 10/12/2018   .      Patient has a longstanding history of sleep apnea . Stable on CPAP    MEDICAL HISTORY:     Patient Active Problem List    Diagnosis Date Noted     SVT (supraventricular tachycardia) (H) 09/19/2019     Priority: Medium     Added  automatically from request for surgery [id number]       Paroxysmal supraventricular tachycardia (H) 05/29/2019     Priority: Medium     Dysphagia 08/30/2017     Priority: Medium     Age-related osteoporosis without current pathological fracture 07/10/2017     Priority: Medium     Disorder of bone and cartilage 07/10/2017     Priority: Medium     2014:  Lumbar Spine (L1-L4) T-score: -0.4   Significant degenerative and/or osteosclerotic changes are present, falsely improving result.   Forearm (radius 33%) T-score: -1.1    Lumbar (L1-L4) BMD: 1.142 Previous: 1.125   Total Hip Mean BMD: NA metal Previous: 0.793        2017:  Lumbar Spine (L1-L4)      T-score:  -0.2    Significant degenerative and/or osteosclerotic changes are present, falsely improving result.                Left Femoral Neck            T-score:  NA metal               Right Femoral Neck         T-score:  NA Metal               Forearm (radius 33%)      T-score:  -1.9       Rosacea 05/18/2017     Priority: Medium     Dermatitis 08/10/2016     Priority: Medium     Sleep apnea 06/03/2014     Priority: Medium     Advanced directives, counseling/discussion 04/02/2012     Priority: Medium      Patient states has Advance Directive and will bring in a copy to clinic. 4/2/2012   Fredy Carroll ma         Otitis externa 04/02/2012     Priority: Medium     Hypertension goal BP (blood pressure) < 140/90 03/09/2011     Priority: Medium     Hyperlipidemia LDL goal <160 06/15/2009     Priority: Medium     Osteoarthritis 12/21/2004     Priority: Medium     Problem list name updated by automated process. Provider to review       Osteoporosis 04/13/2004     Priority: Medium     Osteopenia 2017  Lumbar Spine (L1-L4)      T-score:  -0.2    Significant degenerative and/or osteosclerotic changes are present, falsely improving result.                Left Femoral Neck            T-score:  NA metal               Right Femoral Neck         T-score:  NA  Metal               Forearm (radius 33%)      T-score:  -1.9       Personal history of alcoholism (H) 02/18/2004     Priority: Medium     Allergic rhinitis 02/18/2004     Priority: Medium     Problem list name updated by automated process. Provider to review       Acquired hypothyroidism 10/14/2003     Priority: Medium     Problem list name updated by automated process. Provider to review        Past Medical History:   Diagnosis Date     Allergic rhinitis, cause unspecified      Personal history of alcoholism (H)      Sleep apnea      SVT (supraventricular tachycardia) (H)      Unspecified essential hypertension      Unspecified hypothyroidism      Past Surgical History:   Procedure Laterality Date     C NONSPECIFIC PROCEDURE  1974    s/p Vaginal hysterectomy     COLONOSCOPY N/A 10/31/2018    Procedure: COMBINED COLONOSCOPY, SINGLE OR MULTIPLE BIOPSY/POLYPECTOMY BY BIOPSY;  Surgeon: Leon Cosme MD;  Location:  GI     ESOPHAGOSCOPY, GASTROSCOPY, DUODENOSCOPY (EGD), COMBINED N/A 10/31/2018    Procedure: GASTROSCOPY;  Surgeon: eLon Cosme MD;  Location:  GI     HYSTERECTOMY       JOINT REPLACEMENT Bilateral      knee replacement Bilateral      ORTHOPEDIC SURGERY       Current Outpatient Medications   Medication Sig Dispense Refill     amLODIPine (NORVASC) 10 MG tablet TAKE 1 TABLET EVERY DAY 90 tablet 0     Calcium Citrate-Vitamin D (CALCIUM CITRATE + D PO) Take 2 tablets by mouth 2 times daily        fluticasone (FLONASE) 50 MCG/ACT nasal spray USE 2 SPRAYS IN EACH NOSTRIL EVERY DAY 48 g 1     fluticasone (FLOVENT HFA) 110 MCG/ACT Inhaler Inhale 1 puff into the lungs 2 times daily 1 Inhaler 1     Ibuprofen-Diphenhydramine Cit (ADVIL PM PO) Take 2 tablets by mouth At Bedtime       levothyroxine (SYNTHROID/LEVOTHROID) 100 MCG tablet TAKE 1 TABLET EVERY DAY 90 tablet 0     losartan (COZAAR) 100 MG tablet Take 1 tablet (100 mg) by mouth daily 90 tablet 1     metoprolol tartrate (LOPRESSOR) 25 MG  "tablet Take 0.5 tablets (12.5 mg) by mouth 2 times daily 90 tablet 1     Multiple Vitamins-Minerals (MULTIVITAMIN ADULT PO) Take 1 tablet by mouth daily       Multiple Vitamins-Minerals (PRESERVISION AREDS 2 PO) Take 1 tablet by mouth 2 times daily       Naproxen Sodium (ALEVE PO) Take 220 mg by mouth Takes every AM and PM when not taking ibuprofen.       Omega-3 Fatty Acids (FISH OIL PO) 1,000 mg 2 times daily       RaNITidine HCl (ZANTAC 150 MAXIMUM STRENGTH PO)        triamterene-HCTZ (DYAZIDE) 37.5-25 MG capsule TAKE 1 CAPSULE EVERY DAY 90 capsule 0     OTC products: None, except as noted above    Allergies   Allergen Reactions     Cats      Hylan G-F 20      Lisinopril Cough     Seasonal Allergies      Vioxx       Latex Allergy: NO    Social History     Tobacco Use     Smoking status: Never Smoker     Smokeless tobacco: Never Used   Substance Use Topics     Alcohol use: No     Alcohol/week: 0.0 standard drinks     History   Drug Use No       REVIEW OF SYSTEMS:   Constitutional, neuro, ENT, endocrine, pulmonary, cardiac, gastrointestinal, genitourinary, musculoskeletal, integument and psychiatric systems are negative, except as otherwise noted.    EXAM:   /71 (BP Location: Right arm, Patient Position: Sitting, Cuff Size: Adult Regular)   Pulse 79   Temp 97.8  F (36.6  C) (Oral)   Resp 14   Ht 1.613 m (5' 3.5\")   Wt 60.4 kg (133 lb 1 oz)   LMP  (Approximate)   SpO2 99%   Breastfeeding? No   BMI 23.20 kg/m      GENERAL APPEARANCE: healthy, alert and no distress     EYES: EOMI, PERRL     HENT: ear canals and TM's normal and nose and mouth without ulcers or lesions     NECK: no adenopathy, no asymmetry, masses, or scars and thyroid normal to palpation     RESP: lungs clear to auscultation - no rales, rhonchi or wheezes     CV: regular rates and rhythm, normal S1 S2, no S3 or S4 and no murmur, click or rub     ABDOMEN:  soft, nontender, no HSM or masses and bowel sounds normal     MS: extremities " normal- no gross deformities noted, no evidence of inflammation in joints, FROM in all extremities.     SKIN: no suspicious lesions or rashes     NEURO: Normal strength and tone, sensory exam grossly normal, mentation intact and speech normal     PSYCH: mentation appears normal. and affect normal/bright     LYMPHATICS: No cervical adenopathy    DIAGNOSTICS:   EKG: Not indicated due to non-vascular surgery and last ekg on September  (within 30 days for CAD history or last year for cardiac risk factors)    Recent Labs   Lab Test 06/12/19 10/12/18  1103 12/12/17  1210  03/24/17  0954  04/17/13  0815 02/12/13 02/08/13   HGB  --   --   --   --  13.6  --  13.2  --   --    PLT  --   --   --   --  255  --   --   --   --    INR  --   --   --   --   --   --   --  1.7 1.9   NA  --  134 135   < > 139   < > 138  --   --    POTASSIUM 4.2 4.3 4.5   < > 4.4   < > 3.9  --   --    CR 0.70 0.73 0.69   < > 0.76   < > 0.66  --   --     < > = values in this interval not displayed.        IMPRESSION:   Reason for surgery/procedure: bilateral cataract  Diagnosis/reason for consult: Preoperative History and Physical.      The proposed surgical procedure is considered LOW risk.    REVISED CARDIAC RISK INDEX  The patient has the following serious cardiovascular risks for perioperative complications such as (MI, PE, VFib and 3  AV Block):  No serious cardiac risks  INTERPRETATION: 1 risks: Class II (low risk - 0.9% complication rate)    The patient has the following additional risks for perioperative complications:  No identified additional risks      ICD-10-CM    1. Pre-operative general physical examination Z01.818    2. Paroxysmal supraventricular tachycardia (H) I47.1    3. Hypertension goal BP (blood pressure) < 140/90 I10    4. Acquired hypothyroidism E03.9    5. Age-related osteoporosis without current pathological fracture M81.0    6. Other age-related cataract of both eyes H25.89        RECOMMENDATIONS:         APPROVAL GIVEN to  proceed with proposed procedure, without further diagnostic evaluation       Signed Electronically by: Alessia Serrano MD    Copy of this evaluation report is provided to requesting physician.    [facility] Preop Guidelines    Revised Cardiac Risk Index

## 2019-10-02 DIAGNOSIS — I10 ESSENTIAL HYPERTENSION WITH GOAL BLOOD PRESSURE LESS THAN 140/90: ICD-10-CM

## 2019-10-03 RX ORDER — LOSARTAN POTASSIUM 100 MG/1
TABLET ORAL
Start: 2019-10-03

## 2019-10-03 NOTE — TELEPHONE ENCOUNTER
"Last Written Prescription Date:  5/15/19  Last Fill Quantity: 90,  # refills: 1   Last office visit: 10/1/2019 with prescribing provider:  10/12/18   Future Office Visit:   Next 5 appointments (look out 90 days)    Oct 10, 2019  9:00 AM CDT  Office Visit with Mary Bustos [facility] ([facility]) [address]  [address]  [facility] [address] [phone]         Requested Prescriptions   Pending Prescriptions Disp Refills     losartan (COZAAR) 100 MG tablet [Pharmacy Med Name: LOSARTAN POTASSIUM 100 MG Tablet] 90 tablet 1     Sig: TAKE 1 TABLET EVERY DAY       Angiotensin-II Receptors Passed - 10/2/2019  8:26 PM        Passed - Last blood pressure under 140/90 in past 12 months     BP Readings from Last 3 Encounters:   10/01/19 118/71   09/19/19 127/78   06/14/19 131/73                 Passed - Recent (12 mo) or future (30 days) visit within the authorizing provider's specialty     Patient has had an office visit with the authorizing provider or a provider within the authorizing providers department within the previous 12 mos or has a future within next 30 days. See \"Patient Info\" tab in inbasket, or \"Choose Columns\" in Meds & Orders section of the refill encounter.              Passed - Medication is active on med list        Passed - Patient is age 18 or older        Passed - No active pregnancy on record        Passed - Normal serum creatinine on file in past 12 months     Recent Labs   Lab Test 06/12/19   CR 0.70             Passed - Normal serum potassium on file in past 12 months     Recent Labs   Lab Test 06/12/19   POTASSIUM 4.2                    Passed - No positive pregnancy test in past 12 months          "

## 2019-10-07 ENCOUNTER — TELEPHONE (OUTPATIENT)
Dept: CARDIOLOGY | Facility: CLINIC | Age: 79
End: 2019-10-07

## 2019-10-07 RX ORDER — SODIUM CHLORIDE 450 MG/100ML
INJECTION, SOLUTION INTRAVENOUS CONTINUOUS
Status: CANCELLED | OUTPATIENT
Start: 2019-10-07

## 2019-10-07 RX ORDER — LIDOCAINE 40 MG/G
CREAM TOPICAL
Status: CANCELLED | OUTPATIENT
Start: 2019-10-07

## 2019-10-07 NOTE — TELEPHONE ENCOUNTER
Message left for patient to review instructions for SVT ablation.  Awaiting return call.  ANASTACIO Marshall

## 2019-10-08 ENCOUNTER — SURGERY (OUTPATIENT)
Age: 79
End: 2019-10-08
Payer: MEDICARE

## 2019-10-08 ENCOUNTER — HOSPITAL ENCOUNTER (OUTPATIENT)
Facility: CLINIC | Age: 79
Discharge: HOME | End: 2019-10-08
Payer: MEDICARE

## 2019-10-08 VITALS
SYSTOLIC BLOOD PRESSURE: 117 MMHG | OXYGEN SATURATION: 97 % | HEIGHT: 65 IN | DIASTOLIC BLOOD PRESSURE: 59 MMHG | BODY MASS INDEX: 22.26 KG/M2 | TEMPERATURE: 97.6 F | HEART RATE: 83 BPM | WEIGHT: 133.6 LBS | RESPIRATION RATE: 18 BRPM

## 2019-10-08 DIAGNOSIS — I47.10 SVT (SUPRAVENTRICULAR TACHYCARDIA) (H): ICD-10-CM

## 2019-10-08 LAB
ANION GAP SERPL CALCULATED.3IONS-SCNC: 3 MMOL/L (ref 3–14)
BUN SERPL-MCNC: 15 MG/DL (ref 7–30)
CALCIUM SERPL-MCNC: 10.1 MG/DL (ref 8.5–10.1)
CHLORIDE SERPL-SCNC: 98 MMOL/L (ref 94–109)
CO2 SERPL-SCNC: 31 MMOL/L (ref 20–32)
CREAT SERPL-MCNC: 0.79 MG/DL (ref 0.52–1.04)
ERYTHROCYTE [DISTWIDTH] IN BLOOD BY AUTOMATED COUNT: 13 % (ref 10–15)
GFR SERPL CREATININE-BSD FRML MDRD: 72 ML/MIN/{1.73_M2}
GLUCOSE SERPL-MCNC: 92 MG/DL (ref 70–99)
HCT VFR BLD AUTO: 41 % (ref 35–47)
HGB BLD-MCNC: 13.5 G/DL (ref 11.7–15.7)
MCH RBC QN AUTO: 30.4 PG (ref 26.5–33)
MCHC RBC AUTO-ENTMCNC: 32.9 G/DL (ref 31.5–36.5)
MCV RBC AUTO: 92 FL (ref 78–100)
PLATELET # BLD AUTO: 251 10E9/L (ref 150–450)
POTASSIUM SERPL-SCNC: 3.5 MMOL/L (ref 3.4–5.3)
RBC # BLD AUTO: 4.44 10E12/L (ref 3.8–5.2)
SODIUM SERPL-SCNC: 132 MMOL/L (ref 133–144)
WBC # BLD AUTO: 6 10E9/L (ref 4–11)

## 2019-10-08 PROCEDURE — 93623 PRGRMD STIMJ&PACG IV RX NFS: CPT | Mod: 26 | Performed by: INTERNAL MEDICINE

## 2019-10-08 PROCEDURE — 36415 COLL VENOUS BLD VENIPUNCTURE: CPT

## 2019-10-08 PROCEDURE — 93653 COMPRE EP EVAL TX SVT: CPT | Performed by: INTERNAL MEDICINE

## 2019-10-08 PROCEDURE — 25000128 H RX IP 250 OP 636: Performed by: INTERNAL MEDICINE

## 2019-10-08 PROCEDURE — 40000235 ZZH STATISTIC TELEMETRY

## 2019-10-08 PROCEDURE — 99152 MOD SED SAME PHYS/QHP 5/>YRS: CPT | Performed by: INTERNAL MEDICINE

## 2019-10-08 PROCEDURE — 27210794 ZZH OR GENERAL SUPPLY STERILE: Performed by: INTERNAL MEDICINE

## 2019-10-08 PROCEDURE — 85027 COMPLETE CBC AUTOMATED: CPT | Performed by: INTERNAL MEDICINE

## 2019-10-08 PROCEDURE — 80048 BASIC METABOLIC PNL TOTAL CA: CPT | Performed by: INTERNAL MEDICINE

## 2019-10-08 PROCEDURE — 25800029 ZZH RX IP 258 OP 250: Performed by: INTERNAL MEDICINE

## 2019-10-08 PROCEDURE — C1730 CATH, EP, 19 OR FEW ELECT: HCPCS | Performed by: INTERNAL MEDICINE

## 2019-10-08 PROCEDURE — 25000125 ZZHC RX 250: Performed by: INTERNAL MEDICINE

## 2019-10-08 PROCEDURE — 99153 MOD SED SAME PHYS/QHP EA: CPT | Performed by: INTERNAL MEDICINE

## 2019-10-08 PROCEDURE — 40000852 ZZH STATISTIC HEART CATH LAB OR EP LAB

## 2019-10-08 PROCEDURE — 93621 COMP EP EVL L PAC&REC C SINS: CPT | Mod: 26 | Performed by: INTERNAL MEDICINE

## 2019-10-08 PROCEDURE — 93010 ELECTROCARDIOGRAM REPORT: CPT | Mod: 76 | Performed by: INTERNAL MEDICINE

## 2019-10-08 PROCEDURE — 93005 ELECTROCARDIOGRAM TRACING: CPT

## 2019-10-08 PROCEDURE — 93613 INTRACARDIAC EPHYS 3D MAPG: CPT | Performed by: INTERNAL MEDICINE

## 2019-10-08 RX ORDER — FENTANYL CITRATE 50 UG/ML
INJECTION, SOLUTION INTRAMUSCULAR; INTRAVENOUS
Status: DISCONTINUED | OUTPATIENT
Start: 2019-10-08 | End: 2019-10-08 | Stop reason: HOSPADM

## 2019-10-08 RX ORDER — LIDOCAINE 40 MG/G
CREAM TOPICAL
Status: DISCONTINUED | OUTPATIENT
Start: 2019-10-08 | End: 2019-10-08 | Stop reason: HOSPADM

## 2019-10-08 RX ORDER — NALOXONE HYDROCHLORIDE 0.4 MG/ML
.1-.4 INJECTION, SOLUTION INTRAMUSCULAR; INTRAVENOUS; SUBCUTANEOUS
Status: DISCONTINUED | OUTPATIENT
Start: 2019-10-08 | End: 2019-10-08 | Stop reason: HOSPADM

## 2019-10-08 RX ORDER — OXYCODONE AND ACETAMINOPHEN 5; 325 MG/1; MG/1
1 TABLET ORAL EVERY 4 HOURS PRN
Status: DISCONTINUED | OUTPATIENT
Start: 2019-10-08 | End: 2019-10-08 | Stop reason: HOSPADM

## 2019-10-08 RX ORDER — SODIUM CHLORIDE 450 MG/100ML
INJECTION, SOLUTION INTRAVENOUS CONTINUOUS
Status: DISCONTINUED | OUTPATIENT
Start: 2019-10-08 | End: 2019-10-08 | Stop reason: HOSPADM

## 2019-10-08 RX ADMIN — LIDOCAINE HYDROCHLORIDE 10 ML: 10 INJECTION, SOLUTION EPIDURAL; INFILTRATION; INTRACAUDAL; PERINEURAL at 09:21

## 2019-10-08 RX ADMIN — FENTANYL CITRATE 50 MCG: 50 INJECTION, SOLUTION INTRAMUSCULAR; INTRAVENOUS at 09:12

## 2019-10-08 RX ADMIN — Medication 2 MCG/MIN: at 09:51

## 2019-10-08 RX ADMIN — SODIUM CHLORIDE: 4.5 INJECTION, SOLUTION INTRAVENOUS at 07:04

## 2019-10-08 RX ADMIN — Medication 5 MCG/MIN: at 10:22

## 2019-10-08 RX ADMIN — Medication 5 MCG/MIN: at 09:48

## 2019-10-08 RX ADMIN — MIDAZOLAM 1 MG: 1 INJECTION INTRAMUSCULAR; INTRAVENOUS at 09:13

## 2019-10-08 RX ADMIN — LIDOCAINE HYDROCHLORIDE 10 ML: 10 INJECTION, SOLUTION EPIDURAL; INFILTRATION; INTRACAUDAL; PERINEURAL at 09:16

## 2019-10-08 ASSESSMENT — MIFFLIN-ST. JEOR: SCORE: 1091.89

## 2019-10-08 NOTE — PROGRESS NOTES
Pt returned to [facility]14 via bed.  Denies pain, nausea or vomiting at this time.   Right groin site and left subclavian site without bleeding or hematoma.

## 2019-10-08 NOTE — DISCHARGE INSTRUCTIONS
SVT Ablation Discharge Instructions - Femoral & Neck/Chest      After you go home:      Have an adult stay with you until tomorrow.    You may resume your normal diet.       For 24 hours - due to the sedation you received:    Relax and take it easy.    Do NOT make any important or legal decisions.    Do NOT drive or operate machines at home or at work.    Do NOT drink alcohol.    Care of Neck/Chest & Groin Puncture Sites:      For the first 24 hrs - check the puncture site every 1-2 hours while awake.    For 2 days, when you cough, sneeze, laugh or move your bowels, hold your hand over the puncture site and press firmly.    Remove the bandaids after 24 hours. If there is minor oozing, apply another bandaid and remove it after 12 hours.    It is normal to have a small bruise, pea sized lump or soreness at the site.    You may shower tomorrow. Do NOT take a bath, or use a hot tub or pool for at least 3 days. Do NOT scrub the site. Do not use lotion or powder near the puncture site.      Activity - For 2 days:    Neck/Chest site:    Avoid heavy lifting or the overuse of your shoulder.        Groin site:      No stooping or squatting    Do NOT do any heavy activity such as exercise, lifting, or straining.     No housework, yard work or any activity that make you sweat    Do NOT lift more than 10 pounds    Bleeding:     Neck/Chest site:    If you start bleeding from the site in your neck/chest, sit down and press gently on the site for 10 minutes.     Once bleeding stops, sit still for 2 hours.     Call [facility] as soon as you can      Groin site:    If you start bleeding from the site in your groin, lie down flat and press firmly on the site for 10 minutes.     Once bleeding stops, lay flat for 2 hours.    Call [facility] as soon as you can.       Call 911 right away if you have heavy bleeding or bleeding that does not stop.      Medicines:      Take your medications, including blood thinners, unless your  provider tells you not to.    If you have stopped any medicines, check with your provider about when to restart them.    If you have pain or shortness of breath, you may take Advil (ibuprofen) or Tylenol (acetaminophen).    Follow Up Appointments:      An appointment has been set up for you for follow-up care    You will receive a phone call tomorrow morning from an RN - Jeff Lucero or Nicci.    Call the clinic if:      You have increased pain or a large or growing hard lump around the site.    The site is red, swollen, hot or tender.    Blood or fluid is draining from the site.    You have chills or a fever greater than 101 F (38 C).    Your leg feels numb, cool or changes color.    Increased pain in the chest and/or groin.    Increased shortness of breath    Chest pain not relieved by Tylenol or Advil    New pain in the back or belly that you cannot control with Tylenol.    Recurrent irregular or fast heart rate lasting over 2 hours.    Any questions or concerns.    Heart rhythms:    You may have some irregular heartbeats. These feel very strong. They may make you feel that the fast heart rhythm is going to start again.  Give it time. The irregular beats should occur less often.       [facility]:    [phone] ( 8am-5pm M-F)  Jeff Lucero or Nicci    [phone] [facility] (7 days a week)

## 2019-10-08 NOTE — PROGRESS NOTES
Atrial tachycardia ablation near the orifice of coronary sinus.  No complications.  May be discharged around 4-5 PM.  Stop metoprolol.  Continue other home medications.  See me in 1-3 months with ECG.

## 2019-10-08 NOTE — PROGRESS NOTES
Groin site bleeding after getting up post bedrest.   Pressure to site.  Dressing removed, new dressing with thrombix placed to site.  Site stable now, groin soft and flat.  Pt on bedpan now.  Spouse updated.  Will keep on bedrest 2 hours per Dr Arriaza.  Dr Arriaza notified by cath lab Rn as he is in a procedure now.  Will cont to monitor.  Call light in reach.    Small to med void on bedpan, reports feeling like bladder emptied.  No pain.  Small 1cm spot of drainage on dressing.  Groin remains soft and flat.    Up to ambulate after 2 additional hours of bedrest.  No bleeding noted after activity.  Pt is very cautious and continues to hold pressure at site with movement.  Dr Arriaza here to see pt.  Pt will stop metoprolol per his verbal order and note in Epic.    Spouse called for ride home.  Will recheck site before d/c

## 2019-10-08 NOTE — PRE-PROCEDURE
GENERAL PRE-PROCEDURE:   Procedure:  SVT ablation  Date/Time:  10/8/2019 8:36 AM    Verbal consent obtained?: Yes    Written consent obtained?: Yes    Risks and benefits: Risks, benefits and alternatives were discussed    Consent given by:  Patient  Patient states understanding of procedure being performed: Yes    Patient's understanding of procedure matches consent: Yes    Procedure consent matches procedure scheduled: Yes    Expected level of sedation:  Moderate  Appropriately NPO:  Yes  ASA Class:  Class 3- Severe systemic disease, definite functional limitations  Mallampati  :  Grade 1- soft palate, uvula, tonsillar pillars, and posterior pharyngeal wall visible  Lungs:  Lungs clear with good breath sounds bilaterally  Heart:  Normal heart sounds and rate  History & Physical reviewed:  History and physical reviewed and no updates needed  Statement of review:  I have reviewed the lab findings, diagnostic data, medications, and the plan for sedation

## 2019-10-08 NOTE — PROGRESS NOTES
Bandaid CDI to right groin puncture sites/Left subclavian site. No oozing or hematoma noted. Area soft & flat. Pt denies pain. Pt instructed on activity restrictions while on bedrest. Verbal understanding received from pt. Pt taking diet & flds well. No complaints. Discharge teaching & instructions given to pt w/ verbal understanding received. All questions & concerns addressed.

## 2019-10-08 NOTE — PROGRESS NOTES
Care Suites Discharge Nursing Note    Education/questions answered: yes  Patient DC location: home  Accompanied by: spouse  CS discharge time: 1820  Groin site CDI, soft and flat on last check before discharge.  No pain.

## 2019-10-08 NOTE — PROGRESS NOTES
Care Suites Admission Nursing Note    Reason for admission: Cardiac SVT ablation  CS arrival time: 0635  Accompanied by: spouse  Name/phone of DC : spouse - eulalia  Medications held: none  Consent signed: yes  Abnormal assessment/labs: none  Education/questions answered: yes  Plan: proceed

## 2019-10-09 ENCOUNTER — TELEPHONE (OUTPATIENT)
Dept: CARDIOLOGY | Facility: CLINIC | Age: 79
End: 2019-10-09

## 2019-10-09 NOTE — TELEPHONE ENCOUNTER
10/9/19 Called patient about discharge instructions post SVT Ablation.  Patient made aware that all instructions, appointments and phone numbers have been included in AVS, that will be given to pt on discharge by Nurse. Patient reminded that there is no driving for 2 days and no lifting, pushing or pulling of more than 10 pounds for 3 days. Patient reminded to call with any concerns or problems including,  Groin or subclavian bleed or swelling, increased shortness of breath, fever greater than 101, Arrhythmias lasting longer than 2-3 hours. Pt made aware  of follow up appointment (11/12 at 12:45 pm)  Also reminded to discontinue Metoprolol but to continue all other meds per Dr Arriaza.  Pt has no questions at this time. David 9:50 am

## 2019-10-10 ENCOUNTER — OFFICE VISIT (OUTPATIENT)
Dept: PHARMACY | Facility: CLINIC | Age: 79
End: 2019-10-10

## 2019-10-10 VITALS
HEART RATE: 70 BPM | BODY MASS INDEX: 22 KG/M2 | WEIGHT: 132.2 LBS | SYSTOLIC BLOOD PRESSURE: 118 MMHG | DIASTOLIC BLOOD PRESSURE: 70 MMHG

## 2019-10-10 DIAGNOSIS — E03.9 ACQUIRED HYPOTHYROIDISM: Primary | ICD-10-CM

## 2019-10-10 DIAGNOSIS — G47.00 INSOMNIA, UNSPECIFIED TYPE: ICD-10-CM

## 2019-10-10 DIAGNOSIS — M15.0 PRIMARY OSTEOARTHRITIS INVOLVING MULTIPLE JOINTS: ICD-10-CM

## 2019-10-10 DIAGNOSIS — E03.9 ACQUIRED HYPOTHYROIDISM: ICD-10-CM

## 2019-10-10 DIAGNOSIS — M81.0 AGE-RELATED OSTEOPOROSIS WITHOUT CURRENT PATHOLOGICAL FRACTURE: ICD-10-CM

## 2019-10-10 DIAGNOSIS — I10 HYPERTENSION GOAL BP (BLOOD PRESSURE) < 140/90: ICD-10-CM

## 2019-10-10 DIAGNOSIS — J30.1 ALLERGIC RHINITIS DUE TO POLLEN, UNSPECIFIED SEASONALITY: ICD-10-CM

## 2019-10-10 DIAGNOSIS — H35.30 MACULAR DEGENERATION (SENILE) OF RETINA: ICD-10-CM

## 2019-10-10 LAB
INTERPRETATION ECG - MUSE: NORMAL
INTERPRETATION ECG - MUSE: NORMAL
TSH SERPL DL<=0.005 MIU/L-ACNC: 1.52 MU/L (ref 0.4–4)

## 2019-10-10 PROCEDURE — 36415 COLL VENOUS BLD VENIPUNCTURE: CPT | Performed by: FAMILY MEDICINE

## 2019-10-10 PROCEDURE — 84443 ASSAY THYROID STIM HORMONE: CPT | Performed by: FAMILY MEDICINE

## 2019-10-10 PROCEDURE — 99607 MTMS BY PHARM ADDL 15 MIN: CPT | Performed by: PHARMACIST

## 2019-10-10 PROCEDURE — 99605 MTMS BY PHARM NP 15 MIN: CPT | Performed by: PHARMACIST

## 2019-10-10 RX ORDER — ACETAMINOPHEN 325 MG/1
325-650 TABLET ORAL EVERY MORNING
COMMUNITY
End: 2020-10-27

## 2019-10-10 NOTE — PATIENT INSTRUCTIONS
Recommendations from today's MTM visit:                                                    MTM (medication therapy management) is a service provided by a clinical pharmacist designed to help you get the most of out of your medicines.   Today we reviewed what your medicines are for, how to know if they are working, that your medicines are safe and how to make your medicine regimen as easy as possible.     1. Try melatonin 6-9mg at night to help you sleep    2. If you can, try just plain tylenol at night - avoiding the Tylenol PM at night if possible. If you need something else to help you sleep, let us know and we can make suggestions.     3. When you see Dr. Arriaza, have him check your blood pressure. If you are still under 120/80 and having dizzy spells, it would be good to decrease the amount of blood pressure medicines you are taking. If your sodium is still low, I would recommend stopping the Triam/HCTZ.     4. Flu shot after your cataract procedure.     It was great to speak with you today.  I value your experience and would be very thankful for your time with providing feedback on our clinic survey. You may receive a survey via email or text message in the next few days.     Next MTM visit: Fall 2020.     To schedule another MTM appointment, please call the clinic directly or you may call the MTM scheduling line at [phone] or toll-free at [phone].     My Clinical Pharmacist's contact information:                                                      It was a pleasure talking with you today!  Please feel free to contact me with any questions or concerns you have.      Mary Bustos, Pharm.D., M.B.A., BCACP  MTM Pharmacist, [facility]  Pager: [phone]  Email: [email or web address]

## 2019-10-10 NOTE — PROGRESS NOTES
SUBJECTIVE/OBJECTIVE:                Mely Guerra is a 79 year old female coming in for a follow-up visit for Medication Therapy Management.  She was referred to me from Dr. Messer.     Chief Complaint: Follow up from MTM visit on 8/17/2018.  Annual med review.     Tobacco: No tobacco use  Alcohol: not currently using    Medication Adherence/Access: no issues reported  Patient takes medications directly from bottles.  Patient takes medications 2 time(s) per day.   Per patient, misses medication 0 times per week.   Medication barriers: none.     Hypertension/CV: Current medications include amlodipine 10mg daily, triamterene-HCTZ 37.5-25mg daily, and losartan 100mg daily. Patient does self-monitor BP. Patient reports no current medication side effects. She underwent an ablation on Tuesday for SVT - still having some dizzy spells, but not lasting as long. Additionally she is feeling some heart arrhythmias but nothing lasting more than 2-3 hours. She is adhering to her discharge instructions regarding lifting, etc. Reports groin site is healing well.     Osteopenia: Current therapy includes: calcium citrate/vitamin D 1 tabs BID and fish oil 1000mg daily (as recommended by endocrinologist). She is getting at least 3 servings of calcium in her diet daily.  Pt is not experiencing side effects.  DEXA History: 8/10/17  Risk factors: post-menopausal    Macular Degeneration/Ophth: Pt is currently taking Preservision Areds2 1 tab BID and separate MVI daily, as well as fish oil as recommended by ophthamologist. Pt is not experiencing side effects. She is going to have a cataract removed soon.     Hypothyroidism: Patient is taking levothyroxine 100 mcg daily. She denies SE from levothyroxine, or any s/sx of hypo/hyperthyroidism.  She is due for labs today.     Osteoarthritis/Insomnia: She is taking APAP 325mg 2 tabs each morning and APAP/Diphenhydramine 325mg/25mg - 2 tabs each evening. She has trouble falling asleep and also  "has sleep apnea. She feels that this regimen is effective and denies side effects. Her difficulty sleeping is not related to her pain, but rather she \"cannot shut off my mind.\" She has tried melatonin in the past, but it was not helpful (unclear what dose - she remembers 1mg). She notes she is very compliant with her CPAP.     Allergies: Uses Flonase to help with allergy sx without problems. Denies SE. She had been prescribed Flovent in the past for wheezing during allergy season but feels like it just shoots on the back of her throat and was feeling worse from using it. She has since stopped it. She denies wheezing, SOB, cough today.     Today's Vitals: /70   Pulse 70   Wt 132 lb 3.2 oz (60 kg)   BMI 22.00 kg/m        ASSESSMENT:                  Medication Adherence: no issues identified    Hypertension/CV: Stable with plan to f/u with Dr. Arriaza in November. Her sodium was on the low side which can contribute to dizziness. Additionally her BP was also on the lower end of normal. If this trend continues, she may be able to reduce the dose of her medications (if low Na continues, suggest stopping the diuretic).     Osteopenia: Stable - getting adequate calcium from her diet, likely does not need additional calcium from a supplement, but she feels strongly about continuing.     Macular Degeneration/Ophth: Stable with plan in place.     Hypothyroidism: Due for labs    Osteoarthritis/Insomnia: She has stopped NSAIDs in favor of APAP which is beneficial for her BP management. However, she continues diphenhydramine despite known risks (see previous MTM notes). She likely didn't try a high enough dose of melatonin long enough. If ineffective low-dose mirtazapine may be safer for her.     Allergies: Stable - she does not have a diagnosis of asthma and is not feeling any symptoms that she feels she needs an ICS for. Should she start wheezing again, she should see her PCP.      PLAN:                  1. Try melatonin " 6-9mg at night to help you sleep  2. If you can, try just plain tylenol at night - avoiding the Tylenol PM at night if possible.   3. TSH today  4. BP recheck with Dr. Arriaza next month - If still under 120/80 and having dizzy spells, consider reducing the dose of one of the agents. If sodium is still low, consider stopping Triam/HCTZ.    I spent 30 minutes with this patient today. All changes were made via collaborative practice agreement with Dr. Messer. A copy of the visit note was provided to the patient's primary care provider.     Will follow up in 1 year, sooner if needed.    The patient was given a summary of these recommendations as an after visit summary.    Mary Bustos, FaraD, SYEDA, BCACP  MTM Pharmacist, [facility]

## 2019-10-16 DIAGNOSIS — I10 ESSENTIAL HYPERTENSION WITH GOAL BLOOD PRESSURE LESS THAN 140/90: ICD-10-CM

## 2019-10-16 DIAGNOSIS — E03.9 ACQUIRED HYPOTHYROIDISM: ICD-10-CM

## 2019-10-17 RX ORDER — AMLODIPINE BESYLATE 10 MG/1
TABLET ORAL
Qty: 90 TABLET | Refills: 1 | Status: SHIPPED | OUTPATIENT
Start: 2019-10-17 | End: 2019-12-15

## 2019-10-17 RX ORDER — TRIAMTERENE AND HYDROCHLOROTHIAZIDE 37.5; 25 MG/1; MG/1
CAPSULE ORAL
Qty: 90 CAPSULE | Refills: 1 | Status: SHIPPED | OUTPATIENT
Start: 2019-10-17 | End: 2019-12-15

## 2019-10-17 RX ORDER — LEVOTHYROXINE SODIUM 100 UG/1
TABLET ORAL
Qty: 90 TABLET | Refills: 3 | Status: SHIPPED | OUTPATIENT
Start: 2019-10-17 | End: 2019-12-15

## 2019-10-17 NOTE — TELEPHONE ENCOUNTER
"Dyazide 37.5-25MG  Last Written Prescription Date:  08/08/2019  Last Fill Quantity: 90,  # refills: 0   Last office visit: 10/1/2019 with prescribing provider:  Adelina, last office visit with prescribing provider was on 10/12/2018   Future Office Visit:      Norvasc 10MG  Last Written Prescription Date:  08/13/2019  Last Fill Quantity: 90,  # refills: 0   Last office visit: 10/1/2019 with prescribing provider:  No, last office visit with prescribing provider was on 10/12/2018   Future Office Visit:      Levothyroxine 100MCG  Last Written Prescription Date:  08/13/2019  Last Fill Quantity: 90,  # refills: 0   Last office visit: 10/1/2019 with prescribing provider:  Adelina, last office visit with prescribing provider was on 10/12/2018   Future Office Visit:      Requested Prescriptions   Pending Prescriptions Disp Refills     levothyroxine (SYNTHROID/LEVOTHROID) 100 MCG tablet [Pharmacy Med Name: LEVOTHYROXINE SODIUM 100 MCG Tablet] 90 tablet 0     Sig: TAKE 1 TABLET EVERY DAY (NEED MD APPOINTMENT)       Thyroid Protocol Passed - 10/16/2019  3:56 PM        Passed - Patient is 12 years or older        Passed - Recent (12 mo) or future (30 days) visit within the authorizing provider's specialty     Patient has had an office visit with the authorizing provider or a provider within the authorizing providers department within the previous 12 mos or has a future within next 30 days. See \"Patient Info\" tab in inbasket, or \"Choose Columns\" in Meds & Orders section of the refill encounter.              Passed - Medication is active on med list        Passed - Normal TSH on file in past 12 months     Recent Labs   Lab Test 10/10/19  0949   TSH 1.52              Passed - No active pregnancy on record     If patient is pregnant or has had a positive pregnancy test, please check TSH.          Passed - No positive pregnancy test in past 12 months     If patient is pregnant or has had a positive pregnancy test, please check TSH.          " "amLODIPine (NORVASC) 10 MG tablet [Pharmacy Med Name: AMLODIPINE BESYLATE 10 MG Tablet] 90 tablet 0     Sig: TAKE 1 TABLET EVERY DAY (NEED MD APPOINTMENT)       Calcium Channel Blockers Protocol  Passed - 10/16/2019  3:56 PM        Passed - Blood pressure under 140/90 in past 12 months     BP Readings from Last 3 Encounters:   10/10/19 118/70   10/08/19 117/59   10/01/19 118/71                 Passed - Recent (12 mo) or future (30 days) visit within the authorizing provider's specialty     Patient has had an office visit with the authorizing provider or a provider within the authorizing providers department within the previous 12 mos or has a future within next 30 days. See \"Patient Info\" tab in inbasket, or \"Choose Columns\" in Meds & Orders section of the refill encounter.              Passed - Medication is active on med list        Passed - Patient is age 18 or older        Passed - No active pregnancy on record        Passed - Normal serum creatinine on file in past 12 months     Recent Labs   Lab Test 10/08/19  0655   CR 0.79             Passed - No positive pregnancy test in past 12 months        triamterene-HCTZ (DYAZIDE) 37.5-25 MG capsule [Pharmacy Med Name: TRIAMTERENE/HYDROCHLOROTHIAZIDE 37.5-25 MG Capsule] 90 capsule 0     Sig: TAKE 1 CAPSULE EVERY DAY       Diuretics (Including Combos) Protocol Failed - 10/16/2019  3:56 PM        Failed - Normal serum sodium on file in past 12 months     Recent Labs   Lab Test 10/08/19  0655   *              Passed - Blood pressure under 140/90 in past 12 months     BP Readings from Last 3 Encounters:   10/10/19 118/70   10/08/19 117/59   10/01/19 118/71                 Passed - Recent (12 mo) or future (30 days) visit within the authorizing provider's specialty     Patient has had an office visit with the authorizing provider or a provider within the authorizing providers department within the previous 12 mos or has a future within next 30 days. See \"Patient " "Info\" tab in inbasket, or \"Choose Columns\" in Meds & Orders section of the refill encounter.              Passed - Medication is active on med list        Passed - Patient is age 18 or older        Passed - No active pregancy on record        Passed - Normal serum creatinine on file in past 12 months     Recent Labs   Lab Test 10/08/19  0655   CR 0.79              Passed - Normal serum potassium on file in past 12 months     Recent Labs   Lab Test 10/08/19  0655   POTASSIUM 3.5                    Passed - No positive pregnancy test in past 12 months          "

## 2019-10-29 DIAGNOSIS — J30.89 OTHER ALLERGIC RHINITIS: ICD-10-CM

## 2019-10-30 RX ORDER — FLUTICASONE PROPIONATE 50 MCG
SPRAY, SUSPENSION (ML) NASAL
Qty: 48 G | Refills: 1 | Status: SHIPPED | OUTPATIENT
Start: 2019-10-30 | End: 2020-08-11

## 2019-10-30 NOTE — TELEPHONE ENCOUNTER
"Fluticasone nasal spray 50MCG  Last Written Prescription Date:  06/07/2019  Last Fill Quantity: 48g,  # refills: 1   Last office visit: 10/1/2019 with prescribing provider:  Adelina, last office visit with prescribing provider was on 1/11/2018   Future Office Visit:      Requested Prescriptions   Pending Prescriptions Disp Refills     fluticasone (FLONASE) 50 MCG/ACT nasal spray [Pharmacy Med Name: FLUTICASONE PROPIONATE 50 MCG/ACT Suspension] 48 g 1     Sig: USE 2 SPRAYS IN EACH NOSTRIL EVERY DAY       Inhaled Steroids Protocol Passed - 10/29/2019  5:24 PM        Passed - Patient is age 12 or older        Passed - Recent (12 mo) or future (30 days) visit within the authorizing provider's specialty     Patient has had an office visit with the authorizing provider or a provider within the authorizing providers department within the previous 12 mos or has a future within next 30 days. See \"Patient Info\" tab in inbasket, or \"Choose Columns\" in Meds & Orders section of the refill encounter.              Passed - Medication is active on med list          "

## 2019-10-30 NOTE — TELEPHONE ENCOUNTER
Prescription approved per [facility] Refill Protocol.  Bernadine BROWN RN

## 2019-10-31 ENCOUNTER — MYC MEDICAL ADVICE (OUTPATIENT)
Dept: FAMILY MEDICINE | Facility: CLINIC | Age: 79
End: 2019-10-31

## 2019-10-31 ENCOUNTER — MYC MEDICAL ADVICE (OUTPATIENT)
Dept: PHARMACY | Facility: CLINIC | Age: 79
End: 2019-10-31

## 2019-10-31 NOTE — TELEPHONE ENCOUNTER
10-31-19    Haily, after my appt.  you and watching my blood pressure readings which you felt were low, it remained so and I was experiencing many episodes every day of dizziness when standing up after sitting for more than 10 minutes. I began 3 days ago to cut my morning pill of 10 mg Amlodipine in half, thus taking 5 mg.  I feel much better and the pressure readings are in the low 130's range over 68-70.  I shall continue to monitor this and will be taking my other blood pressure pills as prescribed. Feedback please on doing this.    Marquita Guerra      [facility] will note for haily --5mg. Seems perfect dose for lack of SE with good BP control --continue as is until she see's haily next OV.    Ryland Chester [facility].  Medication Therapy Management Provider  [phone]

## 2019-11-12 ENCOUNTER — OFFICE VISIT (OUTPATIENT)
Dept: CARDIOLOGY | Facility: CLINIC | Age: 79
End: 2019-11-12
Payer: MEDICARE

## 2019-11-12 VITALS
WEIGHT: 134.7 LBS | HEART RATE: 70 BPM | DIASTOLIC BLOOD PRESSURE: 68 MMHG | BODY MASS INDEX: 23 KG/M2 | HEIGHT: 64 IN | SYSTOLIC BLOOD PRESSURE: 110 MMHG

## 2019-11-12 DIAGNOSIS — I47.10 SVT (SUPRAVENTRICULAR TACHYCARDIA) (H): Primary | ICD-10-CM

## 2019-11-12 PROCEDURE — 99214 OFFICE O/P EST MOD 30 MIN: CPT | Performed by: INTERNAL MEDICINE

## 2019-11-12 PROCEDURE — 93000 ELECTROCARDIOGRAM COMPLETE: CPT | Performed by: INTERNAL MEDICINE

## 2019-11-12 ASSESSMENT — MIFFLIN-ST. JEOR: SCORE: 1073.06

## 2019-11-12 NOTE — LETTER
11/12/2019    Tiffany Messer MD  [address]  [facility] [address]    RE: Mely Guerra       Dear Colleague,    I had the pleasure of seeing Mely Guerra in the [facility].    HPI and Plan:   See dictation    Orders Placed This Encounter   Procedures     EKG 12-lead complete w/read - Clinics       No orders of the defined types were placed in this encounter.      There are no discontinued medications.      Encounter Diagnosis   Name Primary?     SVT (supraventricular tachycardia) (H) Yes       CURRENT MEDICATIONS:  Current Outpatient Medications   Medication Sig Dispense Refill     acetaminophen (TYLENOL) 325 MG tablet Take 325-650 mg by mouth every morning       amLODIPine (NORVASC) 10 MG tablet TAKE 1 TABLET EVERY DAY (NEED MD APPOINTMENT) (Patient taking differently: 5 mg ) 90 tablet 1     Calcium Citrate-Vitamin D (CALCIUM CITRATE + D) 250-200 MG-UNIT TABS Take 1 tablet by mouth 2 times daily       diphenhydrAMINE-acetaminophen (TYLENOL PM)  MG tablet Take 2 tablets by mouth At Bedtime       fluticasone (FLONASE) 50 MCG/ACT nasal spray USE 2 SPRAYS IN EACH NOSTRIL EVERY DAY 48 g 1     levothyroxine (SYNTHROID/LEVOTHROID) 100 MCG tablet TAKE 1 TABLET EVERY DAY (NEED MD APPOINTMENT) 90 tablet 3     losartan (COZAAR) 100 MG tablet Take 1 tablet (100 mg) by mouth daily 90 tablet 1     Multiple Vitamins-Minerals (MULTIVITAMIN ADULT PO) Take 1 tablet by mouth daily       Multiple Vitamins-Minerals (PRESERVISION AREDS 2 PO) Take 1 tablet by mouth 2 times daily       Omega-3 Fatty Acids (FISH OIL PO) 1,000 mg 2 times daily       triamterene-HCTZ (DYAZIDE) 37.5-25 MG capsule TAKE 1 CAPSULE EVERY DAY 90 capsule 1       ALLERGIES     Allergies   Allergen Reactions     Cats      Hylan G-F 20      Lisinopril Cough     Seasonal Allergies      Vioxx        PAST MEDICAL HISTORY:  Past Medical History:   Diagnosis Date     Allergic rhinitis, cause unspecified      Personal  history of alcoholism (H)      Sleep apnea      SVT (supraventricular tachycardia) (H)     s/p atrial tachycardia ablation near CS os     Unspecified essential hypertension      Unspecified hypothyroidism        PAST SURGICAL HISTORY:  Past Surgical History:   Procedure Laterality Date     C NONSPECIFIC PROCEDURE  1974    s/p Vaginal hysterectomy     COLONOSCOPY N/A 10/31/2018    Procedure: COMBINED COLONOSCOPY, SINGLE OR MULTIPLE BIOPSY/POLYPECTOMY BY BIOPSY;  Surgeon: Leon Cosme MD;  Location:  GI     EP ABLATION SVT N/A 10/8/2019    Procedure: EP Ablation SVT;  Surgeon: Laney Arriaza MD;  Location:  HEART CARDIAC CATH LAB     ESOPHAGOSCOPY, GASTROSCOPY, DUODENOSCOPY (EGD), COMBINED N/A 10/31/2018    Procedure: GASTROSCOPY;  Surgeon: Leon Cosme MD;  Location:  GI     HYSTERECTOMY       JOINT REPLACEMENT Bilateral      knee replacement Bilateral      ORTHOPEDIC SURGERY         FAMILY HISTORY:  Family History   Problem Relation Age of Onset     Cancer Sister      Heart Disease Father        SOCIAL HISTORY:  Social History     Socioeconomic History     Marital status:      Spouse name: None     Number of children: None     Years of education: None     Highest education level: None   Occupational History     None   Social Needs     Financial resource strain: None     Food insecurity:     Worry: None     Inability: None     Transportation needs:     Medical: None     Non-medical: None   Tobacco Use     Smoking status: Never Smoker     Smokeless tobacco: Never Used   Substance and Sexual Activity     Alcohol use: No     Alcohol/week: 0.0 standard drinks     Drug use: No     Sexual activity: Yes     Partners: Male   Lifestyle     Physical activity:     Days per week: None     Minutes per session: None     Stress: None   Relationships     Social connections:     Talks on phone: None     Gets together: None     Attends Scientologist service: None     Active member of club or organization:  "None     Attends meetings of clubs or organizations: None     Relationship status: None     Intimate partner violence:     Fear of current or ex partner: None     Emotionally abused: None     Physically abused: None     Forced sexual activity: None   Other Topics Concern      Service No     Blood Transfusions No     Caffeine Concern No     Occupational Exposure No     Hobby Hazards No     Sleep Concern Yes     Stress Concern No     Weight Concern No     Special Diet No     Back Care Yes     Exercise No     Bike Helmet Yes     Seat Belt Yes     Self-Exams Yes     Parent/sibling w/ CABG, MI or angioplasty before 65F 55M? Yes   Social History Narrative    Social Documentation:4/10        Balanced Diet: YES    Calcium intake: supplement per day    Caffeine: 4cups per day    Exercise:  type of activity varies;  3 times per week    Sunscreen: Yes    Seatbelts:  Yes    Self Breast Exam:  Yes    Self Testicular Exam: No - na    Physical/Emotional/Sexual Abuse: No -     Do you feel safe in your environment? Yes        Cholesterol screen up to date: yes    Eye Exam up to date: Yes    Dental Exam up to date: Yes    Pap smear up to date: Does Not Apply    Mammogram up to date: utd    Dexa Scan up to date: Yes    Colonoscopy up to date: Yes    Immunizations up to date: Yes    Glucose screen if over 40:  No -     Fredy Carroll ma               Review of Systems:  Skin:  Negative       Eyes:  Positive for glasses    ENT:  Negative      Respiratory:  Positive for sleep apnea;CPAP     Cardiovascular:    Positive for;palpitations    Gastroenterology: Negative      Genitourinary:  Negative      Musculoskeletal:  Positive for arthritis    Neurologic:  Negative      Psychiatric:  Negative      Heme/Lymph/Imm:  Positive for allergies    Endocrine:  Positive for thyroid disorder      Physical Exam:  Vitals: /68   Pulse 70   Ht 1.613 m (5' 3.5\")   Wt 61.1 kg (134 lb 11.2 oz)   BMI 23.49 kg/m       Constitutional:  " cooperative, alert and oriented, well developed, well nourished, in no acute distress        Skin:  warm and dry to the touch, no apparent skin lesions or masses noted          Head:  normocephalic, no masses or lesions        Eyes:  pupils equal and round, conjunctivae and lids unremarkable, sclera white, no xanthalasma, EOMS intact, no nystagmus        Lymph:No Cervical lymphadenopathy present     ENT:  no pallor or cyanosis, dentition good        Neck:  carotid pulses are full and equal bilaterally, JVP normal, no carotid bruit        Respiratory:  normal breath sounds, clear to auscultation, normal A-P diameter, normal symmetry, normal respiratory excursion, no use of accessory muscles         Cardiac: normal S1 and S2;apical impulse not displaced irregular rhythm   no presence of murmur                                                   GI:  abdomen soft, non-tender, BS normoactive, no mass, no HSM, no bruits        Extremities and Muscular Skeletal:  no deformities, clubbing, cyanosis, erythema observed              Neurological:  no gross motor deficits        Psych:  Alert and Oriented x 3        CC  No referring provider defined for this encounter.                Thank you for allowing me to participate in the care of your patient.      Sincerely,     Laney Arriaza MD     [facility]    cc:   No referring provider defined for this encounter.

## 2019-11-12 NOTE — PROGRESS NOTES
Service Date: 2019      HISTORY OF PRESENT ILLNESS:  I saw Ms. Velásquez for followup of SVT ablation.  She is a 79-year-old white female who presented with recurrent nonsustained SVT with obvious symptoms.  She underwent an EP study and was found to have a focal atrial tachycardia through the coronary sinus orifice area.  She had successful ablation and has been doing well since.  She has occasional short-lasting palpitation for a few beats, not more than 5 beats.  Overall, she is happy with the outcome of the ablation, and she has no other complaints.      PHYSICAL EXAMINATION:   VITAL SIGNS:  Blood pressure was 110/68, heart rate 70 beats per minute, body weight 134 pounds.   HEENT:  Eyes and ENT were unremarkable.   LUNGS:  Clear.   CARDIAC:  Rhythm was regular and heart sounds were normal with no murmur.      EKG showed normal sinus rhythm.      ASSESSMENT AND RECOMMENDATIONS:  Ms. Velásquez is doing well with no apparent recurrence of SVT.  She has normal blood pressure and her weight is stable.  She can continue current dose of high blood pressure medications.  She does not require routine Cardiology followup, but can contact us for questions or concerns in the future.  She will return to your office for future care of hypertension.      cc:      Tiffany Messer MD    [facility]   [address]   [place] [address]         PER SALCEDO MD             D: 2019   T: 2019   MT: SUNNY      Name:     THELMA VELÁSQUEZ   MRN:      [id number]        Account:      [id number]   :      1940           Service Date: 2019      Document: [id number]

## 2019-11-12 NOTE — PROGRESS NOTES
HPI and Plan:   See dictation    Orders Placed This Encounter   Procedures     EKG 12-lead complete w/read - Clinics       No orders of the defined types were placed in this encounter.      There are no discontinued medications.      Encounter Diagnosis   Name Primary?     SVT (supraventricular tachycardia) (H) Yes       CURRENT MEDICATIONS:  Current Outpatient Medications   Medication Sig Dispense Refill     acetaminophen (TYLENOL) 325 MG tablet Take 325-650 mg by mouth every morning       amLODIPine (NORVASC) 10 MG tablet TAKE 1 TABLET EVERY DAY (NEED MD APPOINTMENT) (Patient taking differently: 5 mg ) 90 tablet 1     Calcium Citrate-Vitamin D (CALCIUM CITRATE + D) 250-200 MG-UNIT TABS Take 1 tablet by mouth 2 times daily       diphenhydrAMINE-acetaminophen (TYLENOL PM)  MG tablet Take 2 tablets by mouth At Bedtime       fluticasone (FLONASE) 50 MCG/ACT nasal spray USE 2 SPRAYS IN EACH NOSTRIL EVERY DAY 48 g 1     levothyroxine (SYNTHROID/LEVOTHROID) 100 MCG tablet TAKE 1 TABLET EVERY DAY (NEED MD APPOINTMENT) 90 tablet 3     losartan (COZAAR) 100 MG tablet Take 1 tablet (100 mg) by mouth daily 90 tablet 1     Multiple Vitamins-Minerals (MULTIVITAMIN ADULT PO) Take 1 tablet by mouth daily       Multiple Vitamins-Minerals (PRESERVISION AREDS 2 PO) Take 1 tablet by mouth 2 times daily       Omega-3 Fatty Acids (FISH OIL PO) 1,000 mg 2 times daily       triamterene-HCTZ (DYAZIDE) 37.5-25 MG capsule TAKE 1 CAPSULE EVERY DAY 90 capsule 1       ALLERGIES     Allergies   Allergen Reactions     Cats      Hylan G-F 20      Lisinopril Cough     Seasonal Allergies      Vioxx        PAST MEDICAL HISTORY:  Past Medical History:   Diagnosis Date     Allergic rhinitis, cause unspecified      Personal history of alcoholism (H)      Sleep apnea      SVT (supraventricular tachycardia) (H)     s/p atrial tachycardia ablation near CS os     Unspecified essential hypertension      Unspecified hypothyroidism        PAST SURGICAL  HISTORY:  Past Surgical History:   Procedure Laterality Date     C NONSPECIFIC PROCEDURE  1974    s/p Vaginal hysterectomy     COLONOSCOPY N/A 10/31/2018    Procedure: COMBINED COLONOSCOPY, SINGLE OR MULTIPLE BIOPSY/POLYPECTOMY BY BIOPSY;  Surgeon: Leon Cosme MD;  Location:  GI     EP ABLATION SVT N/A 10/8/2019    Procedure: EP Ablation SVT;  Surgeon: Laney Arriaza MD;  Location:  HEART CARD[facility] CATH LAB     ESOPHAGOSCOPY, GASTROSCOPY, DUODENOSCOPY (EGD), COMBINED N/A 10/31/2018    Procedure: GASTROSCOPY;  Surgeon: Leon Cosme MD;  Location:  GI     HYSTERECTOMY       JOINT REPLACEMENT Bilateral      knee replacement Bilateral      ORTHOPEDIC SURGERY         FAMILY HISTORY:  Family History   Problem Relation Age of Onset     Cancer Sister      Heart Disease Father        SOCIAL HISTORY:  Social History     Socioeconomic History     Marital status:      Spouse name: None     Number of children: None     Years of education: None     Highest education level: None   Occupational History     None   Social Needs     Financial resource strain: None     Food insecurity:     Worry: None     Inability: None     Transportation needs:     Medical: None     Non-medical: None   Tobacco Use     Smoking status: Never Smoker     Smokeless tobacco: Never Used   Substance and Sexual Activity     Alcohol use: No     Alcohol/week: 0.0 standard drinks     Drug use: No     Sexual activity: Yes     Partners: Male   Lifestyle     Physical activity:     Days per week: None     Minutes per session: None     Stress: None   Relationships     Social connections:     Talks on phone: None     Gets together: None     Attends Christian service: None     Active member of club or organization: None     Attends meetings of clubs or organizations: None     Relationship status: None     Intimate partner violence:     Fear of current or ex partner: None     Emotionally abused: None     Physically abused: None     Forced  "sexual activity: None   Other Topics Concern      Service No     Blood Transfusions No     Caffeine Concern No     Occupational Exposure No     Hobby Hazards No     Sleep Concern Yes     Stress Concern No     Weight Concern No     Special Diet No     Back Care Yes     Exercise No     Bike Helmet Yes     Seat Belt Yes     Self-Exams Yes     Parent/sibling w/ CABG, MI or angioplasty before 65F 55M? Yes   Social History Narrative    Social Documentation:4/10        Balanced Diet: YES    Calcium intake: supplement per day    Caffeine: 4cups per day    Exercise:  type of activity varies;  3 times per week    Sunscreen: Yes    Seatbelts:  Yes    Self Breast Exam:  Yes    Self Testicular Exam: No - na    Physical/Emotional/Sexual Abuse: No -     Do you feel safe in your environment? Yes        Cholesterol screen up to date: yes    Eye Exam up to date: Yes    Dental Exam up to date: Yes    Pap smear up to date: Does Not Apply    Mammogram up to date: utd    Dexa Scan up to date: Yes    Colonoscopy up to date: Yes    Immunizations up to date: Yes    Glucose screen if over 40:  No -     Fredy Carroll ma               Review of Systems:  Skin:  Negative       Eyes:  Positive for glasses    ENT:  Negative      Respiratory:  Positive for sleep apnea;CPAP     Cardiovascular:    Positive for;palpitations    Gastroenterology: Negative      Genitourinary:  Negative      Musculoskeletal:  Positive for arthritis    Neurologic:  Negative      Psychiatric:  Negative      Heme/Lymph/Imm:  Positive for allergies    Endocrine:  Positive for thyroid disorder      Physical Exam:  Vitals: /68   Pulse 70   Ht 1.613 m (5' 3.5\")   Wt 61.1 kg (134 lb 11.2 oz)   BMI 23.49 kg/m      Constitutional:  cooperative, alert and oriented, well developed, well nourished, in no acute distress        Skin:  warm and dry to the touch, no apparent skin lesions or masses noted          Head:  normocephalic, no masses or lesions    "     Eyes:  pupils equal and round, conjunctivae and lids unremarkable, sclera white, no xanthalasma, EOMS intact, no nystagmus        Lymph:No Cervical lymphadenopathy present     ENT:  no pallor or cyanosis, dentition good        Neck:  carotid pulses are full and equal bilaterally, JVP normal, no carotid bruit        Respiratory:  normal breath sounds, clear to auscultation, normal A-P diameter, normal symmetry, normal respiratory excursion, no use of accessory muscles         Cardiac: normal S1 and S2;apical impulse not displaced irregular rhythm   no presence of murmur                                                   GI:  abdomen soft, non-tender, BS normoactive, no mass, no HSM, no bruits        Extremities and Muscular Skeletal:  no deformities, clubbing, cyanosis, erythema observed              Neurological:  no gross motor deficits        Psych:  Alert and Oriented x 3        CC  No referring provider defined for this encounter.

## 2019-11-12 NOTE — LETTER
2019      Tiffany Messer MD  [address]  [facility] [address]      RE: Mely Velásquez       Dear Colleague,    I had the pleasure of seeing Mely Velásquez in the [facility].    Service Date: 2019      HISTORY OF PRESENT ILLNESS:  I saw Ms. Velásquez for followup of SVT ablation.  She is a 79-year-old white female who presented with recurrent nonsustained SVT with obvious symptoms.  She underwent an EP study and was found to have a focal atrial tachycardia through the coronary sinus orifice area.  She had successful ablation and has been doing well since.  She has occasional short-lasting palpitation for a few beats, not more than 5 beats.  Overall, she is happy with the outcome of the ablation, and she has no other complaints.      PHYSICAL EXAMINATION:   VITAL SIGNS:  Blood pressure was 110/68, heart rate 70 beats per minute, body weight 134 pounds.   HEENT:  Eyes and ENT were unremarkable.   LUNGS:  Clear.   CARDIAC:  Rhythm was regular and heart sounds were normal with no murmur.      EKG showed normal sinus rhythm.      ASSESSMENT AND RECOMMENDATIONS:  Ms. Velásquez is doing well with no apparent recurrence of SVT.  She has normal blood pressure and her weight is stable.  She can continue current dose of high blood pressure medications.  She does not require routine Cardiology followup, but can contact us for questions or concerns in the future.  She will return to your office for future care of hypertension.      cc:      Tiffany Messer MD    [facility]   [address]   [place] [address]         PER SALCEDO MD             D: 2019   T: 2019   MT: SUNNY      Name:     MELY VELÁSQUEZ   MRN:      [id number]        Account:      [id number]   :      1940           Service Date: 2019      Document: [id number]           Outpatient Encounter Medications as of 2019   Medication Sig Dispense Refill      acetaminophen (TYLENOL) 325 MG tablet Take 325-650 mg by mouth every morning       amLODIPine (NORVASC) 10 MG tablet TAKE 1 TABLET EVERY DAY (NEED MD APPOINTMENT) (Patient taking differently: 5 mg ) 90 tablet 1     Calcium Citrate-Vitamin D (CALCIUM CITRATE + D) 250-200 MG-UNIT TABS Take 1 tablet by mouth 2 times daily       diphenhydrAMINE-acetaminophen (TYLENOL PM)  MG tablet Take 2 tablets by mouth At Bedtime       fluticasone (FLONASE) 50 MCG/ACT nasal spray USE 2 SPRAYS IN EACH NOSTRIL EVERY DAY 48 g 1     levothyroxine (SYNTHROID/LEVOTHROID) 100 MCG tablet TAKE 1 TABLET EVERY DAY (NEED MD APPOINTMENT) 90 tablet 3     losartan (COZAAR) 100 MG tablet Take 1 tablet (100 mg) by mouth daily 90 tablet 1     Multiple Vitamins-Minerals (MULTIVITAMIN ADULT PO) Take 1 tablet by mouth daily       Multiple Vitamins-Minerals (PRESERVISION AREDS 2 PO) Take 1 tablet by mouth 2 times daily       Omega-3 Fatty Acids (FISH OIL PO) 1,000 mg 2 times daily       triamterene-HCTZ (DYAZIDE) 37.5-25 MG capsule TAKE 1 CAPSULE EVERY DAY 90 capsule 1     [DISCONTINUED] fluticasone (FLONASE) 50 MCG/ACT nasal spray USE 2 SPRAYS IN EACH NOSTRIL EVERY DAY 48 g 1     No facility-administered encounter medications on file as of 11/12/2019.        Again, thank you for allowing me to participate in the care of your patient.      Sincerely,    Laney Arriaza MD     [facility]

## 2019-11-21 DIAGNOSIS — I10 ESSENTIAL HYPERTENSION WITH GOAL BLOOD PRESSURE LESS THAN 140/90: ICD-10-CM

## 2019-11-21 RX ORDER — LOSARTAN POTASSIUM 100 MG/1
TABLET ORAL
Qty: 90 TABLET | Refills: 0 | Status: SHIPPED | OUTPATIENT
Start: 2019-11-21 | End: 2019-12-15

## 2019-11-21 NOTE — TELEPHONE ENCOUNTER
"LOSARTAN POTASSIUM 100 MG Tablet  Last Written Prescription Date:  05/15/2019  Last Fill Quantity: 90,  # refills: 1   Last office visit: 10/1/2019 with prescribing provider:  AS   Future Office Visit:  Nothing at this time scheduled.    Requested Prescriptions   Pending Prescriptions Disp Refills     losartan (COZAAR) 100 MG tablet [Pharmacy Med Name: LOSARTAN POTASSIUM 100 MG Tablet] 90 tablet 0     Sig: TAKE 1 TABLET EVERY DAY       Angiotensin-II Receptors Passed - 11/21/2019  1:37 PM        Passed - Last blood pressure under 140/90 in past 12 months     BP Readings from Last 3 Encounters:   11/12/19 110/68   10/10/19 118/70   10/08/19 117/59                 Passed - Recent (12 mo) or future (30 days) visit within the authorizing provider's specialty     Patient has had an office visit with the authorizing provider or a provider within the authorizing providers department within the previous 12 mos or has a future within next 30 days. See \"Patient Info\" tab in inbasket, or \"Choose Columns\" in Meds & Orders section of the refill encounter.              Passed - Medication is active on med list        Passed - Patient is age 18 or older        Passed - No active pregnancy on record        Passed - Normal serum creatinine on file in past 12 months     Recent Labs   Lab Test 10/08/19  0655   CR 0.79             Passed - Normal serum potassium on file in past 12 months     Recent Labs   Lab Test 10/08/19  0655   POTASSIUM 3.5                    Passed - No positive pregnancy test in past 12 months        "

## 2019-11-21 NOTE — TELEPHONE ENCOUNTER
Routing refill request to provider for review/approval because:  Drug interaction warning  Bernadine BROWN RN

## 2019-12-12 NOTE — PATIENT INSTRUCTIONS
Patient Education   Personalized Prevention Plan  You are due for the preventive services outlined below.  Your care team is available to assist you in scheduling these services.  If you have already completed any of these items, please share that information with your care team to update in your medical record.  Health Maintenance Due   Topic Date Due     Zoster (Shingles) Vaccine (2 of 3) 04/23/2008     Discuss Advance Care Planning  04/02/2017     Annual Wellness Visit  10/12/2019     FALL RISK ASSESSMENT  10/12/2019       PLEASE CALL TO SCHEDULE YOUR MAMMOGRAM  [facility] [phone]  [facility] [phone]  [facility]   [phone]  Central Scheduling (all locations) [phone]    If you are under/uninsured, we recommend you contact the [facility]. They offer mammograms on a sliding fee charge. You can schedule with them at [phone].

## 2019-12-12 NOTE — PROGRESS NOTES
"SUBJECTIVE:   Mely Guerra is a 79 year old female who presents for Preventive Visit.    Are you in the first 12 months of your Medicare coverage?  No    Healthy Habits:     In general, how would you rate your overall health?  Good    Frequency of exercise:  4-5 days/week    Duration of exercise:  30-45 minutes    Do you usually eat at least 4 servings of fruit and vegetables a day, include whole grains    & fiber and avoid regularly eating high fat or \"junk\" foods?  Yes    Taking medications regularly:  Yes    Medication side effects:  None    Ability to successfully perform activities of daily living:  No assistance needed    Home Safety:  No safety concerns identified    Hearing Impairment:  Difficulty following a conversation in a noisy restaurant or crowded room, feel that people are mumbling or not speaking clearly and difficulty following dialogue in the theater    In the past 6 months, have you been bothered by leaking of urine?  No    In general, how would you rate your overall mental or emotional health?  Good      PHQ-2 Total Score: 0    Additional concerns today:  Yes    Do you feel safe in your environment? Yes    Have you ever done Advance Care Planning? (For example, a Health Directive, POLST, or a discussion with a medical provider or your loved ones about your wishes): Yes, patient states has an Advance Care Planning document and will bring a copy to the clinic.      Fall risk  Fallen 2 or more times in the past year?: No  Any fall with injury in the past year?: No    Cognitive Screening   1) Repeat 3 items (Leader, Season, Table)  3 items   2) Clock draw: NORMAL  3) 3 item recall: Recalls 3 objects  Results: NORMAL clock, 1-2 items recalled: COGNITIVE IMPAIRMENT LESS LIKELY    Mini-CogTM Copyright PINKY Mendieta. Licensed by the author for use in [place] [employer] reprinted with permission (aileen@.[facility]). All rights reserved.      Do you have sleep apnea, excessive snoring or daytime " drowsiness?: yes    Reviewed and updated as needed this visit by clinical staff         Reviewed and updated as needed this visit by Provider        Social History     Tobacco Use     Smoking status: Never Smoker     Smokeless tobacco: Never Used   Substance Use Topics     Alcohol use: No     Alcohol/week: 0.0 standard drinks     If you drink alcohol do you typically have >3 drinks per day or >7 drinks per week? No    Alcohol Use 10/12/2018   Prescreen: >3 drinks/day or >7 drinks/week? Not Applicable   Prescreen: >3 drinks/day or >7 drinks/week? -   No flowsheet data found.        1) SVT, she has had ablation procedure in October and states that she saw cardiology three weeks ago , things were going ok , no more episodes of the SVT and they got her off the blood thinner , she states that last week she had two episodes of the SVT each lasting not more than a few minutes and then going back to sinus rhythm , she is wondering about her metoprolol as she has been taking it still   2) HTN , she saw ALEJANDRINA Hernandez in October - she is on several antiHTN meds , metoprolol, amlodipine ,hydrochlorothiazide_ triamterene,  she has bee taking it at 5mg instead of the 10 mg of amlodipine as her BP was down for a few days and she was symptomatic .  3) hypothyroidism , she is on Syntrhoid 100 mcg daily dose and doing well , recently checked TSh was normal.  4) chronic cough  She has had multiple work ups , CXR , Abx  ,antiallergy meds, Albuterol etc and no relief , it is daily mostly dry , she does have environmental allergies and thinks she has asthm like cough but has not done the testing and not seen a specialist and is requesting this.  Current providers sharing in care for this patient include:   Patient Care Team:  Tiffany Messer MD as PCP - General (Family Practice)  Mirna Manning MD as MD (INTERNAL MEDICINE - ENDOCRINOLOGY, DIABETES & METABOLISM)  Mary Bustos [facility] as Pharmacist (Pharmacist)  Alessia Serrano MD  as Assigned PCP    The following health maintenance items are reviewed in Epic and correct as of today:  Health Maintenance   Topic Date Due     ZOSTER IMMUNIZATION (2 of 3) 04/23/2008     ADVANCE CARE PLANNING  04/02/2017     MEDICARE ANNUAL WELLNESS VISIT  10/12/2019     FALL RISK ASSESSMENT  10/12/2019     TSH W/FREE T4 REFLEX  10/10/2020     DTAP/TDAP/TD IMMUNIZATION (3 - Td) 01/10/2023     LIPID  10/12/2023     COLONOSCOPY  10/31/2028     DEXA  Completed     PHQ-2  Completed     INFLUENZA VACCINE  Completed     PNEUMOCOCCAL IMMUNIZATION 65+ LOW/MEDIUM RISK  Completed     IPV IMMUNIZATION  Aged Out     MENINGITIS IMMUNIZATION  Aged Out     Labs reviewed in EPIC  BP Readings from Last 3 Encounters:   12/13/19 115/72   11/12/19 110/68   10/10/19 118/70    Wt Readings from Last 3 Encounters:   12/13/19 59.7 kg (131 lb 9.6 oz)   11/12/19 61.1 kg (134 lb 11.2 oz)   10/10/19 60 kg (132 lb 3.2 oz)                  Patient Active Problem List   Diagnosis     Acquired hypothyroidism     Allergic rhinitis     Osteoporosis     Osteoarthritis     Hyperlipidemia LDL goal <160     Hypertension goal BP (blood pressure) < 140/90     Advanced directives, counseling/discussion     Otitis externa     Sleep apnea     Dermatitis     Rosacea     Age-related osteoporosis without current pathological fracture     Disorder of bone and cartilage     Dysphagia     Paroxysmal supraventricular tachycardia (H)     SVT (supraventricular tachycardia) (H)     Past Surgical History:   Procedure Laterality Date     C NONSPECIFIC PROCEDURE  1974    s/p Vaginal hysterectomy     COLONOSCOPY N/A 10/31/2018    Procedure: COMBINED COLONOSCOPY, SINGLE OR MULTIPLE BIOPSY/POLYPECTOMY BY BIOPSY;  Surgeon: Leon Cosme MD;  Location:  GI     EP ABLATION SVT N/A 10/8/2019    Procedure: EP Ablation SVT;  Surgeon: Laney Arriaza MD;  Location:  HEART CARDIAC CATH LAB     ESOPHAGOSCOPY, GASTROSCOPY, DUODENOSCOPY (EGD), COMBINED N/A 10/31/2018     Procedure: GASTROSCOPY;  Surgeon: Leon Cosme MD;  Location:  GI     HYSTERECTOMY       JOINT REPLACEMENT Bilateral      knee replacement Bilateral      ORTHOPEDIC SURGERY         Social History     Tobacco Use     Smoking status: Never Smoker     Smokeless tobacco: Never Used   Substance Use Topics     Alcohol use: No     Alcohol/week: 0.0 standard drinks     Family History   Problem Relation Age of Onset     Cancer Sister      Heart Disease Father          Current Outpatient Medications   Medication Sig Dispense Refill     acetaminophen (TYLENOL) 325 MG tablet Take 325-650 mg by mouth every morning       amLODIPine (NORVASC) 10 MG tablet Take 0.5 tablets (5 mg) by mouth daily 90 tablet 1     Calcium Citrate-Vitamin D (CALCIUM CITRATE + D) 250-200 MG-UNIT TABS Take 1 tablet by mouth 2 times daily       diphenhydrAMINE-acetaminophen (TYLENOL PM)  MG tablet Take 2 tablets by mouth At Bedtime       fluticasone (FLONASE) 50 MCG/ACT nasal spray USE 2 SPRAYS IN EACH NOSTRIL EVERY DAY 48 g 1     levothyroxine (SYNTHROID/LEVOTHROID) 100 MCG tablet TAKE 1 TABLET EVERY DAY (NEED MD APPOINTMENT) 90 tablet 3     losartan (COZAAR) 100 MG tablet TAKE 1 TABLET EVERY DAY 90 tablet 1     metoprolol tartrate (LOPRESSOR) 25 MG tablet TAKE 1/2 TABLET(12.5 MG) BY MOUTH TWICE DAILY 90 tablet 0     Multiple Vitamins-Minerals (MULTIVITAMIN ADULT PO) Take 1 tablet by mouth daily       Multiple Vitamins-Minerals (PRESERVISION AREDS 2 PO) Take 1 tablet by mouth 2 times daily       Omega-3 Fatty Acids (FISH OIL PO) 1,000 mg 2 times daily       triamterene-HCTZ (DYAZIDE) 37.5-25 MG capsule Take one capsule daily 90 capsule 1     Allergies   Allergen Reactions     Cats      Hylan G-F 20      Lisinopril Cough     Seasonal Allergies      Vioxx      Recent Labs   Lab Test 10/10/19  0949 10/08/19  0655 06/12/19 10/12/18  1103  03/24/17  0954  06/03/14  0908   LDL  --   --   --  97  --   --   --  126   HDL  --   --   --  85  --    "--   --  99   TRIG  --   --   --  39  --   --   --  70   ALT  --   --   --  32  --  23  --   --    CR  --  0.79 0.70 0.73   < > 0.76   < > 0.78   GFRESTIMATED  --  72 >60 78   < > 75   < > 73   GFRESTBLACK  --  84 >60 >90   < > >90  African American GFR Calc     < > 88   POTASSIUM  --  3.5 4.2 4.3   < > 4.4   < > 4.3   TSH 1.52  --   --  1.59   < > 2.31   < > 2.87    < > = values in this interval not displayed.      Mammogram Screening: Patient over age 75, has elected to stop mammography screening.    Review of Systems  Constitutional, HEENT, cardiovascular, pulmonary, GI, , musculoskeletal, neuro, skin, endocrine and psych systems are negative, except as otherwise noted.    OBJECTIVE:   There were no vitals taken for this visit. Estimated body mass index is 23.49 kg/m  as calculated from the following:    Height as of 11/12/19: 1.613 m (5' 3.5\").    Weight as of 11/12/19: 61.1 kg (134 lb 11.2 oz).  Physical Exam  GENERAL: healthy, alert and no distress  EYES: Eyes grossly normal to inspection, PERRL and conjunctivae and sclerae normal  HENT: ear canals and TM's normal, nose and mouth without ulcers or lesions  NECK: no adenopathy, no asymmetry, masses, or scars and thyroid normal to palpation  RESP: lungs clear to auscultation - no rales, rhonchi or wheezes  BREAST: normal without masses, tenderness or nipple discharge and no palpable axillary masses or adenopathy  CV: regular rate and rhythm, normal S1 S2, no S3 or S4, no murmur, click or rub, no peripheral edema and peripheral pulses strong  ABDOMEN: soft, nontender, no hepatosplenomegaly, no masses and bowel sounds normal  MS: no gross musculoskeletal defects noted, no edema  SKIN: no suspicious lesions or rashes  NEURO: Normal strength and tone, mentation intact and speech normal  PSYCH: mentation appears normal, affect normal/bright    Diagnostic Test Results:  Labs reviewed in Epic  No results found for any visits on 12/13/19.    ASSESSMENT / PLAN:   1. " Encounter for routine adult health examination without abnormal findings  Discussed diet,calcium,exercise.Went over self breast exam.Thin prep was NOT done.Eyes and teeth UTD.No immunizations needed today.See orders below for tests ordered and screening needed.    - Lipid Profile (Chol, Trig, HDL, LDL calc); Future  - Glucose; Future      (I10) Essential hypertension with goal blood pressure less than 140/90  Comment: seems to be well controlled, we went down on the amlodipine as she was having low BP for a while, she would need to recheck her BMP, she will come fasting and will check lipids at that time as well .  Plan: losartan (COZAAR) 100 MG tablet,         triamterene-HCTZ (DYAZIDE) 37.5-25 MG capsule,         amLODIPine (NORVASC) 10 MG tablet, Basic         metabolic panel  (Ca, Cl, CO2, Creat, Gluc, K,         Na, BUN)        As above     (E03.9) Acquired hypothyroidism  Comment: stab le on the 100 mcg of synthroid and doing well , last check of TSh in October normal .  Plan: levothyroxine (SYNTHROID/LEVOTHROID) 100 MCG         tablet        As above     (R05) Cough  Comment: will referr for allergy testing as she has done them many yrs ago and also for spirometry , she does have an Albuterol inhaler but not a daily maintenance one and does not think Albuterol helps her much for the cough - referral given   Plan: ALLERGY/ASTHMA ADULT REFERRAL            (I47.1) Paroxysmal supraventricular tachycardia (H)  Comment: discussed since seeing cardiology she has had a few episodes of the SVT and she had an ablation procedure with them , they each last few minutes not longer than an hour but since cardiology got her off the anticoagulation I recommend she calls theiro office for f/u on the above . Pt is aware  and comfortable with the current plan.    Plan: metoprolol tartrate (LOPRESSOR) 25 MG tablet        As above       COUNSELING:  Reviewed preventive health counseling, as reflected in patient instructions        "Regular exercise       Healthy diet/nutrition       Vision screening       Hearing screening    Estimated body mass index is 23.49 kg/m  as calculated from the following:    Height as of 11/12/19: 1.613 m (5' 3.5\").    Weight as of 11/12/19: 61.1 kg (134 lb 11.2 oz).         reports that she has never smoked. She has never used smokeless tobacco.      Appropriate preventive services were discussed with this patient, including applicable screening as appropriate for cardiovascular disease, diabetes, osteopenia/osteoporosis, and glaucoma.  As appropriate for age/gender, discussed screening for colorectal cancer, prostate cancer, breast cancer, and cervical cancer. Checklist reviewing preventive services available has been given to the patient.    Reviewed patients plan of care and provided an AVS. The Intermediate Care Plan ( asthma action plan, low back pain action plan, and migraine action plan) for Mely meets the Care Plan requirement. This Care Plan has been established and reviewed with the Patient.    Counseling Resources:  ATP IV Guidelines  Pooled Cohorts Equation Calculator  Breast Cancer Risk Calculator  FRAX Risk Assessment  ICSI Preventive Guidelines  Dietary Guidelines for Americans, 2010  USDA's MyPlate  ASA Prophylaxis  Lung CA Screening    Tiffany Messer MD  [facility]    Identified Health Risks:  "

## 2019-12-13 ENCOUNTER — OFFICE VISIT (OUTPATIENT)
Dept: FAMILY MEDICINE | Facility: CLINIC | Age: 79
End: 2019-12-13
Payer: MEDICARE

## 2019-12-13 VITALS
RESPIRATION RATE: 14 BRPM | HEART RATE: 70 BPM | BODY MASS INDEX: 22.47 KG/M2 | SYSTOLIC BLOOD PRESSURE: 115 MMHG | WEIGHT: 131.6 LBS | OXYGEN SATURATION: 99 % | DIASTOLIC BLOOD PRESSURE: 72 MMHG | HEIGHT: 64 IN

## 2019-12-13 DIAGNOSIS — R05.9 COUGH: ICD-10-CM

## 2019-12-13 DIAGNOSIS — E03.9 ACQUIRED HYPOTHYROIDISM: ICD-10-CM

## 2019-12-13 DIAGNOSIS — Z00.00 ENCOUNTER FOR ROUTINE ADULT HEALTH EXAMINATION WITHOUT ABNORMAL FINDINGS: Primary | ICD-10-CM

## 2019-12-13 DIAGNOSIS — I10 ESSENTIAL HYPERTENSION WITH GOAL BLOOD PRESSURE LESS THAN 140/90: ICD-10-CM

## 2019-12-13 DIAGNOSIS — I47.10 PAROXYSMAL SUPRAVENTRICULAR TACHYCARDIA (H): ICD-10-CM

## 2019-12-13 PROCEDURE — 99214 OFFICE O/P EST MOD 30 MIN: CPT | Mod: 25 | Performed by: FAMILY MEDICINE

## 2019-12-13 PROCEDURE — G0439 PPPS, SUBSEQ VISIT: HCPCS | Performed by: FAMILY MEDICINE

## 2019-12-13 ASSESSMENT — MIFFLIN-ST. JEOR: SCORE: 1058.99

## 2019-12-13 ASSESSMENT — ACTIVITIES OF DAILY LIVING (ADL): CURRENT_FUNCTION: NO ASSISTANCE NEEDED

## 2019-12-15 RX ORDER — LEVOTHYROXINE SODIUM 100 UG/1
TABLET ORAL
Qty: 90 TABLET | Refills: 3 | Status: SHIPPED | OUTPATIENT
Start: 2019-12-15 | End: 2020-09-14

## 2019-12-15 RX ORDER — METOPROLOL TARTRATE 25 MG/1
TABLET, FILM COATED ORAL
Qty: 90 TABLET | Refills: 0 | Status: SHIPPED | OUTPATIENT
Start: 2019-12-15 | End: 2019-12-16

## 2019-12-15 RX ORDER — LOSARTAN POTASSIUM 100 MG/1
TABLET ORAL
Qty: 90 TABLET | Refills: 1 | Status: SHIPPED | OUTPATIENT
Start: 2019-12-15 | End: 2020-03-10

## 2019-12-15 RX ORDER — TRIAMTERENE AND HYDROCHLOROTHIAZIDE 37.5; 25 MG/1; MG/1
CAPSULE ORAL
Qty: 90 CAPSULE | Refills: 1 | Status: SHIPPED | OUTPATIENT
Start: 2019-12-15 | End: 2020-03-17

## 2019-12-15 RX ORDER — AMLODIPINE BESYLATE 10 MG/1
5 TABLET ORAL DAILY
Qty: 90 TABLET | Refills: 1 | Status: SHIPPED | OUTPATIENT
Start: 2019-12-15 | End: 2020-12-15

## 2019-12-16 ENCOUNTER — TELEPHONE (OUTPATIENT)
Dept: CARDIOLOGY | Facility: CLINIC | Age: 79
End: 2019-12-16

## 2019-12-16 DIAGNOSIS — I47.10 SVT (SUPRAVENTRICULAR TACHYCARDIA) (H): Primary | ICD-10-CM

## 2019-12-16 DIAGNOSIS — I47.10 PAROXYSMAL SUPRAVENTRICULAR TACHYCARDIA (H): ICD-10-CM

## 2019-12-16 NOTE — TELEPHONE ENCOUNTER
Patient called reporting she has been having recurrent episodes of palpitations since early December.  She has them several times daily.  HR go as high as 160.  Reports episodes lasting seconds to minutes.  Patient s/p SVT ablation 10/8 and metoprolol was stopped at that time.  Will update Dr Arriaza regarding above for recommendations.  ANASTACIO Marshall

## 2019-12-17 NOTE — TELEPHONE ENCOUNTER
Spoke to patient regarding recommendations per Dr Arriaza to wear 14 day ZP   Patient provided verbal understanding.  Orders placed in epic.  Sent patient to scheduling to set this up.  ANASTACIO Marshall

## 2019-12-17 NOTE — TELEPHONE ENCOUNTER
Message left for patient to review recommendations per Dr Arriaza.  Awaiting return call.  ANASTACIO Marshall

## 2019-12-23 ENCOUNTER — HOSPITAL ENCOUNTER (OUTPATIENT)
Dept: MAMMOGRAPHY | Facility: CLINIC | Age: 79
Discharge: HOME | End: 2019-12-23
Attending: FAMILY MEDICINE | Admitting: FAMILY MEDICINE
Payer: MEDICARE

## 2019-12-23 ENCOUNTER — HOSPITAL ENCOUNTER (OUTPATIENT)
Dept: CARDIOLOGY | Facility: CLINIC | Age: 79
Discharge: HOME | End: 2019-12-23
Attending: INTERNAL MEDICINE | Admitting: INTERNAL MEDICINE
Payer: MEDICARE

## 2019-12-23 DIAGNOSIS — I47.10 PAROXYSMAL SUPRAVENTRICULAR TACHYCARDIA (H): ICD-10-CM

## 2019-12-23 DIAGNOSIS — Z12.31 VISIT FOR SCREENING MAMMOGRAM: ICD-10-CM

## 2019-12-23 PROCEDURE — 0296T ZIO PATCH HOLTER ADULT PEDIATRIC GREATER THAN 48 HRS: CPT

## 2019-12-23 PROCEDURE — 77067 SCR MAMMO BI INCL CAD: CPT

## 2019-12-23 PROCEDURE — 0298T ZIO PATCH HOLTER ADULT PEDIATRIC GREATER THAN 48 HRS: CPT | Performed by: INTERNAL MEDICINE

## 2020-01-06 ENCOUNTER — TRANSFERRED RECORDS (OUTPATIENT)
Dept: HEALTH INFORMATION MANAGEMENT | Facility: CLINIC | Age: 80
End: 2020-01-06

## 2020-01-14 ENCOUNTER — TELEPHONE (OUTPATIENT)
Dept: CARDIOLOGY | Facility: CLINIC | Age: 80
End: 2020-01-14

## 2020-01-14 NOTE — TELEPHONE ENCOUNTER
Reviewed with patient results of 14 day ZP worn 12/23/19-1/6/20  Results---612 SVT, up to 26min 2 sec.  Dr Arriaza is recommending repeat SVT ablation.  Patient reports she is heading to [place] for the month of February.  Recommended that patient come in to discuss options for treatment until patient comes back from [place] to schedule repeat SVT ablation.  Patient to see Dr Arriaza on 1/21 at 3:45pm.  ANASTACIO Marshall

## 2020-01-21 ENCOUNTER — OFFICE VISIT (OUTPATIENT)
Dept: CARDIOLOGY | Facility: CLINIC | Age: 80
End: 2020-01-21
Payer: MEDICARE

## 2020-01-21 VITALS
WEIGHT: 135 LBS | DIASTOLIC BLOOD PRESSURE: 76 MMHG | BODY MASS INDEX: 23.05 KG/M2 | OXYGEN SATURATION: 100 % | SYSTOLIC BLOOD PRESSURE: 138 MMHG | HEIGHT: 64 IN | HEART RATE: 58 BPM

## 2020-01-21 DIAGNOSIS — I47.19 ATRIAL TACHYCARDIA (H): Primary | ICD-10-CM

## 2020-01-21 PROCEDURE — 99215 OFFICE O/P EST HI 40 MIN: CPT | Performed by: INTERNAL MEDICINE

## 2020-01-21 RX ORDER — FLECAINIDE ACETATE 50 MG/1
50 TABLET ORAL 2 TIMES DAILY
Qty: 180 TABLET | Refills: 3 | Status: SHIPPED | OUTPATIENT
Start: 2020-01-21 | End: 2020-03-17

## 2020-01-21 ASSESSMENT — MIFFLIN-ST. JEOR: SCORE: 1074.42

## 2020-01-21 NOTE — LETTER
1/21/2020    Tiffany Messer MD  [address] [facility] [address]  [place] [address]    RE: Mely Guerra       Dear Colleague,    I had the pleasure of seeing Mely Guerra in the [facility].    HPI and Plan:   See dictation    No orders of the defined types were placed in this encounter.      Orders Placed This Encounter   Medications     flecainide (TAMBOCOR) 50 MG tablet     Sig: Take 1 tablet (50 mg) by mouth 2 times daily     Dispense:  180 tablet     Refill:  3       There are no discontinued medications.      Encounter Diagnosis   Name Primary?     Atrial tachycardia (H) Yes       CURRENT MEDICATIONS:  Current Outpatient Medications   Medication Sig Dispense Refill     acetaminophen (TYLENOL) 325 MG tablet Take 325-650 mg by mouth every morning       amLODIPine (NORVASC) 10 MG tablet Take 0.5 tablets (5 mg) by mouth daily 90 tablet 1     Calcium Citrate-Vitamin D (CALCIUM CITRATE + D) 250-200 MG-UNIT TABS Take 1 tablet by mouth 2 times daily       diphenhydrAMINE-acetaminophen (TYLENOL PM)  MG tablet Take 2 tablets by mouth At Bedtime       flecainide (TAMBOCOR) 50 MG tablet Take 1 tablet (50 mg) by mouth 2 times daily 180 tablet 3     fluticasone (FLONASE) 50 MCG/ACT nasal spray USE 2 SPRAYS IN EACH NOSTRIL EVERY DAY 48 g 1     levothyroxine (SYNTHROID/LEVOTHROID) 100 MCG tablet TAKE 1 TABLET EVERY DAY (NEED MD APPOINTMENT) 90 tablet 3     losartan (COZAAR) 100 MG tablet TAKE 1 TABLET EVERY DAY 90 tablet 1     Multiple Vitamins-Minerals (MULTIVITAMIN ADULT PO) Take 1 tablet by mouth daily       Multiple Vitamins-Minerals (PRESERVISION AREDS 2 PO) Take 1 tablet by mouth 2 times daily       Omega-3 Fatty Acids (FISH OIL PO) 1,000 mg 2 times daily       triamterene-HCTZ (DYAZIDE) 37.5-25 MG capsule Take one capsule daily 90 capsule 1       ALLERGIES     Allergies   Allergen Reactions     Cats      Hylan G-F 20      Lisinopril Cough     Seasonal Allergies      Vioxx         PAST MEDICAL HISTORY:  Past Medical History:   Diagnosis Date     Allergic rhinitis, cause unspecified      Personal history of alcoholism (H)      Sleep apnea      SVT (supraventricular tachycardia) (H)     s/p atrial tachycardia ablation near CS os     Unspecified essential hypertension      Unspecified hypothyroidism        PAST SURGICAL HISTORY:  Past Surgical History:   Procedure Laterality Date     C NONSPECIFIC PROCEDURE  1974    s/p Vaginal hysterectomy     COLONOSCOPY N/A 10/31/2018    Procedure: COMBINED COLONOSCOPY, SINGLE OR MULTIPLE BIOPSY/POLYPECTOMY BY BIOPSY;  Surgeon: Leon Cosme MD;  Location:  GI     EP ABLATION SVT N/A 10/8/2019    Procedure: EP Ablation SVT;  Surgeon: Laney Arriaza MD;  Location:  HEART CARDIAC CATH LAB     ESOPHAGOSCOPY, GASTROSCOPY, DUODENOSCOPY (EGD), COMBINED N/A 10/31/2018    Procedure: GASTROSCOPY;  Surgeon: Leon Cosme MD;  Location:  GI     HYSTERECTOMY       JOINT REPLACEMENT Bilateral      knee replacement Bilateral      ORTHOPEDIC SURGERY         FAMILY HISTORY:  Family History   Problem Relation Age of Onset     Cancer Sister      Heart Disease Father        SOCIAL HISTORY:  Social History     Socioeconomic History     Marital status:      Spouse name: None     Number of children: None     Years of education: None     Highest education level: None   Occupational History     None   Social Needs     Financial resource strain: None     Food insecurity:     Worry: None     Inability: None     Transportation needs:     Medical: None     Non-medical: None   Tobacco Use     Smoking status: Never Smoker     Smokeless tobacco: Never Used   Substance and Sexual Activity     Alcohol use: No     Alcohol/week: 0.0 standard drinks     Drug use: No     Sexual activity: Yes     Partners: Male   Lifestyle     Physical activity:     Days per week: None     Minutes per session: None     Stress: None   Relationships     Social connections:      Talks on phone: None     Gets together: None     Attends Christianity service: None     Active member of club or organization: None     Attends meetings of clubs or organizations: None     Relationship status: None     Intimate partner violence:     Fear of current or ex partner: None     Emotionally abused: None     Physically abused: None     Forced sexual activity: None   Other Topics Concern      Service No     Blood Transfusions No     Caffeine Concern No     Occupational Exposure No     Hobby Hazards No     Sleep Concern Yes     Stress Concern No     Weight Concern No     Special Diet No     Back Care Yes     Exercise No     Bike Helmet Yes     Seat Belt Yes     Self-Exams Yes     Parent/sibling w/ CABG, MI or angioplasty before 65F 55M? Yes   Social History Narrative    Social Documentation:4/10        Balanced Diet: YES    Calcium intake: supplement per day    Caffeine: 4cups per day    Exercise:  type of activity varies;  3 times per week    Sunscreen: Yes    Seatbelts:  Yes    Self Breast Exam:  Yes    Self Testicular Exam: No - na    Physical/Emotional/Sexual Abuse: No -     Do you feel safe in your environment? Yes        Cholesterol screen up to date: yes    Eye Exam up to date: Yes    Dental Exam up to date: Yes    Pap smear up to date: Does Not Apply    Mammogram up to date: utd    Dexa Scan up to date: Yes    Colonoscopy up to date: Yes    Immunizations up to date: Yes    Glucose screen if over 40:  No -     Fredy Carroll ma               Review of Systems:  Skin:  Negative       Eyes:  Positive for glasses    ENT:  Negative nasal congestion    Respiratory:  Positive for sleep apnea;CPAP occassional SOB   Cardiovascular:    Positive for;palpitations;chest pain occassional chest tightness  Gastroenterology: Negative      Genitourinary:  Negative      Musculoskeletal:  Positive for arthritis    Neurologic:  Negative headaches;stroke;seizures    Psychiatric:  Negative      Heme/Lymph/Imm:   "Positive for allergies    Endocrine:  Positive for thyroid disorder      Physical Exam:  Vitals: /76   Pulse 58   Ht 1.613 m (5' 3.5\")   Wt 61.2 kg (135 lb)   SpO2 100%   BMI 23.54 kg/m       Constitutional:           Skin:             Head:           Eyes:           Lymph:      ENT:           Neck:           Respiratory:            Cardiac:                                                           GI:           Extremities and Muscular Skeletal:                 Neurological:           Psych:           CC  No referring provider defined for this encounter.                Thank you for allowing me to participate in the care of your patient.      Sincerely,     Laney Arriaza MD     [facility]    cc:   No referring provider defined for this encounter.        "

## 2020-01-21 NOTE — LETTER
1/21/2020      Tiffany Messer MD  [address]  [facility] [address]      RE: Mely HANKS Mari       Dear Colleague,    I had the pleasure of seeing Mely Guerra in the [facility].    Service Date: 01/21/2020      HISTORY OF PRESENT ILLNESS:  I saw Ms. Guerra for followup of atrial tachycardia.  She is a 79-year-old white female with a history of recurrent SVT.  She underwent an EP study and was found to have incessant atrial tachycardia with isoproterenol infusion on 10/08/2019.  Her atrial tachycardia was found to be from the coronary sinus orifice area, and the ablation was acutely successful.  She did well for about 2 months and then started to have recurrent palpitations.  She was put on a Zio Patch monitor for 2 weeks on 12/23.  The Zio Patch monitor detected 612 episodes of SVT with duration up to 26 minutes.  She continues to feel recurrent palpitations with occasional dizziness, but she has not required ER visit.      She is known to have intermittent sinus bradycardia down to 35 beats per minute.  For that reason, we have not attempted aggressive medical therapy.      PHYSICAL EXAMINATION:   VITAL SIGNS:  Blood pressure was 138/76, heart rate 58 beats per minute, body weight 135 pounds.     The physical examination showed no remarkable changes.      ASSESSMENT AND RECOMMENDATIONS:  Ms. Guerra unfortunately has recurrence of atrial tachycardia.  The duration is relatively brief but is quite fast and frequent.  The patient has a plan to go to [place] for 1 month.  For that reason, the proposed repeat ablation will be delayed until March.  For short-term, I prescribed flecainide 50 mg p.o. b.i.d.  Hopefully, she will be able to tolerate that dose.  She is asked not to exercise if she is in tachycardia.  Otherwise, she should not have a major restriction for activities.  She is asked to stop flecainide about a week before the repeat ablation.      cc:   Tiffany  MD Ayde   [facility]   [address], [address]   [place]  [address]         PER ARRIAZA MD             D: 2020   T: 2020   MT: PIPER      Name:     THELMA VELÁSQUEZ   MRN:      [id number]        Account:      [id number]   :      1940           Service Date: 2020      Document: [id number]           Outpatient Encounter Medications as of 2020   Medication Sig Dispense Refill     acetaminophen (TYLENOL) 325 MG tablet Take 325-650 mg by mouth every morning       amLODIPine (NORVASC) 10 MG tablet Take 0.5 tablets (5 mg) by mouth daily 90 tablet 1     Calcium Citrate-Vitamin D (CALCIUM CITRATE + D) 250-200 MG-UNIT TABS Take 1 tablet by mouth 2 times daily       diphenhydrAMINE-acetaminophen (TYLENOL PM)  MG tablet Take 2 tablets by mouth At Bedtime       flecainide (TAMBOCOR) 50 MG tablet Take 1 tablet (50 mg) by mouth 2 times daily 180 tablet 3     fluticasone (FLONASE) 50 MCG/ACT nasal spray USE 2 SPRAYS IN EACH NOSTRIL EVERY DAY 48 g 1     levothyroxine (SYNTHROID/LEVOTHROID) 100 MCG tablet TAKE 1 TABLET EVERY DAY (NEED MD APPOINTMENT) 90 tablet 3     losartan (COZAAR) 100 MG tablet TAKE 1 TABLET EVERY DAY 90 tablet 1     Multiple Vitamins-Minerals (MULTIVITAMIN ADULT PO) Take 1 tablet by mouth daily       Multiple Vitamins-Minerals (PRESERVISION AREDS 2 PO) Take 1 tablet by mouth 2 times daily       Omega-3 Fatty Acids (FISH OIL PO) 1,000 mg 2 times daily       triamterene-HCTZ (DYAZIDE) 37.5-25 MG capsule Take one capsule daily 90 capsule 1     No facility-administered encounter medications on file as of 2020.        Again, thank you for allowing me to participate in the care of your patient.      Sincerely,    Per Arriaza MD     [facility]

## 2020-01-21 NOTE — PROGRESS NOTES
HPI and Plan:   See dictation    No orders of the defined types were placed in this encounter.      Orders Placed This Encounter   Medications     flecainide (TAMBOCOR) 50 MG tablet     Sig: Take 1 tablet (50 mg) by mouth 2 times daily     Dispense:  180 tablet     Refill:  3       There are no discontinued medications.      Encounter Diagnosis   Name Primary?     Atrial tachycardia (H) Yes       CURRENT MEDICATIONS:  Current Outpatient Medications   Medication Sig Dispense Refill     acetaminophen (TYLENOL) 325 MG tablet Take 325-650 mg by mouth every morning       amLODIPine (NORVASC) 10 MG tablet Take 0.5 tablets (5 mg) by mouth daily 90 tablet 1     Calcium Citrate-Vitamin D (CALCIUM CITRATE + D) 250-200 MG-UNIT TABS Take 1 tablet by mouth 2 times daily       diphenhydrAMINE-acetaminophen (TYLENOL PM)  MG tablet Take 2 tablets by mouth At Bedtime       flecainide (TAMBOCOR) 50 MG tablet Take 1 tablet (50 mg) by mouth 2 times daily 180 tablet 3     fluticasone (FLONASE) 50 MCG/ACT nasal spray USE 2 SPRAYS IN EACH NOSTRIL EVERY DAY 48 g 1     levothyroxine (SYNTHROID/LEVOTHROID) 100 MCG tablet TAKE 1 TABLET EVERY DAY (NEED MD APPOINTMENT) 90 tablet 3     losartan (COZAAR) 100 MG tablet TAKE 1 TABLET EVERY DAY 90 tablet 1     Multiple Vitamins-Minerals (MULTIVITAMIN ADULT PO) Take 1 tablet by mouth daily       Multiple Vitamins-Minerals (PRESERVISION AREDS 2 PO) Take 1 tablet by mouth 2 times daily       Omega-3 Fatty Acids (FISH OIL PO) 1,000 mg 2 times daily       triamterene-HCTZ (DYAZIDE) 37.5-25 MG capsule Take one capsule daily 90 capsule 1       ALLERGIES     Allergies   Allergen Reactions     Cats      Hylan G-F 20      Lisinopril Cough     Seasonal Allergies      Vioxx        PAST MEDICAL HISTORY:  Past Medical History:   Diagnosis Date     Allergic rhinitis, cause unspecified      Personal history of alcoholism (H)      Sleep apnea      SVT (supraventricular tachycardia) (H)     s/p atrial  tachycardia ablation near CS os     Unspecified essential hypertension      Unspecified hypothyroidism        PAST SURGICAL HISTORY:  Past Surgical History:   Procedure Laterality Date     C NONSPECIFIC PROCEDURE  1974    s/p Vaginal hysterectomy     COLONOSCOPY N/A 10/31/2018    Procedure: COMBINED COLONOSCOPY, SINGLE OR MULTIPLE BIOPSY/POLYPECTOMY BY BIOPSY;  Surgeon: Leon Cosme MD;  Location:  GI     EP ABLATION SVT N/A 10/8/2019    Procedure: EP Ablation SVT;  Surgeon: Laney Arriaza MD;  Location:  HEART CARDIAC CATH LAB     ESOPHAGOSCOPY, GASTROSCOPY, DUODENOSCOPY (EGD), COMBINED N/A 10/31/2018    Procedure: GASTROSCOPY;  Surgeon: Leon Cosme MD;  Location:  GI     HYSTERECTOMY       JOINT REPLACEMENT Bilateral      knee replacement Bilateral      ORTHOPEDIC SURGERY         FAMILY HISTORY:  Family History   Problem Relation Age of Onset     Cancer Sister      Heart Disease Father        SOCIAL HISTORY:  Social History     Socioeconomic History     Marital status:      Spouse name: None     Number of children: None     Years of education: None     Highest education level: None   Occupational History     None   Social Needs     Financial resource strain: None     Food insecurity:     Worry: None     Inability: None     Transportation needs:     Medical: None     Non-medical: None   Tobacco Use     Smoking status: Never Smoker     Smokeless tobacco: Never Used   Substance and Sexual Activity     Alcohol use: No     Alcohol/week: 0.0 standard drinks     Drug use: No     Sexual activity: Yes     Partners: Male   Lifestyle     Physical activity:     Days per week: None     Minutes per session: None     Stress: None   Relationships     Social connections:     Talks on phone: None     Gets together: None     Attends [employer] service: None     Active member of club or organization: None     Attends meetings of clubs or organizations: None     Relationship status: None     Intimate  "partner violence:     Fear of current or ex partner: None     Emotionally abused: None     Physically abused: None     Forced sexual activity: None   Other Topics Concern      Service No     Blood Transfusions No     Caffeine Concern No     Occupational Exposure No     Hobby Hazards No     Sleep Concern Yes     Stress Concern No     Weight Concern No     Special Diet No     Back Care Yes     Exercise No     Bike Helmet Yes     Seat Belt Yes     Self-Exams Yes     Parent/sibling w/ CABG, MI or angioplasty before 65F 55M? Yes   Social History Narrative    Social Documentation:4/10        Balanced Diet: YES    Calcium intake: supplement per day    Caffeine: 4cups per day    Exercise:  type of activity varies;  3 times per week    Sunscreen: Yes    Seatbelts:  Yes    Self Breast Exam:  Yes    Self Testicular Exam: No - na    Physical/Emotional/Sexual Abuse: No -     Do you feel safe in your environment? Yes        Cholesterol screen up to date: yes    Eye Exam up to date: Yes    Dental Exam up to date: Yes    Pap smear up to date: Does Not Apply    Mammogram up to date: utd    Dexa Scan up to date: Yes    Colonoscopy up to date: Yes    Immunizations up to date: Yes    Glucose screen if over 40:  No -     Fredy Carroll ma               Review of Systems:  Skin:  Negative       Eyes:  Positive for glasses    ENT:  Negative nasal congestion    Respiratory:  Positive for sleep apnea;CPAP occassional SOB   Cardiovascular:    Positive for;palpitations;chest pain occassional chest tightness  Gastroenterology: Negative      Genitourinary:  Negative      Musculoskeletal:  Positive for arthritis    Neurologic:  Negative headaches;stroke;seizures    Psychiatric:  Negative      Heme/Lymph/Imm:  Positive for allergies    Endocrine:  Positive for thyroid disorder      Physical Exam:  Vitals: /76   Pulse 58   Ht 1.613 m (5' 3.5\")   Wt 61.2 kg (135 lb)   SpO2 100%   BMI 23.54 kg/m      Constitutional:       "     Skin:             Head:           Eyes:           Lymph:      ENT:           Neck:           Respiratory:            Cardiac:                                                           GI:           Extremities and Muscular Skeletal:                 Neurological:           Psych:           CC  No referring provider defined for this encounter.

## 2020-01-21 NOTE — PROGRESS NOTES
Service Date: 01/21/2020      HISTORY OF PRESENT ILLNESS:  I saw Ms. Velásquez for followup of atrial tachycardia.  She is a 79-year-old white female with a history of recurrent SVT.  She underwent an EP study and was found to have incessant atrial tachycardia with isoproterenol infusion on 10/08/2019.  Her atrial tachycardia was found to be from the coronary sinus orifice area, and the ablation was acutely successful.  She did well for about 2 months and then started to have recurrent palpitations.  She was put on a Zio Patch monitor for 2 weeks on 12/23.  The Zio Patch monitor detected 612 episodes of SVT with duration up to 26 minutes.  She continues to feel recurrent palpitations with occasional dizziness, but she has not required ER visit.      She is known to have intermittent sinus bradycardia down to 35 beats per minute.  For that reason, we have not attempted aggressive medical therapy.      PHYSICAL EXAMINATION:   VITAL SIGNS:  Blood pressure was 138/76, heart rate 58 beats per minute, body weight 135 pounds.     The physical examination showed no remarkable changes.      ASSESSMENT AND RECOMMENDATIONS:  Ms. Velásquez unfortunately has recurrence of atrial tachycardia.  The duration is relatively brief but is quite fast and frequent.  The patient has a plan to go to [place] for 1 month.  For that reason, the proposed repeat ablation will be delayed until March.  For short-term, I prescribed flecainide 50 mg p.o. b.i.d.  Hopefully, she will be able to tolerate that dose.  She is asked not to exercise if she is in tachycardia.  Otherwise, she should not have a major restriction for activities.  She is asked to stop flecainide about a week before the repeat ablation.      cc:   Tiffany Messer MD   [address], [address]   [place]  [address]         PER SALCEDO MD             D: 01/21/2020   T: 01/21/2020   MT: PIPER      Name:     THELMA VELÁSQUEZ   MRN:      [id number]         Account:      [id number]   :      1940           Service Date: 2020      Document: [id number]

## 2020-03-10 DIAGNOSIS — I10 ESSENTIAL HYPERTENSION WITH GOAL BLOOD PRESSURE LESS THAN 140/90: ICD-10-CM

## 2020-03-10 RX ORDER — LOSARTAN POTASSIUM 100 MG/1
TABLET ORAL
Qty: 90 TABLET | Refills: 1 | Status: SHIPPED | OUTPATIENT
Start: 2020-03-10 | End: 2020-09-24

## 2020-03-10 NOTE — TELEPHONE ENCOUNTER
Reason for Call:  Medication or medication refill:    Do you use a [facility]?  Name of the pharmacy and phone number for the current request:     [facility] [place] [place] - [address] AT PORTAL TO REGISTERED [facility] SITES      Name of the medication requested: losartan (COZAAR) 100 MG tablet    Other request:       Can we leave a detailed message on this number? YES    Phone number patient can be reached at: Home number on file [phone] (home)    Best Time: anytime     Call taken on 3/10/2020 at 3:14 PM by Kylee Dubon

## 2020-03-10 NOTE — TELEPHONE ENCOUNTER
"Routing refill request to provider for review/approval because:  Drug interaction warning (HIGH)  Bernadine BROWN RN    Last Written Prescription Date:  12/15/2019  Last Fill Quantity: 90,  # refills: 1   Last office visit: 12/13/2019 with prescribing provider:     Future Office Visit:    Requested Prescriptions   Pending Prescriptions Disp Refills     losartan (COZAAR) 100 MG tablet 90 tablet 1     Sig: TAKE 1 TABLET EVERY DAY       Angiotensin-II Receptors Passed - 3/10/2020  3:15 PM        Passed - Last blood pressure under 140/90 in past 12 months     BP Readings from Last 3 Encounters:   01/21/20 138/76   12/13/19 115/72   11/12/19 110/68                 Passed - Recent (12 mo) or future (30 days) visit within the authorizing provider's specialty     Patient has had an office visit with the authorizing provider or a provider within the authorizing providers department within the previous 12 mos or has a future within next 30 days. See \"Patient Info\" tab in inbasket, or \"Choose Columns\" in Meds & Orders section of the refill encounter.              Passed - Medication is active on med list        Passed - Patient is age 18 or older        Passed - No active pregnancy on record        Passed - Normal serum creatinine on file in past 12 months     Recent Labs   Lab Test 10/08/19  0655   CR 0.79             Passed - Normal serum potassium on file in past 12 months     Recent Labs   Lab Test 10/08/19  0655   POTASSIUM 3.5                    Passed - No positive pregnancy test in past 12 months           "

## 2020-03-13 ENCOUNTER — TELEPHONE (OUTPATIENT)
Dept: CARDIOLOGY | Facility: CLINIC | Age: 80
End: 2020-03-13

## 2020-03-16 ENCOUNTER — DOCUMENTATION ONLY (OUTPATIENT)
Dept: CARDIOLOGY | Facility: CLINIC | Age: 80
End: 2020-03-16

## 2020-03-16 DIAGNOSIS — I47.19 ATRIAL TACHYCARDIA (H): Primary | ICD-10-CM

## 2020-03-16 NOTE — PROGRESS NOTES
"Opted to NOT have a billable visit as pt was cancelling AT/SVT ablation (all elective procedures being postponed).    1. She's really feeling good on the flecainide with \"very infrequent\" palpitations.    2. No dizziness, lightheadedness, CP, SOB, edema. Feels 'really good.'    Continue flecainide 50 mg BID. Pt has Rx with refills already provided.     Agreed she'll see me 3 m to assess how she's doing. Will call earlier if any issues/questions.      "

## 2020-03-17 ENCOUNTER — MYC REFILL (OUTPATIENT)
Dept: FAMILY MEDICINE | Facility: CLINIC | Age: 80
End: 2020-03-17

## 2020-03-17 DIAGNOSIS — I10 ESSENTIAL HYPERTENSION WITH GOAL BLOOD PRESSURE LESS THAN 140/90: ICD-10-CM

## 2020-03-17 DIAGNOSIS — I47.19 ATRIAL TACHYCARDIA (H): ICD-10-CM

## 2020-03-17 RX ORDER — FLECAINIDE ACETATE 50 MG/1
50 TABLET ORAL 2 TIMES DAILY
Qty: 180 TABLET | Refills: 1 | Status: SHIPPED | OUTPATIENT
Start: 2020-03-17 | End: 2020-06-04

## 2020-03-18 RX ORDER — TRIAMTERENE AND HYDROCHLOROTHIAZIDE 37.5; 25 MG/1; MG/1
CAPSULE ORAL
Qty: 90 CAPSULE | Refills: 1 | Status: SHIPPED | OUTPATIENT
Start: 2020-03-18 | End: 2020-11-03

## 2020-03-18 NOTE — TELEPHONE ENCOUNTER
"Patient changing pharmacies    Routing refill request to provider for review/approval because:  Drug interaction warning (High)   Labs out of range:  Sodium     Bernadine BROWN RN    Last Written Prescription Date:  12/15/2019  Last Fill Quantity: 90,  # refills: 1   Last office visit: 12/13/2019 with prescribing provider:     Future Office Visit:    Requested Prescriptions   Pending Prescriptions Disp Refills     triamterene-HCTZ (DYAZIDE) 37.5-25 MG capsule 90 capsule 1     Sig: Take one capsule daily       Diuretics (Including Combos) Protocol Failed - 3/17/2020  4:29 PM        Failed - Normal serum sodium on file in past 12 months     Recent Labs   Lab Test 10/08/19  0655   *              Passed - Blood pressure under 140/90 in past 12 months     BP Readings from Last 3 Encounters:   03/16/20 132/72   01/21/20 138/76   12/13/19 115/72                 Passed - Recent (12 mo) or future (30 days) visit within the authorizing provider's specialty     Patient has had an office visit with the authorizing provider or a provider within the authorizing providers department within the previous 12 mos or has a future within next 30 days. See \"Patient Info\" tab in inbasket, or \"Choose Columns\" in Meds & Orders section of the refill encounter.              Passed - Medication is active on med list        Passed - Patient is age 18 or older        Passed - No active pregancy on record        Passed - Normal serum creatinine on file in past 12 months     Recent Labs   Lab Test 10/08/19  0655   CR 0.79              Passed - Normal serum potassium on file in past 12 months     Recent Labs   Lab Test 10/08/19  0655   POTASSIUM 3.5                    Passed - No positive pregnancy test in past 12 months           "

## 2020-05-27 DIAGNOSIS — Z11.59 ENCOUNTER FOR SCREENING FOR OTHER VIRAL DISEASES: Primary | ICD-10-CM

## 2020-06-01 ENCOUNTER — VIRTUAL VISIT (OUTPATIENT)
Dept: FAMILY MEDICINE | Facility: CLINIC | Age: 80
End: 2020-06-01
Payer: MEDICARE
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
## 2020-06-01 DIAGNOSIS — S30.861A TICK BITE OF ABDOMINAL WALL, INITIAL ENCOUNTER: Primary | ICD-10-CM

## 2020-06-01 PROCEDURE — 99213 OFFICE O/P EST LOW 20 MIN: CPT | Mod: 95 | Performed by: FAMILY MEDICINE

## 2020-06-01 RX ORDER — DOXYCYCLINE HYCLATE 100 MG
100 TABLET ORAL 2 TIMES DAILY
Qty: 28 TABLET | Refills: 0 | Status: SHIPPED | OUTPATIENT
Start: 2020-06-01 | End: 2020-06-15

## 2020-06-01 NOTE — PROGRESS NOTES
"Mely Guerra is a 80 year old female who is being evaluated via a billable video visit.      The patient has been notified of following:     \"This video visit will be conducted via a call between you and your physician/provider. We have found that certain health care needs can be provided without the need for an in-person physical exam.  This service lets us provide the care you need with a video conversation.  If a prescription is necessary we can send it directly to your pharmacy.  If lab work is needed we can place an order for that and you can then stop by our lab to have the test done at a later time.    Video visits are billed at different rates depending on your insurance coverage.  Please reach out to your insurance provider with any questions.    If during the course of the call the physician/provider feels a video visit is not appropriate, you will not be charged for this service.\"    Patient has given verbal consent for Video visit? Yes    How would you like to obtain your AVS? Joyce     Patient would like the video invitation sent by: Text to cell phone: [phone]    Will anyone else be joining your video visit? No      Subjective     Mely Guerra is a 80 year old female who presents today via video visit for the following health issues:    HPI  Tick bite      Duration: 05/28/2020    Description (location/character/radiation): right above groin area lower abdomen    Intensity:  Moderate itching    Accompanying signs and symptoms: redness to the area    History (similar episodes/previous evaluation): None    Precipitating or alleviating factors: None    Therapies tried and outcome: Cortizone cream helped         Video Start Time: 10:31 AM attempted- no response   left at 1035  Video visit started 1038      [facility] in [facility], spends a lot of time out doors.  Noted a tick on the right lower abdomen /groin  Not sure if it was wood or dog tick.  Pulled it out in shower with her finger " "nails  Redness started within 24 hours  Its spread a bit to about 1- 1.5 inch    She reports over all in usual state of good health.    PROBLEMS TO ADD ON...    BP Readings from Last 3 Encounters:   03/16/20 132/72   01/21/20 138/76   12/13/19 115/72    Wt Readings from Last 3 Encounters:   03/16/20 60.3 kg (133 lb)   01/21/20 61.2 kg (135 lb)   12/13/19 59.7 kg (131 lb 9.6 oz)                    Reviewed and updated as needed this visit by Provider         Review of Systems   Constitutional, HEENT, cardiovascular, pulmonary, GI, , musculoskeletal, neuro, skin, endocrine and psych systems are negative, except as otherwise noted.      Objective    There were no vitals taken for this visit.  Estimated body mass index is 23.19 kg/m  as calculated from the following:    Height as of 3/16/20: 1.613 m (5' 3.5\").    Weight as of 3/16/20: 60.3 kg (133 lb).  Physical Exam     GENERAL: Healthy, alert and no distress  EYES: Eyes grossly normal to inspection.  No discharge or erythema, or obvious scleral/conjunctival abnormalities.  Erythematous patch- an inch long, and raised and a central   RESP: No audible wheeze, cough, or visible cyanosis.  No visible retractions or increased work of breathing.    SKIN: right lower abdomen , above the public area, oblong, erythematous rash-1.5 inch with advancing border and central denudation   Rest of  Visible skin clear. No significant rash, abnormal pigmentation or lesions.  NEURO: Cranial nerves grossly intact.  Mentation and speech appropriate for age.  PSYCH: Mentation appears normal, affect normal/bright, judgement and insight intact, normal speech and appearance well-groomed.      Diagnostic Test Results:  Labs reviewed in Epic  none         Assessment & Plan     1. Tick bite of abdominal wall, initial encounter  Engorged tick removed by the patient -  Infected skin  Antibiotic is recommended  - doxycycline hyclate (VIBRA-TABS) 100 MG tablet; Take 1 tablet (100 mg) by mouth 2 " times daily for 14 days  Dispense: 28 tablet; Refill: 0   signs & symptoms of lymes dsease discussed  Patient reports she has had tick bites before as well, and took antibiotic- so familiar with medications and the disease.  Potential medication side effects were discussed with the patient; let me know if any occur.  Follow up in 2 weeks for further quetsions and concerns      Return in about 2 weeks (around 6/15/2020) for virtual/video visit, concerns,unresolved.    Alessia Serrano MD  [facility]      Video-Visit Details    Type of service:  Video Visit    Video End Time:10:47 AM    Originating Location (pt. Location): Home    Distant Location (provider location):  [facility]     Platform used for Video Visit: AmWell    Return in about 2 weeks (around 6/15/2020) for virtual/video visit, concerns,unresolved.       Alessia Serrano MD

## 2020-06-03 NOTE — PROGRESS NOTES
"Mely Guerra is a 80 year old female who is being evaluated via a billable video visit.      The patient has been notified of following:     \"This video visit will be conducted via a call between you and your physician/provider. We have found that certain health care needs can be provided without the need for an in-person physical exam.  This service lets us provide the care you need with a video conversation.  If a prescription is necessary we can send it directly to your pharmacy.  If lab work is needed we can place an order for that and you can then stop by our lab to have the test done at a later time.    Video visits are billed at different rates depending on your insurance coverage.  Please reach out to your insurance provider with any questions.    If during the course of the call the physician/provider feels a video visit is not appropriate, you will not be charged for this service.\"    Patient has given verbal consent for Video visit? Yes    How would you like to obtain your AVS? Mail a copy, My chart      Patient would like the video invitation sent by: Text to cell phone: [phone]    Will anyone else be joining your video visit? No    CC:  Pre-op evaluation for SVT ablation    VITALS:  BP: 149/82 - typically  Pulse:73  Wt:134.0lb    Yamilka Garcia RMA    BRIEF HPI:  79 yo F who sees Dr. Arriaza for her h/o:    1. Recurrent SVT - s/p EP Study showing incessant AT with isoproterenol infusion 10/2019. AT focus noted to be around the coronary sinus orifice area. Increasing episodes of recurrent AT noted. Started on flecainide with consideration of ablation (postponed due to COVID19 restrictions);     I spoke with her in March and she had opted to postpone the EPS and possible ablation due to COVID19 restrictions. At that time, she was doing really well on the flecainide.      INTERVAL HISTORY:  She was doing really well until last week, when she was out biking and had an episode of SVT a/w extreme fatigue " and weakness. She had to get off her bike and call her  to come and get her b/c she felt so terrible. No syncope or CP associated. Nothing she can think of that triggered this. No change in medications, dehydration, etc.      Otherwise, is doing well and remaining active. She remains on flecainide 50 mg BID.       DIAGNOSTICS:  ZioPatch 12/2019 showed SR 60 (range  bpm). 612 episodes of SVT with rates to 140 bpm (range  bpm). Longest episode 26m  Exercise Stress Echo 12/2018 showed no ischemia; normal EF    REVIEW OF SYSTEMS:  Negative except as noted above    PHYSICAL EXAM:  GEN: NAD. Upright. Normal body habitus  ENT/Mouth: Atraumatic and normocephalic  Eyes: No scleral icterus; normal conjunctivae  Chest/Lungs: No audible wheezing or cough; equal chest wall expansion. Non-labored breathing  Extremities: No cyanosis or clubbing observed  Skin: No visible rashes. Normal skin color  Neuro: Normal Arm motion bilaterally. No tremors. No visible evidence of focal defects  Psychiatric: A/O. Calm demeanor    ASSESSMENT/PLAN:    1. AT    Ablation as above, with recurrent AT and sxs despite flecainide 50 mg q 12 hours      Echo with normal EF    Set up for repeat ablation OFF of flecainide on 6/10.     PLAN:    We discussed the risks, benefits and indications of proceeding with electrophysiology study and possible SVT (atrial tachycardia) ablation, including but not limited to the use of conscious sedation and need for a  upon discharge, peripheral vessel injury and discomfort, heart attack, death, stroke, cardiac puncture and/or tamponade, pneumothorax, vvvvg-nv-ssyij-arrhythmias requiring further treatment and damage to existing electrical structures that may require permanent pacemaker implantation. We reviewed that additional procedures may be required. We briefly discussed post-procedural restrictions and post-procedural discomfort. The patient voiced understanding and is willing to proceed. A  consent form will be signed by the procedural physician.      Dr. Arriaza had recommended holding flecainide x 1 week prior to repeat ablation - she'll be a little short of that, which should be fine. Will stop flecainide today.    She'll hold her triamterene/HCTZ and vitamins/supplements AM of procedure    2. Benign Essential HTN    On Dyazide 37.5/25, losartan 100, amlodipine 5    Her cuff has read high in the past - all BPs in office late last year were wnl     PLAN:    Continue current meds      I have reviewed the note as documented above.  This accurately captures the substance of my conversation with the patient.          Video-Visit Details    Type of service:  Video Visit    Video Start Time: 1110  Video End Time: 1127  Duration 17 minutes    Originating Location (pt. Location): Home    Distant Location (provider location):  Home Office    Platform used for Video Visit: Shana Kaplan PA-C

## 2020-06-04 ENCOUNTER — VIRTUAL VISIT (OUTPATIENT)
Dept: CARDIOLOGY | Facility: CLINIC | Age: 80
End: 2020-06-04
Payer: MEDICARE

## 2020-06-04 VITALS
SYSTOLIC BLOOD PRESSURE: 149 MMHG | BODY MASS INDEX: 22.88 KG/M2 | HEART RATE: 73 BPM | HEIGHT: 64 IN | WEIGHT: 134 LBS | DIASTOLIC BLOOD PRESSURE: 82 MMHG

## 2020-06-04 DIAGNOSIS — I47.19 ATRIAL TACHYCARDIA (H): Primary | ICD-10-CM

## 2020-06-04 PROCEDURE — 99213 OFFICE O/P EST LOW 20 MIN: CPT | Mod: 95 | Performed by: PHYSICIAN ASSISTANT

## 2020-06-04 ASSESSMENT — MIFFLIN-ST. JEOR: SCORE: 1064.88

## 2020-06-04 NOTE — LETTER
6/4/2020    Tiffany Messer MD  [address]  [facility] [address]    RE: Mely Guerra       Dear Colleague,    I had the pleasure of seeing Mely Guerra in the [facility].    Mely Guerra is a 80 year old female who is being evaluated via a billable video visit.      BRIEF HPI:  81 yo F who sees Dr. Arriaza for her h/o:    1. Recurrent SVT - s/p EP Study showing incessant AT with isoproterenol infusion 10/2019. AT focus noted to be around the coronary sinus orifice area. Increasing episodes of recurrent AT noted. Started on flecainide with consideration of ablation (postponed due to COVID19 restrictions);     I spoke with her in March and she had opted to postpone the EPS and possible ablation due to COVID19 restrictions. At that time, she was doing really well on the flecainide.      INTERVAL HISTORY:  She was doing really well until last week, when she was out biking and had an episode of SVT a/w extreme fatigue and weakness. She had to get off her bike and call her  to come and get her b/c she felt so terrible. No syncope or CP associated. Nothing she can think of that triggered this. No change in medications, dehydration, etc.      Otherwise, is doing well and remaining active. She remains on flecainide 50 mg BID.       DIAGNOSTICS:  ZioPatch 12/2019 showed SR 60 (range  bpm). 612 episodes of SVT with rates to 140 bpm (range  bpm). Longest episode 26m  Exercise Stress Echo 12/2018 showed no ischemia; normal EF    REVIEW OF SYSTEMS:  Negative except as noted above    PHYSICAL EXAM:  GEN: NAD. Upright. Normal body habitus  ENT/Mouth: Atraumatic and normocephalic  Eyes: No scleral icterus; normal conjunctivae  Chest/Lungs: No audible wheezing or cough; equal chest wall expansion. Non-labored breathing  Extremities: No cyanosis or clubbing observed  Skin: No visible rashes. Normal skin color  Neuro: Normal Arm motion bilaterally. No tremors. No  visible evidence of focal defects  Psychiatric: A/O. Calm demeanor    ASSESSMENT/PLAN:    1. AT    Ablation as above, with recurrent AT and sxs despite flecainide 50 mg q 12 hours      Echo with normal EF    Set up for repeat ablation OFF of flecainide on 6/10.     PLAN:    We discussed the risks, benefits and indications of proceeding with electrophysiology study and possible SVT (atrial tachycardia) ablation, including but not limited to the use of conscious sedation and need for a  upon discharge, peripheral vessel injury and discomfort, heart attack, death, stroke, cardiac puncture and/or tamponade, pneumothorax, kyneg-ot-bfljz-arrhythmias requiring further treatment and damage to existing electrical structures that may require permanent pacemaker implantation. We reviewed that additional procedures may be required. We briefly discussed post-procedural restrictions and post-procedural discomfort. The patient voiced understanding and is willing to proceed. A consent form will be signed by the procedural physician.      Dr. Arriaza had recommended holding flecainide x 1 week prior to repeat ablation - she'll be a little short of that, which should be fine. Will stop flecainide today.    She'll hold her triamterene/HCTZ and vitamins/supplements AM of procedure    2. Benign Essential HTN    On Dyazide 37.5/25, losartan 100, amlodipine 5    Her cuff has read high in the past - all BPs in office late last year were wnl     PLAN:    Continue current meds      I have reviewed the note as documented above.  This accurately captures the substance of my conversation with the patient.         Thank you for allowing me to participate in the care of your patient.    Sincerely,     Twila Kaplan PA-C     [facility]

## 2020-06-04 NOTE — PATIENT INSTRUCTIONS
Mely - it was nice to see you today!    1. Discussed your recurrence of arrhythmia despite flecainide - now set up for ablation    PLAN:  1. STOP flecainide - Dr. Arriaza wants this out of your system before the procedure!  2. Someone from [facility] will contact you to arrange COVID19 testing (done pre-op for all patients)  3. Don't take Dyazide (triamterene/HCTZ) on AM of procedure.  Don't take your supplements/vitamins either  4. Our nurses will be contacting you Tuesday before the procedure to make sure you don't have any questions/concerns    Take care!    Reina

## 2020-06-08 DIAGNOSIS — Z11.59 ENCOUNTER FOR SCREENING FOR OTHER VIRAL DISEASES: ICD-10-CM

## 2020-06-08 PROCEDURE — 87635 SARS-COV-2 COVID-19 AMP PRB: CPT | Performed by: INTERNAL MEDICINE

## 2020-06-08 PROCEDURE — 99000 SPECIMEN HANDLING OFFICE-LAB: CPT | Performed by: INTERNAL MEDICINE

## 2020-06-08 PROCEDURE — 99207 ZZC NO BILLABLE SERVICE THIS VISIT: CPT

## 2020-06-09 ENCOUNTER — TELEPHONE (OUTPATIENT)
Dept: CARDIOLOGY | Facility: CLINIC | Age: 80
End: 2020-06-09

## 2020-06-09 DIAGNOSIS — I47.19 ATRIAL TACHYCARDIA (H): Primary | ICD-10-CM

## 2020-06-09 LAB
SARS-COV-2 RNA SPEC QL NAA+PROBE: NOT DETECTED
SPECIMEN SOURCE: NORMAL

## 2020-06-09 RX ORDER — SODIUM CHLORIDE, SODIUM LACTATE, POTASSIUM CHLORIDE, CALCIUM CHLORIDE 600; 310; 30; 20 MG/100ML; MG/100ML; MG/100ML; MG/100ML
INJECTION, SOLUTION INTRAVENOUS CONTINUOUS
Status: CANCELLED | OUTPATIENT
Start: 2020-06-09

## 2020-06-09 RX ORDER — LIDOCAINE 40 MG/G
CREAM TOPICAL
Status: CANCELLED | OUTPATIENT
Start: 2020-06-09

## 2020-06-09 NOTE — TELEPHONE ENCOUNTER
6/9/20 Called pt regarding SVT Ablation. Notified of arrival time ( 11 am) , NPO after midnight with sips of water for am meds. Discussed meds to be held( Has been holding Flecaininde since 6/4, Hold OTC vitamins and diuretic) and that patient will need a  for ride home.   COVID test pending, done 6/8 per [facility]    Wellness Screening Tool    Symptom Screening:    Do you have one of the following NEW symptoms:      Fever (subjective or >100.0)?  No    New cough? No    Shortness of breath? No    Chills? No    New loss of taste or smell? No    Generalized body aches? No    New persistent headache? No    New sore throat? No    Nausea, vomiting or diarrhea? No    Within the past 3 weeks, have you been exposed to someone with a known positive illness below?      COVID - 19 (known or suspected) No    Chicken pox?  No    Measles? No    Pertussis? No          Patient notified of visitor restriction: Yes  Patient informed to wear a mask: Yes    Patient's appointment status: Patient will be seen for procedure on 6/10 David 9:18 am

## 2020-06-10 ENCOUNTER — HOSPITAL ENCOUNTER (OUTPATIENT)
Facility: CLINIC | Age: 80
Discharge: HOME | End: 2020-06-10
Attending: INTERNAL MEDICINE | Admitting: INTERNAL MEDICINE
Payer: MEDICARE

## 2020-06-10 VITALS
OXYGEN SATURATION: 99 % | HEIGHT: 64 IN | BODY MASS INDEX: 22.88 KG/M2 | SYSTOLIC BLOOD PRESSURE: 129 MMHG | RESPIRATION RATE: 16 BRPM | WEIGHT: 134 LBS | HEART RATE: 64 BPM | TEMPERATURE: 98.5 F | DIASTOLIC BLOOD PRESSURE: 69 MMHG

## 2020-06-10 DIAGNOSIS — I47.19 ATRIAL TACHYCARDIA (H): ICD-10-CM

## 2020-06-10 LAB
ANION GAP SERPL CALCULATED.3IONS-SCNC: 7 MMOL/L (ref 3–14)
BUN SERPL-MCNC: 13 MG/DL (ref 7–30)
CALCIUM SERPL-MCNC: 9.1 MG/DL (ref 8.5–10.1)
CHLORIDE SERPL-SCNC: 102 MMOL/L (ref 94–109)
CO2 SERPL-SCNC: 26 MMOL/L (ref 20–32)
CREAT SERPL-MCNC: 0.64 MG/DL (ref 0.52–1.04)
ERYTHROCYTE [DISTWIDTH] IN BLOOD BY AUTOMATED COUNT: 12.3 % (ref 10–15)
GFR SERPL CREATININE-BSD FRML MDRD: 85 ML/MIN/{1.73_M2}
GLUCOSE SERPL-MCNC: 89 MG/DL (ref 70–99)
HCT VFR BLD AUTO: 38.4 % (ref 35–47)
HGB BLD-MCNC: 12.7 G/DL (ref 11.7–15.7)
MCH RBC QN AUTO: 30.5 PG (ref 26.5–33)
MCHC RBC AUTO-ENTMCNC: 33.1 G/DL (ref 31.5–36.5)
MCV RBC AUTO: 92 FL (ref 78–100)
PLATELET # BLD AUTO: 244 10E9/L (ref 150–450)
POTASSIUM SERPL-SCNC: 3.5 MMOL/L (ref 3.4–5.3)
RBC # BLD AUTO: 4.16 10E12/L (ref 3.8–5.2)
SODIUM SERPL-SCNC: 135 MMOL/L (ref 133–144)
WBC # BLD AUTO: 6.4 10E9/L (ref 4–11)

## 2020-06-10 PROCEDURE — 36415 COLL VENOUS BLD VENIPUNCTURE: CPT | Performed by: INTERNAL MEDICINE

## 2020-06-10 PROCEDURE — 99152 MOD SED SAME PHYS/QHP 5/>YRS: CPT | Performed by: INTERNAL MEDICINE

## 2020-06-10 PROCEDURE — 93653 COMPRE EP EVAL TX SVT: CPT | Performed by: INTERNAL MEDICINE

## 2020-06-10 PROCEDURE — 25000128 H RX IP 250 OP 636: Performed by: INTERNAL MEDICINE

## 2020-06-10 PROCEDURE — C1730 CATH, EP, 19 OR FEW ELECT: HCPCS | Mod: 59 | Performed by: INTERNAL MEDICINE

## 2020-06-10 PROCEDURE — 93005 ELECTROCARDIOGRAM TRACING: CPT

## 2020-06-10 PROCEDURE — 25800030 ZZH RX IP 258 OP 636: Performed by: INTERNAL MEDICINE

## 2020-06-10 PROCEDURE — 85027 COMPLETE CBC AUTOMATED: CPT | Performed by: INTERNAL MEDICINE

## 2020-06-10 PROCEDURE — 25000125 ZZHC RX 250: Performed by: INTERNAL MEDICINE

## 2020-06-10 PROCEDURE — 93623 PRGRMD STIMJ&PACG IV RX NFS: CPT | Performed by: INTERNAL MEDICINE

## 2020-06-10 PROCEDURE — 27210794 ZZH OR GENERAL SUPPLY STERILE: Performed by: INTERNAL MEDICINE

## 2020-06-10 PROCEDURE — 99153 MOD SED SAME PHYS/QHP EA: CPT | Performed by: INTERNAL MEDICINE

## 2020-06-10 PROCEDURE — 40000065 ZZH STATISTIC EKG NON-CHARGEABLE

## 2020-06-10 PROCEDURE — 93613 INTRACARDIAC EPHYS 3D MAPG: CPT | Performed by: INTERNAL MEDICINE

## 2020-06-10 PROCEDURE — 93621 COMP EP EVL L PAC&REC C SINS: CPT

## 2020-06-10 PROCEDURE — 80048 BASIC METABOLIC PNL TOTAL CA: CPT | Performed by: INTERNAL MEDICINE

## 2020-06-10 PROCEDURE — 93010 ELECTROCARDIOGRAM REPORT: CPT | Mod: 76 | Performed by: INTERNAL MEDICINE

## 2020-06-10 RX ORDER — LIDOCAINE 40 MG/G
CREAM TOPICAL
Status: DISCONTINUED | OUTPATIENT
Start: 2020-06-10 | End: 2020-06-10 | Stop reason: HOSPADM

## 2020-06-10 RX ORDER — FENTANYL CITRATE 50 UG/ML
INJECTION, SOLUTION INTRAMUSCULAR; INTRAVENOUS
Status: DISCONTINUED | OUTPATIENT
Start: 2020-06-10 | End: 2020-06-10 | Stop reason: HOSPADM

## 2020-06-10 RX ORDER — SODIUM CHLORIDE, SODIUM LACTATE, POTASSIUM CHLORIDE, CALCIUM CHLORIDE 600; 310; 30; 20 MG/100ML; MG/100ML; MG/100ML; MG/100ML
INJECTION, SOLUTION INTRAVENOUS CONTINUOUS
Status: DISCONTINUED | OUTPATIENT
Start: 2020-06-10 | End: 2020-06-10 | Stop reason: HOSPADM

## 2020-06-10 RX ORDER — OXYCODONE AND ACETAMINOPHEN 5; 325 MG/1; MG/1
1 TABLET ORAL EVERY 4 HOURS PRN
Status: DISCONTINUED | OUTPATIENT
Start: 2020-06-10 | End: 2020-06-10 | Stop reason: HOSPADM

## 2020-06-10 RX ORDER — NALOXONE HYDROCHLORIDE 0.4 MG/ML
.1-.4 INJECTION, SOLUTION INTRAMUSCULAR; INTRAVENOUS; SUBCUTANEOUS
Status: DISCONTINUED | OUTPATIENT
Start: 2020-06-10 | End: 2020-06-10 | Stop reason: HOSPADM

## 2020-06-10 RX ADMIN — SODIUM CHLORIDE, POTASSIUM CHLORIDE, SODIUM LACTATE AND CALCIUM CHLORIDE: 600; 310; 30; 20 INJECTION, SOLUTION INTRAVENOUS at 12:47

## 2020-06-10 ASSESSMENT — MIFFLIN-ST. JEOR: SCORE: 1072.82

## 2020-06-10 NOTE — PROGRESS NOTES
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  Successful ablation.  No complications.  May be discharged around 7-8 PM.

## 2020-06-10 NOTE — PRE-PROCEDURE
GENERAL PRE-PROCEDURE:   Procedure:  Ablation  Date/Time:  6/10/2020 1:28 PM    Verbal consent obtained?: Yes    Written consent obtained?: Yes    Risks and benefits: Risks, benefits and alternatives were discussed    Consent given by:  Patient  Expected level of sedation:  Minimal  ASA Class:  Class 3- Severe systemic disease, definite functional limitations  Mallampati  :  Grade 1- soft palate, uvula, tonsillar pillars, and posterior pharyngeal wall visible

## 2020-06-10 NOTE — DISCHARGE INSTRUCTIONS
SVT Ablation Discharge Instructions - Femoral & Neck/Chest      After you go home:      Have an adult stay with you until tomorrow.    You may resume your normal diet.       For 24 hours - due to the sedation you received:    Relax and take it easy.    Do NOT make any important or legal decisions.    Do NOT drive or operate machines at home or at work.    Do NOT drink alcohol.    Care of Neck/Chest & Groin Puncture Sites:      For the first 24 hrs - check the puncture site every 1-2 hours while awake.    For 2 days, when you cough, sneeze, laugh or move your bowels, hold your hand over the puncture site and press firmly.    Remove the bandaids after 24 hours. If there is minor oozing, apply another bandaid and remove it after 12 hours.    It is normal to have a small bruise, pea sized lump or soreness at the site.    You may shower tomorrow. Do NOT take a bath, or use a hot tub or pool for at least 3 days. Do NOT scrub the site. Do not use lotion or powder near the puncture site.      Activity - For 2-3 days:    Neck/Chest site:    Avoid heavy lifting or the overuse of your shoulder.        Groin site:      No stooping or squatting    Do NOT do any heavy activity such as exercise, lifting, or straining.     No housework, yard work or any activity that make you sweat    Do NOT lift more than 10 pounds    Bleeding:     Neck/Chest site:    If you start bleeding from the site in your neck/chest, sit down and press gently on the site for 10 minutes.     Once bleeding stops, sit still for 2 hours.     Call [facility] as soon as you can      Groin site:    If you start bleeding from the site in your groin, lie down flat and press firmly on the site for 10 minutes.     Once bleeding stops, lay flat for 2 hours.    Call [facility] as soon as you can.       Call 911 right away if you have heavy bleeding or bleeding that does not stop.      Medicines:      Take your medications, including blood thinners, unless  your provider tells you not to.    If you have stopped any medicines, check with your provider about when to restart them.    If you have pain or shortness of breath, you may take Advil (ibuprofen) or Tylenol (acetaminophen).      Follow Up Appointments:      An appointment has been set up for you for follow-up care. July 9th at 3:10 pm with Reina Kaplan PA-C. This is a virtual visit. You will receive a text message to your number [phone] 15-30 minutes prior to the appointment.    You will receive a phone call tomorrow morning from an RN - Nicci Lucero or Bharati.    Call the clinic if:      You have increased pain or a large or growing hard lump around the site.    The site is red, swollen, hot or tender.    Blood or fluid is draining from the site.    You have chills or a fever greater than 101 F (38 C).    Your leg feels numb, cool or changes color.    Increased pain in the chest and/or groin.    Increased shortness of breath    Chest pain not relieved by Tylenol or Advil    New pain in the back or belly that you cannot control with Tylenol.    Recurrent irregular or fast heart rate lasting over 2 hours.    Any questions or concerns.    Heart rhythms:  You may have some irregular heartbeats. These feel very strong. They may make you feel that the fast heart rhythm is going to start again.  Give it time. The irregular beats should occur less often.       [facility]:    [phone] ( 8am-5pm M-F)  Nicci Lucero or Bharati    [phone] option 2 (7 days a week)  on call Cardiologist

## 2020-06-10 NOTE — PROGRESS NOTES
Care Suites Admission Nursing Note    Patient Information  Name: Mely Guerra  Age: 80 year old  Reason for admission: SVT ablation  Care Suites arrival time: 1145    Patient Admission/Assessment   Pre-procedure assessment complete: Yes  If abnormal assessment/labs, provider notified: N/A  NPO: Yes  Medications held per instructions/orders: Yes  Consent: obtained  If applicable, pregnancy test status: deferred  Patient oriented to room: Yes  Education/questions answered: Yes  Plan/other: proceed with procedurea    Discharge Planning  Accompanied by: Spouse Lexa  Discharge name/phone number: Lexa [phone]  Overnight post sedation caregiver: Spouse Lexa  Discharge location: home    SUMMER Balderrama RN

## 2020-06-10 NOTE — PROGRESS NOTES
PATIENT WELLNESS SCREENING    Step 1: Answer all screening questions 1-3.    1. In the last month, have you been in contact with someone who was confirmed or suspected to have Coronavirus/COVID-19? no    2. Do you have the following symptoms?   Fever? no   Cough? no   Shortness of breath?  no   Skin rash?  no    Step 2: Refer to logic grid below for actions    NO SYMPTOM(S)    ACTIONS:  1. Standard rooming process  2. Provider to assess per normal protocol    POSITIVE SYMPTOM(S)  If positive for ANY of the following symptoms: fever, cough, shortness of breath, rash    ACTIONS  1. Mask patient  2. Room patient as soon as possible  3. Don appropriate PPE when entering room  4. Provider evaluation

## 2020-06-11 ENCOUNTER — TELEPHONE (OUTPATIENT)
Dept: CARDIOLOGY | Facility: CLINIC | Age: 80
End: 2020-06-11

## 2020-06-11 NOTE — PROGRESS NOTES
Patient up to ambulate after bedrest complete.  Left subclavian site remains CDI no hematoma or swelling.  Right groin site bleeding.  Patient  back in bed and manual pressure held for 10 minutes.  Dr Arriaza notified and plan for 1 hour more of bedrest.  Patient may discharge tonight if groin site is stable.  Plan to move patient to heart center due to care suites closing.  Awaiting bed.

## 2020-06-11 NOTE — TELEPHONE ENCOUNTER
Pt is doing well post SVT ablation. No pain in chest or groin. Pt had removed groin dressing and placed a band aid.  Pt is taking it easy and giving herself a manicure when this writer called. Pt is aware of f/u with Reina in one month. Will call with any issues prior to OV. Jama

## 2020-06-11 NOTE — PROGRESS NOTES
VSS. Right groin & left subclavian site CDI. CMS intact. Completed one hour of bedrest. Up w/ SBA, no bleeding/hematoma noted. Palpable PD/PT pulses. Reviewed discharge instructions with pt. Verbalizes understanding. Answered questions. Denies SOB or any pain. Pt awaits  for transpot to home.

## 2020-06-12 LAB — INTERPRETATION ECG - MUSE: NORMAL

## 2020-06-13 LAB — INTERPRETATION ECG - MUSE: NORMAL

## 2020-07-09 ENCOUNTER — VIRTUAL VISIT (OUTPATIENT)
Dept: CARDIOLOGY | Facility: CLINIC | Age: 80
End: 2020-07-09
Payer: MEDICARE

## 2020-07-09 VITALS
BODY MASS INDEX: 22.88 KG/M2 | WEIGHT: 134 LBS | SYSTOLIC BLOOD PRESSURE: 124 MMHG | DIASTOLIC BLOOD PRESSURE: 66 MMHG | HEIGHT: 64 IN | HEART RATE: 78 BPM

## 2020-07-09 DIAGNOSIS — I47.10 SVT (SUPRAVENTRICULAR TACHYCARDIA) (H): Primary | ICD-10-CM

## 2020-07-09 PROCEDURE — 99213 OFFICE O/P EST LOW 20 MIN: CPT | Mod: 95 | Performed by: PHYSICIAN ASSISTANT

## 2020-07-09 ASSESSMENT — MIFFLIN-ST. JEOR: SCORE: 1072.82

## 2020-07-09 NOTE — LETTER
"7/9/2020    Tiffany Messer MD  [address]  [place] [facility] [address]    RE: Mely Guerra       Dear Colleague,    I had the pleasure of seeing Mely Guerra in the [facility].    Mely Guerra is a 80 year old female who is being evaluated via a billable video visit.        CC:  Post-ablation    VITALS:  BP:124/66  Pulse:78  Wt:134.0lb    Yamilka Garcia RMA    BRIEF HPI:  79 yo F who sees Dr. Arriaza for her h/o:    1. Recurrent SVT - s/p EP Study showing incessant AT with isoproterenol infusion 10/2019. AT focus around CS orifice area. Increasing episodes of recurrent AT noted. Started on flecainide with consideration of ablation (postponed due to COVID19 pandemic restrictions).      When I spoke with her 6/4, she'd had a \"bad\" episode while out biking. She experienced extreme fatigue and weakness. No syncope or CP.      She therefore underwent EP Study 6/10/2020 with Dr. Arriaza. She had inducible typical AVNRT (isoproterenol), and underwent ablation near slow pathway area. Following ablation, no arrhythmias were induced, though at baseline she had occasional PACs from [place].    INTERVAL HISTORY:  Really doing well following ablation. No c/o palpitations, dizziness, lightheadedness. Feels she has more energy, BPs are lower and overall feeling \"really good.\"  She's gone bike riding without issues. No groin site pain.    She has some breast area discomfort that is reproducible with palpation. No exertional component, no bruising/rash.    DIAGNOSTICS:  EKG 6/10/2020 with SR 67 bpm with normal intervals  ZioPatch 12/2019 showed SR 60 (range  bpm). 612 episodes of SVT with rates to 140 bpm (range  bpm). Longest episode 26m  Exercise Stress Echo 12/2018 showed no ischemia; normal EF    REVIEW OF SYSTEMS:  Negative except as noted above     PHYSICAL EXAM:  GEN: NAD. Upright. Normal body habitus  ENT/Mouth: Atraumatic and normocephalic  Eyes: No scleral icterus; normal " conjunctivae  Chest/Lungs: Non-labored breathing  CV: No evidence of elevated JVP.   Extremities: No cyanosis or clubbing observed  Skin: No visible rashes. Normal skin color  Neuro: Normal Arm motion bilaterally. No tremors. No visible evidence of focal defects  Psychiatric: A/O. Calm demeanor    ASSESSMENT/PLAN:    1. SVT    Had inducible AVNRT and now s/p modification near the slow pathway 6/10/2020    Doing well post ablation     PLAN:    No formal follow-up made but encouraged to contact us with issues/concerns      2. Benign Essential HTN    On Dyazide 37.5/25, losartan 100 and amlodipine 5    BP looks great     PLAN:    No change    I have reviewed the note as documented above.  This accurately captures the substance of my conversation with the patient.      Thank you for allowing me to participate in the care of your patient.    Sincerely,     Twila Kaplan PA-C     [facility]

## 2020-07-09 NOTE — PROGRESS NOTES
"Mely Guerra is a 80 year old female who is being evaluated via a billable video visit.      The patient has been notified of following:     \"This video visit will be conducted via a call between you and your physician/provider. We have found that certain health care needs can be provided without the need for an in-person physical exam.  This service lets us provide the care you need with a video conversation.  If a prescription is necessary we can send it directly to your pharmacy.  If lab work is needed we can place an order for that and you can then stop by our lab to have the test done at a later time.    Video visits are billed at different rates depending on your insurance coverage.  Please reach out to your insurance provider with any questions.    If during the course of the call the physician/provider feels a video visit is not appropriate, you will not be charged for this service.\"    Patient has given verbal consent for Video visit? Yes  How would you like to obtain your AVS? Mail a copy  Patient would like the video invitation sent by: Text to cell phone: [phone]  Will anyone else be joining your video visit? No      CC:  Post-ablation    VITALS:  BP:124/66  Pulse:78  Wt:134.0lb    Yamilka Garcia RMA    BRIEF HPI:  81 yo F who sees Dr. Arriaza for her h/o:    1. Recurrent SVT - s/p EP Study showing incessant AT with isoproterenol infusion 10/2019. AT focus around CS orifice area. Increasing episodes of recurrent AT noted. Started on flecainide with consideration of ablation (postponed due to COVID19 pandemic restrictions).      When I spoke with her 6/4, she'd had a \"bad\" episode while out biking. She experienced extreme fatigue and weakness. No syncope or CP.      She therefore underwent EP Study 6/10/2020 with Dr. Arriaza. She had inducible typical AVNRT (isoproterenol), and underwent ablation near slow pathway area. Following ablation, no arrhythmias were induced, though at baseline she had occasional " "PACs from [place].    INTERVAL HISTORY:  Really doing well following ablation. No c/o palpitations, dizziness, lightheadedness. Feels she has more energy, BPs are lower and overall feeling \"really good.\"  She's gone bike riding without issues. No groin site pain.    She has some breast area discomfort that is reproducible with palpation. No exertional component, no bruising/rash.    DIAGNOSTICS:  EKG 6/10/2020 with SR 67 bpm with normal intervals  ZioPatch 12/2019 showed SR 60 (range  bpm). 612 episodes of SVT with rates to 140 bpm (range  bpm). Longest episode 26m  Exercise Stress Echo 12/2018 showed no ischemia; normal EF    REVIEW OF SYSTEMS:  Negative except as noted above     PHYSICAL EXAM:  GEN: NAD. Upright. Normal body habitus  ENT/Mouth: Atraumatic and normocephalic  Eyes: No scleral icterus; normal conjunctivae  Chest/Lungs: Non-labored breathing  CV: No evidence of elevated JVP.   Extremities: No cyanosis or clubbing observed  Skin: No visible rashes. Normal skin color  Neuro: Normal Arm motion bilaterally. No tremors. No visible evidence of focal defects  Psychiatric: A/O. Calm demeanor    ASSESSMENT/PLAN:    1. SVT    Had inducible AVNRT and now s/p modification near the slow pathway 6/10/2020    Doing well post ablation     PLAN:    No formal follow-up made but encouraged to contact us with issues/concerns      2. Benign Essential HTN    On Dyazide 37.5/25, losartan 100 and amlodipine 5    BP looks great     PLAN:    No change    I have reviewed the note as documented above.  This accurately captures the substance of my conversation with the patient.    Video-Visit Details    Type of service:  Video Visit    Video Start Time: 1529  Video End Time: 1538  Duration 9 minutes    Originating Location (pt. Location): Home    Distant Location (provider location):  [facility]     Platform used for Video Visit: Shana Kaplan PA-C          "

## 2020-07-09 NOTE — PATIENT INSTRUCTIONS
"Mely - so glad you're doing well after your ablation!    1. Reviewed some discomfort in the chest that is reproducible with palpation ... sounds musculoskeletal in nature. Continue to monitor  2. Discussed that you're not on a \"blood thinner\" as your type of rhythm doesn't increase the risk of clot/stroke    PLAN:  1. No changes needed today  2. We'll send copy of EP Study/ablation  3.  No formal follow-up needed but CALL if issues!  My nurses are Nicci Lucero and Bharati: [phone]    "

## 2020-07-28 ENCOUNTER — DOCUMENTATION ONLY (OUTPATIENT)
Dept: RESPIRATORY THERAPY | Facility: CLINIC | Age: 80
End: 2020-07-28

## 2020-07-28 NOTE — PROGRESS NOTES
Met with pt at our [place] showroom ([facility]) to set her up with her replacement ASV device. She seemed very comfortable with the machine and new mask. Even though she's been using a device for her CSA, this replacement is different, so I still instructed her in the use and care of the machine. Her settings:    ASV EPAP 7-13, PS 1-6, P MAX 25, RR AUTO.    Pt had no questions or concerns at the time of set-up.

## 2020-09-24 DIAGNOSIS — I10 ESSENTIAL HYPERTENSION WITH GOAL BLOOD PRESSURE LESS THAN 140/90: ICD-10-CM

## 2020-09-24 RX ORDER — LOSARTAN POTASSIUM 100 MG/1
TABLET ORAL
Qty: 90 TABLET | Refills: 0 | Status: SHIPPED | OUTPATIENT
Start: 2020-09-24 | End: 2020-12-07

## 2020-09-24 NOTE — TELEPHONE ENCOUNTER
Prescription approved per [facility] Refill Protocol.  Bernadine BROWN RN    
Reason for Call:  Medication or medication refill:    Do you use a [facility]?  Name of the pharmacy and phone number for the current request:     [facility] [place] [place] - [address] AT PORTAL TO REGISTERED [facility] SITES    Name of the medication requested: losartan (COZAAR) 100 MG tablet     Other request:     Can we leave a detailed message on this number? YES    Phone number patient can be reached at: Home number on file [phone] (home)    Best Time: any    Call taken on 9/24/2020 at 10:51 AM by Zoraida Gruber      
Detail Level: Zone
Plan: Pt does not wish to treat at this time, if he wants to we will send in rx for ketoconazole cream

## 2020-10-27 ENCOUNTER — VIRTUAL VISIT (OUTPATIENT)
Dept: PHARMACY | Facility: CLINIC | Age: 80
End: 2020-10-27

## 2020-10-27 DIAGNOSIS — G47.00 INSOMNIA, UNSPECIFIED TYPE: ICD-10-CM

## 2020-10-27 DIAGNOSIS — M15.0 PRIMARY OSTEOARTHRITIS INVOLVING MULTIPLE JOINTS: ICD-10-CM

## 2020-10-27 DIAGNOSIS — J30.1 ALLERGIC RHINITIS DUE TO POLLEN, UNSPECIFIED SEASONALITY: ICD-10-CM

## 2020-10-27 DIAGNOSIS — I10 HYPERTENSION GOAL BP (BLOOD PRESSURE) < 140/90: Primary | ICD-10-CM

## 2020-10-27 DIAGNOSIS — M81.0 AGE-RELATED OSTEOPOROSIS WITHOUT CURRENT PATHOLOGICAL FRACTURE: ICD-10-CM

## 2020-10-27 DIAGNOSIS — H35.30 MACULAR DEGENERATION (SENILE) OF RETINA: ICD-10-CM

## 2020-10-27 DIAGNOSIS — E03.9 ACQUIRED HYPOTHYROIDISM: ICD-10-CM

## 2020-10-27 PROCEDURE — 99605 MTMS BY PHARM NP 15 MIN: CPT | Mod: TEL | Performed by: PHARMACIST

## 2020-10-27 NOTE — PROGRESS NOTES
MTM ENCOUNTER  SUBJECTIVE/OBJECTIVE:                           Mely Guerra is a 80 year old female coming in for a follow-up visit. She was referred to me from Dr. Messer.  Today's visit is a follow-up MTM visit from 10/10/2019.      Chief Complaint: Annual medication review.    Allergies/ADRs: Reviewed in chart  Tobacco: She reports that she has never smoked. She has never used smokeless tobacco.  Alcohol: not currently using    Medication Adherence/Access: no issues reported  Patient takes medications directly from bottles.  Patient takes medications 2 time(s) per day.   Per patient, misses medication 0 times per week.   Medication barriers: none.     Hypertension/CV: Current medications include amlodipine 5mg daily, triamterene-HCTZ 37.5-25mg daily, and losartan 100mg daily. Patient reports no current medication side effects. She underwent two ablations since last visit and has had no additional sx of SVT.     Osteopenia: Current therapy includes: calcium citrate/vitamin D 1 tabs BID. She is getting at least 3 servings of calcium in her diet daily.  Pt is not experiencing side effects.  DEXA History: 8/10/17  Risk factors: post-menopausal    Macular Degeneration/Ophth: Switched to EyePromise AREDS 2 Plus. She has switched eye doctors (the other left his practice) and the new provider thought this product was better than Preservision. She has stopped fish oil after starting this one (EyePromise contains fish oil). Last visit was about 6 months ago and vision was stable.     Hypothyroidism: Patient is taking levothyroxine 100 mcg daily. She denies SE from levothyroxine, or any s/sx of hypo/hyperthyroidism.  She is due for labs.    Osteoarthritis/Insomnia: APAP/Diphenhydramine 325mg/25mg - 2 tabs each evening for sleep and pain. She has trouble falling asleep and also has sleep apnea. She feels that this regimen is effective and denies side effects. Her difficulty sleeping is not related to her pain, but rather  "she \"cannot shut off my mind.\" She has tried melatonin in the past, but it was not helpful (unclear what dose - she remembers 1mg). She notes she is very compliant with her CPAP.     Allergies: Uses Flonase once daily and montelukast 10mg daily. Reports she has a persistent cough that bothers her  and kids more than it bothers her. She believes the cough is due to post-nasal drip. She denies sx of reflux or wheezing. She started out by taking montelukast in the evening but found that her cough resumed mid-morning. She switched the dosing time to morning and found this gets her through the day better. Cough starts up again in the evening.    This fall allergies were really bad so she tried oral antihistamines, but they are just too drying.  Previously she has used Flovent, but this caused a lot of throat irritation, so she quit using this. Her allergist is aware (Dr. Hdz).      ASSESSMENT:                            Medication Adherence: No issues identified    Hypertension/CV: Due for lipid profile, but given her age, primary prevention and LDL at last check, would not advise treatment with ASA or statin.     Osteopenia: Stable    Macular Degeneration/Ophth: Stable    Hypothyroidism: Due for labs - will have annual physical next month.     Osteoarthritis/Insomnia: Discussed risks associated with diphenhydramine and the fact that it is likely contributing to some of the dryness she is experiencing. We have discussed this at several previous visits as well. She will think about changes.     Allergies: Discussed trying Flonase BID or changing time of administration to see if this helps with her cough later in the day.     PLAN:                            1. You can play around with the dosing of your Flonase to see if that helps with your cough. You can try taking it at night, or try taking 1 puff in each nostril twice a day.     2. Diphenhydramine (the PM part of Tylenol PM) can be very drying. This can also " put you at risk for dizziness and falls. If you need something to help you sleep, there are other alternatives (let me know if you want to discuss them).  If you need something for your allergies, stopping this and starting the generic form of Claritin, Allegra or Zyrtec may be better.  Zyrtec tends to to cause more tiredness and may be a good replacement.     3. See Dr. Messer (and get fasting lab work done) in December.    I spent 15 minutes with this patient today. All changes were made via collaborative practice agreement with Dr. Messer. A copy of the visit note was provided to the patient's primary care provider.    Will follow up in 1 year, sooner if needed.    The patient was sent via [employer] a summary of these recommendations.     Mary Bustos, FaraD, SYEDA, BCACP  MT Pharmacist, [facility]     Patient consented to a telehealth visit: yes  Telemedicine Visit Details  Type of service:  Telephone visit  Start Time: 9:00 AM  End Time: 9:15 AM  Originating Location (pt. Location): Home  Distant Location (provider location):  [facility]  Mode of Communication:  Telephone

## 2020-10-27 NOTE — PATIENT INSTRUCTIONS
Recommendations from today's MTM visit:                                                    MTM (medication therapy management) is a service provided by a clinical pharmacist designed to help you get the most of out of your medicines.   Today we reviewed what your medicines are for, how to know if they are working, that your medicines are safe and how to make your medicine regimen as easy as possible.     1. You can play around with the dosing of your Flonase to see if that helps with your cough. You can try taking it at night, or try taking 1 puff in each nostril twice a day.     2. Diphenhydramine (the PM part of Tylenol PM) can be very drying. This can also put you at risk for dizziness and falls. If you need something to help you sleep, there are other alternatives (let me know if you want to discuss them).  If you need something for your allergies, stopping this and starting the generic form of Claritin, Allegra or Zyrtec may be better.  Zyrtec tends to to cause more tiredness and may be a good replacement.     3. See Dr. Messer (and get fasting lab work done) in December.    It was great to speak with you today.  I value your experience and would be very thankful for your time with providing feedback on our clinic survey. You may receive a survey via email or text message in the next few days.     Next MTM visit: October 2021 - but please reach out if you have any questions!     To schedule another MTM appointment, please call the clinic directly or you may call the MTM scheduling line at [phone] or toll-free at [phone].     My Clinical Pharmacist's contact information:                                                      It was a pleasure talking with you today!  Please feel free to contact me with any questions or concerns you have.      Mary Bustos, Pharm.D., M.B.A., [facility]  MTM Pharmacist, [facility]  Pager: [phone]  Email: [email or web address]

## 2020-10-31 DIAGNOSIS — I10 ESSENTIAL HYPERTENSION WITH GOAL BLOOD PRESSURE LESS THAN 140/90: ICD-10-CM

## 2020-11-03 RX ORDER — TRIAMTERENE AND HYDROCHLOROTHIAZIDE 37.5; 25 MG/1; MG/1
CAPSULE ORAL
Qty: 90 CAPSULE | Refills: 0 | Status: SHIPPED | OUTPATIENT
Start: 2020-11-03 | End: 2020-12-15

## 2020-11-03 NOTE — TELEPHONE ENCOUNTER
LS,    Routing refill request to provider for review/approval because:  Drug interaction warning:  New Warnings (1 unfiltered, 2 filtered)  []Show filtered (2)  High  Drug-Drug: triamterene-HCTZ and losartan  The risk of hyperkalemia may be increased when potassium-sparing diuretics are co-administered with angiotensin II receptor antagonists.  Details    Last Written Prescription Date:  3/18/2020  Last Fill Quantity: 90,  # refills: 1   Last office visit: 12/13/2019 with prescribing provider:  Yes, LS - physical  Future Office Visit:   Next 5 appointments (look out 90 days)    Dec 15, 2020  8:40 AM  PHYSICAL with Tiffany Messer MD  [facility] ([facility] [address] [id number] [phone]           Thanks,  Nayana Alcaraz RN

## 2020-12-15 ENCOUNTER — OFFICE VISIT (OUTPATIENT)
Dept: FAMILY MEDICINE | Facility: CLINIC | Age: 80
End: 2020-12-15
Payer: MEDICARE

## 2020-12-15 VITALS
BODY MASS INDEX: 22.88 KG/M2 | DIASTOLIC BLOOD PRESSURE: 82 MMHG | RESPIRATION RATE: 16 BRPM | OXYGEN SATURATION: 97 % | HEIGHT: 64 IN | TEMPERATURE: 96.7 F | SYSTOLIC BLOOD PRESSURE: 124 MMHG | WEIGHT: 134 LBS | HEART RATE: 64 BPM

## 2020-12-15 DIAGNOSIS — I10 ESSENTIAL HYPERTENSION WITH GOAL BLOOD PRESSURE LESS THAN 140/90: ICD-10-CM

## 2020-12-15 DIAGNOSIS — Z00.00 ENCOUNTER FOR ROUTINE ADULT HEALTH EXAMINATION WITHOUT ABNORMAL FINDINGS: Primary | ICD-10-CM

## 2020-12-15 DIAGNOSIS — I47.10 PAROXYSMAL SUPRAVENTRICULAR TACHYCARDIA (H): ICD-10-CM

## 2020-12-15 DIAGNOSIS — E03.9 ACQUIRED HYPOTHYROIDISM: ICD-10-CM

## 2020-12-15 PROBLEM — F10.21 ALCOHOL DEPENDENCE IN REMISSION (H): Status: ACTIVE | Noted: 2020-12-15

## 2020-12-15 LAB
ALBUMIN SERPL-MCNC: 4 G/DL (ref 3.4–5)
ALP SERPL-CCNC: 59 U/L (ref 40–150)
ALT SERPL W P-5'-P-CCNC: 24 U/L (ref 0–50)
ANION GAP SERPL CALCULATED.3IONS-SCNC: 9 MMOL/L (ref 3–14)
AST SERPL W P-5'-P-CCNC: 56 U/L (ref 0–45)
BILIRUB SERPL-MCNC: 1 MG/DL (ref 0.2–1.3)
BUN SERPL-MCNC: 12 MG/DL (ref 7–30)
CALCIUM SERPL-MCNC: 10 MG/DL (ref 8.5–10.1)
CHLORIDE SERPL-SCNC: 96 MMOL/L (ref 94–109)
CHOLEST SERPL-MCNC: 222 MG/DL
CO2 SERPL-SCNC: 26 MMOL/L (ref 20–32)
CREAT SERPL-MCNC: 0.68 MG/DL (ref 0.52–1.04)
GFR SERPL CREATININE-BSD FRML MDRD: 83 ML/MIN/{1.73_M2}
GLUCOSE SERPL-MCNC: 82 MG/DL (ref 70–99)
HDLC SERPL-MCNC: 102 MG/DL
LDLC SERPL CALC-MCNC: 112 MG/DL
NONHDLC SERPL-MCNC: 120 MG/DL
POTASSIUM SERPL-SCNC: 4.3 MMOL/L (ref 3.4–5.3)
PROT SERPL-MCNC: 7.4 G/DL (ref 6.8–8.8)
SODIUM SERPL-SCNC: 131 MMOL/L (ref 133–144)
TRIGL SERPL-MCNC: 41 MG/DL
TSH SERPL DL<=0.005 MIU/L-ACNC: 1.53 MU/L (ref 0.4–4)

## 2020-12-15 PROCEDURE — 36415 COLL VENOUS BLD VENIPUNCTURE: CPT | Performed by: FAMILY MEDICINE

## 2020-12-15 PROCEDURE — 80061 LIPID PANEL: CPT | Performed by: FAMILY MEDICINE

## 2020-12-15 PROCEDURE — 99213 OFFICE O/P EST LOW 20 MIN: CPT | Mod: 25 | Performed by: FAMILY MEDICINE

## 2020-12-15 PROCEDURE — 84443 ASSAY THYROID STIM HORMONE: CPT | Performed by: FAMILY MEDICINE

## 2020-12-15 PROCEDURE — G0439 PPPS, SUBSEQ VISIT: HCPCS | Performed by: FAMILY MEDICINE

## 2020-12-15 PROCEDURE — 80053 COMPREHEN METABOLIC PANEL: CPT | Performed by: FAMILY MEDICINE

## 2020-12-15 RX ORDER — LEVOTHYROXINE SODIUM 100 UG/1
TABLET ORAL
Qty: 90 TABLET | Refills: 1 | Status: SHIPPED | OUTPATIENT
Start: 2020-12-15 | End: 2021-08-03

## 2020-12-15 RX ORDER — LOSARTAN POTASSIUM 100 MG/1
TABLET ORAL
Qty: 90 TABLET | Refills: 1 | Status: SHIPPED | OUTPATIENT
Start: 2020-12-15 | End: 2021-07-21

## 2020-12-15 RX ORDER — AMLODIPINE BESYLATE 10 MG/1
5 TABLET ORAL DAILY
Qty: 90 TABLET | Refills: 1 | Status: SHIPPED | OUTPATIENT
Start: 2020-12-15 | End: 2021-08-03

## 2020-12-15 RX ORDER — TRIAMTERENE AND HYDROCHLOROTHIAZIDE 37.5; 25 MG/1; MG/1
CAPSULE ORAL
Qty: 90 CAPSULE | Refills: 1 | Status: SHIPPED | OUTPATIENT
Start: 2020-12-15 | End: 2021-08-03

## 2020-12-15 ASSESSMENT — MIFFLIN-ST. JEOR: SCORE: 1068.85

## 2020-12-15 ASSESSMENT — ACTIVITIES OF DAILY LIVING (ADL): CURRENT_FUNCTION: NO ASSISTANCE NEEDED

## 2020-12-15 NOTE — PROGRESS NOTES
"PE faxed to [phone] and [phone].    Nela Cabrera MA on 12/15/2020 at 11:28 AM      Patient is cleared for her eye surgery in January   Would recommend doing an EKG prior as that was not done during her annual PE exam   EKG done today January 12 and is unchanged form the one done in June 2020 .    Answers for HPI/ROS submitted by the patient on 12/15/2020   Annual Exam:  In general, how would you rate your overall physical health?: good  Frequency of exercise:: 4-5 days/week  Do you usually eat at least 4 servings of fruit and vegetables a day, include whole grains & fiber, and avoid regularly eating high fat or \"junk\" foods? : Yes  Taking medications regularly:: Yes  Medication side effects:: None  Activities of Daily Living: no assistance needed  Home safety: no safety concerns identified  Hearing Impairment:: no hearing concerns  In the past 6 months, have you been bothered by leaking of urine?: No  In general, how would you rate your overall mental or emotional health?: good  Additional concerns today:: No  Duration of exercise:: Greater than 60 minutes    "

## 2020-12-15 NOTE — PROGRESS NOTES
"SUBJECTIVE:   Mely Guerra is a 80 year old female who presents for Preventive Visit.      Patient has been advised of split billing requirements and indicates understanding: Yes   Are you in the first 12 months of your Medicare coverage?  No    Healthy Habits:     In general, how would you rate your overall health?  Good    Frequency of exercise:  4-5 days/week    Duration of exercise:  Greater than 60 minutes    Do you usually eat at least 4 servings of fruit and vegetables a day, include whole grains    & fiber and avoid regularly eating high fat or \"junk\" foods?  Yes    Taking medications regularly:  Yes    Medication side effects:  None    Ability to successfully perform activities of daily living:  No assistance needed    Home Safety:  No safety concerns identified    Hearing Impairment:  No hearing concerns    In the past 6 months, have you been bothered by leaking of urine?  No    In general, how would you rate your overall mental or emotional health?  Good      PHQ-2 Total Score: 0    Additional concerns today:  No    Do you feel safe in your environment? Yes    Have you ever done Advance Care Planning? (For example, a Health Directive, POLST, or a discussion with a medical provider or your loved ones about your wishes): Yes, patient states has an Advance Care Planning document and will bring a copy to the clinic.      Fall risk  Fallen 2 or more times in the past year?: No  Any fall with injury in the past year?: No    Cognitive Screening   1) Repeat 3 items (Leader, Season, Table)    2) Clock draw: NORMAL  3) 3 item recall: Recalls 2 objects   Results: NORMAL clock, 1-2 items recalled: COGNITIVE IMPAIRMENT LESS LIKELY    Mini-CogTM Copyright PINKY Mendieta. Licensed by the author for use in [facility]; reprinted with permission (aileen@.[facility]). All rights reserved.      Do you have sleep apnea, excessive snoring or daytime drowsiness?: yes    Reviewed and updated as needed this visit by clinical " staff  Tobacco  Allergies  Meds   Med Hx  Surg Hx  Fam Hx  Soc Hx        Reviewed and updated as needed this visit by Provider                Social History     Tobacco Use     Smoking status: Never Smoker     Smokeless tobacco: Never Used   Substance Use Topics     Alcohol use: No     Alcohol/week: 0.0 standard drinks     If you drink alcohol do you typically have >3 drinks per day or >7 drinks per week? No    Alcohol Use 12/15/2020   Prescreen: >3 drinks/day or >7 drinks/week? Not Applicable   Prescreen: >3 drinks/day or >7 drinks/week? -   No flowsheet data found.            Current providers sharing in care for this patient include:   Patient Care Team:  Tiffany Messer MD as PCP - General (Family Practice)  Mirna Manning MD as MD (INTERNAL MEDICINE - ENDOCRINOLOGY, DIABETES & METABOLISM)  Mary Bustos [facility] as Pharmacist (Pharmacist)  Tiffany Messer MD as Assigned PCP  Laney Arriaza MD as Assigned Heart and Vascular Provider    The following health maintenance items are reviewed in Epic and correct as of today:  Health Maintenance   Topic Date Due     HEPATITIS C SCREENING  02/11/1958     ZOSTER IMMUNIZATION (2 of 3) 04/23/2008     TSH W/FREE T4 REFLEX  10/10/2020     FALL RISK ASSESSMENT  12/13/2020     MEDICARE ANNUAL WELLNESS VISIT  12/13/2020     DTAP/TDAP/TD IMMUNIZATION (3 - Td) 01/10/2023     LIPID  10/12/2023     ADVANCE CARE PLANNING  12/15/2024     COLORECTAL CANCER SCREENING  10/31/2028     DEXA  08/10/2032     PHQ-2  Completed     INFLUENZA VACCINE  Completed     Pneumococcal Vaccine: 65+ Years  Completed     Pneumococcal Vaccine: Pediatrics (0 to 5 Years) and At-Risk Patients (6 to 64 Years)  Aged Out     IPV IMMUNIZATION  Aged Out     MENINGITIS IMMUNIZATION  Aged Out     HEPATITIS B IMMUNIZATION  Aged Out     Labs reviewed in EPIC  BP Readings from Last 3 Encounters:   12/15/20 (!) 155/77   07/09/20 124/66   06/10/20 129/69    Wt Readings from Last 3 Encounters:   12/15/20 60.8 kg (134  lb)   07/09/20 60.8 kg (134 lb)   06/10/20 60.8 kg (134 lb)                  Patient Active Problem List   Diagnosis     Acquired hypothyroidism     Allergic rhinitis     Osteoporosis     Osteoarthritis     Hyperlipidemia LDL goal <160     Hypertension goal BP (blood pressure) < 140/90     Advanced directives, counseling/discussion     Otitis externa     Sleep apnea     Dermatitis     Rosacea     Age-related osteoporosis without current pathological fracture     Disorder of bone and cartilage     Dysphagia     Paroxysmal supraventricular tachycardia (H)     SVT (supraventricular tachycardia) (H)     Atrial tachycardia (H)     Alcohol dependence in remission (H)     Past Surgical History:   Procedure Laterality Date     COLONOSCOPY N/A 10/31/2018    Procedure: COMBINED COLONOSCOPY, SINGLE OR MULTIPLE BIOPSY/POLYPECTOMY BY BIOPSY;  Surgeon: Leon Cosme MD;  Location:  GI     EP ABLATION SVT N/A 10/8/2019    Procedure: EP Ablation SVT;  Surgeon: Laney Arriaza MD;  Location:  HEART CARDIAC CATH LAB     EP ABLATION SVT N/A 6/10/2020    Procedure: EP Ablation SVT;  Surgeon: Laney Arriaza MD;  Location:  HEART CARDIAC CATH LAB     ESOPHAGOSCOPY, GASTROSCOPY, DUODENOSCOPY (EGD), COMBINED N/A 10/31/2018    Procedure: GASTROSCOPY;  Surgeon: Leon Cosme MD;  Location:  GI     HYSTERECTOMY       JOINT REPLACEMENT Bilateral      knee replacement Bilateral      ORTHOPEDIC SURGERY       ZZC NONSPECIFIC PROCEDURE  1974    s/p Vaginal hysterectomy       Social History     Tobacco Use     Smoking status: Never Smoker     Smokeless tobacco: Never Used   Substance Use Topics     Alcohol use: No     Alcohol/week: 0.0 standard drinks     Family History   Problem Relation Age of Onset     Cancer Sister      Heart Disease Father          Current Outpatient Medications   Medication Sig Dispense Refill     amLODIPine (NORVASC) 10 MG tablet Take 0.5 tablets (5 mg) by mouth daily 90 tablet 1     Calcium  Citrate-Vitamin D (CALCIUM CITRATE + D) 250-200 MG-UNIT TABS Take 1 tablet by mouth 2 times daily       diphenhydrAMINE-acetaminophen (TYLENOL PM)  MG tablet Take 2 tablets by mouth At Bedtime       fluticasone (FLONASE) 50 MCG/ACT nasal spray USE 2 SPRAYS IN EACH       NOSTRIL DAILY 48 g 0     levothyroxine (SYNTHROID/LEVOTHROID) 100 MCG tablet TAKE 1 TABLET EVERY DAY 90 tablet 1     losartan (COZAAR) 100 MG tablet TAKE 1 TABLET DAILY 90 tablet 1     montelukast (SINGULAIR) 10 MG tablet Take 10 mg by mouth every morning       Multiple Vitamins-Minerals (EYE VITAMINS & MINERALS) TABS Take 2 capsules by mouth daily EyePromise AREDS2 Plus       Multiple Vitamins-Minerals (MULTIVITAMIN ADULT PO) Take 1 tablet by mouth daily       triamterene-HCTZ (DYAZIDE) 37.5-25 MG capsule TAKE 1 CAPSULE DAILY 90 capsule 1     Allergies   Allergen Reactions     Cats      Hylan G-F 20      Lisinopril Cough     Seasonal Allergies      Vioxx      Recent Labs   Lab Test 06/10/20  1218 10/10/19  0949 10/08/19  0655 10/12/18  1103 10/12/18  1103 03/24/17  0954 03/24/17  0954 06/03/14  0908 06/03/14  0908   LDL  --   --   --   --  97  --   --   --  126   HDL  --   --   --   --  85  --   --   --  99   TRIG  --   --   --   --  39  --   --   --  70   ALT  --   --   --   --  32  --  23  --   --    CR 0.64  --  0.79   < > 0.73   < > 0.76   < > 0.78   GFRESTIMATED 85  --  72   < > 78   < > 75   < > 73   GFRESTBLACK >90  --  84   < > >90   < > >90  African American GFR Calc     < > 88   POTASSIUM 3.5  --  3.5   < > 4.3   < > 4.4   < > 4.3   TSH  --  1.52  --   --  1.59   < > 2.31   < > 2.87    < > = values in this interval not displayed.      Mammogram Screening: Patient over age 75, has elected to continue with mammography screening.    Review of Systems  Constitutional, HEENT, cardiovascular, pulmonary, GI, , musculoskeletal, neuro, skin, endocrine and psych systems are negative, except as otherwise noted.    OBJECTIVE:   BP (!) 155/77   " Pulse 64   Temp 96.7  F (35.9  C) (Tympanic)   Resp 16   Ht 1.619 m (5' 3.75\")   Wt 60.8 kg (134 lb)   SpO2 97%   BMI 23.18 kg/m   Estimated body mass index is 23.18 kg/m  as calculated from the following:    Height as of this encounter: 1.619 m (5' 3.75\").    Weight as of this encounter: 60.8 kg (134 lb).  Physical Exam  GENERAL: healthy, alert and no distress  EYES: Eyes grossly normal to inspection, PERRL and conjunctivae and sclerae normal  HENT: ear canals and TM's normal, nose and mouth without ulcers or lesions  NECK: no adenopathy, no asymmetry, masses, or scars and thyroid normal to palpation  RESP: lungs clear to auscultation - no rales, rhonchi or wheezes  BREAST: normal without masses, tenderness or nipple discharge and no palpable axillary masses or adenopathy  CV: regular rate and rhythm, normal S1 S2, no S3 or S4, no murmur, click or rub, no peripheral edema and peripheral pulses strong  ABDOMEN: soft, nontender, no hepatosplenomegaly, no masses and bowel sounds normal  MS: no gross musculoskeletal defects noted, no edema  SKIN: no suspicious lesions or rashes  NEURO: Normal strength and tone, mentation intact and speech normal  PSYCH: mentation appears normal, affect normal/bright    Diagnostic Test Results:  Labs reviewed in Epic  No results found for this or any previous visit (from the past 24 hour(s)).    ASSESSMENT / PLAN:   1. Encounter for routine adult health examination without abnormal findings  Discussed diet,calcium,exercise.Went over self breast exam.Thin prep was NOT done.Eyes and teeth UTD.No immunizations needed today.See orders below for tests ordered and screening needed.    - Lipid panel reflex to direct LDL Fasting    2. Essential hypertension with goal blood pressure less than 140/90  Will check labs , she is on 4 different meds to control the BP and has been well controlled, although she has taken the amlodipine at 5 mg daily not 10 mg , also she has been very physically " "active walking biking a lot during the warmer months of the year   - Comprehensive metabolic panel (BMP + Alb, Alk Phos, ALT, AST, Total. Bili, TP)  - amLODIPine (NORVASC) 10 MG tablet; Take 0.5 tablets (5 mg) by mouth daily  Dispense: 90 tablet; Refill: 1  - losartan (COZAAR) 100 MG tablet; TAKE 1 TABLET DAILY  Dispense: 90 tablet; Refill: 1  - triamterene-HCTZ (DYAZIDE) 37.5-25 MG capsule; TAKE 1 CAPSULE DAILY  Dispense: 90 capsule; Refill: 1        4. Acquired hypothyroidism  She is due for recheck on the TSH , doing well on the synthroid 100 mcg daily dose   - TSH with free T4 reflex  - levothyroxine (SYNTHROID/LEVOTHROID) 100 MCG tablet; TAKE 1 TABLET EVERY DAY  Dispense: 90 tablet; Refill: 1    5. Paroxysmal supraventricular tachycardia (H)  Has had a second ablation procedure June of this year and no symptoms ever since that , doing well       Patient has been advised of split billing requirements and indicates understanding: Yes  COUNSELING:  Reviewed preventive health counseling, as reflected in patient instructions       Regular exercise       Healthy diet/nutrition       Vision screening       Hearing screening       Dental care       Osteoporosis Prevention/Bone Health       Colon cancer screening    Estimated body mass index is 23.18 kg/m  as calculated from the following:    Height as of this encounter: 1.619 m (5' 3.75\").    Weight as of this encounter: 60.8 kg (134 lb).        She reports that she has never smoked. She has never used smokeless tobacco.      Appropriate preventive services were discussed with this patient, including applicable screening as appropriate for cardiovascular disease, diabetes, osteopenia/osteoporosis, and glaucoma.  As appropriate for age/gender, discussed screening for colorectal cancer, prostate cancer, breast cancer, and cervical cancer. Checklist reviewing preventive services available has been given to the patient.    Reviewed patients plan of care and provided an " AVS. The Intermediate Care Plan ( asthma action plan, low back pain action plan, and migraine action plan) for Mely meets the Care Plan requirement. This Care Plan has been established and reviewed with the Patient.    Counseling Resources:  ATP IV Guidelines  Pooled Cohorts Equation Calculator  Breast Cancer Risk Calculator  Breast Cancer: Medication to Reduce Risk  FRAX Risk Assessment  ICSI Preventive Guidelines  Dietary Guidelines for Americans, 2010  [employer]'s MyPlate  ASA Prophylaxis  Lung CA Screening    Tiffany Messer MD  [facility]    Identified Health Risks:

## 2020-12-18 ENCOUNTER — MYC MEDICAL ADVICE (OUTPATIENT)
Dept: FAMILY MEDICINE | Facility: CLINIC | Age: 80
End: 2020-12-18

## 2020-12-18 DIAGNOSIS — Z01.818 PRE-OPERATIVE CLEARANCE: Primary | ICD-10-CM

## 2020-12-29 NOTE — TELEPHONE ENCOUNTER
I have placed an addendum to  the PE note for the eye surgery BUT I recommend she comes to have an EKG done as this is part of the preoperative clearing   Can you let pt know this ?  Thanks

## 2021-01-05 ENCOUNTER — APPOINTMENT (OUTPATIENT)
Dept: NURSING | Facility: CLINIC | Age: 81
End: 2021-01-05
Payer: MEDICARE

## 2021-01-09 NOTE — TELEPHONE ENCOUNTER
"Rx sent to [facility] in [place] 3/22/2018 for 6 months  Pt requesting mail order  Sent remaining refill to mail order  MyChart sent to pt  Bernadine BROWN RN    Requested Prescriptions   Pending Prescriptions Disp Refills     levothyroxine (SYNTHROID/LEVOTHROID) 100 MCG tablet 90 tablet 1     Sig: TAKE 1 TABLET (100 MCG) BY MOUTH DAILY    Thyroid Protocol Passed    7/2/2018  8:12 AM       Passed - Patient is 12 years or older       Passed - Recent (12 mo) or future (30 days) visit within the authorizing provider's specialty    Patient had office visit in the last 12 months or has a visit in the next 30 days with authorizing provider or within the authorizing provider's specialty.  See \"Patient Info\" tab in inbasket, or \"Choose Columns\" in Meds & Orders section of the refill encounter.           Passed - Normal TSH on file in past 12 months    Recent Labs   Lab Test  12/12/17   1210   TSH  2.13             Passed - No active pregnancy on record    If patient is pregnant or has had a positive pregnancy test, please check TSH.         Passed - No positive pregnancy test in past 12 months    If patient is pregnant or has had a positive pregnancy test, please check TSH.              " · Baseline creatinine appears to be 0 8-0 9  · 1 38 on admission  · Gentle IV fluids  · Hold lisinopril  · Check BMP in am

## 2021-01-12 ENCOUNTER — TELEPHONE (OUTPATIENT)
Dept: FAMILY MEDICINE | Facility: CLINIC | Age: 81
End: 2021-01-12

## 2021-01-12 ENCOUNTER — OFFICE VISIT (OUTPATIENT)
Dept: FAMILY MEDICINE | Facility: CLINIC | Age: 81
End: 2021-01-12
Payer: MEDICARE

## 2021-01-12 DIAGNOSIS — Z00.00 ENCOUNTER FOR ROUTINE ADULT HEALTH EXAMINATION WITHOUT ABNORMAL FINDINGS: Primary | ICD-10-CM

## 2021-01-12 DIAGNOSIS — Z01.818 PRE-OPERATIVE CLEARANCE: ICD-10-CM

## 2021-01-12 PROBLEM — I47.19 ATRIAL TACHYCARDIA (H): Status: RESOLVED | Noted: 2020-01-21 | Resolved: 2021-01-12

## 2021-01-12 PROBLEM — I47.10 SVT (SUPRAVENTRICULAR TACHYCARDIA) (H): Status: RESOLVED | Noted: 2019-09-19 | Resolved: 2021-01-12

## 2021-01-12 PROBLEM — I47.10 PAROXYSMAL SUPRAVENTRICULAR TACHYCARDIA (H): Status: RESOLVED | Noted: 2019-05-29 | Resolved: 2021-01-12

## 2021-01-12 PROBLEM — F10.21 ALCOHOL DEPENDENCE IN REMISSION (H): Status: RESOLVED | Noted: 2020-12-15 | Resolved: 2021-01-12

## 2021-01-12 PROCEDURE — 99207 PR NON-BILLABLE SERV PER CHARTING: CPT | Performed by: FAMILY MEDICINE

## 2021-01-12 NOTE — TELEPHONE ENCOUNTER
Mely came into the [facility].  Her surgery is tomorrow on 1/13/2021, the eyelids will be adjusted to improve Mely's vision, since the eye lids are blocking her vision.    [facility] in [place], Dr. Vibha Putnam need a pre-op results.    Mely says the 12/15/2020 routine wellness can be addendumed by Dr. Messer, to say Mely is well enough to have the procedure.    This addendum along with the EKG results needs to be faxed to [facility], [place] office, attention to Dr. Vibha Putnam.    Please send this yet this morning/early afternoon to avoid Mely's surgery being rescheduled.

## 2021-01-12 NOTE — TELEPHONE ENCOUNTER
Attempt to call phone number x 3 in message below.  Keeps ringing, no answer.  No VM option  See message from 12/18. Looks like this was already taken care of    Note from 12/18/2020:    Tiffany Messer MD         2:05 PM  Note     I have placed an addendum to  the PE note for the eye surgery BUT I recommend she comes to have an EKG done as this is part of the preoperative clearing   Can you let pt know this ?  Thanks         Pt scheduled EKG for 1/5/2020  Bernadine BROWN RN

## 2021-01-12 NOTE — TELEPHONE ENCOUNTER
Reason for Call:  Other call back    Detailed comments: [facility] would like a call back to explain in detail the type of surgery pt will have tomorrow an if last clinic appt note can be changed to a pre-op.    Phone Number Patient can be reached at: Other phone number:  [phone]    Best Time: ASAP    Can we leave a detailed message on this number? NO    Call taken on 1/12/2021 at 7:56 AM by Mandie Johnston

## 2021-01-12 NOTE — PROGRESS NOTES
Pt here for an EKG only for a minor eye surgery as she had a PE exam done and would need that exam faxed to the surgeon plus EKG result   EKG is unchanged from the one done June 2020

## 2021-01-13 NOTE — TELEPHONE ENCOUNTER
Patient walked in today   Surgery was cancelled because paperwork not received   Pt said they will reschedule her within next 3 weeks if they get preop notes, ekg, labs, etc  Faxed again today   Also gave pt 2 copies of notes, ekg, and labs so pt had copy for herself and could drop copy off to surgery center herself to ensure it was sent/received  Bernadine BROWN RN

## 2021-05-21 ENCOUNTER — TELEPHONE (OUTPATIENT)
Dept: CARDIOLOGY | Facility: CLINIC | Age: 81
End: 2021-05-21

## 2021-05-21 NOTE — TELEPHONE ENCOUNTER
Pt called and LM on schedulers phone line that she wanted an OV with Dr Arriaza and that she was having shortness of breath. Jama

## 2021-05-24 NOTE — TELEPHONE ENCOUNTER
Pt called back and states that the shortness of breath has been going on for a couple weeks. States that she can ride her bike for miles and does not get short of breath, but sitting at the table for a couple of hours and gets up and she is short of breath.  Pt had a SVT ablation in 2019 and she notes no rapid heart rates a this time.  Pt does have a history of allergies and when she takes a deep breath she does cough.  This writer suggested that pt may want to see her PMD who can give the pt a once over, which would include listening to her lungs. Pt states understanding. Jama

## 2021-06-29 ENCOUNTER — DOCUMENTATION ONLY (OUTPATIENT)
Dept: FAMILY MEDICINE | Facility: CLINIC | Age: 81
End: 2021-06-29

## 2021-06-29 DIAGNOSIS — E03.9 ACQUIRED HYPOTHYROIDISM: Primary | ICD-10-CM

## 2021-06-29 DIAGNOSIS — R74.8 ELEVATED LIVER ENZYMES: ICD-10-CM

## 2021-07-08 DIAGNOSIS — R74.8 ELEVATED LIVER ENZYMES: ICD-10-CM

## 2021-07-08 DIAGNOSIS — E03.9 ACQUIRED HYPOTHYROIDISM: ICD-10-CM

## 2021-07-08 LAB
ALBUMIN SERPL-MCNC: 4 G/DL (ref 3.4–5)
ALP SERPL-CCNC: 56 U/L (ref 40–150)
ALT SERPL W P-5'-P-CCNC: 29 U/L (ref 0–50)
AST SERPL W P-5'-P-CCNC: 52 U/L (ref 0–45)
BILIRUB DIRECT SERPL-MCNC: 0.2 MG/DL (ref 0–0.2)
BILIRUB SERPL-MCNC: 0.9 MG/DL (ref 0.2–1.3)
PROT SERPL-MCNC: 7.2 G/DL (ref 6.8–8.8)
TSH SERPL DL<=0.005 MIU/L-ACNC: 1.87 MU/L (ref 0.4–4)

## 2021-07-08 PROCEDURE — 84443 ASSAY THYROID STIM HORMONE: CPT | Performed by: FAMILY MEDICINE

## 2021-07-08 PROCEDURE — 36415 COLL VENOUS BLD VENIPUNCTURE: CPT | Performed by: FAMILY MEDICINE

## 2021-07-08 PROCEDURE — 80076 HEPATIC FUNCTION PANEL: CPT | Performed by: FAMILY MEDICINE

## 2021-08-03 ENCOUNTER — OFFICE VISIT (OUTPATIENT)
Dept: FAMILY MEDICINE | Facility: CLINIC | Age: 81
End: 2021-08-03
Payer: MEDICARE

## 2021-08-03 VITALS
TEMPERATURE: 98 F | HEIGHT: 64 IN | HEART RATE: 60 BPM | SYSTOLIC BLOOD PRESSURE: 130 MMHG | WEIGHT: 134.2 LBS | OXYGEN SATURATION: 96 % | BODY MASS INDEX: 22.91 KG/M2 | DIASTOLIC BLOOD PRESSURE: 65 MMHG | RESPIRATION RATE: 20 BRPM

## 2021-08-03 DIAGNOSIS — E03.9 ACQUIRED HYPOTHYROIDISM: ICD-10-CM

## 2021-08-03 DIAGNOSIS — I10 ESSENTIAL HYPERTENSION WITH GOAL BLOOD PRESSURE LESS THAN 140/90: Primary | ICD-10-CM

## 2021-08-03 PROCEDURE — 99214 OFFICE O/P EST MOD 30 MIN: CPT | Performed by: FAMILY MEDICINE

## 2021-08-03 RX ORDER — AMLODIPINE BESYLATE 10 MG/1
5 TABLET ORAL DAILY
Qty: 90 TABLET | Refills: 1 | Status: SHIPPED | OUTPATIENT
Start: 2021-08-03 | End: 2022-04-06 | Stop reason: DRUGHIGH

## 2021-08-03 RX ORDER — LEVOTHYROXINE SODIUM 100 UG/1
TABLET ORAL
Qty: 90 TABLET | Refills: 1 | Status: SHIPPED | OUTPATIENT
Start: 2021-08-03 | End: 2022-01-04

## 2021-08-03 RX ORDER — TRIAMTERENE AND HYDROCHLOROTHIAZIDE 37.5; 25 MG/1; MG/1
CAPSULE ORAL
Qty: 90 CAPSULE | Refills: 1 | Status: SHIPPED | OUTPATIENT
Start: 2021-08-03 | End: 2022-01-04

## 2021-08-03 RX ORDER — LOSARTAN POTASSIUM 100 MG/1
100 TABLET ORAL DAILY
Qty: 90 TABLET | Refills: 1 | Status: SHIPPED | OUTPATIENT
Start: 2021-08-03 | End: 2022-01-14

## 2021-08-03 ASSESSMENT — MIFFLIN-ST. JEOR: SCORE: 1064.76

## 2021-08-03 NOTE — PROGRESS NOTES
Assessment & Plan       Essential hypertension with goal blood pressure less than 140/90  Her BP is always under 140 over 90 at home , and more like 120 to 130 over 70 or 60 , she denies any symptoms , will check her BMP at next visit , she did have some mild elevation in LFTs but that went down and we discussed cutting off the tylenol which she has been taking daily for arthritic pain , she will take Ibuprofen as needed but not more than 200 mg a day   - amLODIPine (NORVASC) 10 MG tablet; Take 0.5 tablets (5 mg) by mouth daily  - losartan (COZAAR) 100 MG tablet; Take 1 tablet (100 mg) by mouth daily  - triamterene-HCTZ (DYAZIDE) 37.5-25 MG capsule; TAKE 1 CAPSULE DAILY    Acquired hypothyroidism  Refilled , stable TSh this year and will recheck at next visit  In the fall   - levothyroxine (SYNTHROID/LEVOTHROID) 100 MCG tablet; TAKE 1 TABLET EVERY DAY        RTC if no improving or worsening.  Pt is aware  and comfortable with the current plan.      Tiffany Messer MD  [facility]    Raj Boone is a 81 year old who presents for the following health issues     HPI     Hypertension Follow-up      Do you check your blood pressure regularly outside of the clinic? Yes     Are you following a low salt diet? Yes    Are your blood pressures ever more than 140 on the top number (systolic) OR more   than 90 on the bottom number (diastolic), for example 140/90? Yes      How many servings of fruits and vegetables do you eat daily?  2-3    On average, how many sweetened beverages do you drink each day (Examples: soda, juice, sweet tea, etc.  Do NOT count diet or artificially sweetened beverages)?   0    How many days per week do you exercise enough to make your heart beat faster? 3 or less    How many minutes a day do you exercise enough to make your heart beat faster? 9 or less    How many days per week do you miss taking your medication? 0    2) hypothyroidism and on Synthrpoid doing well on that      Review of Systems   Constitutional, HEENT, cardiovascular, pulmonary, GI, , musculoskeletal, neuro, skin, endocrine and psych systems are negative, except as otherwise noted.      Objective    There were no vitals taken for this visit.  There is no height or weight on file to calculate BMI.  Physical Exam   GENERAL: healthy, alert and no distress  NECK: no adenopathy, no asymmetry, masses, or scars and thyroid normal to palpation  RESP: lungs clear to auscultation - no rales, rhonchi or wheezes  CV: regular rate and rhythm, normal S1 S2, no S3 or S4, no murmur, click or rub, no peripheral edema and peripheral pulses strong  ABDOMEN: soft, nontender, no hepatosplenomegaly, no masses and bowel sounds normal  MS: no gross musculoskeletal defects noted, no edema    No results found for this or any previous visit (from the past 24 hour(s)).

## 2021-10-02 ENCOUNTER — MYC MEDICAL ADVICE (OUTPATIENT)
Dept: FAMILY MEDICINE | Facility: CLINIC | Age: 81
End: 2021-10-02

## 2021-12-13 NOTE — PROGRESS NOTES
Medication Therapy Management (MTM) Encounter    ASSESSMENT:                            Medication Adherence/Access: No issues identified.     Hypertension/CV: Stable. Patient is meeting blood pressure goal of < 130/80mmHg.     Osteopenia: Stable. Patient is due for DEXA scan.      Macular Degeneration/Ophth: Stable.     Hypothyroidism: Stable. Last TSH is within normal limits.     Osteoarthritis/Insomnia: Discussed risks of using diphenhydramine nightly, but patient is aware of risk and unwilling to make this change. Given that her AST was >45, appropriate to have her taking acetaminophen minimally. Although ibuprofen is not ideal given her age and increased risk of bleed, she is taking a very low dose to reduce risk.      Allergies: Stable.    PLAN:                            1. Dr. Messer verbally approved DEXA and mammogram.   To set up a mammogram call: [phone] or [phone].    2. We sent in a new prescription for fluticasone.     Follow-up: annually    SUBJECTIVE/OBJECTIVE:                          Mely Guerra is a 81 year old female coming in for a follow-up visit. She was referred to me from Dr. Messer.  Today's visit is a follow-up MTM visit from 10/27/2020, first visit of 2021.   Reason for visit: comprehensive medication review  Patient is wondering today if she should get a mammogram.    Allergies/ADRs: Reviewed in chart  Past Medical History: Reviewed in chart  Tobacco: She reports that she has never smoked. She has never used smokeless tobacco.  Alcohol: not currently using  Caffeine: 3 cups/day  Activity: she is very active and enjoys biking, hiking, and dog walking for 2 miles/day    Medication Adherence/Access: no issues reported, patient reports that her insurance will be changing as of January 1st 2022 and would like to refill fluticasone before her insurance changes.     Hypertension/CV: Current medications include amlodipine 5 mg daily, triamterene-HCTZ 37.5-25 mg daily, and losartan  100 mg daily. Takes first thing in the morning and in the evening. Patient reports no current medication side effects. Reports home blood pressure readings 130/70s, but drinks coffee within the hour of taking her blood pressure and has not always been resting for 15-20 minutes before taking her blood pressure.      Osteopenia: Current therapy includes: calcium citrate/vitamin D 250-200 mg-unit 1 tabs BID. She is getting at least 3 servings of calcium in her diet daily.  Pt is not experiencing side effects.  DEXA History: 8/10/17  Risk factors: post-menopausal     Macular Degeneration/Ophth: Switched to [employer] AREDS 2 Plus daily. No reported issues.     Hypothyroidism: Patient is taking levothyroxine 100 mcg daily. Patient is having the following symptoms: none.     Osteoarthritis/Insomnia: Current therapy includes acetaminophen 325 mg every other morning (decreased by Dr. Messer due to liver function) alternating with ibuprofen 200 mg every other morning. In addition, she takes acetaminophen/Diphenhydramine 325 mg/25 mg - 1 tablet each evening for sleep and pain alternating with ibuprofen/diphenhydramine - 1 tablet nightly. She is also taking tumeric daily to help with inflammation. She feels that this regimen is effective for her osteoarthritis and sleep and denies side effects. Of note, her AST was elevated back in July.      Allergies: Uses Flonase 1 spray in each nostril twice daily and loratadine 10 mg daily. She stopped taking montelukast because she felt that it wasn't working for her. Reports some congestion today due to allergies but finds this regimen effective.     Today's Vitals: /62   Wt 134 lb 14.4 oz (61.2 kg)   BMI 23.34 kg/m    ----------------  I spent 25 minutes with this patient today. All refills sent via collaborative practice agreement with Tiffany Messer MD, additional orders placed via verbal approval with Tiffany Messer MD. A copy of the visit note was provided to the patient's  primary care provider.    The patient was given a summary of these recommendations.     Pratima Cota, PharmD  Medication Therapy Management Resident  Fara RendonD, SYEDA, BCACP   Medication Therapy Management Pharmacist     Medication Therapy Recommendations  Age-related osteoporosis without current pathological fracture    Current Medication: Calcium Citrate-Vitamin D (CALCIUM CITRATE + D) 250-200 MG-UNIT TABS   Rationale: Medication requires monitoring - Needs additional monitoring   Recommendation: Order Lab   Status: Contact Provider - Awaiting Response

## 2021-12-14 ENCOUNTER — OFFICE VISIT (OUTPATIENT)
Dept: PHARMACY | Facility: CLINIC | Age: 81
End: 2021-12-14
Payer: COMMERCIAL

## 2021-12-14 VITALS — DIASTOLIC BLOOD PRESSURE: 62 MMHG | BODY MASS INDEX: 23.34 KG/M2 | SYSTOLIC BLOOD PRESSURE: 118 MMHG | WEIGHT: 134.9 LBS

## 2021-12-14 DIAGNOSIS — E03.9 ACQUIRED HYPOTHYROIDISM: ICD-10-CM

## 2021-12-14 DIAGNOSIS — G47.00 INSOMNIA, UNSPECIFIED TYPE: ICD-10-CM

## 2021-12-14 DIAGNOSIS — I10 HYPERTENSION GOAL BP (BLOOD PRESSURE) < 140/90: Primary | ICD-10-CM

## 2021-12-14 DIAGNOSIS — M15.0 PRIMARY OSTEOARTHRITIS INVOLVING MULTIPLE JOINTS: ICD-10-CM

## 2021-12-14 DIAGNOSIS — J30.89 OTHER ALLERGIC RHINITIS: ICD-10-CM

## 2021-12-14 DIAGNOSIS — M81.0 AGE-RELATED OSTEOPOROSIS WITHOUT CURRENT PATHOLOGICAL FRACTURE: ICD-10-CM

## 2021-12-14 DIAGNOSIS — H35.30 MACULAR DEGENERATION (SENILE) OF RETINA: ICD-10-CM

## 2021-12-14 PROCEDURE — 99605 MTMS BY PHARM NP 15 MIN: CPT | Performed by: PHARMACIST

## 2021-12-14 PROCEDURE — 99607 MTMS BY PHARM ADDL 15 MIN: CPT | Performed by: PHARMACIST

## 2021-12-14 RX ORDER — IBUPROFEN 200 MG
200 TABLET ORAL EVERY OTHER DAY
COMMUNITY
End: 2023-09-29

## 2021-12-14 RX ORDER — VIT C/B6/B5/MAGNESIUM/HERB 173 50-5-6-5MG
2 CAPSULE ORAL DAILY
COMMUNITY
End: 2024-01-09

## 2021-12-14 RX ORDER — ACETAMINOPHEN 325 MG/1
325 TABLET ORAL EVERY OTHER DAY
COMMUNITY
End: 2022-08-09

## 2021-12-14 RX ORDER — FLUTICASONE PROPIONATE 50 MCG
1 SPRAY, SUSPENSION (ML) NASAL 2 TIMES DAILY
Qty: 48 G | Refills: 0 | Status: SHIPPED | OUTPATIENT
Start: 2021-12-14 | End: 2021-12-14

## 2021-12-14 RX ORDER — FLUTICASONE PROPIONATE 50 MCG
1 SPRAY, SUSPENSION (ML) NASAL 2 TIMES DAILY
COMMUNITY
End: 2021-12-16

## 2021-12-14 RX ORDER — LORATADINE 10 MG/1
10 TABLET ORAL DAILY
COMMUNITY

## 2021-12-14 NOTE — PATIENT INSTRUCTIONS
Recommendations from today's MTM visit:                                                    MTM (medication therapy management) is a service provided by a clinical pharmacist designed to help you get the most of out of your medicines.   Today we reviewed what your medicines are for, how to know if they are working, that your medicines are safe and how to make your medicine regimen as easy as possible.      1. We will talk to Dr. Messer about getting a bone scan and mammogram.     2. I will send a refill for the fluticasone nasal spray.    Follow-up: annually    It was great to speak with you today.  I value your experience and would be very thankful for your time with providing feedback on our clinic survey. You may receive a survey via email or text message in the next few days.     To schedule another MTM appointment, please call the clinic directly or you may call the MTM scheduling line at [phone] or toll-free at [phone].     My Clinical Pharmacist's contact information:                                                      Please feel free to contact me with any questions or concerns you have.      Pratima Cota PharmD  Medication Therapy Management Resident, [facility] [place] [facility] [place]  Pager: [phone]  Email: [email or web address]    Mary Bustos, Pharm.D., M.B.A., [facility]  Medication Therapy Management Pharmacist, [facility]  Pager: [phone]  Email: [email or web address]

## 2021-12-16 DIAGNOSIS — J30.1 SEASONAL ALLERGIC RHINITIS DUE TO POLLEN: Primary | ICD-10-CM

## 2021-12-16 RX ORDER — FLUTICASONE PROPIONATE 50 MCG
1 SPRAY, SUSPENSION (ML) NASAL 2 TIMES DAILY
Qty: 16 G | Refills: 0 | Status: SHIPPED | OUTPATIENT
Start: 2021-12-16 | End: 2022-01-14

## 2021-12-16 NOTE — TELEPHONE ENCOUNTER
Message to prescriber:   The directions are conflicting on the original rx for flonase date (12/14/2021).  Please respond with intended directions or comment to  Pharmacy.  [facility] [phone]

## 2021-12-16 NOTE — TELEPHONE ENCOUNTER
ZORAIDA,    Flonase:    Routing refill request to provider for review/approval because:  Medication is reported/historical    Message from MT OV: 12/14/21:    Allergies: Uses Flonase 1 spray in each nostril twice daily and loratadine 10 mg daily. She stopped taking montelukast because she felt that it wasn't working for her. Reports some congestion today due to allergies but finds this regimen effective.      Last OV with you 8/3/21.  Thanks,  Nayana Alcaraz RN

## 2021-12-30 DIAGNOSIS — E03.9 ACQUIRED HYPOTHYROIDISM: ICD-10-CM

## 2021-12-30 DIAGNOSIS — I10 ESSENTIAL HYPERTENSION WITH GOAL BLOOD PRESSURE LESS THAN 140/90: ICD-10-CM

## 2021-12-30 NOTE — PROGRESS NOTES
"  Assessment & Plan     Acute sinusitis with symptoms > 10 days    - doxycycline hyclate (VIBRA-TABS) 100 MG tablet; Take 1 tablet (100 mg) by mouth 2 times daily                 Return in about 1 week (around 1/10/2022) for If symptoms persist or worsen.    MAGDALENA [facility]   Mely is a 81 year old who presents for the following health issues     HPI     Acute Illness  Acute illness concerns: Sinus  Onset/Duration: 1 month   Symptoms:  Fever: no  Chills/Sweats: no  Headache (location?): YES  Sinus Pressure: YES  Conjunctivitis:  no  Ear Pain: no  Rhinorrhea: no  Congestion: YES  Sore Throat: YES  Cough: YES  Wheeze: YES  Decreased Appetite: no  Nausea: no  Vomiting: no  Diarrhea: no  Dysuria/Freq.: no  Dysuria or Hematuria: no  Fatigue/Achiness: YES  Sick/Strep Exposure: no  Therapies tried and outcome: None      Review of Systems   Constitutional, HEENT, cardiovascular, pulmonary, gi and gu systems are negative, except as otherwise noted.      Objective    /76   Pulse 71   Temp 97.8  F (36.6  C)   Ht 1.619 m (5' 3.75\")   Wt 61.1 kg (134 lb 11.2 oz)   SpO2 97%   BMI 23.30 kg/m    Body mass index is 23.3 kg/m .  Physical Exam   GENERAL: alert and no distress  EYES: Eyes grossly normal to inspection  HENT: b/l TM dullness with loss of light reflux nasal mucosa is erythematous and edematous   RESP: lungs clear to auscultation - no rales, rhonchi or wheezes  CV: regular rate and rhythm, normal S1 S2, no S3 or S4, no murmur, click or rub, no peripheral edema and peripheral pulses strong  PSYCH: mentation appears normal, affect normal/bright                "

## 2021-12-31 NOTE — TELEPHONE ENCOUNTER
Routing refill request to provider for review/approval because:  Labs out of range:  NA  Scheduled with JS on Monday.  Please authorize if appropriate.  Thanks,  Luann Urias RN

## 2022-01-03 ENCOUNTER — OFFICE VISIT (OUTPATIENT)
Dept: FAMILY MEDICINE | Facility: CLINIC | Age: 82
End: 2022-01-03
Payer: COMMERCIAL

## 2022-01-03 VITALS
HEIGHT: 64 IN | BODY MASS INDEX: 23 KG/M2 | HEART RATE: 71 BPM | DIASTOLIC BLOOD PRESSURE: 76 MMHG | TEMPERATURE: 97.8 F | WEIGHT: 134.7 LBS | OXYGEN SATURATION: 97 % | SYSTOLIC BLOOD PRESSURE: 134 MMHG

## 2022-01-03 DIAGNOSIS — J01.90 ACUTE SINUSITIS WITH SYMPTOMS > 10 DAYS: Primary | ICD-10-CM

## 2022-01-03 PROCEDURE — 99213 OFFICE O/P EST LOW 20 MIN: CPT | Performed by: PHYSICIAN ASSISTANT

## 2022-01-03 RX ORDER — DOXYCYCLINE HYCLATE 100 MG
100 TABLET ORAL 2 TIMES DAILY
Qty: 20 TABLET | Refills: 0 | Status: SHIPPED | OUTPATIENT
Start: 2022-01-03 | End: 2022-08-09

## 2022-01-03 ASSESSMENT — MIFFLIN-ST. JEOR: SCORE: 1067.03

## 2022-01-04 ENCOUNTER — HOSPITAL ENCOUNTER (OUTPATIENT)
Dept: MAMMOGRAPHY | Facility: CLINIC | Age: 82
Discharge: HOME | End: 2022-01-04
Attending: FAMILY MEDICINE | Admitting: FAMILY MEDICINE
Payer: COMMERCIAL

## 2022-01-04 DIAGNOSIS — Z00.00 PREVENTATIVE HEALTH CARE: ICD-10-CM

## 2022-01-04 DIAGNOSIS — Z12.31 VISIT FOR SCREENING MAMMOGRAM: ICD-10-CM

## 2022-01-04 PROCEDURE — 77067 SCR MAMMO BI INCL CAD: CPT

## 2022-01-04 RX ORDER — LEVOTHYROXINE SODIUM 100 UG/1
TABLET ORAL
Qty: 90 TABLET | Refills: 1 | Status: SHIPPED | OUTPATIENT
Start: 2022-01-04 | End: 2022-01-14

## 2022-01-04 RX ORDER — TRIAMTERENE AND HYDROCHLOROTHIAZIDE 37.5; 25 MG/1; MG/1
CAPSULE ORAL
Qty: 90 CAPSULE | Refills: 1 | Status: SHIPPED | OUTPATIENT
Start: 2022-01-04 | End: 2022-01-14

## 2022-01-14 ENCOUNTER — OFFICE VISIT (OUTPATIENT)
Dept: FAMILY MEDICINE | Facility: CLINIC | Age: 82
End: 2022-01-14
Payer: COMMERCIAL

## 2022-01-14 VITALS
BODY MASS INDEX: 22.91 KG/M2 | HEIGHT: 64 IN | WEIGHT: 134.2 LBS | HEART RATE: 56 BPM | DIASTOLIC BLOOD PRESSURE: 76 MMHG | OXYGEN SATURATION: 100 % | SYSTOLIC BLOOD PRESSURE: 130 MMHG | TEMPERATURE: 97.8 F

## 2022-01-14 DIAGNOSIS — J30.1 SEASONAL ALLERGIC RHINITIS DUE TO POLLEN: ICD-10-CM

## 2022-01-14 DIAGNOSIS — E03.9 ACQUIRED HYPOTHYROIDISM: ICD-10-CM

## 2022-01-14 DIAGNOSIS — I10 BENIGN ESSENTIAL HYPERTENSION: ICD-10-CM

## 2022-01-14 DIAGNOSIS — R05.9 COUGH: ICD-10-CM

## 2022-01-14 DIAGNOSIS — Z00.00 ENCOUNTER FOR ROUTINE ADULT HEALTH EXAMINATION WITHOUT ABNORMAL FINDINGS: Primary | ICD-10-CM

## 2022-01-14 DIAGNOSIS — I10 ESSENTIAL HYPERTENSION WITH GOAL BLOOD PRESSURE LESS THAN 140/90: ICD-10-CM

## 2022-01-14 PROCEDURE — 80061 LIPID PANEL: CPT | Performed by: FAMILY MEDICINE

## 2022-01-14 PROCEDURE — 80053 COMPREHEN METABOLIC PANEL: CPT | Performed by: FAMILY MEDICINE

## 2022-01-14 PROCEDURE — 84443 ASSAY THYROID STIM HORMONE: CPT | Performed by: FAMILY MEDICINE

## 2022-01-14 PROCEDURE — 99214 OFFICE O/P EST MOD 30 MIN: CPT | Mod: 25 | Performed by: FAMILY MEDICINE

## 2022-01-14 PROCEDURE — 36415 COLL VENOUS BLD VENIPUNCTURE: CPT | Performed by: FAMILY MEDICINE

## 2022-01-14 PROCEDURE — 99397 PER PM REEVAL EST PAT 65+ YR: CPT | Performed by: FAMILY MEDICINE

## 2022-01-14 RX ORDER — LEVOTHYROXINE SODIUM 100 UG/1
TABLET ORAL
Qty: 90 TABLET | Refills: 1 | Status: SHIPPED | OUTPATIENT
Start: 2022-01-14 | End: 2022-07-11

## 2022-01-14 RX ORDER — TRIAMTERENE AND HYDROCHLOROTHIAZIDE 37.5; 25 MG/1; MG/1
1 CAPSULE ORAL DAILY
Qty: 90 CAPSULE | Refills: 1 | Status: SHIPPED | OUTPATIENT
Start: 2022-01-14 | End: 2022-07-25

## 2022-01-14 RX ORDER — FLUTICASONE PROPIONATE 50 MCG
1 SPRAY, SUSPENSION (ML) NASAL 2 TIMES DAILY
Qty: 16 G | Refills: 0 | Status: SHIPPED | OUTPATIENT
Start: 2022-01-14

## 2022-01-14 RX ORDER — AMLODIPINE BESYLATE 5 MG/1
5 TABLET ORAL DAILY
Qty: 90 TABLET | Refills: 1 | Status: SHIPPED | OUTPATIENT
Start: 2022-01-14 | End: 2022-07-11

## 2022-01-14 RX ORDER — LOSARTAN POTASSIUM 100 MG/1
100 TABLET ORAL DAILY
Qty: 90 TABLET | Refills: 1 | Status: SHIPPED | OUTPATIENT
Start: 2022-01-14 | End: 2022-07-18

## 2022-01-14 ASSESSMENT — ENCOUNTER SYMPTOMS
CONSTIPATION: 0
PALPITATIONS: 0
DIARRHEA: 0
MYALGIAS: 0
SHORTNESS OF BREATH: 1
FREQUENCY: 0
ABDOMINAL PAIN: 0
HEMATURIA: 0
DIZZINESS: 0
ARTHRALGIAS: 1
NAUSEA: 0
JOINT SWELLING: 1
DYSURIA: 0
EYE PAIN: 0
HEARTBURN: 0
FEVER: 0
SORE THROAT: 0
WEAKNESS: 0
PARESTHESIAS: 0
HEMATOCHEZIA: 0
COUGH: 1
CHILLS: 0
HEADACHES: 0
NERVOUS/ANXIOUS: 0

## 2022-01-14 ASSESSMENT — ACTIVITIES OF DAILY LIVING (ADL): CURRENT_FUNCTION: NO ASSISTANCE NEEDED

## 2022-01-14 ASSESSMENT — MIFFLIN-ST. JEOR: SCORE: 1064.76

## 2022-01-15 LAB
ALBUMIN SERPL-MCNC: 4.2 G/DL (ref 3.4–5)
ALP SERPL-CCNC: 58 U/L (ref 40–150)
ALT SERPL W P-5'-P-CCNC: 28 U/L (ref 0–50)
ANION GAP SERPL CALCULATED.3IONS-SCNC: 7 MMOL/L (ref 3–14)
AST SERPL W P-5'-P-CCNC: 54 U/L (ref 0–45)
BILIRUB SERPL-MCNC: 1 MG/DL (ref 0.2–1.3)
BUN SERPL-MCNC: 11 MG/DL (ref 7–30)
CALCIUM SERPL-MCNC: 10.2 MG/DL (ref 8.5–10.1)
CHLORIDE BLD-SCNC: 98 MMOL/L (ref 94–109)
CHOLEST SERPL-MCNC: 244 MG/DL
CO2 SERPL-SCNC: 26 MMOL/L (ref 20–32)
CREAT SERPL-MCNC: 0.73 MG/DL (ref 0.52–1.04)
FASTING STATUS PATIENT QL REPORTED: YES
GFR SERPL CREATININE-BSD FRML MDRD: 83 ML/MIN/1.73M2
GLUCOSE BLD-MCNC: 86 MG/DL (ref 70–99)
HDLC SERPL-MCNC: 106 MG/DL
LDLC SERPL CALC-MCNC: 130 MG/DL
NONHDLC SERPL-MCNC: 138 MG/DL
POTASSIUM BLD-SCNC: 4.7 MMOL/L (ref 3.4–5.3)
PROT SERPL-MCNC: 7.6 G/DL (ref 6.8–8.8)
SODIUM SERPL-SCNC: 131 MMOL/L (ref 133–144)
TRIGL SERPL-MCNC: 42 MG/DL
TSH SERPL DL<=0.005 MIU/L-ACNC: 1.73 MU/L (ref 0.4–4)

## 2022-01-17 ENCOUNTER — MYC MEDICAL ADVICE (OUTPATIENT)
Dept: FAMILY MEDICINE | Facility: CLINIC | Age: 82
End: 2022-01-17
Payer: COMMERCIAL

## 2022-03-14 ENCOUNTER — TRANSFERRED RECORDS (OUTPATIENT)
Dept: HEALTH INFORMATION MANAGEMENT | Facility: CLINIC | Age: 82
End: 2022-03-14
Payer: COMMERCIAL

## 2022-03-22 ENCOUNTER — MYC MEDICAL ADVICE (OUTPATIENT)
Dept: FAMILY MEDICINE | Facility: CLINIC | Age: 82
End: 2022-03-22
Payer: COMMERCIAL

## 2022-03-25 NOTE — TELEPHONE ENCOUNTER
Would need to do at least a virtual visit to discuss , I have VV next week,   Can you tell her that ?  Thanks

## 2022-04-04 NOTE — PROGRESS NOTES
Mely is a 82 year old who is being evaluated via a billable video visit.      How would you like to obtain your AVS? MyChart  If the video visit is dropped, the invitation should be resent by: Text to cell phone: [phone]  Will anyone else be joining your video visit? No    Video Start Time: 8:50 am     Assessment & Plan     Benign essential hypertension  We discussed increasing the dose of the amlodipine to 10 mg and getting off the losartan per ENT recommendation to see if her cough is related to the losartan , but meanwhile she would need to monitor her BP at home , check twice a day , would need to be seen if any symptoms , also f/u here in clinic in 2 weeks   - REVIEW OF HEALTH MAINTENANCE PROTOCOL ORDERS  - amLODIPine (NORVASC) 10 MG tablet; Take 1 tablet (10 mg) by mouth daily    F/u in one to two weeks depending what her home BP readings are , if consistently high above 150 over 90 would need to f/u sooner .  Pt is aware  and comfortable with the current plan.      Tiffany Messer MD  [facility]   Mely is a 82 year old who presents for the following health issues     History of Present Illness       Reason for visit:  Discontinue Lorsartan?  Symptoms include:  Cough  Symptom intensity:  Moderate  Symptom progression:  Staying the same  Had these symptoms before:  Yes  Has tried/received treatment for these symptoms:  Yes  Previous treatment was successful:  No    She eats 2-3 servings of fruits and vegetables daily.She consumes 0 sweetened beverage(s) daily.She exercises with enough effort to increase her heart rate 30 to 60 minutes per day.  She exercises with enough effort to increase her heart rate 5 days per week.   She is taking medications regularly.     Patient wants to talk to provider form ENT visit 03/14/2022 related to cough as specialty provider thinks it cough could be related to losartan medication and they are suggesting she does not take medication for 1  week to see if it helps with cough. Would like providers input     1) chronic cough , she is describing it as mostly dry but occasionally she has had phlegm to cough up  Has seen ENt who recommended a trial off losartan for 2 weeks as possible reason for her chronic cough , she is wondering how to do this in view of her HTN    2) HTN , on losartan 100 mg , triamterene - hydrochlorothiazide and also losartan 100 mg which has been well controlled currently   She does daily walks and weekly hiking group     Review of Systems   Constitutional, HEENT, cardiovascular, pulmonary, GI, , musculoskeletal, neuro, skin, endocrine and psych systems are negative, except as otherwise noted.      Objective           Vitals:  No vitals were obtained today due to virtual visit.    Physical Exam   GENERAL: Healthy, alert and no distress  EYES: Eyes grossly normal to inspection.  No discharge or erythema, or obvious scleral/conjunctival abnormalities.  RESP: No audible wheeze, cough, or visible cyanosis.  No visible retractions or increased work of breathing.    SKIN: Visible skin clear. No significant rash, abnormal pigmentation or lesions.  NEURO: Cranial nerves grossly intact.  Mentation and speech appropriate for age.  PSYCH: Mentation appears normal, affect normal/bright, judgement and insight intact, normal speech and appearance well-groomed.    No results found for this or any previous visit (from the past 24 hour(s)).            Video-Visit Details    Type of service:  Video Visit    Video End Time:9:01 AM    Originating Location (pt. Location): Home    Distant Location (provider location):  [facility]     Platform used for Video Visit: discoapi

## 2022-04-06 ENCOUNTER — VIRTUAL VISIT (OUTPATIENT)
Dept: FAMILY MEDICINE | Facility: CLINIC | Age: 82
End: 2022-04-06
Payer: COMMERCIAL

## 2022-04-06 DIAGNOSIS — I10 BENIGN ESSENTIAL HYPERTENSION: Primary | ICD-10-CM

## 2022-04-06 PROCEDURE — 99214 OFFICE O/P EST MOD 30 MIN: CPT | Mod: 95 | Performed by: FAMILY MEDICINE

## 2022-04-06 RX ORDER — AMLODIPINE BESYLATE 10 MG/1
10 TABLET ORAL DAILY
Qty: 30 TABLET | Refills: 1
Start: 2022-04-06 | End: 2022-12-06

## 2022-04-26 DIAGNOSIS — I10 ESSENTIAL HYPERTENSION WITH GOAL BLOOD PRESSURE LESS THAN 140/90: ICD-10-CM

## 2022-04-26 RX ORDER — TRIAMTERENE AND HYDROCHLOROTHIAZIDE 37.5; 25 MG/1; MG/1
1 CAPSULE ORAL DAILY
Qty: 1 CAPSULE | Refills: 0 | OUTPATIENT
Start: 2022-04-26

## 2022-05-17 ENCOUNTER — TRANSFERRED RECORDS (OUTPATIENT)
Dept: HEALTH INFORMATION MANAGEMENT | Facility: CLINIC | Age: 82
End: 2022-05-17

## 2022-07-07 NOTE — PROGRESS NOTES
"SUBJECTIVE:   Mely Guerra is a 81 year old female who presents for Preventive Visit.      Patient has been advised of split billing requirements and indicates understanding: Yes   Are you in the first 12 months of your Medicare coverage?  No    Healthy Habits:     In general, how would you rate your overall health?  Good    Frequency of exercise:  4-5 days/week    Duration of exercise:  45-60 minutes    Do you usually eat at least 4 servings of fruit and vegetables a day, include whole grains    & fiber and avoid regularly eating high fat or \"junk\" foods?  No    Taking medications regularly:  Yes    Medication side effects:  None    Ability to successfully perform activities of daily living:  No assistance needed    Home Safety:  Lighting is poor    Hearing Impairment:  No hearing concerns    In the past 6 months, have you been bothered by leaking of urine?  No    In general, how would you rate your overall mental or emotional health?  Good      PHQ-2 Total Score: 0    Additional concerns today:  No    Do you feel safe in your environment? Yes    Have you ever done Advance Care Planning? (For example, a Health Directive, POLST, or a discussion with a medical provider or your loved ones about your wishes): Yes, advance care planning is on file.    Fall risk       Cognitive Screening   1) Repeat 3 items (Leader, Season, Table)    2) Clock draw: NORMAL  3) 3 item recall: Recalls 3 objects  Results: 3 items recalled: COGNITIVE IMPAIRMENT LESS LIKELY    Mini-CogTM Copyright PINKY Mendieta. Licensed by the author for use in [facility]; reprinted with permission (aileen@.[facility]). All rights reserved.      Do you have sleep apnea, excessive snoring or daytime drowsiness?: yes    Reviewed and updated as needed this visit by clinical staff                Reviewed and updated as needed this visit by Provider               Social History     Tobacco Use     Smoking status: Never Smoker     Smokeless tobacco: Never Used "   Substance Use Topics     Alcohol use: No     Alcohol/week: 0.0 standard drinks     If you drink alcohol do you typically have >3 drinks per day or >7 drinks per week? Not applicable    Alcohol Use 1/14/2022   Prescreen: >3 drinks/day or >7 drinks/week? Not Applicable   Prescreen: >3 drinks/day or >7 drinks/week? -   No flowsheet data found.        1) HTN, her BP has been well controlled on the current meds , no side effects losartan 100 mg , triamterene - hydrochlorothiazide and amlodipine at 5 mg daily dose , she does exercise on a regular basis   2) hypothyroidism, she has been on the synthroid 100 mcg daily , needs to check TSh today   3) cough -more of an irritation in the throat for years , ten years now , went to the ENT ten or more yrs ago yrs ago , polyps found and was told  not to worry about them, but cough is  more persistent now than before   She is recovering from sinusitis currently and ever since starting the medicine she has felt better          Current providers sharing in care for this patient include:   Patient Care Team:  Tiffany Messer MD as PCP - General (Family Practice)  Mirna Manning MD as MD (INTERNAL MEDICINE - ENDOCRINOLOGY, DIABETES & METABOLISM)  Mary Bustos [facility] as Pharmacist (Pharmacist)  Tiffayn Messer MD as Assigned PCP  Twila Kaplan PA-C as Assigned Heart and Vascular Provider  Pratima Cota [facility] as Pharmacist (Pharmacist)    The following health maintenance items are reviewed in Epic and correct as of today:  Health Maintenance Due   Topic Date Due     ANNUAL REVIEW OF HM ORDERS  Never done     HEPATITIS C SCREENING  Never done     ZOSTER IMMUNIZATION (2 of 3) 04/23/2008     FALL RISK ASSESSMENT  12/15/2021     Lab work is in process  Labs reviewed in EPIC  BP Readings from Last 3 Encounters:   01/14/22 130/76   01/03/22 134/76   12/14/21 118/62    Wt Readings from Last 3 Encounters:   01/14/22 60.9 kg (134 lb 3.2 oz)   01/03/22 61.1 kg (134 lb 11.2  oz)   12/14/21 61.2 kg (134 lb 14.4 oz)                  Patient Active Problem List   Diagnosis     Acquired hypothyroidism     Allergic rhinitis     Osteoporosis     Osteoarthritis     Hyperlipidemia LDL goal <160     Hypertension goal BP (blood pressure) < 140/90     Advanced directives, counseling/discussion     Otitis externa     Sleep apnea     Dermatitis     Rosacea     Age-related osteoporosis without current pathological fracture     Disorder of bone and cartilage     Dysphagia     Past Surgical History:   Procedure Laterality Date     COLONOSCOPY N/A 10/31/2018    Procedure: COMBINED COLONOSCOPY, SINGLE OR MULTIPLE BIOPSY/POLYPECTOMY BY BIOPSY;  Surgeon: Leon Cosme MD;  Location:  GI     EP ABLATION SVT N/A 10/8/2019    Procedure: EP Ablation SVT;  Surgeon: Laney Arriaza MD;  Location:  HEART CARDIAC CATH LAB     EP ABLATION SVT N/A 6/10/2020    Procedure: EP Ablation SVT;  Surgeon: Laney Arriaza MD;  Location:  HEART CARDIAC CATH LAB     ESOPHAGOSCOPY, GASTROSCOPY, DUODENOSCOPY (EGD), COMBINED N/A 10/31/2018    Procedure: GASTROSCOPY;  Surgeon: Leon Cosme MD;  Location:  GI     HYSTERECTOMY       JOINT REPLACEMENT Bilateral      knee replacement Bilateral      ORTHOPEDIC SURGERY       ZZC NONSPECIFIC PROCEDURE  1974    s/p Vaginal hysterectomy       Social History     Tobacco Use     Smoking status: Never Smoker     Smokeless tobacco: Never Used   Substance Use Topics     Alcohol use: No     Alcohol/week: 0.0 standard drinks     Family History   Problem Relation Age of Onset     Cancer Sister      Heart Disease Father          Current Outpatient Medications   Medication Sig Dispense Refill     amLODIPine (NORVASC) 5 MG tablet Take 1 tablet (5 mg) by mouth daily 90 tablet 1     fluticasone (FLONASE) 50 MCG/ACT nasal spray Spray 1 spray into both nostrils 2 times daily 16 g 0     levothyroxine (SYNTHROID/LEVOTHROID) 100 MCG tablet TAKE 1 TABLET DAILY 90 tablet 1     losartan  (COZAAR) 100 MG tablet Take 1 tablet (100 mg) by mouth daily 90 tablet 1     triamterene-HCTZ (DYAZIDE) 37.5-25 MG capsule Take 1 capsule by mouth daily 90 capsule 1     acetaminophen (TYLENOL) 325 MG tablet Take 325 mg by mouth every other day       amLODIPine (NORVASC) 10 MG tablet Take 0.5 tablets (5 mg) by mouth daily 90 tablet 1     Calcium Citrate-Vitamin D (CALCIUM CITRATE + D) 250-200 MG-UNIT TABS Take 1 tablet by mouth 2 times daily       diphenhydrAMINE-acetaminophen (TYLENOL PM)  MG tablet Take 1 tablet by mouth every other day        doxycycline hyclate (VIBRA-TABS) 100 MG tablet Take 1 tablet (100 mg) by mouth 2 times daily 20 tablet 0     ibuprofen (ADVIL/MOTRIN) 200 MG tablet Take 200 mg by mouth every other day       IBUPROFEN-DIPHENHYDRAMINE HCL PO Take 1 tablet by mouth every other day       loratadine (CLARITIN) 10 MG tablet Take 10 mg by mouth daily (Patient not taking: Reported on 1/14/2022)       Multiple Vitamins-Minerals (EYE VITAMINS & MINERALS) TABS Take 2 capsules by mouth daily EyePromise AREDS2 Plus       Multiple Vitamins-Minerals (MULTIVITAMIN ADULT PO) Take 1 tablet by mouth daily       Turmeric 500 MG CAPS Take 1 capsule by mouth daily       Allergies   Allergen Reactions     Cats      Hylan G-F 20      Lisinopril Cough     Seasonal Allergies      Vioxx      Recent Labs   Lab Test 07/08/21  0808 12/15/20  0943 06/10/20  1218 06/12/19  0000 10/12/18  1103 06/01/15  1041 06/03/14  0908   LDL  --  112*  --   --  97  --  126   HDL  --  102  --   --  85  --  99   TRIG  --  41  --   --  39  --  70   ALT 29 24  --   --  32   < >  --    CR  --  0.68 0.64   < > 0.73   < > 0.78   GFRESTIMATED  --  83 85   < > 78   < > 73   GFRESTBLACK  --  >90 >90   < > >90   < > 88   POTASSIUM  --  4.3 3.5   < > 4.3   < > 4.3   TSH 1.87 1.53  --    < > 1.59   < > 2.87    < > = values in this interval not displayed.      Mammogram Screening: Mammogram Screening - Patient over age 75, has elected to  "continue with screening.      Pertinent mammograms are reviewed under the imaging tab.    Review of Systems   Constitutional: Negative for chills and fever.   HENT: Positive for hearing loss. Negative for congestion, ear pain and sore throat.    Eyes: Negative for pain and visual disturbance.   Respiratory: Positive for cough and shortness of breath.    Cardiovascular: Negative for chest pain, palpitations and peripheral edema.   Gastrointestinal: Negative for abdominal pain, constipation, diarrhea, heartburn, hematochezia and nausea.   Breasts:  Negative for tenderness and discharge.   Genitourinary: Negative for dysuria, frequency, genital sores, hematuria, pelvic pain, urgency, vaginal bleeding and vaginal discharge.   Musculoskeletal: Positive for arthralgias and joint swelling. Negative for myalgias.   Skin: Negative for rash.   Neurological: Negative for dizziness, weakness, headaches and paresthesias.   Psychiatric/Behavioral: Negative for mood changes. The patient is not nervous/anxious.      Constitutional, HEENT, cardiovascular, pulmonary, GI, , musculoskeletal, neuro, skin, endocrine and psych systems are negative, except as otherwise noted.    OBJECTIVE:   There were no vitals taken for this visit. Estimated body mass index is 23.3 kg/m  as calculated from the following:    Height as of 1/3/22: 1.619 m (5' 3.75\").    Weight as of 1/3/22: 61.1 kg (134 lb 11.2 oz).  Physical Exam  GENERAL: healthy, alert and no distress  EYES: Eyes grossly normal to inspection, PERRL and conjunctivae and sclerae normal  HENT: ear canals and TM's normal, nose and mouth without ulcers or lesions  NECK: no adenopathy, no asymmetry, masses, or scars and thyroid normal to palpation  RESP: lungs clear to auscultation - no rales, rhonchi or wheezes  BREAST: normal without masses, tenderness or nipple discharge and no palpable axillary masses or adenopathy  CV: regular rate and rhythm, normal S1 S2, no S3 or S4, no murmur, click " or rub, no peripheral edema and peripheral pulses strong  ABDOMEN: soft, nontender, no hepatosplenomegaly, no masses and bowel sounds normal  MS: no gross musculoskeletal defects noted, no edema  SKIN: no suspicious lesions or rashes  NEURO: Normal strength and tone, mentation intact and speech normal  PSYCH: mentation appears normal, affect normal/bright    Diagnostic Test Results:  Labs reviewed in Epic  No results found for this or any previous visit (from the past 24 hour(s)).    ASSESSMENT / PLAN:   (Z00.00) Encounter for routine adult health examination without abnormal findings  (primary encounter diagnosis)  Comment: Discussed diet,calcium,exercise.Went over self breast exam.Thin prep was NOT  done.Eyes and teeth UTD.No immunizations needed today.See orders below for tests ordered and screening needed.    Plan: as above     (I10) Benign essential hypertension  Comment: will check labs today , refilled her medications, BP is well controlled   Plan: Lipid panel reflex to direct LDL Fasting,         Comprehensive metabolic panel (BMP + Alb, Alk         Phos, ALT, AST, Total. Bili, TP), amLODIPine         (NORVASC) 5 MG tablet            (E03.9) Acquired hypothyroidism  Comment: will check TSh , doing well on the synthroid , no side effects , no current symptoms   Plan: TSH with free T4 reflex, levothyroxine         (SYNTHROID/LEVOTHROID) 100 MCG tablet        Follow up in 6 months     (I10) Essential hypertension with goal blood pressure less than 140/90  Comment: as above   Plan: losartan (COZAAR) 100 MG tablet,         triamterene-HCTZ (DYAZIDE) 37.5-25 MG capsule        Continue on the same meds     (J30.1) Seasonal allergic rhinitis due to pollen  Comment: refilled   Plan: fluticasone (FLONASE) 50 MCG/ACT nasal spray        Needs for her allergies     (R05.9) Cough  Comment: we discussed seeing an ENt as it has been over 10 or 15 yrs since she last saw one   Plan: Otolaryngology Referral         "      Patient has been advised of split billing requirements and indicates understanding: Yes  COUNSELING:  Reviewed preventive health counseling, as reflected in patient instructions       Regular exercise       Healthy diet/nutrition       Vision screening       Hearing screening       Dental care       Bladder control       Fall risk prevention    Estimated body mass index is 23.3 kg/m  as calculated from the following:    Height as of 1/3/22: 1.619 m (5' 3.75\").    Weight as of 1/3/22: 61.1 kg (134 lb 11.2 oz).        She reports that she has never smoked. She has never used smokeless tobacco.      Appropriate preventive services were discussed with this patient, including applicable screening as appropriate for cardiovascular disease, diabetes, osteopenia/osteoporosis, and glaucoma.  As appropriate for age/gender, discussed screening for colorectal cancer, prostate cancer, breast cancer, and cervical cancer. Checklist reviewing preventive services available has been given to the patient.    Reviewed patients plan of care and provided an AVS. The Intermediate Care Plan ( asthma action plan, low back pain action plan, and migraine action plan) for Mely meets the Care Plan requirement. This Care Plan has been established and reviewed with the Patient.    Counseling Resources:  ATP IV Guidelines  Pooled Cohorts Equation Calculator  Breast Cancer Risk Calculator  Breast Cancer: Medication to Reduce Risk  FRAX Risk Assessment  ICSI Preventive Guidelines  Dietary Guidelines for Americans, 2010  [employer]'s MyPlate  ASA Prophylaxis  Lung CA Screening    Tiffany Messer MD  [facility]    Identified Health Risks:  " None

## 2022-07-25 ENCOUNTER — MYC MEDICAL ADVICE (OUTPATIENT)
Dept: FAMILY MEDICINE | Facility: CLINIC | Age: 82
End: 2022-07-25

## 2022-07-25 DIAGNOSIS — I10 ESSENTIAL HYPERTENSION WITH GOAL BLOOD PRESSURE LESS THAN 140/90: ICD-10-CM

## 2022-07-26 NOTE — TELEPHONE ENCOUNTER
Note from 4/6/22 VV:  F/u in one to two weeks depending what her home BP readings are , if consistently high above 150 over 90 would need to f/u sooner .  Pt is aware  and comfortable with the current plan.       Nayana Alcaraz RN

## 2022-07-29 RX ORDER — TRIAMTERENE AND HYDROCHLOROTHIAZIDE 37.5; 25 MG/1; MG/1
1 CAPSULE ORAL DAILY
Qty: 90 CAPSULE | Refills: 0 | Status: SHIPPED | OUTPATIENT
Start: 2022-07-29 | End: 2022-10-27

## 2022-08-09 ENCOUNTER — OFFICE VISIT (OUTPATIENT)
Dept: FAMILY MEDICINE | Facility: CLINIC | Age: 82
End: 2022-08-09
Payer: COMMERCIAL

## 2022-08-09 VITALS
HEART RATE: 60 BPM | OXYGEN SATURATION: 98 % | WEIGHT: 136 LBS | SYSTOLIC BLOOD PRESSURE: 130 MMHG | HEIGHT: 64 IN | TEMPERATURE: 97.5 F | DIASTOLIC BLOOD PRESSURE: 78 MMHG | BODY MASS INDEX: 23.22 KG/M2 | RESPIRATION RATE: 16 BRPM

## 2022-08-09 DIAGNOSIS — Z01.818 PRE-OP EXAM: Primary | ICD-10-CM

## 2022-08-09 DIAGNOSIS — H25.9 SENILE CATARACT OF RIGHT EYE, UNSPECIFIED AGE-RELATED CATARACT TYPE: ICD-10-CM

## 2022-08-09 PROCEDURE — 99214 OFFICE O/P EST MOD 30 MIN: CPT | Performed by: FAMILY MEDICINE

## 2022-08-09 NOTE — PROGRESS NOTES
[facility]  [address], [address]  [place] [address]  Phone: [phone]  Primary Provider: Tiffany Messer  Pre-op Performing Provider: WEGENER, JOEL DANIEL IRWIN      PREOPERATIVE EVALUATION:  Today's date: 8/9/2022    Mely Guerra is a 82 year old female who presents for a preoperative evaluation.    Surgical Information:  Surgery/Procedure: cataract  Surgery Location: [facility]  Surgeon: Dr.Sherman Muhammad  Surgery Date: 9/23/22  Time of Surgery:   Where patient plans to recover: At home with family  Fax number for surgical facility: [phone]    Type of Anesthesia Anticipated: to be determined    Assessment & Plan     The proposed surgical procedure is considered LOW risk.    Pre-op exam  Cleared for procedure.  Hold medications day of procedure.     Senile cataract of right eye, unspecified age-related cataract type  Reason for procedure.     H/o SVT s/p successful ablation.   Possible Sleep Apnea: No      Implanted Device:              RECOMMENDATION:  APPROVAL GIVEN to proceed with proposed procedure, without further diagnostic evaluation.                      Subjective     HPI related to upcoming procedure: planning second cataract procedure.  Otherwise feeling physically and emotionally well.  Bikes for exercise.       Preop Questions 10/1/2019   1. Have you ever had a heart attack or stroke? No   3. Do you have chest pain with activity? No   4. Do you have a history of  heart failure? UNKNOWN - No per chart review   5. Do you currently have a cold, bronchitis or symptoms of other infection? No   6. Do you have a cough, shortness of breath, or wheezing? No   7. Do you or anyone in your family have previous history of blood clots? No   8. Do you or does anyone in your family have a serious bleeding problem such as prolonged bleeding following surgeries or cuts? No   9. Have you ever had problems with anemia or been told to take iron pills? No   10.  Have you had any abnormal blood loss such as black, tarry or bloody stools, or abnormal vaginal bleeding? No   11. Have you ever had a blood transfusion? UNKNOWN - No per chart review   13. Have you or any of your relatives ever had problems with anesthesia? No   14. Do you have sleep apnea, excessive snoring or daytime drowsiness? No   15. Do you have any artifical heart valves or other implanted medical devices like a pacemaker, defibrillator, or continuous glucose monitor? No   16. Do you have artificial joints? YES -    18. Is there any chance that you may be pregnant? No     Health Care Directive:  Patient does not have a Health Care Directive or Living Will: Discussed advance care planning with patient; however, patient declined at this time.    Preoperative Review of :   reviewed - no record of controlled substances prescribed.          Review of Systems  CONSTITUTIONAL: NEGATIVE for fever, chills, change in weight  ENT/MOUTH: NEGATIVE for ear, mouth and throat problems  RESP: NEGATIVE for significant cough or SOB  CV: NEGATIVE for chest pain, palpitations or peripheral edema    Patient Active Problem List    Diagnosis Date Noted     Dysphagia 08/30/2017     Priority: Medium     Age-related osteoporosis without current pathological fracture 07/10/2017     Priority: Medium     Disorder of bone and cartilage 07/10/2017     Priority: Medium     2014:  Lumbar Spine (L1-L4) T-score: -0.4   Significant degenerative and/or osteosclerotic changes are present, falsely improving result.   Forearm (radius 33%) T-score: -1.1    Lumbar (L1-L4) BMD: 1.142 Previous: 1.125   Total Hip Mean BMD: NA metal Previous: 0.793        2017:  Lumbar Spine (L1-L4)      T-score:  -0.2    Significant degenerative and/or osteosclerotic changes are present, falsely improving result.                Left Femoral Neck            T-score:  NA metal               Right Femoral Neck         T-score:  NA Metal               Forearm (radius  33%)      T-score:  -1.9       Rosacea 05/18/2017     Priority: Medium     Dermatitis 08/10/2016     Priority: Medium     Sleep apnea 06/03/2014     Priority: Medium     Advanced directives, counseling/discussion 04/02/2012     Priority: Medium      Patient states has Advance Directive and will bring in a copy to clinic. 4/2/2012   Fredy Carroll ma         Otitis externa 04/02/2012     Priority: Medium     Hypertension goal BP (blood pressure) < 140/90 03/09/2011     Priority: Medium     Hyperlipidemia LDL goal <160 06/15/2009     Priority: Medium     Osteoarthritis 12/21/2004     Priority: Medium     Problem list name updated by automated process. Provider to review       Osteoporosis 04/13/2004     Priority: Medium     Osteopenia 2017  Lumbar Spine (L1-L4)      T-score:  -0.2    Significant degenerative and/or osteosclerotic changes are present, falsely improving result.                Left Femoral Neck            T-score:  NA metal               Right Femoral Neck         T-score:  NA Metal               Forearm (radius 33%)      T-score:  -1.9       Allergic rhinitis 02/18/2004     Priority: Medium     Problem list name updated by automated process. Provider to review       Acquired hypothyroidism 10/14/2003     Priority: Medium     Problem list name updated by automated process. Provider to review        Past Medical History:   Diagnosis Date     Allergic rhinitis, cause unspecified      Arthritis      Personal history of alcoholism (H)      Sleep apnea      SVT (supraventricular tachycardia) (H)     s/p atrial tachycardia ablation near CS os     Unspecified essential hypertension      Unspecified hypothyroidism      Past Surgical History:   Procedure Laterality Date     COLONOSCOPY N/A 10/31/2018    Procedure: COMBINED COLONOSCOPY, SINGLE OR MULTIPLE BIOPSY/POLYPECTOMY BY BIOPSY;  Surgeon: Leon Cosme MD;  Location:  GI     EP ABLATION SVT N/A 10/8/2019    Procedure: EP Ablation SVT;  Surgeon:  Laney Arriaza MD;  Location:  HEART CARDIAC CATH LAB     EP ABLATION SVT N/A 6/10/2020    Procedure: EP Ablation SVT;  Surgeon: Laney Arriaza MD;  Location:  HEART CARDIAC CATH LAB     ESOPHAGOSCOPY, GASTROSCOPY, DUODENOSCOPY (EGD), COMBINED N/A 10/31/2018    Procedure: GASTROSCOPY;  Surgeon: Leon Cosme MD;  Location:  GI     GYN SURGERY       HYSTERECTOMY       JOINT REPLACEMENT Bilateral      knee replacement Bilateral      ORTHOPEDIC SURGERY       ZZC NONSPECIFIC PROCEDURE  1974    s/p Vaginal hysterectomy     Current Outpatient Medications   Medication Sig Dispense Refill     amLODIPine (NORVASC) 10 MG tablet Take 1 tablet (10 mg) by mouth daily 30 tablet 1     Calcium Citrate-Vitamin D (CALCIUM CITRATE + D) 250-200 MG-UNIT TABS Take 1 tablet by mouth 2 times daily       diphenhydrAMINE-acetaminophen (TYLENOL PM)  MG tablet Take 1 tablet by mouth every other day        fluticasone (FLONASE) 50 MCG/ACT nasal spray Spray 1 spray into both nostrils 2 times daily 16 g 0     ibuprofen (ADVIL/MOTRIN) 200 MG tablet Take 200 mg by mouth every other day       IBUPROFEN-DIPHENHYDRAMINE HCL PO Take 1 tablet by mouth every other day       levothyroxine (SYNTHROID/LEVOTHROID) 100 MCG tablet TAKE 1 TABLET BY MOUTH DAILY 90 tablet 1     loratadine (CLARITIN) 10 MG tablet Take 10 mg by mouth daily        losartan (COZAAR) 100 MG tablet TAKE 1 TABLET(100 MG) BY MOUTH DAILY 90 tablet 0     Multiple Vitamins-Minerals (EYE VITAMINS & MINERALS) TABS Take 2 capsules by mouth daily EyePromise AREDS2 Plus       Multiple Vitamins-Minerals (MULTIVITAMIN ADULT PO) Take 1 tablet by mouth daily       triamterene-HCTZ (DYAZIDE) 37.5-25 MG capsule Take 1 capsule by mouth daily 90 capsule 0     Turmeric 500 MG CAPS Take 1 capsule by mouth daily         Allergies   Allergen Reactions     Cats      Hylan G-F 20      Lisinopril Cough     Seasonal Allergies      Vioxx         Social History     Tobacco Use     Smoking status:  "Never Smoker     Smokeless tobacco: Never Used   Substance Use Topics     Alcohol use: No     Alcohol/week: 0.0 standard drinks     Family History   Problem Relation Age of Onset     Cancer Sister      Heart Disease Father      Hypertension Father      Thyroid Disease Father      Other Cancer Other          age 65     Other Cancer Other          age 56     Other Cancer Niece          age 53     Other Cancer Sister          age 51     History   Drug Use No         Objective     /78   Pulse 60   Temp 97.5  F (36.4  C) (Tympanic)   Resp 16   Ht 1.613 m (5' 3.5\")   Wt 61.7 kg (136 lb)   SpO2 98%   BMI 23.71 kg/m      Physical Exam  GENERAL APPEARANCE: healthy, alert and no distress  RESP: lungs clear to auscultation - no rales, rhonchi or wheezes  CV: regular rate and rhythm, normal S1 S2, no S3 or S4 and no murmur, click or rub   ABDOMEN: soft, nontender, no HSM or masses and bowel sounds normal  NEURO: Normal strength and tone, sensory exam grossly normal, mentation intact and speech normal    Recent Labs   Lab Test 22  1053 12/15/20  0943   * 131*   POTASSIUM 4.7 4.3   CR 0.73 0.68        Diagnostics:  No labs were ordered during this visit.   No EKG required for low risk surgery (cataract, skin procedure, breast biopsy, etc).    Revised Cardiac Risk Index (RCRI):  The patient has the following serious cardiovascular risks for perioperative complications:   - No serious cardiac risks = 0 points     RCRI Interpretation: 0 points: Class I (very low risk - 0.4% complication rate)           Signed Electronically by: Joel Daniel Wegener, MD  Copy of this evaluation report is provided to requesting physician.      "

## 2022-09-16 NOTE — LETTER
5/29/2019    Tiffany Messer MD  [address]  [facility] [facility] [address]    RE: Mely Timmonschencho       Dear Colleague,    I had the pleasure of seeing Mely Guerra in the [facility].      Cardiology Progress Note    Date of Service: 05/29/2019  Patient seen today in follow up of: palpitations  Primary cardiologist: Dr. York    HPI:  Mely Guerra is a very pleasant 79 year old female  with a history of hypertension, palpitations, and sleep apnea treated with CPAP who is here today to follow up on recent symptoms of increasing palpitations.     She was initially referred to Dr. York in November of 2018 for an abnormal EKG. Her EKG showed possible Wenckebach. Dr. York sent her for a stress echocardiogram as well as a holter monitor for further evaluation. Her stress echocardiogram showed no evidence of ischemia and preserved chronotropic competence. She tolerated above average exercise. She also wore a 24 hour holter monitor which showed a first degree AV block and multiple APCs. She had many pauses, although none were greater than 2.2 seconds. Short runs of PSVT were noted but these did not correlate with any symptoms. No further cardiac intervention was recommended at that time.    I saw her on 5/2/19 as she had note some increasing palpitations occurring several times a day on a daily basis. She has no associated shortness of breath, chest pain, palpitations or other symptoms. I ordered a 48 hour holter monitor for further evaluation.     This showed primarily normal sinus rhythm with a first degree block and frequent supraventricular ectomy. She had 34,835 supraventricular ectopies (23,706 being PACs). She had 900 SVE runs the longest of which was 536 beats at a rate of 169 BPM. She was also noted to have several pauses, the maximum being 2.08 seconds long. Her symptoms of palpitations correlated with supraventricular tachycardia with HRs in the 150s.    She returns today to  review her holter monitor results. She continues to have symptoms of palpitations most days of the week. At times, they occur several times a day. She denies any other new symptoms.     ASSESSMENT/PLAN:  1.  Palpitations. She had over 900 runs of SVT and a significant amount of PACs on her recent holter monitor in a 48 hour period. Her symptoms of palpitations and fluttering correlated with runs of SVT with HRs in the 150s. Her average HR is 69. She did have several pauses overnight up to two seconds. I reviewed her holter with Dr. York and discussed with her today. We will plan to start low dose metoprolol tartrate. I have asked her to stop the medication if she experiences any dizziness, lightheadedness, presyncope or other issues after starting. We certainly will not be able to push her beta blocker dose significantly. If she continues to have frequent symptoms and palpitations I asked her to call us and we will refer her to electrophysiology for further discussion and treatment options.   2.  FLAVIA. Treated with CPAP.  3.  Hypertension. Well controlled on her current regimen.     Orders this Visit:  No orders of the defined types were placed in this encounter.    Orders Placed This Encounter   Medications     metoprolol tartrate (LOPRESSOR) 25 MG tablet     Sig: Take 0.5 tablets (12.5 mg) by mouth 2 times daily     Dispense:  30 tablet     Refill:  1     There are no discontinued medications.    CURRENT MEDICATIONS:  Current Outpatient Medications   Medication Sig Dispense Refill     amLODIPine (NORVASC) 10 MG tablet TAKE 1 TABLET EVERY DAY 90 tablet 1     Calcium Citrate-Vitamin D (CALCIUM CITRATE + D PO) Take 2 tablets by mouth 2 times daily        fluticasone (FLONASE) 50 MCG/ACT spray Spray 2 sprays into both nostrils daily 48 g prn     fluticasone (FLOVENT HFA) 110 MCG/ACT Inhaler Inhale 1 puff into the lungs 2 times daily 1 Inhaler 1     Ibuprofen-Diphenhydramine Cit (ADVIL PM PO) Take 2 tablets by mouth At  Bedtime       levothyroxine (SYNTHROID/LEVOTHROID) 100 MCG tablet TAKE 1 TABLET EVERY DAY 90 tablet 2     losartan (COZAAR) 100 MG tablet Take 1 tablet (100 mg) by mouth daily 90 tablet 1     metoprolol tartrate (LOPRESSOR) 25 MG tablet Take 0.5 tablets (12.5 mg) by mouth 2 times daily 30 tablet 1     Multiple Vitamins-Minerals (MULTIVITAMIN ADULT PO) Take 1 tablet by mouth daily       Multiple Vitamins-Minerals (PRESERVISION AREDS 2 PO) Take 1 tablet by mouth 2 times daily       Naproxen Sodium (ALEVE PO) Take 220 mg by mouth Takes every AM and PM when not taking ibuprofen.       Omega-3 Fatty Acids (FISH OIL PO) 1,000 mg 2 times daily       RaNITidine HCl (ZANTAC 150 MAXIMUM STRENGTH PO)        triamterene-hydrochlorothiazide (DYAZIDE) 37.5-25 MG per capsule TAKE 1 CAPSULE EVERY DAY 90 capsule 1     ALLERGIES  Allergies   Allergen Reactions     Cats      Hylan G-F 20      Lisinopril Cough     Seasonal Allergies      Vioxx      PAST MEDICAL HISTORY:  Past Medical History:   Diagnosis Date     Allergic rhinitis, cause unspecified      Personal history of alcoholism (H)      Sleep apnea      Undiagnosed cardiac murmurs      Unspecified essential hypertension      Unspecified hypothyroidism      PAST SURGICAL HISTORY:  Past Surgical History:   Procedure Laterality Date     C NONSPECIFIC PROCEDURE  1974    s/p Vaginal hysterectomy     COLONOSCOPY N/A 10/31/2018    Procedure: COMBINED COLONOSCOPY, SINGLE OR MULTIPLE BIOPSY/POLYPECTOMY BY BIOPSY;  Surgeon: Leon Cosme MD;  Location:  GI     ESOPHAGOSCOPY, GASTROSCOPY, DUODENOSCOPY (EGD), COMBINED N/A 10/31/2018    Procedure: GASTROSCOPY;  Surgeon: Leon Cosme MD;  Location:  GI     HYSTERECTOMY       JOINT REPLACEMENT Bilateral      knee replacement Bilateral      ORTHOPEDIC SURGERY       FAMILY HISTORY:  Family History   Problem Relation Age of Onset     Cancer Sister      Heart Disease Father      SOCIAL HISTORY:  Social History      Socioeconomic History     Marital status:      Spouse name: None     Number of children: None     Years of education: None     Highest education level: None   Occupational History     None   Social Needs     Financial resource strain: None     Food insecurity:     Worry: None     Inability: None     Transportation needs:     Medical: None     Non-medical: None   Tobacco Use     Smoking status: Never Smoker     Smokeless tobacco: Never Used   Substance and Sexual Activity     Alcohol use: No     Alcohol/week: 0.0 oz     Drug use: No     Sexual activity: Yes     Partners: Male   Lifestyle     Physical activity:     Days per week: None     Minutes per session: None     Stress: None   Relationships     Social connections:     Talks on phone: None     Gets together: None     Attends Zoroastrian service: None     Active member of club or organization: None     Attends meetings of clubs or organizations: None     Relationship status: None     Intimate partner violence:     Fear of current or ex partner: None     Emotionally abused: None     Physically abused: None     Forced sexual activity: None   Other Topics Concern      Service No     Blood Transfusions No     Caffeine Concern No     Occupational Exposure No     Hobby Hazards No     Sleep Concern Yes     Stress Concern No     Weight Concern No     Special Diet No     Back Care Yes     Exercise No     Bike Helmet Yes     Seat Belt Yes     Self-Exams Yes     Parent/sibling w/ CABG, MI or angioplasty before 65F 55M? Yes   Social History Narrative    Social Documentation:4/10        Balanced Diet: YES    Calcium intake: supplement per day    Caffeine: 4cups per day    Exercise:  type of activity varies;  3 times per week    Sunscreen: Yes    Seatbelts:  Yes    Self Breast Exam:  Yes    Self Testicular Exam: No - na    Physical/Emotional/Sexual Abuse: No -     Do you feel safe in your environment? Yes        Cholesterol screen up to date: yes    Eye Exam up  "to date: Yes    Dental Exam up to date: Yes    Pap smear up to date: Does Not Apply    Mammogram up to date: utd    Dexa Scan up to date: Yes    Colonoscopy up to date: Yes    Immunizations up to date: Yes    Glucose screen if over 40:  No -     Fredy Carroll ma             Review of Systems:  Skin:  Negative     Eyes:  Positive for glasses  ENT:  Negative    Respiratory:  Positive for sleep apnea;CPAP  Cardiovascular:  Negative Positive for;palpitations;lightheadedness  Gastroenterology: Negative    Genitourinary:  Negative    Musculoskeletal:  Positive for arthritis  Neurologic:  Negative    Psychiatric:  Negative    Heme/Lymph/Imm:  Positive for allergies  Endocrine:  Positive for thyroid disorder     Physical Exam:  Vitals: /72   Pulse 61   Ht 1.651 m (5' 5\")   Wt 59.9 kg (132 lb)   BMI 21.97 kg/m      Wt Readings from Last 4 Encounters:   05/29/19 59.9 kg (132 lb)   05/02/19 60.3 kg (133 lb)   12/17/18 59 kg (130 lb)   11/26/18 58.8 kg (129 lb 11.2 oz)     GEN: well nourished, in no acute distress.  HEENT:  Pupils equal, round. Sclerae nonicteric.   EXTREM: no LE edema.  NEURO: Alert and oriented, cooperative.  SKIN: Warm and dry.     Recent Lab Results:  LIPID RESULTS:  Lab Results   Component Value Date    CHOL 191 10/12/2018    HDL 85 10/12/2018    LDL 97 10/12/2018    TRIG 39 10/12/2018    CHOLHDLRATIO 2.4 06/03/2014     LIVER ENZYME RESULTS:  Lab Results   Component Value Date    AST 55 (H) 10/12/2018    ALT 32 10/12/2018     CBC RESULTS:  Lab Results   Component Value Date    WBC 4.9 03/24/2017    RBC 4.50 03/24/2017    HGB 13.6 03/24/2017    HCT 41.3 03/24/2017    MCV 92 03/24/2017    MCH 30.2 03/24/2017    MCHC 32.9 03/24/2017    RDW 12.5 03/24/2017     03/24/2017     BMP RESULTS:  Lab Results   Component Value Date     10/12/2018    POTASSIUM 4.3 10/12/2018    CHLORIDE 99 10/12/2018    CO2 26 10/12/2018    ANIONGAP 9 10/12/2018    GLC 80 10/12/2018    BUN 9 10/12/2018    CR " 0.73 10/12/2018    GFRESTIMATED 78 10/12/2018    GFRESTBLACK >90 10/12/2018    CEASAR 10.5 (H) 10/12/2018      A1C RESULTS:  No results found for: A1C  INR RESULTS:  Lab Results   Component Value Date    INR 1.7 02/12/2013    INR 1.9 02/08/2013         Thank you for allowing me to participate in the care of your patient.    Sincerely,     Bonnie Klein PA-C     [facility]     Enbrel Counseling:  I discussed with the patient the risks of etanercept including but not limited to myelosuppression, immunosuppression, autoimmune hepatitis, demyelinating diseases, lymphoma, and infections.  The patient understands that monitoring is required including a PPD at baseline and must alert us or the primary physician if symptoms of infection or other concerning signs are noted.

## 2022-09-27 ENCOUNTER — MYC MEDICAL ADVICE (OUTPATIENT)
Dept: FAMILY MEDICINE | Facility: CLINIC | Age: 82
End: 2022-09-27

## 2022-10-10 ENCOUNTER — HEALTH MAINTENANCE LETTER (OUTPATIENT)
Age: 82
End: 2022-10-10

## 2022-11-22 ENCOUNTER — MYC MEDICAL ADVICE (OUTPATIENT)
Dept: FAMILY MEDICINE | Facility: CLINIC | Age: 82
End: 2022-11-22

## 2022-12-06 DIAGNOSIS — I10 BENIGN ESSENTIAL HYPERTENSION: ICD-10-CM

## 2022-12-06 RX ORDER — AMLODIPINE BESYLATE 5 MG/1
10 TABLET ORAL DAILY
Qty: 90 TABLET | Refills: 1 | Status: SHIPPED | OUTPATIENT
Start: 2022-12-06 | End: 2022-12-06

## 2022-12-06 RX ORDER — AMLODIPINE BESYLATE 5 MG/1
TABLET ORAL
Qty: 180 TABLET | Refills: 0 | Status: SHIPPED | OUTPATIENT
Start: 2022-12-06 | End: 2022-12-08

## 2022-12-06 NOTE — TELEPHONE ENCOUNTER
"Requested Prescriptions   Pending Prescriptions Disp Refills     amLODIPine (NORVASC) 5 MG tablet [Pharmacy Med Name: AMLODIPINE BESYLATE 5MG TABLETS] 180 tablet      Sig: TAKE 2 TABLETS(10 MG) BY MOUTH DAILY       Calcium Channel Blockers Protocol  Passed - 12/6/2022 12:07 PM        Passed - Blood pressure under 140/90 in past 12 months     BP Readings from Last 3 Encounters:   08/09/22 130/78   01/14/22 130/76   01/03/22 134/76                 Passed - Recent (12 mo) or future (30 days) visit within the authorizing provider's specialty     Patient has had an office visit with the authorizing provider or a provider within the authorizing providers department within the previous 12 mos or has a future within next 30 days. See \"Patient Info\" tab in inbasket, or \"Choose Columns\" in Meds & Orders section of the refill encounter.              Passed - Medication is active on med list        Passed - Patient is age 18 or older        Passed - No active pregnancy on record        Passed - Normal serum creatinine on file in past 12 months     Recent Labs   Lab Test 01/14/22  1053   CR 0.73       Ok to refill medication if creatinine is low          Passed - No positive pregnancy test in past 12 months           Prescription approved per [facility] Refill Protocol.    Jessica Dhillon RN  [facility]        "

## 2022-12-06 NOTE — TELEPHONE ENCOUNTER
Routing refill request to provider for review/approval because:  Medication discontinued 8/9/22   Possible error  Patient reports taking 5mg/day with good BP control  Joy ARNOLD RN

## 2022-12-08 ENCOUNTER — OFFICE VISIT (OUTPATIENT)
Dept: PHARMACY | Facility: CLINIC | Age: 82
End: 2022-12-08
Payer: COMMERCIAL

## 2022-12-08 VITALS — SYSTOLIC BLOOD PRESSURE: 118 MMHG | DIASTOLIC BLOOD PRESSURE: 52 MMHG

## 2022-12-08 DIAGNOSIS — M15.0 PRIMARY OSTEOARTHRITIS INVOLVING MULTIPLE JOINTS: ICD-10-CM

## 2022-12-08 DIAGNOSIS — J30.89 OTHER ALLERGIC RHINITIS: ICD-10-CM

## 2022-12-08 DIAGNOSIS — E03.9 ACQUIRED HYPOTHYROIDISM: ICD-10-CM

## 2022-12-08 DIAGNOSIS — M81.0 AGE-RELATED OSTEOPOROSIS WITHOUT CURRENT PATHOLOGICAL FRACTURE: ICD-10-CM

## 2022-12-08 DIAGNOSIS — G47.00 INSOMNIA, UNSPECIFIED TYPE: ICD-10-CM

## 2022-12-08 DIAGNOSIS — I10 HYPERTENSION GOAL BP (BLOOD PRESSURE) < 140/90: Primary | ICD-10-CM

## 2022-12-08 DIAGNOSIS — H35.30 MACULAR DEGENERATION (SENILE) OF RETINA: ICD-10-CM

## 2022-12-08 PROCEDURE — 99607 MTMS BY PHARM ADDL 15 MIN: CPT | Performed by: PHARMACIST

## 2022-12-08 PROCEDURE — 99605 MTMS BY PHARM NP 15 MIN: CPT | Performed by: PHARMACIST

## 2022-12-08 NOTE — LETTER
December 13, 2022  Mely Guerra  [address] [place] [address]    Dear DEMARIO [facility]     Thank you for talking with me on Dec 8, 2022 about your health and medications. As a follow-up to our conversation, I have included two documents:      1. Your Recommended To-Do List has steps you should take to get the best results from your medications.  2. Your Medication List will help you keep track of your medications and how to take them.    If you want to talk about these documents, please call Mary Bustos RPH at phone: [phone], Monday-Friday 8-4:30pm.    I look forward to working with you and your doctors to make sure your medications work well for you.    Sincerely,  Mary Bustos RPH  [facility] Pharmacist, [facility]

## 2022-12-08 NOTE — PATIENT INSTRUCTIONS
"Recommendations from today's MTM visit:                                                       1. If helpful you can take famotidine 20mg 20-30 minutes before trigger foods to see if that helps with the mucous/cough.     2. Try getting an Aerochamber or Optichamber to help take the albuterol. Use the albuterol 15 minutes before exposure to cold.     3. They will be reaching out to schedule your Dexa (Bone density scan). Don't forget to get your mammogram at the beginning of the year.     Follow-up: 1 year      It was great speaking with you today.  I value your experience and would be very thankful for your time in providing feedback in our clinic survey. In the next few days, you may receive an email or text message from [employer] with a link to a survey related to your  clinical pharmacist.\"     To schedule another MTM appointment, please call the clinic directly or you may call the MTM scheduling line at [phone] or toll-free at [phone].     My Clinical Pharmacist's contact information:                                                      Please feel free to contact me with any questions or concerns you have.      Mary Bustos, Pharm.D., M.B.A., BCACP  MTM Pharmacist, [facility]  Pager: [phone]  Email: [email or web address]      "

## 2022-12-08 NOTE — LETTER
"Recommended To-Do List      Prepared on: Dec 8, 2022       You can get the best results from your medications by completing the items on this \"To-Do List.\"      Bring your To-Do List when you go to your doctor. And, share it with your family or caregivers.    My To-Do List:  What we talked about: What I should do:   Needing additional monitoring    Schedule a DEXA scan          What we talked about: What I should do:   A new medication that may be of benefit to you    Start taking famotidine (PEPCID) 20mg 20-30 minutes before trigger foods to see if that helps with the mucous/cough.           What we talked about: What I should do:   A Spacer may help with timing/administration of inhlaers    Try getting an Aerochamber or Optichamber to help take the albuterol. Use the albuterol 15 minutes before exposure to cold. Please confirm that the inhaler you have at home is albuterol.           What we talked about: What I should do:                       "

## 2022-12-08 NOTE — PROGRESS NOTES
Medication Therapy Management (MTM) Encounter    ASSESSMENT:                            Medication Adherence/Access: No issues identified    Hypertension/CV: Sodium slightly low with most likely cause being her diuretic. Her BP is at goal, so will monitor, but may need to change this in the future (can increase amlodipine dose, or change triam/HCTZ to spironolactone).      Osteopenia: Would benefit from repeat Dexa scan. Getting adequate calcium/vitamin D.      Macular Degeneration/Ophth: Stable     Hypothyroidism: Labs WNL, tolerating meds without side effects.     Osteoarthritis/Insomnia: Reviewed that diphenhydramine is not ideal, she is aware of risks.      Allergies: Given timing of symptoms and cold as a trigger, her sx don't fit a profile of GERD and she may benefit from trying albuterol prior to trigger exposure. I believe that the coughing she has induced by albuterol may be timing/technique (med hitting back of the throat) and using a spacer may help.   If this is GERD, taking famotidine prior to known trigger foods may help.     PLAN:                            1. If helpful you can take famotidine 20mg 20-30 minutes before trigger foods to see if that helps with the mucous/cough.     2. Try getting an Aerochamber or Optichamber to help take the albuterol. Use the albuterol 15 minutes before exposure to cold. Please confirm that the inhaler you have at home is albuterol.     3. They will be reaching out to schedule your Dexa (Bone density scan). Don't forget to get your mammogram at the beginning of the year.     [facility] year [facility].     SUBJECTIVE/OBJECTIVE:                          Mely Guerra is a 82 year old female coming in for a follow-up visit.  Today's visit is a follow-up MTM visit from 12/14/21     Reason for visit: Comprehensive medication review.    Allergies/ADRs: Reviewed in chart  Past Medical History: Reviewed in chart  Tobacco: She reports that  she has never smoked. She has never used smokeless tobacco.  Alcohol: not currently using  Caffeine: 3 cups/day    Medication Adherence/Access: No concerns with cost/access. No reported concerns with missed doses or difficulty taking her medications.     Hypertension/CV: Current medications include amlodipine 5 mg daily, triamterene-HCTZ 37.5-25 mg daily, and losartan 100 mg daily.   Patient reports no current medication side effects. Reports home blood pressure readings are at goal. She has had a persistent, but only slightly low sodium.      Osteopenia: Current therapy includes: calcium citrate/vitamin D 250-200 mg-unit 1 tabs BID. She is getting at least 3 servings of calcium in her diet daily.  Pt is not experiencing side effects.  DEXA History: 8/10/17 - although verbally approved at last MT visit, appears that the order was not placed and she has not yet had a DEXA (but is willing).    Risk factors: post-menopausal     Macular Degeneration/Ophth: Continues AREDS 2 Plus daily. No reported issues.     Hypothyroidism: Patient is taking levothyroxine 100 mcg daily. Patient is having the following symptoms: none.  Thyroid last checked in Jan 2022 and was WNL.     Osteoarthritis/Insomnia: Current therapy includes acetaminophen 325 mg every other morning, alternating with ibuprofen 200 mg every other morning. In addition, she takes acetaminophen/Diphenhydramine 325 mg/25 mg - 1 tablet each evening for sleep and pain alternating with ibuprofen/diphenhydramine - 1 tablet nightly. She is also taking tumeric daily to help with inflammation. She feels that this regimen is effective for her osteoarthritis and sleep and denies side effects. Of note, her AST continues to be elevated with normal ALT.       Allergies: Uses Flonase 1 spray in each nostril twice daily and loratadine 10 mg daily. She stopped taking montelukast because she felt that it wasn't working for her. Reports some congestion today due to allergies but  finds this regimen effective. She reports she has been seen for an on-going cough by ENT and they suggested stopping losartan, which she did without any change in sx. They also suggested it could be GERD, which she has not treated.   She reports that she gets very phlegmy when exposed to cold weather and sometimes wheezes. Tests for asthma have been negative, but she has a strong family history.   She has been given an inhaler in the past and knows she has one at home, but isn't sure what kind. She is reluctant to use it because it causes a coughing fit when she does.     Today's Vitals: /52   ----------------  I spent 30 minutes with this patient today. All changes were made via collaborative practice agreement with Tiffany Messer MD. A copy of the visit note was provided to the patient's provider(s).    A summary of these recommendations was given to the patient.    Mary Bustos, FaraD, SYEDA, BCACP  MTM Pharmacist, [facility]      Medication Therapy Recommendations  Allergic rhinitis    Rationale: Preventive therapy - Needs additional medication therapy - Indication   Recommendation: Start Medication - famotidine 20 MG tablet   Status: Accepted - no CPA Needed          Rationale: Cannot swallow/administer medication - Adherence - Adherence   Recommendation: Provide Adherence Intervention - aerochamber Misc   Status: Accepted per CPA

## 2022-12-08 NOTE — LETTER
_  Medication List        Prepared on: Dec 8, 2022     Bring your Medication List when you go to the doctor, hospital, or   emergency room. And, share it with your family or caregivers.     Note any changes to how you take your medications.  Cross out medications when you no longer use them.    Medication How I take it Why I use it Prescriber   amLODIPine (NORVASC) 5 MG tablet Take 1 tablet (5 mg) by mouth daily Hypertension Goal BP (Blood Pressure) < 140/90 Tiffany Messer MD   Calcium Citrate-Vitamin D 250-200 MG-UNIT TABS Take 1 tablet by mouth 2 times daily Age-related osteoporosis without current pathological fracture Tiffany Messer MD   diphenhydrAMINE-acetaminophen (TYLENOL PM)  MG tablet Take 1 tablet by mouth every other day  Insomnia Patient Reported   fluticasone (FLONASE) 50 MCG/ACT nasal spray Spray 1 spray into both nostrils 2 times daily Seasonal allergic rhinitis due to pollen Tiffany Messer MD   ibuprofen (ADVIL/MOTRIN) 200 MG tablet Take 200 mg by mouth every other day Osteoarthritis Patient Reported   IBUPROFEN-DIPHENHYDRAMINE HCL PO Take 1 tablet by mouth every other day Insomnia Patient Reported   levothyroxine (SYNTHROID/LEVOTHROID) 100 MCG tablet TAKE 1 TABLET BY MOUTH DAILY Acquired Hypothyroidism Tiffany Messer MD   loratadine (CLARITIN) 10 MG tablet Take 10 mg by mouth daily  Seasonal allergic rhinitis due to pollen Patient Reported   losartan (COZAAR) 100 MG tablet Take 1 tablet (100 mg) by mouth daily Essential hypertension with goal blood pressure less than 140/90 Tiffany Messer MD   Multiple Vitamins-Minerals (EYE VITAMINS & MINERALS) TABS Take 2 capsules by mouth daily [employer] AREDS2 Plus General Health Patient Reported   Multiple Vitamins-Minerals (MULTIVITAMIN ADULT PO) Take 1 tablet by mouth daily General Health   Patient Reported   triamterene-HCTZ (DYAZIDE) 37.5-25 MG capsule TAKE 1 CAPSULE BY MOUTH DAILY Essential hypertension with goal blood pressure less than 140/90 Tiffany  MD Ayde   Turmeric 500 MG CAPS Take 1 capsule by mouth daily Osteoarthritis Patient Reported         Add new medications, over-the-counter drugs, herbals, vitamins, or  minerals in the blank rows below.    Medication How I take it Why I use it Prescriber                          Allergies:      cats; hylan g-f 20; lisinopril; seasonal allergies; vioxx        Side effects I have had:               Other Information:              My notes and questions:

## 2022-12-12 ENCOUNTER — VIRTUAL VISIT (OUTPATIENT)
Dept: FAMILY MEDICINE | Facility: CLINIC | Age: 82
End: 2022-12-12
Payer: COMMERCIAL

## 2022-12-12 DIAGNOSIS — U07.1 INFECTION DUE TO 2019 NOVEL CORONAVIRUS: Primary | ICD-10-CM

## 2022-12-12 PROCEDURE — 99213 OFFICE O/P EST LOW 20 MIN: CPT | Mod: CS | Performed by: FAMILY MEDICINE

## 2022-12-12 RX ORDER — NIRMATRELVIR AND RITONAVIR 300-100 MG
3 KIT ORAL 2 TIMES DAILY
Qty: 30 EACH | Refills: 0 | Status: SHIPPED | OUTPATIENT
Start: 2022-12-12 | End: 2023-01-23

## 2022-12-12 NOTE — PROGRESS NOTES
"Mely is a 82 year old who is being evaluated via a billable video visit.      How would you like to obtain your AVS? MyChart  If the video visit is dropped, the invitation should be resent by: Text to cell phone: [phone]  Will anyone else be joining your video visit? No          Assessment & Plan     Infection due to 2019 novel coronavirus  If not better sart  - nirmatrelvir and ritonavir (PAXLOVID, 300/100,) therapy pack  Dispense: 30 each; Refill: 0     Discharge Instructions for COVID-19 Patients  You have--or may have--COVID-19. Please follow the instructions listed below.   If you have a weakened immune system, discuss with your doctor any other actions you need to take.  How can I protect others?  If you have symptoms (fever, cough, body aches or trouble breathing):    Stay home and away from others (self-isolate) until:  ? Your other symptoms have resolved (gotten better). And   ? You've had no fever--and no medicine that reduces fever--for 1 full day (24 hours). And   ? At least 10 days have passed since your symptoms started. (You may need to wait 20 days. Follow the advice of your care team.)  If you don't show symptoms, but testing showed that you have COVID-19:    Stay home and away from others (self-isolate) until at least 10 days have passed since the date of your first positive COVID-19 test.  During this time    Stay in your own room, even for meals. Use your own bathroom if you can.    Stay away from others in your home. No hugging, kissing or shaking hands. No visitors.    Don't go to work, school or anywhere else.    Clean \"high touch\" surfaces often (doorknobs, counters, handles). Use household cleaning spray or wipes.    You'll find a full list of  on the EPA website: www.epa.gov/pesticide-registration/list-n-disinfectants-use-against-sars-cov-2.    Cover your mouth and nose with a mask or other face covering to avoid spreading germs.    Wash your hands and face often. Use soap and " water.    Caregivers in these groups are at risk for severe illness due to COVID-19:  ? People 65 years and older  ? People who live in a nursing home or long-term care facility  ? People with chronic disease (lung, heart, cancer, diabetes, kidney, liver, immunologic)  ? People who have a weakened immune system, including those who:    Are in cancer treatment    Take medicine that weakens the immune system, such as corticosteroids    Had a bone marrow or organ transplant    Have an immune deficiency    Have poorly controlled HIV or AIDS    Are obese (body mass index of 40 or higher)    Smoke regularly    Caregivers should wear gloves while washing dishes, handling laundry and cleaning bedrooms and bathrooms.    Use caution when washing and drying laundry: Don't shake dirty laundry and use the warmest water setting that you can.    For more tips on managing your health at home, go to www.cdc.gov/coronavirus/2019-ncov/downloads/10Things.pdf.  How can I take care of myself at home?  1. Get lots of rest. Drink extra fluids (unless a doctor has told you not to).  2. Take Tylenol (acetaminophen) for fever or pain. If you have liver or kidney problems, ask your family doctor if it's okay to take Tylenol.   Adults can take either:   ? 650 mg (two 325 mg pills) every 4 to 6 hours, or   ? 1,000 mg (two 500 mg pills) every 8 hours as needed.  ? Note: Don't take more than 3,000 mg in one day. Acetaminophen is found in many medicines (both prescribed and over-the-counter medicines). Read all labels to be sure you don't take too much.     3. If you have other health problems (like cancer, heart failure, an organ transplant or severe kidney disease): Call your specialty clinic if you don't feel better in the next 2 days.  4. Know when to call 911. Emergency warning signs include:  ? Trouble breathing or shortness of breath  ? Pain or pressure in the chest that doesn't go away  ? Feeling confused like you haven't felt before, or  not being able to wake up  ? Bluish-colored lips or face  5. Your doctor may have prescribed a blood thinner medicine. Follow their instructions.  Where can I get more information?    [facility] - About COVID-19:   [email or web address]    CDC - What to Do If You're Sick: www.cdc.gov/coronavirus/2019-ncov/about/steps-when-sick.html    CDC - Ending Home Isolation: www.cdc.gov/coronavirus/2019-ncov/hcp/disposition-in-home-patients.html    CDC - Caring for Someone: www.cdc.gov/coronavirus/2019-ncov/if-you-are-sick/care-for-someone.html    [facility] - Interim Guidance for Hospital Discharge to Home: www.health.[facility].mn./diseases/coronavirus/hcp/hospdischarge.pdf    Below are the COVID-19 hotlines at the [place] Department of Health ([facility]). Interpreters are available.  ? For health questions: Call 618-741-0337 or 1-311.297.4314 (7 a.m. to 7 p.m.)  ? For questions about schools and childcare: Call 450-868-9912 or 1-334.462.1918 (7 a.m. to 7 p.m.)    For informational purposes only. Not to replace the advice of your health care provider. Clinically reviewed by Dr. Mil Hassan.   Copyright   2020 [facility]. All rights reserved. [employer] [id number] - REV 01/05/21.             Fluids/ rest  See Patient Instructions    No follow-ups on file.    Chapo Justin MD  [facility] Boone is a 82 year old, presenting for the following health issues:  No chief complaint on file.      HPI     Acute Illness  Acute illness concerns: covid  Onset/Duration: 2-3 days  Symptoms:  Fever: No  Chills/Sweats: No  Headache (location?): No  Sinus Pressure: No  Conjunctivitis:  No  Ear Pain: no  Rhinorrhea: YES  Congestion: YES  Sore Throat: YES  Cough: no  Wheeze: No  Decreased Appetite: YES  Nausea: No  Vomiting: No  Diarrhea: No     Fatigue/Achiness: YES  Sick/Strep Exposure: No  Therapies tried and outcome: None      Review of Systems   Constitutional, HEENT,  cardiovascular, pulmonary, gi and gu systems are negative, except as otherwise noted.      Objective           Vitals:  No vitals were obtained today due to virtual visit.    Physical Exam   GENERAL: alert, no distress and fatigued  EYES: Eyes grossly normal to inspection.  No discharge or erythema, or obvious scleral/conjunctival abnormalities.  RESP: No audible wheeze, cough, or visible cyanosis.  No visible retractions or increased work of breathing.    SKIN: Visible skin clear. No significant rash, abnormal pigmentation or lesions.  NEURO: Cranial nerves grossly intact.  Mentation and speech appropriate for age.  PSYCH: Mentation appears normal, affect normal/bright, judgement and insight intact, normal speech and appearance well-groomed.    Office Visit on 01/14/2022   Component Date Value Ref Range Status     TSH 01/14/2022 1.73  0.40 - 4.00 mU/L Final     Cholesterol 01/14/2022 244 (H)  <200 mg/dL Final     Triglycerides 01/14/2022 42  <150 mg/dL Final     Direct Measure HDL 01/14/2022 106  >=50 mg/dL Final     LDL Cholesterol Calculated 01/14/2022 130 (H)  <=100 mg/dL Final     Non HDL Cholesterol 01/14/2022 138 (H)  <130 mg/dL Final     Patient Fasting > 8hrs? 01/14/2022 Yes   Final     Sodium 01/14/2022 131 (L)  133 - 144 mmol/L Final     Potassium 01/14/2022 4.7  3.4 - 5.3 mmol/L Final     Chloride 01/14/2022 98  94 - 109 mmol/L Final     Carbon Dioxide (CO2) 01/14/2022 26  20 - 32 mmol/L Final     Anion Gap 01/14/2022 7  3 - 14 mmol/L Final     Urea Nitrogen 01/14/2022 11  7 - 30 mg/dL Final     Creatinine 01/14/2022 0.73  0.52 - 1.04 mg/dL Final     Calcium 01/14/2022 10.2 (H)  8.5 - 10.1 mg/dL Final     Glucose 01/14/2022 86  70 - 99 mg/dL Final     Alkaline Phosphatase 01/14/2022 58  40 - 150 U/L Final     AST 01/14/2022 54 (H)  0 - 45 U/L Final     ALT 01/14/2022 28  0 - 50 U/L Final     Protein Total 01/14/2022 7.6  6.8 - 8.8 g/dL Final     Albumin 01/14/2022 4.2  3.4 - 5.0 g/dL Final     Bilirubin  Total 01/14/2022 1.0  0.2 - 1.3 mg/dL Final     GFR Estimate 01/14/2022 83  >60 mL/min/1.73m2 Final    Effective December 21, 2021 eGFRcr in adults is calculated using the 2021 CKD-EPI creatinine equation which includes age and gender (Jun montoya al., NEJM, DOI: 10.1056/MPDYhh9307027)               Video-Visit Details    Video Start Time: 355    Type of service:  Video Visit    Video End Time:4:08 PM    Originating Location (pt. Location): Home        Distant Location (provider location):  On-site    Platform used for Video Visit: Ermias

## 2022-12-13 RX ORDER — AMLODIPINE BESYLATE 5 MG/1
5 TABLET ORAL DAILY
Qty: 90 TABLET | Refills: 1 | Status: SHIPPED | OUTPATIENT
Start: 2022-12-13 | End: 2023-03-14

## 2022-12-20 ENCOUNTER — MYC MEDICAL ADVICE (OUTPATIENT)
Dept: PHARMACY | Facility: CLINIC | Age: 82
End: 2022-12-20

## 2022-12-20 DIAGNOSIS — R05.9 COUGH: Primary | ICD-10-CM

## 2022-12-20 RX ORDER — ALBUTEROL SULFATE 90 UG/1
2 AEROSOL, METERED RESPIRATORY (INHALATION) EVERY 6 HOURS PRN
Qty: 18 G | Refills: 1 | Status: SHIPPED | OUTPATIENT
Start: 2022-12-20 | End: 2024-01-09

## 2022-12-20 RX ORDER — INHALER, ASSIST DEVICES
SPACER (EA) MISCELLANEOUS
Qty: 1 EACH | Refills: 0 | Status: SHIPPED | OUTPATIENT
Start: 2022-12-20

## 2022-12-20 NOTE — TELEPHONE ENCOUNTER
See MTM OV note from  for additional details -       Tiffany Messer MD Schlichte, Mary HANKS, [facility] [place],   It is ok for her to try it and then follow up with me   Thank you for your help !   Tiffany           Previous Messages     ----- Message -----   From: Mary Bustos [facility]   Sent: 2022   4:36 PM CST   To: Tiffany Messer MD   Subject: Albuterol?                                       Hello!  I met with Mely last week and she is still struggling with a chronic cough. She reports that she gets very phlegmy when exposed to cold weather and sometimes wheezes. Tests for asthma have been negative, but she has a strong family history.   She has had a Flovent inhaler before, but it  in 2019.     I am wondering if you would be willing to let me send in either albuterol or Dulera (SMART therapy) to see if it helps with her symptoms?  Would you like to see her before prescribing (I'm a bit worried about how long it may take to get in)?  Or would you like her to try it and follow-up with you and see how it works?     Let me know what you think and I can follow-up with her for next steps. Thanks!     Mary Bustos, PharmD, SYEDA, BCACP   MTM Pharmacist, [facility]

## 2023-01-06 DIAGNOSIS — E03.9 ACQUIRED HYPOTHYROIDISM: ICD-10-CM

## 2023-01-09 RX ORDER — LEVOTHYROXINE SODIUM 100 UG/1
TABLET ORAL
Qty: 90 TABLET | Refills: 1 | Status: SHIPPED | OUTPATIENT
Start: 2023-01-09 | End: 2023-03-14

## 2023-01-09 NOTE — TELEPHONE ENCOUNTER
Prescription approved per [facility] Refill Protocol.    Josselin Lang RN   [facility]

## 2023-01-13 ENCOUNTER — HOSPITAL ENCOUNTER (OUTPATIENT)
Dept: MAMMOGRAPHY | Facility: CLINIC | Age: 83
Discharge: HOME | End: 2023-01-13
Attending: FAMILY MEDICINE
Payer: COMMERCIAL

## 2023-01-13 ENCOUNTER — HOSPITAL ENCOUNTER (OUTPATIENT)
Dept: BONE DENSITY | Facility: CLINIC | Age: 83
Discharge: HOME | End: 2023-01-13
Attending: FAMILY MEDICINE
Payer: COMMERCIAL

## 2023-01-13 DIAGNOSIS — M81.0 AGE-RELATED OSTEOPOROSIS WITHOUT CURRENT PATHOLOGICAL FRACTURE: ICD-10-CM

## 2023-01-13 DIAGNOSIS — Z12.31 BREAST CANCER SCREENING BY MAMMOGRAM: ICD-10-CM

## 2023-01-13 PROCEDURE — 77080 DXA BONE DENSITY AXIAL: CPT

## 2023-01-13 PROCEDURE — 77067 SCR MAMMO BI INCL CAD: CPT

## 2023-01-16 DIAGNOSIS — I10 ESSENTIAL HYPERTENSION WITH GOAL BLOOD PRESSURE LESS THAN 140/90: ICD-10-CM

## 2023-01-17 RX ORDER — LOSARTAN POTASSIUM 100 MG/1
100 TABLET ORAL DAILY
Qty: 30 TABLET | Refills: 0 | Status: SHIPPED | OUTPATIENT
Start: 2023-01-17 | End: 2023-03-14

## 2023-01-17 NOTE — TELEPHONE ENCOUNTER
Prescription approved per [facility] Refill Protocol.  1 month refill only; patient due for appointment (physical)  Chandni WALTON RN

## 2023-01-30 DIAGNOSIS — I10 ESSENTIAL HYPERTENSION WITH GOAL BLOOD PRESSURE LESS THAN 140/90: ICD-10-CM

## 2023-01-31 ENCOUNTER — MYC MEDICAL ADVICE (OUTPATIENT)
Dept: FAMILY MEDICINE | Facility: CLINIC | Age: 83
End: 2023-01-31
Payer: COMMERCIAL

## 2023-01-31 RX ORDER — TRIAMTERENE AND HYDROCHLOROTHIAZIDE 37.5; 25 MG/1; MG/1
1 CAPSULE ORAL DAILY
Qty: 30 CAPSULE | Refills: 0 | Status: SHIPPED | OUTPATIENT
Start: 2023-01-31 | End: 2023-03-10

## 2023-01-31 NOTE — TELEPHONE ENCOUNTER
Due for BP follow up    Note from 4/6/22 VV:    Benign essential hypertension  We discussed increasing the dose of the amlodipine to 10 mg and getting off the losartan per ENT recommendation to see if her cough is related to the losartan , but meanwhile she would need to monitor her BP at home , check twice     Refill sent for one month supply  Enviancehart message sent to patient asking her to schedule appointment.    Nayana Alcaraz RN

## 2023-03-09 DIAGNOSIS — I10 ESSENTIAL HYPERTENSION WITH GOAL BLOOD PRESSURE LESS THAN 140/90: ICD-10-CM

## 2023-03-10 RX ORDER — TRIAMTERENE AND HYDROCHLOROTHIAZIDE 37.5; 25 MG/1; MG/1
1 CAPSULE ORAL DAILY
Qty: 30 CAPSULE | Refills: 0 | Status: SHIPPED | OUTPATIENT
Start: 2023-03-10 | End: 2023-03-14

## 2023-03-10 NOTE — TELEPHONE ENCOUNTER
One month supply sent 1/31/23.  Patient scheduled for wellness exam 3/14.    Refill sent for one more mnth.  Nayana Alcaraz RN

## 2023-03-14 ENCOUNTER — OFFICE VISIT (OUTPATIENT)
Dept: FAMILY MEDICINE | Facility: CLINIC | Age: 83
End: 2023-03-14
Payer: COMMERCIAL

## 2023-03-14 VITALS
RESPIRATION RATE: 16 BRPM | WEIGHT: 135.6 LBS | OXYGEN SATURATION: 100 % | HEIGHT: 63 IN | HEART RATE: 60 BPM | SYSTOLIC BLOOD PRESSURE: 118 MMHG | BODY MASS INDEX: 24.03 KG/M2 | DIASTOLIC BLOOD PRESSURE: 80 MMHG | TEMPERATURE: 97.5 F

## 2023-03-14 DIAGNOSIS — Z00.00 ENCOUNTER FOR MEDICARE ANNUAL WELLNESS EXAM: Primary | ICD-10-CM

## 2023-03-14 DIAGNOSIS — I10 ESSENTIAL HYPERTENSION WITH GOAL BLOOD PRESSURE LESS THAN 140/90: ICD-10-CM

## 2023-03-14 DIAGNOSIS — Z23 ENCOUNTER FOR IMMUNIZATION: ICD-10-CM

## 2023-03-14 DIAGNOSIS — E03.9 ACQUIRED HYPOTHYROIDISM: ICD-10-CM

## 2023-03-14 LAB
ALBUMIN SERPL BCG-MCNC: 4.9 G/DL (ref 3.5–5.2)
ALP SERPL-CCNC: 61 U/L (ref 35–104)
ALT SERPL W P-5'-P-CCNC: 21 U/L (ref 10–35)
ANION GAP SERPL CALCULATED.3IONS-SCNC: 15 MMOL/L (ref 7–15)
AST SERPL W P-5'-P-CCNC: 57 U/L (ref 10–35)
BILIRUB SERPL-MCNC: 0.9 MG/DL
BUN SERPL-MCNC: 9.4 MG/DL (ref 8–23)
CALCIUM SERPL-MCNC: 10.8 MG/DL (ref 8.8–10.2)
CHLORIDE SERPL-SCNC: 95 MMOL/L (ref 98–107)
CHOLEST SERPL-MCNC: 257 MG/DL
CREAT SERPL-MCNC: 0.68 MG/DL (ref 0.51–0.95)
DEPRECATED HCO3 PLAS-SCNC: 24 MMOL/L (ref 22–29)
GFR SERPL CREATININE-BSD FRML MDRD: 87 ML/MIN/1.73M2
GLUCOSE SERPL-MCNC: 94 MG/DL (ref 70–99)
HDLC SERPL-MCNC: 114 MG/DL
LDLC SERPL CALC-MCNC: 134 MG/DL
NONHDLC SERPL-MCNC: 143 MG/DL
POTASSIUM SERPL-SCNC: 4.5 MMOL/L (ref 3.4–5.3)
PROT SERPL-MCNC: 8 G/DL (ref 6.4–8.3)
SODIUM SERPL-SCNC: 134 MMOL/L (ref 136–145)
TRIGL SERPL-MCNC: 46 MG/DL
TSH SERPL DL<=0.005 MIU/L-ACNC: 2.16 UIU/ML (ref 0.3–4.2)

## 2023-03-14 PROCEDURE — 80053 COMPREHEN METABOLIC PANEL: CPT | Performed by: FAMILY MEDICINE

## 2023-03-14 PROCEDURE — 99214 OFFICE O/P EST MOD 30 MIN: CPT | Mod: 25 | Performed by: FAMILY MEDICINE

## 2023-03-14 PROCEDURE — G0439 PPPS, SUBSEQ VISIT: HCPCS | Performed by: FAMILY MEDICINE

## 2023-03-14 PROCEDURE — 84443 ASSAY THYROID STIM HORMONE: CPT | Performed by: FAMILY MEDICINE

## 2023-03-14 PROCEDURE — 80061 LIPID PANEL: CPT | Performed by: FAMILY MEDICINE

## 2023-03-14 PROCEDURE — 90471 IMMUNIZATION ADMIN: CPT | Performed by: FAMILY MEDICINE

## 2023-03-14 PROCEDURE — 36415 COLL VENOUS BLD VENIPUNCTURE: CPT | Performed by: FAMILY MEDICINE

## 2023-03-14 PROCEDURE — 90715 TDAP VACCINE 7 YRS/> IM: CPT | Performed by: FAMILY MEDICINE

## 2023-03-14 RX ORDER — AMLODIPINE BESYLATE 5 MG/1
5 TABLET ORAL DAILY
Qty: 90 TABLET | Refills: 1 | Status: SHIPPED | OUTPATIENT
Start: 2023-03-14 | End: 2023-10-16

## 2023-03-14 RX ORDER — LOSARTAN POTASSIUM 100 MG/1
100 TABLET ORAL DAILY
Qty: 90 TABLET | Refills: 1 | Status: SHIPPED | OUTPATIENT
Start: 2023-03-14 | End: 2023-10-16

## 2023-03-14 RX ORDER — LEVOTHYROXINE SODIUM 100 UG/1
TABLET ORAL
Qty: 90 TABLET | Refills: 1 | Status: SHIPPED | OUTPATIENT
Start: 2023-03-14 | End: 2024-01-04

## 2023-03-14 RX ORDER — TRIAMTERENE AND HYDROCHLOROTHIAZIDE 37.5; 25 MG/1; MG/1
1 CAPSULE ORAL DAILY
Qty: 90 CAPSULE | Refills: 1 | Status: SHIPPED | OUTPATIENT
Start: 2023-03-14 | End: 2023-08-30

## 2023-03-14 ASSESSMENT — ENCOUNTER SYMPTOMS
DIARRHEA: 0
PALPITATIONS: 0
ABDOMINAL PAIN: 0
JOINT SWELLING: 1
NAUSEA: 0
FREQUENCY: 0
HEMATOCHEZIA: 0
NERVOUS/ANXIOUS: 0
CONSTIPATION: 0
EYE PAIN: 0
HEMATURIA: 0
HEADACHES: 0
BREAST MASS: 0
DIZZINESS: 0
SORE THROAT: 0
ARTHRALGIAS: 1
DYSURIA: 0
WEAKNESS: 0
HEARTBURN: 0
SHORTNESS OF BREATH: 0
PARESTHESIAS: 0
MYALGIAS: 1
COUGH: 1
FEVER: 0
CHILLS: 0

## 2023-03-14 ASSESSMENT — PAIN SCALES - GENERAL: PAINLEVEL: NO PAIN (0)

## 2023-03-14 ASSESSMENT — ACTIVITIES OF DAILY LIVING (ADL): CURRENT_FUNCTION: NO ASSISTANCE NEEDED

## 2023-03-14 NOTE — PATIENT INSTRUCTIONS
Patient Education   Personalized Prevention Plan  You are due for the preventive services outlined below.  Your care team is available to assist you in scheduling these services.  If you have already completed any of these items, please share that information with your care team to update in your medical record.  Health Maintenance Due   Topic Date Due     Diptheria Tetanus Pertussis (DTAP/TDAP/TD) Vaccine (3 - Td or Tdap) 01/10/2023     Annual Wellness Visit  01/14/2023     Thyroid Function Lab  01/14/2023     ANNUAL REVIEW OF HM ORDERS  04/06/2023       Preventing Falls at Home  A person can fall for many reasons. Older adults may fall because reaction time slows down as we age. Your muscles and joints may get stiff, weak, or less flexible because of illness, medicines, or a physical condition.   Other health problems that make falls more likely include:     Arthritis    Dizziness or lightheadedness when you stand up (orthostatic hypotension)    History of a stroke    Dizziness    Anemia    Certain medicines taken for mental illness or to control blood pressure.    Problems with balance or gait    Bladder or urinary problems    History of falling    Changes in vision (vision impairment)    Changes in thinking skills and memory (cognitive impairment)  Injuries from a fall can include serious injuries such as broken bones, dislocated joints, internal bleeding and cuts. Injuries like these can limit your independence.   Prevention tips  To help prevent falls and fall-related injuries, follow the tips below.    Floors  To make floors safer:     Put nonskid pads under area rugs.    Remove small rugs.    Replace worn floor coverings.    Tack carpets firmly to each step on carpeted stairs. Put nonskid strips on the edges of uncarpeted stairs.    Keep floors and stairs free of clutter and cords.    Arrange furniture so there are clear pathways.    Clean up any spills right away.  Bathrooms    To make bathrooms  safer:     Install grab bars in the tub or shower.    Apply nonskid strips or put a nonskid rubber mat in the tub or shower.    Sit on a bath chair to bathe.    Use bathmats with nonskid backing.  Lighting  To improve visibility in your home:      Keep a flashlight in each room. Or put a lamp next to the bed within easy reach.    Put nightlights in the bedrooms, hallways, kitchen, and bathrooms.    Make sure all stairways have good lighting.    Take your time when going up and down stairs.    Put handrails on both sides of stairs and in walkways for more support. To prevent injury to your wrist or arm, don t use handrails to pull yourself up.    Install grab bars to pull yourself up.    Move or rearrange items that you use often. This will make them easier to find or reach.    Look at your home to find any safety hazards. Especially look at doorways, walkways, and the driveway. Remove or repair any safety problems that you find.  Other changes to make    Look around to find any safety hazards. Look closely at doorways, walkways, and the driveway. Remove or repair any safety problems that you find.    Wear shoes that fit well.    Take your time when going up and down stairs.    Put handrails on both sides of stairs and in walkways for more support. To prevent injury to your wrist or arm, don t use handrails to pull yourself up.    Install grab bars wherever needed to pull yourself up.    Arrange items that you use often. This will make them easier to find or reach.    [employer] last reviewed this educational content on 3/1/2020    3985-3881 [employer]. All rights reserved. This information is not intended as a substitute for professional medical care. Always follow your healthcare professional's instructions.

## 2023-03-14 NOTE — PROGRESS NOTES
"  She is at risk for falling and has been provided with information to reduce the risk of falling at home.  SUBJECTIVE:   Mely is a 83 year old who presents for Preventive Visit.  Patient has been advised of split billing requirements and indicates understanding: Yes  Are you in the first 12 months of your Medicare coverage?  No    Healthy Habits:     In general, how would you rate your overall health?  Excellent    Frequency of exercise:  6-7 days/week    Duration of exercise:  45-60 minutes    Do you usually eat at least 4 servings of fruit and vegetables a day, include whole grains    & fiber and avoid regularly eating high fat or \"junk\" foods?  No    Taking medications regularly:  Yes    Medication side effects:  None    Ability to successfully perform activities of daily living:  No assistance needed    Home Safety:  No safety concerns identified    Hearing Impairment:  No hearing concerns    In the past 6 months, have you been bothered by leaking of urine? Yes    In general, how would you rate your overall mental or emotional health?  Excellent      PHQ-2 Total Score: 0    Additional concerns today:  No      Have you ever done Advance Care Planning? (For example, a Health Directive, POLST, or a discussion with a medical provider or your loved ones about your wishes): Yes, patient states has an Advance Care Planning document and will bring a copy to the clinic.  Fall risk  Fallen 2 or more times in the past year?: Yes  Any fall with injury in the past year?: No    Cognitive Screening   1) Repeat 3 items (Leader, Season, Table)    2) Clock draw: NORMAL  3) 3 item recall: Recalls 2 objects   Results: NORMAL clock, 1-2 items recalled: COGNITIVE IMPAIRMENT LESS LIKELY    Mini-CogTM Copyright PINKY Mendieta. Licensed by the author for use in [facility]; reprinted with permission (aileen@.[email or web address]). All rights reserved.      Do you have sleep apnea, excessive snoring or daytime drowsiness?: yes-sleep " apnea    Reviewed and updated as needed this visit by clinical staff   Tobacco  Allergies  Meds              Reviewed and updated as needed this visit by Provider                 Social History     Tobacco Use     Smoking status: Never     Smokeless tobacco: Never   Substance Use Topics     Alcohol use: No     Alcohol/week: 0.0 standard drinks         Alcohol Use 3/14/2023   Prescreen: >3 drinks/day or >7 drinks/week? Not Applicable   No flowsheet data found.        Hypertension Follow-up    Do you check your blood pressure regularly outside of the clinic? No   Are you following a low salt diet? Yes  Are your blood pressures ever more than 140 on the top number (systolic) OR more   than 90 on the bottom number (diastolic), for example 140/90? No    Hypothyroidism Follow-up    Since last visit, patient describes the following symptoms: Weight stable, no hair loss, no skin changes, no constipation, no loose stools, weight gain lbs and dry skin      Current providers sharing in care for this patient include:   Patient Care Team:  Tiffany Messer MD as PCP - General (Family Practice)  Mirna Manning MD as MD (INTERNAL MEDICINE - ENDOCRINOLOGY, DIABETES & METABOLISM)  Mary Bustos RPH as Pharmacist (Pharmacist)  Tiffany Messer MD as Assigned PCP  Mary Bustos RPH as Assigned MTM Pharmacist  Lisa Weathers RPH as Pharmacist (Pharmacist)    The following health maintenance items are reviewed in Epic and correct as of today:  Health Maintenance   Topic Date Due     DTAP/TDAP/TD IMMUNIZATION (3 - Td or Tdap) 01/10/2023     MEDICARE ANNUAL WELLNESS VISIT  01/14/2023     TSH W/FREE T4 REFLEX  01/14/2023     ANNUAL REVIEW OF HM ORDERS  04/06/2023     FALL RISK ASSESSMENT  03/14/2024     ADVANCE CARE PLANNING  01/14/2027     COLORECTAL CANCER SCREENING  10/31/2028     DEXA  01/13/2038     PHQ-2 (once per calendar year)  Completed     INFLUENZA VACCINE  Completed     Pneumococcal Vaccine: 65+ Years   Completed     ZOSTER IMMUNIZATION  Completed     COVID-19 Vaccine  Completed     IPV IMMUNIZATION  Aged Out     MENINGITIS IMMUNIZATION  Aged Out     MAMMO SCREENING  Discontinued     Lab work is in process  Labs reviewed in EPIC  BP Readings from Last 3 Encounters:   03/14/23 118/80   12/08/22 118/52   08/09/22 130/78    Wt Readings from Last 3 Encounters:   03/14/23 61.5 kg (135 lb 9.6 oz)   08/09/22 61.7 kg (136 lb)   01/14/22 60.9 kg (134 lb 3.2 oz)                  Patient Active Problem List   Diagnosis     Acquired hypothyroidism     Allergic rhinitis     Osteoporosis     Osteoarthritis     Hyperlipidemia LDL goal <160     Hypertension goal BP (blood pressure) < 140/90     Advanced directives, counseling/discussion     Otitis externa     Sleep apnea     Dermatitis     Rosacea     Age-related osteoporosis without current pathological fracture     Disorder of bone and cartilage     Dysphagia     Past Surgical History:   Procedure Laterality Date     COLONOSCOPY N/A 10/31/2018    Procedure: COMBINED COLONOSCOPY, SINGLE OR MULTIPLE BIOPSY/POLYPECTOMY BY BIOPSY;  Surgeon: Leon Cosme MD;  Location:  GI     EP ABLATION SVT N/A 10/8/2019    Procedure: EP Ablation SVT;  Surgeon: Laney Arriaza MD;  Location:  HEART CARDI[facility] CATH LAB     EP ABLATION SVT N/A 6/10/2020    Procedure: EP Ablation SVT;  Surgeon: Laney Arriaza MD;  Location:  HEART CARD[facility] CATH LAB     ESOPHAGOSCOPY, GASTROSCOPY, DUODENOSCOPY (EGD), COMBINED N/A 10/31/2018    Procedure: GASTROSCOPY;  Surgeon: Leon Cosme MD;  Location:  GI     GYN SURGERY       HYSTERECTOMY       JOINT REPLACEMENT Bilateral      knee replacement Bilateral      ORTHOPEDIC SURGERY       ZZC NONSPECIFIC PROCEDURE  1974    s/p Vaginal hysterectomy       Social History     Tobacco Use     Smoking status: Never     Smokeless tobacco: Never   Substance Use Topics     Alcohol use: No     Alcohol/week: 0.0 standard drinks     Family History   Problem Relation  Age of Onset     Cancer Sister      Heart Disease Father      Hypertension Father      Thyroid Disease Father      Other Cancer Other          age 65     Other Cancer Other          age 56     Other Cancer Niece          age 53     Other Cancer Sister          age 51         Current Outpatient Medications   Medication Sig Dispense Refill     albuterol (PROAIR HFA/PROVENTIL HFA/VENTOLIN HFA) 108 (90 Base) MCG/ACT inhaler Inhale 2 puffs into the lungs every 6 hours as needed for shortness of breath, wheezing or cough 18 g 1     amLODIPine (NORVASC) 5 MG tablet Take 1 tablet (5 mg) by mouth daily 90 tablet 1     Calcium Citrate-Vitamin D 250-200 MG-UNIT TABS Take 1 tablet by mouth 2 times daily       diphenhydrAMINE-acetaminophen (TYLENOL PM)  MG tablet Take 1 tablet by mouth every other day        fluticasone (FLONASE) 50 MCG/ACT nasal spray Spray 1 spray into both nostrils 2 times daily 16 g 0     ibuprofen (ADVIL/MOTRIN) 200 MG tablet Take 200 mg by mouth every other day       IBUPROFEN-DIPHENHYDRAMINE HCL PO Take 1 tablet by mouth every other day       levothyroxine (SYNTHROID/LEVOTHROID) 100 MCG tablet TAKE 1 TABLET BY MOUTH DAILY 90 tablet 1     loratadine (CLARITIN) 10 MG tablet Take 10 mg by mouth daily        losartan (COZAAR) 100 MG tablet Take 1 tablet (100 mg) by mouth daily 90 tablet 1     Multiple Vitamins-Minerals (EYE VITAMINS & MINERALS) TABS Take 2 capsules by mouth daily [employer] AREDS2 Plus       Multiple Vitamins-Minerals (MULTIVITAMIN ADULT PO) Take 1 tablet by mouth daily       Spacer/Aero-Holding Chambers (AEROCHAMBER MV) MISC One Aerochamber or Optichamber to assist with albuterol use. 1 each 0     triamterene-HCTZ (DYAZIDE) 37.5-25 MG capsule Take 1 capsule by mouth daily 90 capsule 1     Turmeric 500 MG CAPS Take 1 capsule by mouth daily       Allergies   Allergen Reactions     Cats      Hylan G-F 20      Lisinopril Cough     Seasonal Allergies      Vioxx      Recent  "Labs   Lab Test 03/14/23  1218 01/14/22  1053 07/08/21  0808 12/15/20  0943 06/10/20  1218   * 130*  --  112*  --     106  --  102  --    TRIG 46 42  --  41  --    ALT 21 28 29 24  --    CR 0.68 0.73  --  0.68 0.64   GFRESTIMATED 87 83  --  83 85   GFRESTBLACK  --   --   --  >90 >90   POTASSIUM 4.5 4.7  --  4.3 3.5   TSH 2.16 1.73 1.87 1.53  --       Mammogram Screening: Mammogram Screening - Patient over age 75, has elected to continue with screening.      Pertinent mammograms are reviewed under the imaging tab.    Review of Systems   Constitutional: Negative for chills and fever.   HENT: Negative for congestion, ear pain, hearing loss and sore throat.    Eyes: Negative for pain and visual disturbance.   Respiratory: Positive for cough. Negative for shortness of breath.    Cardiovascular: Negative for chest pain, palpitations and peripheral edema.   Gastrointestinal: Negative for abdominal pain, constipation, diarrhea, heartburn, hematochezia and nausea.   Breasts:  Negative for tenderness, breast mass and discharge.   Genitourinary: Negative for dysuria, frequency, genital sores, hematuria, pelvic pain, urgency, vaginal bleeding and vaginal discharge.   Musculoskeletal: Positive for arthralgias, joint swelling and myalgias.   Skin: Negative for rash.   Neurological: Negative for dizziness, weakness, headaches and paresthesias.   Psychiatric/Behavioral: Negative for mood changes. The patient is not nervous/anxious.      Constitutional, HEENT, cardiovascular, pulmonary, GI, , musculoskeletal, neuro, skin, endocrine and psych systems are negative, except as otherwise noted.    OBJECTIVE:   There were no vitals taken for this visit. Estimated body mass index is 23.71 kg/m  as calculated from the following:    Height as of 8/9/22: 1.613 m (5' 3.5\").    Weight as of 8/9/22: 61.7 kg (136 lb).  Physical Exam  GENERAL: healthy, alert and no distress  EYES: Eyes grossly normal to inspection, PERRL and " conjunctivae and sclerae normal  HENT: ear canals and TM's normal, nose and mouth without ulcers or lesions  NECK: no adenopathy, no asymmetry, masses, or scars and thyroid normal to palpation  RESP: lungs clear to auscultation - no rales, rhonchi or wheezes  BREAST: normal without masses, tenderness or nipple discharge and no palpable axillary masses or adenopathy  CV: regular rate and rhythm, normal S1 S2, no S3 or S4, no murmur, click or rub, no peripheral edema and peripheral pulses strong  ABDOMEN: soft, nontender, no hepatosplenomegaly, no masses and bowel sounds normal  MS: no gross musculoskeletal defects noted, no edema  SKIN: no suspicious lesions or rashes  NEURO: Normal strength and tone, mentation intact and speech normal  PSYCH: mentation appears normal, affect normal/bright    Diagnostic Test Results:  Labs reviewed in Epic  Results for orders placed or performed in visit on 03/14/23 (from the past 24 hour(s))   TSH WITH FREE T4 REFLEX   Result Value Ref Range    TSH 2.16 0.30 - 4.20 uIU/mL   Lipid panel reflex to direct LDL Fasting   Result Value Ref Range    Cholesterol 257 (H) <200 mg/dL    Triglycerides 46 <150 mg/dL    Direct Measure  >=50 mg/dL    LDL Cholesterol Calculated 134 (H) <=100 mg/dL    Non HDL Cholesterol 143 (H) <130 mg/dL    Narrative    Cholesterol  Desirable:  <200 mg/dL    Triglycerides  Normal:  Less than 150 mg/dL  Borderline High:  150-199 mg/dL  High:  200-499 mg/dL  Very High:  Greater than or equal to 500 mg/dL    Direct Measure HDL  Female:  Greater than or equal to 50 mg/dL   Male:  Greater than or equal to 40 mg/dL    LDL Cholesterol  Desirable:  <100mg/dL  Above Desirable:  100-129 mg/dL   Borderline High:  130-159 mg/dL   High:  160-189 mg/dL   Very High:  >= 190 mg/dL    Non HDL Cholesterol  Desirable:  130 mg/dL  Above Desirable:  130-159 mg/dL  Borderline High:  160-189 mg/dL  High:  190-219 mg/dL  Very High:  Greater than or equal to 220 mg/dL    Comprehensive metabolic panel (BMP + Alb, Alk Phos, ALT, AST, Total. Bili, TP)   Result Value Ref Range    Sodium 134 (L) 136 - 145 mmol/L    Potassium 4.5 3.4 - 5.3 mmol/L    Chloride 95 (L) 98 - 107 mmol/L    Carbon Dioxide (CO2) 24 22 - 29 mmol/L    Anion Gap 15 7 - 15 mmol/L    Urea Nitrogen 9.4 8.0 - 23.0 mg/dL    Creatinine 0.68 0.51 - 0.95 mg/dL    Calcium 10.8 (H) 8.8 - 10.2 mg/dL    Glucose 94 70 - 99 mg/dL    Alkaline Phosphatase 61 35 - 104 U/L    AST 57 (H) 10 - 35 U/L    ALT 21 10 - 35 U/L    Protein Total 8.0 6.4 - 8.3 g/dL    Albumin 4.9 3.5 - 5.2 g/dL    Bilirubin Total 0.9 <=1.2 mg/dL    GFR Estimate 87 >60 mL/min/1.73m2       ASSESSMENT / PLAN:   (Z00.00) Encounter for Medicare annual wellness exam  (primary encounter diagnosis)  Comment: Discussed diet,calcium,exercise.Went over self breast exam.Thin prep was NOT  done.Eyes and teeth UTD.No immunizations needed today.See orders below for tests ordered and screening needed.    Plan: Lipid panel reflex to direct LDL Fasting            (E03.9) Acquired hypothyroidism  Comment: on synthroid 100mcg daily , will check labs today   Plan: TSH WITH FREE T4 REFLEX, levothyroxine         (SYNTHROID/LEVOTHROID) 100 MCG tablet        Refilled her prescription     (I10) Essential hypertension with goal blood pressure less than 140/90  Comment: seems that her BP is well controlled on the amlodipine, triamterene-hydrochlorothiazide and also losartan , will check labs as below   Plan: amLODIPine (NORVASC) 5 MG tablet, losartan         (COZAAR) 100 MG tablet, triamterene-HCTZ         (DYAZIDE) 37.5-25 MG capsule, Lipid panel         reflex to direct LDL Fasting, Comprehensive         metabolic panel (BMP + Alb, Alk Phos, ALT, AST,        Total. Bili, TP)        Home BP readings have been under control       Patient has been advised of split billing requirements and indicates understanding: Yes      COUNSELING:  Reviewed preventive health counseling, as reflected  in patient instructions       Regular exercise       Healthy diet/nutrition       Vision screening       Hearing screening       Dental care       Bladder control       Fall risk prevention        She reports that she has never smoked. She has never used smokeless tobacco.      Appropriate preventive services were discussed with this patient, including applicable screening as appropriate for cardiovascular disease, diabetes, osteopenia/osteoporosis, and glaucoma.  As appropriate for age/gender, discussed screening for colorectal cancer, prostate cancer, breast cancer, and cervical cancer. Checklist reviewing preventive services available has been given to the patient.    Reviewed patients plan of care and provided an AVS. The Intermediate Care Plan ( asthma action plan, low back pain action plan, and migraine action plan) for Mely meets the Care Plan requirement. This Care Plan has been established and reviewed with the Patient.      Tiffany Messer MD  [facility]    Identified Health Risks:

## 2023-03-14 NOTE — NURSING NOTE
Prior to immunization administration, verified patients identity using patient s name and date of birth. Please see Immunization Activity for additional information.     Screening Questionnaire for Adult Immunization    Are you sick today?   No   Do you have allergies to medications, food, a vaccine component or latex?   No   Have you ever had a serious reaction after receiving a vaccination?   No   Do you have a long-term health problem with heart, lung, kidney, or metabolic disease (e.g., diabetes), asthma, a blood disorder, no spleen, complement component deficiency, a cochlear implant, or a spinal fluid leak?  Are you on long-term aspirin therapy?   No   Do you have cancer, leukemia, HIV/AIDS, or any other immune system problem?   No   Do you have a parent, brother, or sister with an immune system problem?   No   In the past 3 months, have you taken medications that affect  your immune system, such as prednisone, other steroids, or anticancer drugs; drugs for the treatment of rheumatoid arthritis, Crohn s disease, or psoriasis; or have you had radiation treatments?   No   Have you had a seizure, or a brain or other nervous system problem?   No   During the past year, have you received a transfusion of blood or blood    products, or been given immune (gamma) globulin or antiviral drug?   No   For women: Are you pregnant or is there a chance you could become       pregnant during the next month?   No   Have you received any vaccinations in the past 4 weeks?   No     Immunization questionnaire answers were all negative.        Per orders of Dr. Messer, injection of Tdwap (adacel) given by Waleska Loera. Patient instructed to remain in clinic for 15 minutes afterwards, and to report any adverse reaction to me immediately.       Screening performed by Waleska Loera on 3/14/2023 at 12:15 PM.

## 2023-03-21 ENCOUNTER — MYC MEDICAL ADVICE (OUTPATIENT)
Dept: FAMILY MEDICINE | Facility: CLINIC | Age: 83
End: 2023-03-21
Payer: COMMERCIAL

## 2023-03-21 DIAGNOSIS — R79.89 ELEVATED LFTS: Primary | ICD-10-CM

## 2023-03-22 NOTE — TELEPHONE ENCOUNTER
LS,    Please see [employer] message  AST has been in the 50's for past 2 years.    Thank you,  Nayana Alcaraz RN

## 2023-04-28 NOTE — TELEPHONE ENCOUNTER
DIAGNOSIS: Elevated LFTs   Appt Date: 06.02.2023   NOTES STATUS DETAILS   OFFICE NOTE from referring provider Internal 03.24.2023 Tiffany Messer MD   OFFICE NOTES from other specialists     DISCHARGE SUMMARY from hospital     MEDICATION LIST Internal    LIVER BIOSPY (IF APPLICABLE)      PATHOLOGY REPORTS      IMAGING     ENDOSCOPY (IF AVAILABLE)     COLONOSCOPY (IF AVAILABLE)     ULTRASOUND LIVER     CT OF ABDOMEN     MRI OF LIVER     FIBROSCAN, US ELASTOGRAPHY, FIBROSIS SCAN, MR ELASTOGRAPHY     LABS     HEPATIC PANEL (LIVER PANEL) Internal 07.08.2021   BASIC METABOLIC PANEL Internal 06.10.2020   COMPLETE METABOLIC PANEL Internal 06.10.2020   COMPLETE BLOOD COUNT (CBC) Internal 06.10.2020   INTERNATIONAL NORMALIZED RATIO (INR)     HEPATITIS C ANTIBODY     HEPATITIS C VIRAL LOAD/PCR     HEPATITIS C GENOTYPE     HEPATITIS B SURFACE ANTIGEN     HEPATITIS B SURFACE ANTIBODY     HEPATITIS B DNA QUANT LEVEL     HEPATITIS B CORE ANTIBODY

## 2023-05-25 DIAGNOSIS — R94.5 ABNORMAL RESULTS OF LIVER FUNCTION STUDIES: ICD-10-CM

## 2023-05-25 DIAGNOSIS — Z11.59 ENCOUNTER FOR SCREENING FOR OTHER VIRAL DISEASES: ICD-10-CM

## 2023-05-25 DIAGNOSIS — R79.89 ELEVATED LFTS: Primary | ICD-10-CM

## 2023-06-02 ENCOUNTER — LAB (OUTPATIENT)
Dept: LAB | Facility: CLINIC | Age: 83
End: 2023-06-02
Payer: COMMERCIAL

## 2023-06-02 ENCOUNTER — PRE VISIT (OUTPATIENT)
Dept: GASTROENTEROLOGY | Facility: CLINIC | Age: 83
End: 2023-06-02

## 2023-06-02 ENCOUNTER — OFFICE VISIT (OUTPATIENT)
Dept: GASTROENTEROLOGY | Facility: CLINIC | Age: 83
End: 2023-06-02
Attending: PHYSICIAN ASSISTANT
Payer: COMMERCIAL

## 2023-06-02 VITALS
BODY MASS INDEX: 23.99 KG/M2 | WEIGHT: 136.5 LBS | DIASTOLIC BLOOD PRESSURE: 69 MMHG | TEMPERATURE: 98.1 F | OXYGEN SATURATION: 98 % | HEART RATE: 70 BPM | SYSTOLIC BLOOD PRESSURE: 134 MMHG

## 2023-06-02 DIAGNOSIS — Z11.59 ENCOUNTER FOR SCREENING FOR OTHER VIRAL DISEASES: ICD-10-CM

## 2023-06-02 DIAGNOSIS — R79.89 ELEVATED LFTS: ICD-10-CM

## 2023-06-02 DIAGNOSIS — R94.5 ABNORMAL RESULTS OF LIVER FUNCTION STUDIES: ICD-10-CM

## 2023-06-02 LAB
ALBUMIN SERPL BCG-MCNC: 4.6 G/DL (ref 3.5–5.2)
ALP SERPL-CCNC: 75 U/L (ref 35–104)
ALT SERPL W P-5'-P-CCNC: 22 U/L (ref 10–35)
ANION GAP SERPL CALCULATED.3IONS-SCNC: 9 MMOL/L (ref 7–15)
AST SERPL W P-5'-P-CCNC: 46 U/L (ref 10–35)
BILIRUB DIRECT SERPL-MCNC: <0.2 MG/DL (ref 0–0.3)
BILIRUB SERPL-MCNC: 0.6 MG/DL
BUN SERPL-MCNC: 12.6 MG/DL (ref 8–23)
CALCIUM SERPL-MCNC: 10.3 MG/DL (ref 8.8–10.2)
CHLORIDE SERPL-SCNC: 99 MMOL/L (ref 98–107)
CK SERPL-CCNC: 82 U/L (ref 26–192)
CREAT SERPL-MCNC: 0.71 MG/DL (ref 0.51–0.95)
DEPRECATED HCO3 PLAS-SCNC: 26 MMOL/L (ref 22–29)
ERYTHROCYTE [DISTWIDTH] IN BLOOD BY AUTOMATED COUNT: 13.2 % (ref 10–15)
GFR SERPL CREATININE-BSD FRML MDRD: 85 ML/MIN/1.73M2
GLUCOSE SERPL-MCNC: 92 MG/DL (ref 70–99)
HBV CORE AB SERPL QL IA: NONREACTIVE
HBV SURFACE AB SERPL IA-ACNC: 0 M[IU]/ML
HBV SURFACE AB SERPL IA-ACNC: NONREACTIVE M[IU]/ML
HBV SURFACE AG SERPL QL IA: NONREACTIVE
HCT VFR BLD AUTO: 40.8 % (ref 35–47)
HCV AB SERPL QL IA: NONREACTIVE
HGB BLD-MCNC: 13.4 G/DL (ref 11.7–15.7)
INR PPP: 1.06 (ref 0.85–1.15)
MCH RBC QN AUTO: 29.9 PG (ref 26.5–33)
MCHC RBC AUTO-ENTMCNC: 32.8 G/DL (ref 31.5–36.5)
MCV RBC AUTO: 91 FL (ref 78–100)
PLATELET # BLD AUTO: 238 10E3/UL (ref 150–450)
POTASSIUM SERPL-SCNC: 4.1 MMOL/L (ref 3.4–5.3)
PROT SERPL-MCNC: 7.4 G/DL (ref 6.4–8.3)
RBC # BLD AUTO: 4.48 10E6/UL (ref 3.8–5.2)
SODIUM SERPL-SCNC: 134 MMOL/L (ref 136–145)
WBC # BLD AUTO: 4.7 10E3/UL (ref 4–11)

## 2023-06-02 PROCEDURE — 82550 ASSAY OF CK (CPK): CPT | Performed by: PATHOLOGY

## 2023-06-02 PROCEDURE — 86803 HEPATITIS C AB TEST: CPT | Performed by: PATHOLOGY

## 2023-06-02 PROCEDURE — 91200 LIVER ELASTOGRAPHY: CPT | Mod: 26 | Performed by: PHYSICIAN ASSISTANT

## 2023-06-02 PROCEDURE — 99204 OFFICE O/P NEW MOD 45 MIN: CPT | Mod: 25 | Performed by: PHYSICIAN ASSISTANT

## 2023-06-02 PROCEDURE — 80053 COMPREHEN METABOLIC PANEL: CPT | Performed by: PATHOLOGY

## 2023-06-02 PROCEDURE — 85027 COMPLETE CBC AUTOMATED: CPT | Performed by: PATHOLOGY

## 2023-06-02 PROCEDURE — 87340 HEPATITIS B SURFACE AG IA: CPT | Performed by: PATHOLOGY

## 2023-06-02 PROCEDURE — 86704 HEP B CORE ANTIBODY TOTAL: CPT | Performed by: PATHOLOGY

## 2023-06-02 PROCEDURE — 86706 HEP B SURFACE ANTIBODY: CPT | Performed by: PATHOLOGY

## 2023-06-02 PROCEDURE — G0463 HOSPITAL OUTPT CLINIC VISIT: HCPCS | Performed by: PHYSICIAN ASSISTANT

## 2023-06-02 PROCEDURE — 82248 BILIRUBIN DIRECT: CPT | Performed by: PATHOLOGY

## 2023-06-02 PROCEDURE — 36415 COLL VENOUS BLD VENIPUNCTURE: CPT | Performed by: PATHOLOGY

## 2023-06-02 PROCEDURE — 85610 PROTHROMBIN TIME: CPT | Performed by: PATHOLOGY

## 2023-06-02 NOTE — LETTER
6/2/2023         RE: Mely Guerra  [address] [place] [address]        Dear Colleague,    Thank you for referring your patient, Mely Guerra, to the [facility] [place]. Please see a copy of my visit note below.    Hepatology Clinic note  Mely Guerra   Date of Birth 1940  Date of Service 6/2/2023    REASON FOR CONSULTATION: Elevated LFT's   REFERRING PROVIDER: Tiffany Messer MD          Assessment/plan:   Mely Guerra is a 83 year old female with history of mildly elevated AST first noted in 2017. Her AST is primarily elevated and it has been very stable. Normal liver function without stigmata of advanced liver disease.      She has a few risk factors for fatty liver disease includes: FLAVIA, HTN, hypothyroidism, HLD. Will also evaluate for other causes of chronic liver disease. She is very physically active and fit. No alcohol for 50 years. FibroScan today showing Stage 0, 4.4 kilopascals and Grade 0 steatosis which is very reassuring.      # Will also rule out genetic and autoimmune causes of hepatobiliary disease.   - Labs: alpha-1-antitrypsin deficiency, ALESHIA, F-actin, TTG, CK   - US abdomen limited in the near future to evaluate hepatobiliary imaging   - Differential diagnosis also includes Macro-AST, which is benign, although do not feel that extensive work-up is indicated at this time    - We discussed the pathophysiology and natural history of nonalcoholic fatty liver disease.  - Recommend weight maintenance   - Maintain good control of cholesterol and optimize blood glucose as needed   - Continue limiting carbohydrates, especially simple carbohydrates, and following Mediterranean eating patten  - Continue physical activity    - Follow up with Hepatology as needed if transaminases trend up       Moises Barksdale PA-C   [facility]     Total time for E/M services performed on the date of the encounter 45 minutes; excluding  performing and official interpretation of fibrosis scan reads.  This included review of previous: clinic visits, hospital records, lab results, imaging studies, and procedural documentation. Time also includes patient visit, documentation and discussion with other providers.  The findings from this review are summarized in the above note.     -----------------------------------------------------       HPI:   Mely Guerra is a 83 year old female  presenting for the evaluation of elevated LFT's.     Per chart review, LFT's were first noticed to be mildly elevated dating back to     Appetite is good. Tried to avodi a lot of processed. Eats a lot of fruits.     Weight is stable. Avid Bike rider, Does 15 miles at least once a week plus several other short rides throughout the week.. Does hiking in the winter    Patient denies jaundice, lower extremity edema, abdominal distension or confusion.      Patient also denies melena, hematochezia or hematemesis.    Patient denies weight loss, fevers, sweats or chills. .     PMH:    has a past medical history of Allergic rhinitis, cause unspecified, Arthritis, Personal history of alcoholism (H), Sleep apnea, SVT (supraventricular tachycardia) (H), Unspecified essential hypertension, and Unspecified hypothyroidism.     SMH:    has a past surgical history that includes NONSPECIFIC PROCEDURE (1974); orthopedic surgery; knee replacement (Bilateral); joint replacement (Bilateral); Hysterectomy; Colonoscopy (N/A, 10/31/2018); Esophagoscopy, gastroscopy, duodenoscopy (EGD), combined (N/A, 10/31/2018); Ablation Supraventricular Tachycardia (N/A, 10/8/2019); Ablation Supraventricular Tachycardia (N/A, 6/10/2020); and GYN surgery.     Medications:   AeroChamber MV Misc  albuterol  amLODIPine  Calcium Citrate-Vitamin D Tabs  diphenhydrAMINE-acetaminophen  Eye Vitamins & Minerals Tabs  fluticasone  ibuprofen  IBUPROFEN-DIPHENHYDRAMINE HCL  PO  levothyroxine  loratadine  losartan  MULTIVITAMIN ADULT PO  triamterene-HCTZ  Turmeric Caps     No previous tobacco use. History of heavy alcohol [age] . Sober since that time. No previous IV/IN drug use.  Retired, last career was in [employer]. Patient currently lives with . No known family history of liver disease or liver cancer.    Previous work-up:   Lab Results   Component Value Date    TSH 2.16 03/14/2023    CHOL 257 (H) 03/14/2023     03/14/2023     (H) 03/14/2023    TRIG 46 03/14/2023      No results found for: SPECDES, LDRESULTS, LDINTERP, LDMETHOD, LDCOMMENTS, LDDISCLAIMER  Ferritin: 106     Recent Labs   Lab Test 03/14/23  1218 01/14/22  1053 07/08/21  0808 12/15/20  0943 10/12/18  1103 03/24/17  0954   ALKPHOS 61 58 56 59 64 56   ALT 21 28 29 24 32 23   AST 57* 54* 52* 56* 55* 44            Allergies:     Allergies   Allergen Reactions     Cats      Hylan G-F 20      Lisinopril Cough     Seasonal Allergies      Vioxx             Social History:     Social History     Socioeconomic History     Marital status:      Spouse name: Not on file     Number of children: Not on file     Years of education: Not on file     Highest education level: Not on file   Occupational History     Not on file   Tobacco Use     Smoking status: Never     Smokeless tobacco: Never   Vaping Use     Vaping status: Never Used   Substance and Sexual Activity     Alcohol use: No     Alcohol/week: 0.0 standard drinks of alcohol     Drug use: No     Sexual activity: Yes     Partners: Male   Other Topics Concern      Service No     Blood Transfusions No     Caffeine Concern No     Occupational Exposure No     Hobby Hazards No     Sleep Concern Yes     Stress Concern No     Weight Concern No     Special Diet No     Back Care Yes     Exercise No     Bike Helmet Yes     Seat Belt Yes     Self-Exams Yes     Parent/sibling w/ CABG, MI or angioplasty before 65F 55M? Yes   Social History Narrative    Social  Documentation:4/10        Balanced Diet: YES    Calcium intake: supplement per day    Caffeine: 4cups per day    Exercise:  type of activity varies;  3 times per week    Sunscreen: Yes    Seatbelts:  Yes    Self Breast Exam:  Yes    Self Testicular Exam: No - na    Physical/Emotional/Sexual Abuse: No -     Do you feel safe in your environment? Yes        Cholesterol screen up to date: yes    Eye Exam up to date: Yes    Dental Exam up to date: Yes    Pap smear up to date: Does Not Apply    Mammogram up to date: utd    Dexa Scan up to date: Yes    Colonoscopy up to date: Yes    Immunizations up to date: Yes    Glucose screen if over 40:  No -     Fredy Carroll ma             Social Determinants of Health     Financial Resource Strain: Not on file   Food Insecurity: Not on file   Transportation Needs: Not on file   Physical Activity: Not on file   Stress: Not on file   Social Connections: Not on file   Intimate Partner Violence: Not on file   Housing Stability: Not on file            Family History:     Family History   Problem Relation Age of Onset     Cancer Sister      Heart Disease Father      Hypertension Father      Thyroid Disease Father      Other Cancer Other          age 65     Other Cancer Other          age 56     Other Cancer Niece          age 53     Other Cancer Sister          age 51            Review of Systems:   Gen: See HPI     HEENT: No change in vision or hearing, mouth sores, dysphagia, lymph nodes  Resp: No shortness of breath, coughing, hx of asthma  CV: No chest pain, palpitations, syncope   GI: See HPI  : No dysuria, history of stones, urine color    Skin: No rash; no pruritus or psoriasis  MS: No arthralgias, myalgias, joint swelling  Neuro: No memory changes, confusion, numbness    Heme: No difficulty clotting, bruising, bleeding  Psych:  No anxiety, depression, agitation          Physical Exam:   /69   Pulse 70   Temp 98.1  F (36.7  C) (Oral)   SpO2 98%      Gen: A&Ox3, NAD, well developed  HEENT: non-icteric   CV: RRR, no overt murmurs  Lung: CTA Bilatererally, no wheezing or crackles.   Lym- no palpable lymphadenopathy  Abd: soft, NT, ND, no palpable splenomegaly, liver is not palpable.   Ext: no edema, intact pulses.   Skin: No rash,  no palmar erythema, telangiectasias or jaundice  Neuro: grossly intact, no asterixis   Psych: appropriate mood and affects         Data:   Reviewed in person and significant for:    Lab Results   Component Value Date     03/14/2023     12/15/2020      Lab Results   Component Value Date    POTASSIUM 4.5 03/14/2023    POTASSIUM 4.7 01/14/2022    POTASSIUM 4.3 12/15/2020     Lab Results   Component Value Date    CHLORIDE 95 03/14/2023    CHLORIDE 98 01/14/2022    CHLORIDE 96 12/15/2020     Lab Results   Component Value Date    CO2 24 03/14/2023    CO2 26 01/14/2022    CO2 26 12/15/2020     Lab Results   Component Value Date    BUN 9.4 03/14/2023    BUN 11 01/14/2022    BUN 12 12/15/2020     Lab Results   Component Value Date    CR 0.68 03/14/2023    CR 0.68 12/15/2020       Lab Results   Component Value Date    WBC 4.7 06/02/2023    WBC 6.4 06/10/2020     Lab Results   Component Value Date    HGB 13.4 06/02/2023    HGB 12.7 06/10/2020     Lab Results   Component Value Date    HCT 40.8 06/02/2023    HCT 38.4 06/10/2020     Lab Results   Component Value Date    MCV 91 06/02/2023    MCV 92 06/10/2020     Lab Results   Component Value Date     06/02/2023     06/10/2020       Lab Results   Component Value Date    AST 57 03/14/2023    AST 52 07/08/2021     Lab Results   Component Value Date    ALT 21 03/14/2023    ALT 29 07/08/2021     No results found for: BILICONJ   Lab Results   Component Value Date    BILITOTAL 0.9 03/14/2023    BILITOTAL 0.9 07/08/2021       Lab Results   Component Value Date    ALBUMIN 4.9 03/14/2023    ALBUMIN 4.2 01/14/2022    ALBUMIN 4.0 07/08/2021     Lab Results   Component Value Date     PROTTOTAL 8.0 03/14/2023    PROTTOTAL 7.2 07/08/2021      Lab Results   Component Value Date    ALKPHOS 61 03/14/2023    ALKPHOS 56 07/08/2021       Lab Results   Component Value Date    INR 1.7 02/12/2013         Imaging:      CT ENTEROGRAPHY WITH CONTRAST   11/23/2018 9:19 AM      HISTORY: Crohn's disease.      TECHNIQUE: CT enterography protocol was performed. 1200 mL Breeza  negative oral contrast was administered prior to the exam. 64 mL  Isovue 370 IV was also administered. After contrast administration,  volumetric helical sections were acquired from the lung bases to the  ischial tuberosities. Coronal images were also reconstructed.  Radiation dose for this scan was reduced using automated exposure  control, adjustment of the mA and/or kV according to patient size, or  iterative reconstruction technique.     COMPARISON: None.      FINDINGS: Multiple images through the pelvis are degraded by artifact  related to bilateral hip arthroplasties. No abnormal loops of small  bowel are identified. No small bowel wall thickening or mucosal  hyperenhancement is identified. No evidence for abscess or fistula.  The appendix is partially seen, and is unremarkable where visualized.  There is a moderate amount of stool in the ascending and transverse  colon. No convincing evidence for colitis or diverticulitis. No free  fluid is identified in the pelvis.     Mild atherosclerotic aortoiliac calcification. The liver, gallbladder,  spleen, adrenal glands, pancreas, and kidneys are unremarkable. No  hydronephrosis. Mild scattered scarring and/or atelectasis at both  lung bases. The visualized lung bases are otherwise clear.   Degenerative changes are noted throughout the visualized thoracolumbar  spine. Lumbar curve, convex left.                                                                        IMPRESSION:   1. No small bowel abnormalities are identified. No convincing evidence  for active Crohn's inflammation.  2.  Moderate amount of stool in the ascending and transverse colon.      Again, thank you for allowing me to participate in the care of your patient.        Sincerely,        Moises Barksdale PA-C

## 2023-06-02 NOTE — PROGRESS NOTES
Hepatology Clinic note  Mely Guerra   Date of Birth 1940  Date of Service 6/2/2023    REASON FOR CONSULTATION: Elevated LFT's   REFERRING PROVIDER: Tiffany Messer MD          Assessment/plan:   Mely Guerra is a 83 year old female with history of mildly elevated AST first noted in 2017. Her AST is primarily elevated and it has been very stable. Normal liver function without stigmata of advanced liver disease.      She has a few risk factors for fatty liver disease includes: FLAVIA, HTN, hypothyroidism, HLD. Will also evaluate for other causes of chronic liver disease. She is very physically active and fit. No alcohol for 50 years. FibroScan today showing Stage 0, 4.4 kilopascals and Grade 0 steatosis which is very reassuring.      # Will also rule out genetic and autoimmune causes of hepatobiliary disease.   - Labs: alpha-1-antitrypsin deficiency, ALESHIA, F-actin, TTG, CK   - US abdomen limited in the near future to evaluate hepatobiliary imaging   - Differential diagnosis also includes Macro-AST, which is benign, although do not feel that extensive work-up is indicated at this time    - We discussed the pathophysiology and natural history of nonalcoholic fatty liver disease.  - Recommend weight maintenance   - Maintain good control of cholesterol and optimize blood glucose as needed   - Continue limiting carbohydrates, especially simple carbohydrates, and following Mediterranean eating patten  - Continue physical activity    - Follow up with Hepatology as needed if transaminases trend up       Moises Barksdale PA-C   [facility]     Total time for E/M services performed on the date of the encounter 45 minutes; excluding performing and official interpretation of fibrosis scan reads.  This included review of previous: clinic visits, hospital records, lab results, imaging studies, and procedural documentation. Time also includes patient visit, documentation and discussion with other providers.   The findings from this review are summarized in the above note.     -----------------------------------------------------       HPI:   Mely Guerra is a 83 year old female  presenting for the evaluation of elevated LFT's.     Per chart review, LFT's were first noticed to be mildly elevated dating back to     Appetite is good. Tried to avodi a lot of processed. Eats a lot of fruits.     Weight is stable. Avid Bike rider, Does 15 miles at least once a week plus several other short rides throughout the week.. Does hiking in the winter    Patient denies jaundice, lower extremity edema, abdominal distension or confusion.      Patient also denies melena, hematochezia or hematemesis.    Patient denies weight loss, fevers, sweats or chills. .     PMH:    has a past medical history of Allergic rhinitis, cause unspecified, Arthritis, Personal history of alcoholism (H), Sleep apnea, SVT (supraventricular tachycardia) (H), Unspecified essential hypertension, and Unspecified hypothyroidism.     SMH:    has a past surgical history that includes NONSPECIFIC PROCEDURE (1974); orthopedic surgery; knee replacement (Bilateral); joint replacement (Bilateral); Hysterectomy; Colonoscopy (N/A, 10/31/2018); Esophagoscopy, gastroscopy, duodenoscopy (EGD), combined (N/A, 10/31/2018); Ablation Supraventricular Tachycardia (N/A, 10/8/2019); Ablation Supraventricular Tachycardia (N/A, 6/10/2020); and GYN surgery.     Medications:   AeroChamber MV Misc  albuterol  amLODIPine  Calcium Citrate-Vitamin D Tabs  diphenhydrAMINE-acetaminophen  Eye Vitamins & Minerals Tabs  fluticasone  ibuprofen  IBUPROFEN-DIPHENHYDRAMINE HCL PO  levothyroxine  loratadine  losartan  MULTIVITAMIN ADULT PO  triamterene-HCTZ  Turmeric Caps     No previous tobacco use. History of heavy alcohol [age] . Sober since that time. No previous IV/IN drug use.  Retired, last career was in [employer]. Patient currently lives with . No known family history of liver disease  or liver cancer.    Previous work-up:   Lab Results   Component Value Date    TSH 2.16 03/14/2023    CHOL 257 (H) 03/14/2023     03/14/2023     (H) 03/14/2023    TRIG 46 03/14/2023      No results found for: SPECDES, LDRESULTS, LDINTERP, LDMETHOD, LDCOMMENTS, LDDISCLAIMER  Ferritin: 106     Recent Labs   Lab Test 03/14/23  1218 01/14/22  1053 07/08/21  0808 12/15/20  0943 10/12/18  1103 03/24/17  0954   ALKPHOS 61 58 56 59 64 56   ALT 21 28 29 24 32 23   AST 57* 54* 52* 56* 55* 44            Allergies:     Allergies   Allergen Reactions     Cats      Hylan G-F 20      Lisinopril Cough     Seasonal Allergies      Vioxx             Social History:     Social History     Socioeconomic History     Marital status:      Spouse name: Not on file     Number of children: Not on file     Years of education: Not on file     Highest education level: Not on file   Occupational History     Not on file   Tobacco Use     Smoking status: Never     Smokeless tobacco: Never   Vaping Use     Vaping status: Never Used   Substance and Sexual Activity     Alcohol use: No     Alcohol/week: 0.0 standard drinks of alcohol     Drug use: No     Sexual activity: Yes     Partners: Male   Other Topics Concern      Service No     Blood Transfusions No     Caffeine Concern No     Occupational Exposure No     Hobby Hazards No     Sleep Concern Yes     Stress Concern No     Weight Concern No     Special Diet No     Back Care Yes     Exercise No     Bike Helmet Yes     Seat Belt Yes     Self-Exams Yes     Parent/sibling w/ CABG, MI or angioplasty before 65F 55M? Yes   Social History Narrative    Social Documentation:4/10        Balanced Diet: YES    Calcium intake: supplement per day    Caffeine: 4cups per day    Exercise:  type of activity varies;  3 times per week    Sunscreen: Yes    Seatbelts:  Yes    Self Breast Exam:  Yes    Self Testicular Exam: No - na    Physical/Emotional/Sexual Abuse: No -     Do you feel safe  in your environment? Yes        Cholesterol screen up to date: yes    Eye Exam up to date: Yes    Dental Exam up to date: Yes    Pap smear up to date: Does Not Apply    Mammogram up to date: utd    Dexa Scan up to date: Yes    Colonoscopy up to date: Yes    Immunizations up to date: Yes    Glucose screen if over 40:  No -     Fredy Carroll ma             Social Determinants of Health     Financial Resource Strain: Not on file   Food Insecurity: Not on file   Transportation Needs: Not on file   Physical Activity: Not on file   Stress: Not on file   Social Connections: Not on file   Intimate Partner Violence: Not on file   Housing Stability: Not on file            Family History:     Family History   Problem Relation Age of Onset     Cancer Sister      Heart Disease Father      Hypertension Father      Thyroid Disease Father      Other Cancer Other          age 65     Other Cancer Other          age 56     Other Cancer Niece          age 53     Other Cancer Sister          age 51            Review of Systems:   Gen: See HPI     HEENT: No change in vision or hearing, mouth sores, dysphagia, lymph nodes  Resp: No shortness of breath, coughing, hx of asthma  CV: No chest pain, palpitations, syncope   GI: See HPI  : No dysuria, history of stones, urine color    Skin: No rash; no pruritus or psoriasis  MS: No arthralgias, myalgias, joint swelling  Neuro: No memory changes, confusion, numbness    Heme: No difficulty clotting, bruising, bleeding  Psych:  No anxiety, depression, agitation          Physical Exam:   /69   Pulse 70   Temp 98.1  F (36.7  C) (Oral)   SpO2 98%     Gen: A&Ox3, NAD, well developed  HEENT: non-icteric   CV: RRR, no overt murmurs  Lung: CTA Bilatererally, no wheezing or crackles.   Lym- no palpable lymphadenopathy  Abd: soft, NT, ND, no palpable splenomegaly, liver is not palpable.   Ext: no edema, intact pulses.   Skin: No rash,  no palmar erythema, telangiectasias or  jaundice  Neuro: grossly intact, no asterixis   Psych: appropriate mood and affects         Data:   Reviewed in person and significant for:    Lab Results   Component Value Date     03/14/2023     12/15/2020      Lab Results   Component Value Date    POTASSIUM 4.5 03/14/2023    POTASSIUM 4.7 01/14/2022    POTASSIUM 4.3 12/15/2020     Lab Results   Component Value Date    CHLORIDE 95 03/14/2023    CHLORIDE 98 01/14/2022    CHLORIDE 96 12/15/2020     Lab Results   Component Value Date    CO2 24 03/14/2023    CO2 26 01/14/2022    CO2 26 12/15/2020     Lab Results   Component Value Date    BUN 9.4 03/14/2023    BUN 11 01/14/2022    BUN 12 12/15/2020     Lab Results   Component Value Date    CR 0.68 03/14/2023    CR 0.68 12/15/2020       Lab Results   Component Value Date    WBC 4.7 06/02/2023    WBC 6.4 06/10/2020     Lab Results   Component Value Date    HGB 13.4 06/02/2023    HGB 12.7 06/10/2020     Lab Results   Component Value Date    HCT 40.8 06/02/2023    HCT 38.4 06/10/2020     Lab Results   Component Value Date    MCV 91 06/02/2023    MCV 92 06/10/2020     Lab Results   Component Value Date     06/02/2023     06/10/2020       Lab Results   Component Value Date    AST 57 03/14/2023    AST 52 07/08/2021     Lab Results   Component Value Date    ALT 21 03/14/2023    ALT 29 07/08/2021     No results found for: BILICONJ   Lab Results   Component Value Date    BILITOTAL 0.9 03/14/2023    BILITOTAL 0.9 07/08/2021       Lab Results   Component Value Date    ALBUMIN 4.9 03/14/2023    ALBUMIN 4.2 01/14/2022    ALBUMIN 4.0 07/08/2021     Lab Results   Component Value Date    PROTTOTAL 8.0 03/14/2023    PROTTOTAL 7.2 07/08/2021      Lab Results   Component Value Date    ALKPHOS 61 03/14/2023    ALKPHOS 56 07/08/2021       Lab Results   Component Value Date    INR 1.7 02/12/2013         Imaging:      CT ENTEROGRAPHY WITH CONTRAST   11/23/2018 9:19 AM      HISTORY: Crohn's disease.      TECHNIQUE: CT  enterography protocol was performed. 1200 mL Breeza  negative oral contrast was administered prior to the exam. 64 mL  Isovue 370 IV was also administered. After contrast administration,  volumetric helical sections were acquired from the lung bases to the  ischial tuberosities. Coronal images were also reconstructed.  Radiation dose for this scan was reduced using automated exposure  control, adjustment of the mA and/or kV according to patient size, or  iterative reconstruction technique.     COMPARISON: None.      FINDINGS: Multiple images through the pelvis are degraded by artifact  related to bilateral hip arthroplasties. No abnormal loops of small  bowel are identified. No small bowel wall thickening or mucosal  hyperenhancement is identified. No evidence for abscess or fistula.  The appendix is partially seen, and is unremarkable where visualized.  There is a moderate amount of stool in the ascending and transverse  colon. No convincing evidence for colitis or diverticulitis. No free  fluid is identified in the pelvis.     Mild atherosclerotic aortoiliac calcification. The liver, gallbladder,  spleen, adrenal glands, pancreas, and kidneys are unremarkable. No  hydronephrosis. Mild scattered scarring and/or atelectasis at both  lung bases. The visualized lung bases are otherwise clear.   Degenerative changes are noted throughout the visualized thoracolumbar  spine. Lumbar curve, convex left.                                                                        IMPRESSION:   1. No small bowel abnormalities are identified. No convincing evidence  for active Crohn's inflammation.  2. Moderate amount of stool in the ascending and transverse colon.

## 2023-06-15 ENCOUNTER — ANCILLARY PROCEDURE (OUTPATIENT)
Dept: ULTRASOUND IMAGING | Facility: CLINIC | Age: 83
End: 2023-06-15
Attending: PHYSICIAN ASSISTANT
Payer: COMMERCIAL

## 2023-06-15 DIAGNOSIS — R79.89 ELEVATED LFTS: ICD-10-CM

## 2023-06-15 PROCEDURE — 76705 ECHO EXAM OF ABDOMEN: CPT | Performed by: RADIOLOGY

## 2023-08-30 DIAGNOSIS — I10 ESSENTIAL HYPERTENSION WITH GOAL BLOOD PRESSURE LESS THAN 140/90: ICD-10-CM

## 2023-08-30 RX ORDER — TRIAMTERENE AND HYDROCHLOROTHIAZIDE 37.5; 25 MG/1; MG/1
1 CAPSULE ORAL DAILY
Qty: 90 CAPSULE | Refills: 1 | Status: SHIPPED | OUTPATIENT
Start: 2023-08-30 | End: 2023-09-26

## 2023-08-30 NOTE — TELEPHONE ENCOUNTER
"Requested Prescriptions   Pending Prescriptions Disp Refills    triamterene-HCTZ (DYAZIDE) 37.5-25 MG capsule 90 capsule 1     Sig: Take 1 capsule by mouth daily       Diuretics (Including Combos) Protocol Failed - 8/30/2023 12:16 PM        Failed - Normal serum sodium on file in past 12 months     Recent Labs   Lab Test 06/02/23  0939   *              Passed - Blood pressure under 140/90 in past 12 months     BP Readings from Last 3 Encounters:   06/02/23 134/69   03/14/23 118/80   12/08/22 118/52                 Passed - Recent (12 mo) or future (30 days) visit within the authorizing provider's specialty     Patient has had an office visit with the authorizing provider or a provider within the authorizing providers department within the previous 12 mos or has a future within next 30 days. See \"Patient Info\" tab in inbasket, or \"Choose Columns\" in Meds & Orders section of the refill encounter.              Passed - Medication is active on med list        Passed - Patient is age 18 or older        Passed - No active pregancy on record        Passed - Normal serum creatinine on file in past 12 months     Recent Labs   Lab Test 06/02/23  0939   CR 0.71              Passed - Normal serum potassium on file in past 12 months     Recent Labs   Lab Test 06/02/23  0939   POTASSIUM 4.1                    Passed - No positive pregnancy test in past 12 months           Routing refill request to provider for review/approval because:  Drug not on the [facility] refill protocol   Slava Quiroz RN  [facility]         "

## 2023-09-21 ENCOUNTER — TELEPHONE (OUTPATIENT)
Dept: FAMILY MEDICINE | Facility: CLINIC | Age: 83
End: 2023-09-21
Payer: COMMERCIAL

## 2023-09-21 DIAGNOSIS — I10 ESSENTIAL HYPERTENSION WITH GOAL BLOOD PRESSURE LESS THAN 140/90: ICD-10-CM

## 2023-09-21 NOTE — TELEPHONE ENCOUNTER
General Call    Contacts         Type Contact Phone/Fax    09/21/2023 03:02 PM CDT Phone (Incoming) Mely Guerra (Self) [phone] (H)          Reason for Call:  prescription issue  t  What are your questions or concerns:  a refill of triamterene-HCTZ (DYAZIDE) 37.5-25 MG capsule  was sent on 8/30 for 90 days with one refill.  Per patient, Natasha told her she has no refills.    Patient has some pills left right now, but is hoping we can help getting this resolved.     Date of last appointment with provider: 3/14/23, for Medicare wellness visit.     Could we send this information to you in [employer] or would you prefer to receive a phone call?:   Patient would like to be contacted via [employer]

## 2023-09-21 NOTE — TELEPHONE ENCOUNTER
Left voicemail with pharmacy requesting they fill the medication  E-receipt confirmed by pharmacy  Pharmacy to call back if needing to troubleshoot   Joy ARNOLD RN

## 2023-09-26 RX ORDER — TRIAMTERENE AND HYDROCHLOROTHIAZIDE 37.5; 25 MG/1; MG/1
1 CAPSULE ORAL DAILY
Qty: 90 CAPSULE | Refills: 1 | Status: SHIPPED | OUTPATIENT
Start: 2023-09-26 | End: 2024-01-09

## 2023-09-26 NOTE — TELEPHONE ENCOUNTER
RN spoke with patient.  No update from pharmacy.  Resent prescription- advised patient to call back with further issues.  Chandni WALTON RN

## 2023-09-28 ENCOUNTER — TELEPHONE (OUTPATIENT)
Dept: FAMILY MEDICINE | Facility: CLINIC | Age: 83
End: 2023-09-28
Payer: COMMERCIAL

## 2023-09-28 NOTE — TELEPHONE ENCOUNTER
Patient called  Few days ago was moving metal chair and nicked her left shin.  Didn't  realize this happened until she was changing pants she was wearing ans saw the blood.    First said it was a wound then said it's a pea size scab    Washed with warm soapy water.  Abx ointment.  States scratch was not healing right so she stop putting ointment on.    Slight redness around scab.  Denies pain  Would like this looked at    Schedule with CW tomorrow  Nayana Alcaraz RN

## 2023-09-29 ENCOUNTER — OFFICE VISIT (OUTPATIENT)
Dept: FAMILY MEDICINE | Facility: CLINIC | Age: 83
End: 2023-09-29
Payer: COMMERCIAL

## 2023-09-29 VITALS
HEIGHT: 63 IN | RESPIRATION RATE: 22 BRPM | TEMPERATURE: 97 F | HEART RATE: 60 BPM | BODY MASS INDEX: 24.03 KG/M2 | SYSTOLIC BLOOD PRESSURE: 122 MMHG | OXYGEN SATURATION: 97 % | DIASTOLIC BLOOD PRESSURE: 62 MMHG

## 2023-09-29 DIAGNOSIS — T14.8XXA NONHEALING NONSURGICAL WOUND: Primary | ICD-10-CM

## 2023-09-29 DIAGNOSIS — H61.23 EXCESSIVE CERUMEN IN BOTH EAR CANALS: ICD-10-CM

## 2023-09-29 DIAGNOSIS — L02.92 RECURRENT BOILS: ICD-10-CM

## 2023-09-29 DIAGNOSIS — M15.1 HEBERDEN NODES: ICD-10-CM

## 2023-09-29 DIAGNOSIS — Z23 COVID-19 VACCINE ADMINISTERED: ICD-10-CM

## 2023-09-29 PROCEDURE — 99213 OFFICE O/P EST LOW 20 MIN: CPT | Mod: 25 | Performed by: FAMILY MEDICINE

## 2023-09-29 PROCEDURE — 90480 ADMN SARSCOV2 VAC 1/ONLY CMP: CPT | Performed by: FAMILY MEDICINE

## 2023-09-29 PROCEDURE — 91320 SARSCV2 VAC 30MCG TRS-SUC IM: CPT | Performed by: FAMILY MEDICINE

## 2023-09-29 PROCEDURE — 69209 REMOVE IMPACTED EAR WAX UNI: CPT | Mod: 50 | Performed by: FAMILY MEDICINE

## 2023-09-29 ASSESSMENT — PAIN SCALES - GENERAL: PAINLEVEL: NO PAIN (0)

## 2023-09-29 NOTE — NURSING NOTE
Prior to immunization administration, verified patients identity using patient s name and date of birth. Please see Immunization Activity for additional information.     Screening Questionnaire for Adult Immunization    Are you sick today?   No   Do you have allergies to medications, food, a vaccine component or latex?   No   Have you ever had a serious reaction after receiving a vaccination?   No   Do you have a long-term health problem with heart, lung, kidney, or metabolic disease (e.g., diabetes), asthma, a blood disorder, no spleen, complement component deficiency, a cochlear implant, or a spinal fluid leak?  Are you on long-term aspirin therapy?   No   Do you have cancer, leukemia, HIV/AIDS, or any other immune system problem?   No   Do you have a parent, brother, or sister with an immune system problem?   No   In the past 3 months, have you taken medications that affect  your immune system, such as prednisone, other steroids, or anticancer drugs; drugs for the treatment of rheumatoid arthritis, Crohn s disease, or psoriasis; or have you had radiation treatments?   No   Have you had a seizure, or a brain or other nervous system problem?   No   During the past year, have you received a transfusion of blood or blood    products, or been given immune (gamma) globulin or antiviral drug?   No   For women: Are you pregnant or is there a chance you could become       pregnant during the next month?   No   Have you received any vaccinations in the past 4 weeks?   No     Immunization questionnaire answers were all negative.      Patient instructed to remain in clinic for 15 minutes afterwards, and to report any adverse reactions.     Screening performed by Sav Marks MA on 9/29/2023 at 2:42 PM.

## 2023-09-29 NOTE — PROGRESS NOTES
Assessment & Plan       ICD-10-CM    1. Nonhealing nonsurgical wound  T14.8XXA       2. Excessive cerumen in both ear canals  H61.23 REMOVE IMPACTED CERUMEN      3. Recurrent boils  L02.93       4. Heberden nodes  M15.1       5. COVID-19 vaccine administered  Z23 COVID-19 12+ (2023-24) (PFIZER)     DISCONTINUED: COVID-19 mRNA vaccine 12+y (PFIZER) injection 30 mcg           Wounds- very slow healing scabs from injuries to lower legs.  No signs of infection today, but claudio line around left lower leg and will watch- no warmth or concerning redness today.  Discussed anticipating very slow resolution of wounds due to age and distal location.    Cerumen- bilateral, has hearing aids, ear wash completed today by team.    Boils- during biking season, gets occasional boil in groin area.  Rec prn use of hibiclens, and warm packs if they occur, to clinic for I&D if needed.    Heberden nodes- on all fingers, minimal pain, strong fam h/o hand OA.  Discussed conservative care, follow-up with Dr. Sanchez (hand ortho) if MCP joint pain worsens.    COVID vaccine- Risks/benefits discussed, given today.     Rachel Mansfield MD  [facility]   Mely is a 83 year old, presenting for the following health issues:  Wound Check (WOUND NOT HEALING)      9/29/2023     1:44 PM   Additional Questions   Roomed by MUNDO QUINTERO   Accompanied by JOB         9/29/2023     1:44 PM   Patient Reported Additional Medications   Patient reports taking the following new medications N/A       Wound Check    History of Present Illness       Reason for visit:  Follow up  Symptom onset:  3-4 weeks ago  Symptom intensity:  Moderate  Symptom progression:  Worsening  Had these symptoms before:  Yes  Has tried/received treatment for these symptoms:  No  What makes it worse:  No  What makes it better:  No    She eats 2-3 servings of fruits and vegetables daily.She consumes 0 sweetened beverage(s) daily.She exercises with  "enough effort to increase her heart rate 30 to 60 minutes per day.  She exercises with enough effort to increase her heart rate 6 days per week.   She is taking medications regularly.     Wants to check if wounds are okay, not healing, bit worsening redness the last few days, streaking down leg from left lower leg wound yesterday, better but not gone today.    Also questions about vaccines, and notes ear wax bilaterally.  Also has questions about nodules on hands, strong fam h/o OA in hands.  Minimal pain in her hands, no morning stiffness.    Review of Systems   Constitutional, HEENT, cardiovascular, pulmonary, gi and gu systems are negative, except as otherwise noted.      Objective    /62 (BP Location: Left arm, Patient Position: Sitting, Cuff Size: Adult Regular)   Pulse 60   Temp 97  F (36.1  C) (Temporal)   Resp 22   Ht 1.605 m (5' 3.2\")   SpO2 97%   BMI 24.03 kg/m    Body mass index is 24.03 kg/m .  Physical Exam   GENERAL: healthy, alert and no distress  EYES: Eyes grossly normal to inspection, PERRL and conjunctivae and sclerae normal  HENT: normal cephalic/atraumatic, both ears: occluded with wax, nose and mouth without ulcers or lesions, oropharynx clear, and oral mucous membranes moist  NECK: no adenopathy, no asymmetry, masses, or scars and thyroid normal to palpation  RESP: lungs clear to auscultation - no rales, rhonchi or wheezes  CV: regular rate and rhythm, normal S1 S2, no S3 or S4, no murmur, click or rub, no peripheral edema and peripheral pulses strong  MS: no gross musculoskeletal defects noted except extensive heberden's nodes on bilateral hands/fingers, no edema  SKIN: ~1cm scabbed wounds on bilateral lower legs, both surrounded by pink granulation tissue, mild erythema extending lower from scab on left side ~3cm, no warmth or bright red erythema, no discharge, extended area marked with sharpie.                "

## 2023-10-16 DIAGNOSIS — I10 ESSENTIAL HYPERTENSION WITH GOAL BLOOD PRESSURE LESS THAN 140/90: ICD-10-CM

## 2023-10-16 RX ORDER — AMLODIPINE BESYLATE 5 MG/1
5 TABLET ORAL DAILY
Qty: 90 TABLET | Refills: 0 | Status: SHIPPED | OUTPATIENT
Start: 2023-10-16 | End: 2024-01-04

## 2023-10-16 RX ORDER — LOSARTAN POTASSIUM 100 MG/1
100 TABLET ORAL DAILY
Qty: 90 TABLET | Refills: 1 | Status: SHIPPED | OUTPATIENT
Start: 2023-10-16 | End: 2024-01-09

## 2024-01-03 ENCOUNTER — MYC MEDICAL ADVICE (OUTPATIENT)
Dept: FAMILY MEDICINE | Facility: CLINIC | Age: 84
End: 2024-01-03
Payer: COMMERCIAL

## 2024-01-03 DIAGNOSIS — E03.9 ACQUIRED HYPOTHYROIDISM: ICD-10-CM

## 2024-01-03 DIAGNOSIS — I10 ESSENTIAL HYPERTENSION WITH GOAL BLOOD PRESSURE LESS THAN 140/90: ICD-10-CM

## 2024-01-04 RX ORDER — TRIAMTERENE AND HYDROCHLOROTHIAZIDE 37.5; 25 MG/1; MG/1
1 CAPSULE ORAL DAILY
Qty: 90 CAPSULE | Refills: 1 | OUTPATIENT
Start: 2024-01-04

## 2024-01-04 RX ORDER — LEVOTHYROXINE SODIUM 100 UG/1
TABLET ORAL
Qty: 90 TABLET | Refills: 1 | Status: SHIPPED | OUTPATIENT
Start: 2024-01-04 | End: 2024-03-17

## 2024-01-04 RX ORDER — AMLODIPINE BESYLATE 5 MG/1
5 TABLET ORAL DAILY
Qty: 90 TABLET | Refills: 0 | Status: SHIPPED | OUTPATIENT
Start: 2024-01-04 | End: 2024-03-17

## 2024-01-09 ENCOUNTER — OFFICE VISIT (OUTPATIENT)
Dept: PHARMACY | Facility: CLINIC | Age: 84
End: 2024-01-09
Payer: COMMERCIAL

## 2024-01-09 VITALS — DIASTOLIC BLOOD PRESSURE: 80 MMHG | SYSTOLIC BLOOD PRESSURE: 122 MMHG

## 2024-01-09 DIAGNOSIS — E03.9 ACQUIRED HYPOTHYROIDISM: ICD-10-CM

## 2024-01-09 DIAGNOSIS — I10 HYPERTENSION GOAL BP (BLOOD PRESSURE) < 140/90: Primary | ICD-10-CM

## 2024-01-09 DIAGNOSIS — M85.80 OSTEOPENIA, UNSPECIFIED LOCATION: ICD-10-CM

## 2024-01-09 DIAGNOSIS — Z78.9 TAKES DIETARY SUPPLEMENTS: ICD-10-CM

## 2024-01-09 DIAGNOSIS — J30.9 ALLERGIC RHINITIS, UNSPECIFIED SEASONALITY, UNSPECIFIED TRIGGER: ICD-10-CM

## 2024-01-09 DIAGNOSIS — G47.00 INSOMNIA, UNSPECIFIED TYPE: ICD-10-CM

## 2024-01-09 DIAGNOSIS — M15.0 PRIMARY OSTEOARTHRITIS INVOLVING MULTIPLE JOINTS: ICD-10-CM

## 2024-01-09 DIAGNOSIS — R05.9 COUGH: ICD-10-CM

## 2024-01-09 PROCEDURE — 99605 MTMS BY PHARM NP 15 MIN: CPT | Performed by: PHARMACIST

## 2024-01-09 PROCEDURE — 99607 MTMS BY PHARM ADDL 15 MIN: CPT | Performed by: PHARMACIST

## 2024-01-09 RX ORDER — CELECOXIB 200 MG/1
200 CAPSULE ORAL 2 TIMES DAILY
COMMUNITY

## 2024-01-09 RX ORDER — GUAIFENESIN 600 MG/1
1200 TABLET, EXTENDED RELEASE ORAL 2 TIMES DAILY
COMMUNITY

## 2024-01-09 NOTE — LETTER
"Recommended To-Do List      Prepared on: 01/10/2024       You can get the best results from your medications by completing the items on this \"To-Do List.\"      Bring your To-Do List when you go to your doctor. And, share it with your family or caregivers.    My To-Do List:  What we talked about: What I should do:   Albuterol makes you cough    Try using the albuterol before activity as a preventative of wheezing           What we talked about: What I should do:                     "

## 2024-01-09 NOTE — PATIENT INSTRUCTIONS
"Recommendations from today's MTM visit:                                                    Today we reviewed what your medicines are for, how to know if they are working, that your medicines are safe and how to make your medicine regimen as easy as possible.      Try using the albuterol before activity (like hiking or going out in the cold) as a preventative of wheezing   MTM will send Albuterol, Losartan, Triamterene-HCTZ to [facility]  If you become dizzy, confused, or have any unexplained agitation please stop the Tylenol PM and Doxylamine     Follow-up: One year    It was great speaking with you today.  I value your experience and would be very thankful for your time in providing feedback in our clinic survey. In the next few days, you may receive an email or text message from [employer] with a link to a survey related to your  clinical pharmacist.\"     To schedule another MTM appointment, please call the clinic directly or you may call the MTM scheduling line at [phone] or toll-free at [phone].     My Clinical Pharmacist's contact information:                                                      Please feel free to contact me with any questions or concerns you have.      Arin Crespo PharmD  Medication Therapy Management Resident, [facility]  Pager: [phone]  Email: [email or web address]    Mary Bustos, Pharm.D., M.B.A., [facility]  Medication Therapy Management Pharmacist, [facility]  Pager: [phone]  Email: [email or web address]     "

## 2024-01-09 NOTE — LETTER
_  Medication List        Prepared on: 01/10/2024     Bring your Medication List when you go to the doctor, hospital, or   emergency room. And, share it with your family or caregivers.     Note any changes to how you take your medications.  Cross out medications when you no longer use them.    Medication How I take it Why I use it Prescriber   ACETAMINOPHEN PO Take 500 mg by mouth every morning  Pain Patient Reported   albuterol (PROAIR HFA/PROVENTIL HFA/VENTOLIN HFA) 108 (90 Base) MCG/ACT inhaler Inhale 2 puffs into the lungs every 6 hours as needed for shortness of breath, wheezing or cough Cough Tiffany Messer MD   amLODIPine (NORVASC) 5 MG tablet Take 1 tablet (5 mg) by mouth daily Essential hypertension with goal blood pressure less than 140/90 Tiffany Messer MD   Calcium Citrate-Vitamin D 250-200 MG-UNIT TABS Take 1 tablet by mouth daily (with breakfast) Age-related osteoporosis without current pathological fracture Tiffany Messer MD   celecoxib (CELEBREX) 200 MG capsule Take 200 mg by mouth 2 times daily  Pain Patient Reported   doxylamine (UNISOM) 25 MG TABS tablet Take 25 mg by mouth at bedtime  Insomnia Patient Reported   fluticasone (FLONASE) 50 MCG/ACT nasal spray Spray 1 spray into both nostrils 2 times daily Seasonal allergic rhinitis due to pollen Tiffany Messer MD   guaiFENesin (MUCINEX) 600 MG 12 hr tablet Take 1,200 mg by mouth 2 times daily As needed  Congestion Patient Reported   levothyroxine (SYNTHROID/LEVOTHROID) 100 MCG tablet TAKE 1 TABLET BY MOUTH DAILY Acquired Hypothyroidism Tiffany Messer MD   loratadine (CLARITIN) 10 MG tablet Take 10 mg by mouth daily   Seasonal allergies Patient Reported   losartan (COZAAR) 100 MG tablet Take 1 tablet (100 mg) by mouth daily Hypertension Goal BP (Blood Pressure) < 140/90 Tiffany Messer MD   Multiple Vitamins-Minerals (EYE VITAMINS & MINERALS) TABS Take 2 capsules by mouth daily [employer] AREDS2 Plus  General Health   Patient Reported   Multiple  Vitamins-Minerals (MULTIVITAMIN ADULT PO) Take 1 tablet by mouth daily  General Health  Patient Reported   triamterene-HCTZ (DYAZIDE) 37.5-25 MG capsule Take 1 capsule by mouth daily Hypertension Goal BP (Blood Pressure) < 140/90 Tiffany Messer MD         Add new medications, over-the-counter drugs, herbals, vitamins, or  minerals in the blank rows below.    Medication How I take it Why I use it Prescriber                                      Allergies:      cats; hylan g-f 20; lisinopril; seasonal allergies; vioxx        Side effects I have had:               Other Information:              My notes and questions:

## 2024-01-09 NOTE — LETTER
January 10, 2024  Mely Guerra  [address] [place] [address]    Dear DEMARIO Kay [facility]     Thank you for talking with me on Jan 9, 2024 about your health and medications. As a follow-up to our conversation, I have included two documents:      Your Recommended To-Do List has steps you should take to get the best results from your medications.  Your Medication List will help you keep track of your medications and how to take them.    If you want to talk about these documents, please call Mary Bustos RPH at phone: [phone], Monday-Friday 8-4:30pm.    I look forward to working with you and your doctors to make sure your medications work well for you.    Sincerely,  Mary Bustos RPH  [facility] Pharmacist, [facility]

## 2024-01-09 NOTE — PROGRESS NOTES
Medication Therapy Management (MTM) Encounter    ASSESSMENT:                            Medication Adherence/Access: No issues identified    Hypertension:   Stable. BP at goal    Osteopenia:    Stable     Hypothyroidism:   Stable. Last TSH is within normal limits.    Osteoarthritis/Insomnia:   Discussed the risks of taking diphenhydramine and doxylamine, patient declined to stop medications at this time.     Allergies/Cough:   Patient may benefit from using albuterol in anticipation of activity or triggers that affect her breathing.     Supplements:   Stable      PLAN:                            Try using the albuterol before activity as a preventative of wheezing   MTM will send Albuterol, Losartan, Triamterene-HCTZ to [facility]  If you become dizzy, confused, or have any unexplained agitation please stop the Tylenol PM and Doxylamine     Follow-up: Jan 2025, sooner if needed    SUBJECTIVE/OBJECTIVE:                          Mely Guerra is a 83 year old female coming in for a follow-up visit from 12/08/22.       Reason for visit: Routine Annual Visit.    Allergies/ADRs: Reviewed in chart  Past Medical History: Reviewed in chart  Tobacco: She reports that she has never smoked. She has never used smokeless tobacco.  Alcohol: Not currently using    Medication Adherence/Access: no issues reported. Has changed pharmacies to [facility], but not all Rxs have been moved.     Hypertension :   Amlodipine 5 mg daily  Losartan 100 mg daily   Triamterene-hydrochlorothiazide 37.5-25 mg daily     Patient reports no current medication side effects    BP Readings from Last 3 Encounters:   01/09/24 122/80   09/29/23 122/62   06/02/23 134/69     Pulse Readings from Last 3 Encounters:   09/29/23 60   06/02/23 70   03/14/23 60     Osteopenia:   Calcium citrate -Vitamin D 250-200 daily taking one in the morning and one at night.  Says that she is getting 2-3 servings of dietary calcium daily     Hypothyroidism:   Levothyroxine 100 mcg  daily  Patient is having the following symptoms: none.   TSH   Date Value Ref Range Status   03/14/2023 2.16 0.30 - 4.20 uIU/mL Final   01/14/2022 1.73 0.40 - 4.00 mU/L Final   07/08/2021 1.87 0.40 - 4.00 mU/L Final      Osteoarthritis/Insomnia  Doxylamine 25 mg nightly as needed when she has a very difficult time (1-2 times per week)  Acetaminophen-diphenhydramine 500-25 mg at bedtime   Acetaminophen 500 every morning  daily   Celecoxib 200 mg twice daily- only takes this as needed a few times/week. Finds this to be effective      Allergies/Cough:   Loratadine 10 mg daily- only when need for seasonal allergies, not currently taking this.   Fluticasone 1 spray twice daily   Albuterol HFA- uses as needed. Uses the spacer when she does use it, but reports that the medication makes her cough.   Says that she gets a little wheezy, goes outside in the winter, and when coming back from a hike. Doesn't use the medication prior to activity.     Supplements:   Multivitamin daily   AREDs twice daily   No reported issues at this time.       Today's Vitals: /80   ----------------  I spent 33 minutes with this patient today. All changes were made via collaborative practice agreement with Tiffany Messer MD. A copy of the visit note was provided to the patient's provider(s).    A summary of these recommendations was given to the patient.    Arin Crespo PharmD  Medication Therapy Management Resident  Mary Bustos PharmD, SYEDA, BCACP   Medication Therapy Management Pharmacist     Medication Therapy Recommendations  Allergic rhinitis    Current Medication: albuterol (PROAIR HFA/PROVENTIL HFA/VENTOLIN HFA) 108 (90 Base) MCG/ACT inhaler   Rationale: Incorrect administration - Dosage too low - Effectiveness   Recommendation: Change Administration Time   Status: Accepted per CPA         Insomnia, unspecified type    Current Medication: doxylamine (UNISOM) 25 MG TABS tablet   Rationale: Unsafe medication for the patient -  Adverse medication event - Safety   Recommendation: Provide Education   Status: Declined per Patient

## 2024-01-10 RX ORDER — TRIAMTERENE AND HYDROCHLOROTHIAZIDE 37.5; 25 MG/1; MG/1
1 CAPSULE ORAL DAILY
Qty: 90 CAPSULE | Refills: 1 | Status: SHIPPED | OUTPATIENT
Start: 2024-01-10 | End: 2024-03-17

## 2024-01-10 RX ORDER — ALBUTEROL SULFATE 90 UG/1
2 AEROSOL, METERED RESPIRATORY (INHALATION) EVERY 6 HOURS PRN
Qty: 18 G | Refills: 1 | Status: SHIPPED | OUTPATIENT
Start: 2024-01-10

## 2024-01-10 RX ORDER — LOSARTAN POTASSIUM 100 MG/1
100 TABLET ORAL DAILY
Qty: 90 TABLET | Refills: 1 | Status: SHIPPED | OUTPATIENT
Start: 2024-01-10 | End: 2024-03-17

## 2024-01-24 ENCOUNTER — HOSPITAL ENCOUNTER (OUTPATIENT)
Dept: MAMMOGRAPHY | Facility: CLINIC | Age: 84
Discharge: HOME | End: 2024-01-24
Attending: FAMILY MEDICINE | Admitting: FAMILY MEDICINE
Payer: COMMERCIAL

## 2024-01-24 DIAGNOSIS — Z12.31 VISIT FOR SCREENING MAMMOGRAM: ICD-10-CM

## 2024-01-24 PROCEDURE — 77063 BREAST TOMOSYNTHESIS BI: CPT

## 2024-03-14 NOTE — PROGRESS NOTES
Preventive Care Visit  [facility]  Tiffany Messer MD, Family Medicine  Mar 15, 2024  {Provider  Link to SmartSet :014588}    {PROVIDER CHARTING PREFERENCE:520552}    Raj Boone is a 84 year old, presenting for the following:  No chief complaint on file.    {(!) Visit Details have not yet been documented.  Please enter Visit Details and then use this list to pull in documentation. (Optional):891847}  {ROOMER if patient is in their first year of Medicare a vision screen is required click here to document the Vison screen and then refresh the note to pull in results  :307738}    Health Care Directive  Patient does not have a Health Care Directive or Living Will: {ADVANCE_DIRECTIVE_STATUS:867123}    HPI          ***  {MA/LPN/RN Pre-Provider Visit Orders- hCG/UA/Strep (Optional):768866}  {SUPERLIST (Optional):542994}  {additonal problems for provider to add (Optional):865441}      3/14/2023   General Health   How would you rate your overall physical health? Excellent         3/14/2023   Nutrition   At least 4 servings of fruits and vegetables/day No         3/14/2023   Exercise   Frequency of exercise: 6-7 days/week         9/29/2023   Social Factors   Worry food won't last until get money to buy more No   Food not last or not have enough money for food? No   Do you have housing?  Yes   Are you worried about losing your housing? No   Lack of transportation? No   Unable to get utilities (heat,electricity)? No         3/14/2023   Fall Risk   Fallen 2 or more times in the past year? Yes          3/14/2023   Activities of Daily Living- Home Safety   Needs help with the following daily activites NO assistance is needed   Safety concerns in the home None of the above         10/12/2018   Dental   Dentist two times every year? Yes         3/14/2023   Hearing Screening   Hearing concerns? No concerns            No data to display                   {Rooming Staff Patient needs a PHQ as part of the  AWV.  Use this link to complete and then refresh the note to pull results Link to PHQ2 Assessment :235987}  {USE TO PULL IN PHQ RESULTS FOR TODAY:328252}      3/14/2023   Substance Use   Alcohol more than 3/day or more than 7/wk Not Applicable     Social History     Tobacco Use    Smoking status: Never    Smokeless tobacco: Never   Vaping Use    Vaping Use: Never used   Substance Use Topics    Alcohol use: No     Alcohol/week: 0.0 standard drinks of alcohol    Drug use: No     {Provider  If there are gaps in the social history shown above, please follow the link to update and then refresh the note Link to Social and Substance History :458727}      1/24/2024   LAST FHS-7 RESULTS   1st degree relative breast or ovarian cancer No   Any relative bilateral breast cancer No   Any male have breast cancer No   Any ONE woman have BOTH breast AND ovarian cancer No   Any woman with breast cancer before 50yrs No   2 or more relatives with breast AND/OR ovarian cancer No   2 or more relatives with breast AND/OR bowel cancer No     {If any of the questions to the FHS7 are answered yes, consider referral for genetic counseling.    Additional indications for genetic referral include personal history of breast or ovarian cancer, genetic mutation in 1st degree relative which increases risk of breast cancer including BRCA1, BRCA2, CHYNA, PALB 2, TP53, CHEK2, PTEN, CDH1, STK11 (per ACS) and/or 1st degree relative with history of pancreatic or high-risk prostate cancer (per NCCN):926273}   {Mammogram Decision Support (Optional):370599}      {Link to Fracture Risk Assessment Tool (Optional):227951}    {Provider  Use the storyboard to review patient history, after sections have been marked as reviewed, refresh note to capture documentation:701960}  {Provider   REQUIRED AWV use this link to review and update sexual activity history  after section has been marked as reviewed, refresh note to capture documentation:295970}  Reviewed and  "updated as needed this visit by Provider                    {HISTORY OPTIONS (Optional):145025}  Current providers sharing in care for this patient include:  Patient Care Team:  Tiffany Messer MD as PCP - General (Family Practice)  Mirna Manning MD as MD (INTERNAL MEDICINE - ENDOCRINOLOGY, DIABETES & METABOLISM)  Mary Bustos RPH as Pharmacist (Pharmacist)  Tiffany Messer MD as Assigned PCP  Mary Bustos RPH as Assigned [facility] Pharmacist  Moises Barksdale PA-C as Physician Assistant (Gastroenterology)    The following health maintenance items are reviewed in Epic and correct as of today:  Health Maintenance   Topic Date Due    PHQ-2 (once per calendar year)  01/01/2024    MEDICARE ANNUAL WELLNESS VISIT  03/14/2024    LIPID  03/14/2024    TSH W/FREE T4 REFLEX  03/14/2024    ANNUAL REVIEW OF HM ORDERS  03/14/2024    FALL RISK ASSESSMENT  03/14/2024    ADVANCE CARE PLANNING  03/14/2028    COLORECTAL CANCER SCREENING  10/31/2028    DTAP/TDAP/TD IMMUNIZATION (3 - Td or Tdap) 03/14/2033    DEXA  01/13/2038    INFLUENZA VACCINE  Completed    Pneumococcal Vaccine: 65+ Years  Completed    ZOSTER IMMUNIZATION  Completed    RSV VACCINE (Pregnancy & 60+)  Completed    COVID-19 Vaccine  Completed    IPV IMMUNIZATION  Aged Out    HPV IMMUNIZATION  Aged Out    MENINGITIS IMMUNIZATION  Aged Out    RSV MONOCLONAL ANTIBODY  Aged Out    MAMMO SCREENING  Discontinued       {ROS Picklists (Optional):941099}     Objective    Exam  There were no vitals taken for this visit.   Estimated body mass index is 24.03 kg/m  as calculated from the following:    Height as of 9/29/23: 1.605 m (5' 3.2\").    Weight as of 6/2/23: 61.9 kg (136 lb 8 oz).    Physical Exam  {Exam Choices (Optional):203757}  {Provider  The Mini-Cog is incomplete, use link to complete and refresh note Link to Mini-Cog :267594}       No data to display              {A Mini-Cog total score of 0-2 suggests the possibility of dementia, score of 3-5 " suggests no dementia:301664}         Signed Electronically by: Tiffany Messer MD  {Email feedback regarding this note to [email or web address]   :853052}

## 2024-03-15 ENCOUNTER — OFFICE VISIT (OUTPATIENT)
Dept: FAMILY MEDICINE | Facility: CLINIC | Age: 84
End: 2024-03-15
Payer: COMMERCIAL

## 2024-03-15 VITALS
SYSTOLIC BLOOD PRESSURE: 134 MMHG | WEIGHT: 135.1 LBS | OXYGEN SATURATION: 98 % | DIASTOLIC BLOOD PRESSURE: 75 MMHG | RESPIRATION RATE: 16 BRPM | HEART RATE: 67 BPM | TEMPERATURE: 97.3 F | HEIGHT: 64 IN | BODY MASS INDEX: 23.06 KG/M2

## 2024-03-15 DIAGNOSIS — Z00.00 ENCOUNTER FOR MEDICARE ANNUAL WELLNESS EXAM: Primary | ICD-10-CM

## 2024-03-15 DIAGNOSIS — E03.9 ACQUIRED HYPOTHYROIDISM: ICD-10-CM

## 2024-03-15 DIAGNOSIS — I10 ESSENTIAL HYPERTENSION WITH GOAL BLOOD PRESSURE LESS THAN 140/90: ICD-10-CM

## 2024-03-15 DIAGNOSIS — E78.5 HYPERLIPIDEMIA LDL GOAL <160: ICD-10-CM

## 2024-03-15 LAB
ALBUMIN SERPL BCG-MCNC: 4.8 G/DL (ref 3.5–5.2)
ALP SERPL-CCNC: 78 U/L (ref 40–150)
ALT SERPL W P-5'-P-CCNC: 21 U/L (ref 0–50)
ANION GAP SERPL CALCULATED.3IONS-SCNC: 11 MMOL/L (ref 7–15)
AST SERPL W P-5'-P-CCNC: 56 U/L (ref 0–45)
BILIRUB SERPL-MCNC: 1.1 MG/DL
BUN SERPL-MCNC: 9 MG/DL (ref 8–23)
CALCIUM SERPL-MCNC: 10.6 MG/DL (ref 8.8–10.2)
CHLORIDE SERPL-SCNC: 96 MMOL/L (ref 98–107)
CHOLEST SERPL-MCNC: 242 MG/DL
CREAT SERPL-MCNC: 0.69 MG/DL (ref 0.51–0.95)
DEPRECATED HCO3 PLAS-SCNC: 25 MMOL/L (ref 22–29)
EGFRCR SERPLBLD CKD-EPI 2021: 86 ML/MIN/1.73M2
FASTING STATUS PATIENT QL REPORTED: YES
GLUCOSE SERPL-MCNC: 84 MG/DL (ref 70–99)
HDLC SERPL-MCNC: 106 MG/DL
LDLC SERPL CALC-MCNC: 126 MG/DL
NONHDLC SERPL-MCNC: 136 MG/DL
POTASSIUM SERPL-SCNC: 3.9 MMOL/L (ref 3.4–5.3)
PROT SERPL-MCNC: 7.7 G/DL (ref 6.4–8.3)
SODIUM SERPL-SCNC: 132 MMOL/L (ref 135–145)
TRIGL SERPL-MCNC: 50 MG/DL
TSH SERPL DL<=0.005 MIU/L-ACNC: 2.26 UIU/ML (ref 0.3–4.2)

## 2024-03-15 PROCEDURE — 80053 COMPREHEN METABOLIC PANEL: CPT | Performed by: FAMILY MEDICINE

## 2024-03-15 PROCEDURE — 80061 LIPID PANEL: CPT | Performed by: FAMILY MEDICINE

## 2024-03-15 PROCEDURE — 84443 ASSAY THYROID STIM HORMONE: CPT | Performed by: FAMILY MEDICINE

## 2024-03-15 PROCEDURE — 36415 COLL VENOUS BLD VENIPUNCTURE: CPT | Performed by: FAMILY MEDICINE

## 2024-03-15 PROCEDURE — 99214 OFFICE O/P EST MOD 30 MIN: CPT | Mod: 25 | Performed by: FAMILY MEDICINE

## 2024-03-15 PROCEDURE — G0439 PPPS, SUBSEQ VISIT: HCPCS | Performed by: FAMILY MEDICINE

## 2024-03-15 SDOH — HEALTH STABILITY: PHYSICAL HEALTH: ON AVERAGE, HOW MANY MINUTES DO YOU ENGAGE IN EXERCISE AT THIS LEVEL?: 60 MIN

## 2024-03-15 SDOH — HEALTH STABILITY: PHYSICAL HEALTH: ON AVERAGE, HOW MANY DAYS PER WEEK DO YOU ENGAGE IN MODERATE TO STRENUOUS EXERCISE (LIKE A BRISK WALK)?: 6 DAYS

## 2024-03-15 ASSESSMENT — SOCIAL DETERMINANTS OF HEALTH (SDOH): HOW OFTEN DO YOU GET TOGETHER WITH FRIENDS OR RELATIVES?: MORE THAN THREE TIMES A WEEK

## 2024-03-15 ASSESSMENT — PAIN SCALES - GENERAL: PAINLEVEL: NO PAIN (1)

## 2024-03-15 NOTE — PROGRESS NOTES
Preventive Care Visit  [facility]  Tiffany Messer MD, Family Medicine  Mar 15, 2024      Assessment & Plan     Hyperlipidemia LDL goal <160  Pt is fasting for lipids today , will check , also check CMP as below   - Lipid panel reflex to direct LDL Non-fasting; Future  - Comprehensive metabolic panel (BMP + Alb, Alk Phos, ALT, AST, Total. Bili, TP); Future  - Lipid panel reflex to direct LDL Non-fasting  - Comprehensive metabolic panel (BMP + Alb, Alk Phos, ALT, AST, Total. Bili, TP)    Acquired hypothyroidism  She has been on synthroid 100mcg no side effects , no symptoms , will check thyroid function today   - TSH WITH FREE T4 REFLEX; Future  - TSH WITH FREE T4 REFLEX  I have refilled her thyroid medication      Encounter for Medicare annual wellness exam   Is overall healthy     HTN goal BP less than 140 over 90 , pt 's BP is well controlled , no side effects with the medications she is on , will check labs today and also I have refilled her Rx     Patient has been advised of split billing requirements and indicates understanding: Yes          Counseling  Appropriate preventive services were discussed with this patient, including applicable screening as appropriate for fall prevention, nutrition, physical activity, Tobacco-use cessation, weight loss and cognition.  Checklist reviewing preventive services available has been given to the patient.  Reviewed patient's diet, addressing concerns and/or questions.   She is at risk for psychosocial distress and has been provided with information to reduce risk.   Discussed possible causes of fatigue. The patient was provided with written information regarding signs of hearing loss.           Raj Boone is a 84 year old, presenting for the following:  Physical (AWV)        3/15/2024     8:44 AM   Additional Questions   Roomed by ANASTACIO Samanigeo   Accompanied by N/A         Health Care Directive  Patient does not have a Health Care Directive or Living  Will: Discussed advance care planning with patient; information given to patient to review.    HPI  Cough for months daily saw EN   Wellness Visit Notes:  -Last mammo done 1/2024, previously discontinued (impression: negative)  -Last DEXA done 1/2023, due 1/2026 (impression: osteopenia)  -Last colon cancer screening done 10/2018, due 10/2028 (impression: multiple ulcers at ileocecal valve were biopsied, stricture at the ileocecal valve was biopsied, examined portion of ileum was normal per chart review)  -Immunizations: up to date per  at this time          HTN   Hypothyroidism   Hyperlipidemia       3/15/2024   General Health   How would you rate your overall physical health? (!) FAIR   Feel stress (tense, anxious, or unable to sleep) Only a little   (!) STRESS CONCERN      3/15/2024   Nutrition   Diet: Regular (no restrictions)         3/15/2024   Exercise   Days per week of moderate/strenous exercise 6 days   Average minutes spent exercising at this level 60 min         3/15/2024   Social Factors   Frequency of gathering with friends or relatives More than three times a week   Worry food won't last until get money to buy more No   Food not last or not have enough money for food? No   Do you have housing?  Yes   Are you worried about losing your housing? No   Lack of transportation? No   Unable to get utilities (heat,electricity)? No         3/15/2024   Activities of Daily Living- Home Safety   Needs help with the following daily activites None of the above   Safety concerns in the home None of the above         3/15/2024   Dental   Dentist two times every year? Yes         3/15/2024   Hearing Screening   Hearing concerns? (!) IT'S HARD TO FOLLOW A CONVERSATION IN A NOISY RESTAURANT OR CROWDED ROOM.         3/15/2024   Driving Risk Screening   Patient/family members have concerns about driving No         3/15/2024   General Alertness/Fatigue Screening   Have you been more tired than usual lately? (!) YES          3/15/2024   Urinary Incontinence Screening   Bothered by leaking urine in past 6 months No            Today's PHQ-2 Score:       3/15/2024     8:35 AM   PHQ-2 ( 1999 [employer])   Q1: Little interest or pleasure in doing things 0   Q2: Feeling down, depressed or hopeless 0   PHQ-2 Score 0   Q1: Little interest or pleasure in doing things Not at all   Q2: Feeling down, depressed or hopeless Not at all   PHQ-2 Score 0           3/15/2024   Substance Use   Alcohol more than 3/day or more than 7/wk No   Do you have a current opioid prescription? No   How severe/bad is pain from 1 to 10? 1/10   Do you use any other substances recreationally? No     Social History     Tobacco Use    Smoking status: Never    Smokeless tobacco: Never   Vaping Use    Vaping Use: Never used   Substance Use Topics    Alcohol use: No     Alcohol/week: 0.0 standard drinks of alcohol    Drug use: No           1/24/2024   LAST FHS-7 RESULTS   1st degree relative breast or ovarian cancer No   Any relative bilateral breast cancer No   Any male have breast cancer No   Any ONE woman have BOTH breast AND ovarian cancer No   Any woman with breast cancer before 50yrs No   2 or more relatives with breast AND/OR ovarian cancer No   2 or more relatives with breast AND/OR bowel cancer No        Mammogram Screening - After age 74- determine frequency with patient based on health status, life expectancy and patient goals              Reviewed and updated as needed this visit by Provider                    Past Medical History:   Diagnosis Date    Allergic rhinitis, cause unspecified     Arthritis     Personal history of alcoholism (H)     Sleep apnea     SVT (supraventricular tachycardia)     s/p atrial tachycardia ablation near CS os    Unspecified essential hypertension     Unspecified hypothyroidism      Past Surgical History:   Procedure Laterality Date    COLONOSCOPY N/A 10/31/2018    Procedure: COMBINED COLONOSCOPY, SINGLE OR MULTIPLE BIOPSY/POLYPECTOMY BY  BIOPSY;  Surgeon: Leon Cosme MD;  Location:  GI    EP ABLATION SVT N/A 10/8/2019    Procedure: EP Ablation SVT;  Surgeon: Laney Arriaza MD;  Location:  HEART CARDIAC CATH LAB    EP ABLATION SVT N/A 6/10/2020    Procedure: EP Ablation SVT;  Surgeon: Laney Arriaza MD;  Location:  HEART CARDIAC CATH LAB    ESOPHAGOSCOPY, GASTROSCOPY, DUODENOSCOPY (EGD), COMBINED N/A 10/31/2018    Procedure: GASTROSCOPY;  Surgeon: Leon Cosme MD;  Location:  GI    GYN SURGERY      HYSTERECTOMY      JOINT REPLACEMENT Bilateral     knee replacement Bilateral     ORTHOPEDIC SURGERY      ZZC NONSPECIFIC PROCEDURE  1974    s/p Vaginal hysterectomy     Lab work is in process  Labs reviewed in EPIC  BP Readings from Last 3 Encounters:   03/15/24 134/75   01/09/24 122/80   09/29/23 122/62    Wt Readings from Last 3 Encounters:   03/15/24 61.3 kg (135 lb 1.6 oz)   06/02/23 61.9 kg (136 lb 8 oz)   03/14/23 61.5 kg (135 lb 9.6 oz)                  Patient Active Problem List   Diagnosis    Acquired hypothyroidism    Allergic rhinitis    Osteoporosis    Osteoarthritis    Hyperlipidemia LDL goal <160    Hypertension goal BP (blood pressure) < 140/90    Advanced directives, counseling/discussion    Otitis externa    Sleep apnea    Dermatitis    Rosacea    Age-related osteoporosis without current pathological fracture    Disorder of bone and cartilage    Dysphagia     Past Surgical History:   Procedure Laterality Date    COLONOSCOPY N/A 10/31/2018    Procedure: COMBINED COLONOSCOPY, SINGLE OR MULTIPLE BIOPSY/POLYPECTOMY BY BIOPSY;  Surgeon: Leon Cosme MD;  Location:  GI    EP ABLATION SVT N/A 10/8/2019    Procedure: EP Ablation SVT;  Surgeon: Laney Arriaza MD;  Location:  HEART CARDIAC CATH LAB    EP ABLATION SVT N/A 6/10/2020    Procedure: EP Ablation SVT;  Surgeon: Laney Arriaza MD;  Location:  HEART CARDIAC CATH LAB    ESOPHAGOSCOPY, GASTROSCOPY, DUODENOSCOPY (EGD), COMBINED N/A 10/31/2018    Procedure:  GASTROSCOPY;  Surgeon: Leon Cosme MD;  Location:  GI    GYN SURGERY      HYSTERECTOMY      JOINT REPLACEMENT Bilateral     knee replacement Bilateral     ORTHOPEDIC SURGERY      ZZC NONSPECIFIC PROCEDURE  1974    s/p Vaginal hysterectomy       Social History     Tobacco Use    Smoking status: Never    Smokeless tobacco: Never   Substance Use Topics    Alcohol use: No     Alcohol/week: 0.0 standard drinks of alcohol     Family History   Problem Relation Age of Onset    Cancer Sister     Heart Disease Father     Hypertension Father     Thyroid Disease Father     Other Cancer Other          age 65    Other Cancer Other          age 56    Other Cancer Niece          age 53    Other Cancer Sister          age 51         Current Outpatient Medications   Medication Sig Dispense Refill    albuterol (PROAIR HFA/PROVENTIL HFA/VENTOLIN HFA) 108 (90 Base) MCG/ACT inhaler Inhale 2 puffs into the lungs every 6 hours as needed for shortness of breath, wheezing or cough 18 g 1    amLODIPine (NORVASC) 5 MG tablet Take 1 tablet (5 mg) by mouth daily 90 tablet 1    Calcium Citrate-Vitamin D 250-200 MG-UNIT TABS Take 1 tablet by mouth daily (with breakfast)      celecoxib (CELEBREX) 200 MG capsule Take 200 mg by mouth 2 times daily      doxylamine (UNISOM) 25 MG TABS tablet Take 25 mg by mouth at bedtime      fluticasone (FLONASE) 50 MCG/ACT nasal spray Spray 1 spray into both nostrils 2 times daily 16 g 0    guaiFENesin (MUCINEX) 600 MG 12 hr tablet Take 1,200 mg by mouth 2 times daily As needed      levothyroxine (SYNTHROID/LEVOTHROID) 100 MCG tablet TAKE 1 TABLET BY MOUTH DAILY 90 tablet 1    loratadine (CLARITIN) 10 MG tablet Take 10 mg by mouth daily       losartan (COZAAR) 100 MG tablet Take 1 tablet (100 mg) by mouth daily 90 tablet 1    Multiple Vitamins-Minerals (EYE VITAMINS & MINERALS) TABS Take 2 capsules by mouth daily [employer] AREDS2 Plus      Multiple Vitamins-Minerals (MULTIVITAMIN ADULT  PO) Take 1 tablet by mouth daily      Spacer/Aero-Holding Chambers (AEROCHAMBER MV) MISC One Aerochamber or Optichamber to assist with albuterol use. 1 each 0    triamterene-HCTZ (DYAZIDE) 37.5-25 MG capsule Take 1 capsule by mouth daily 90 capsule 1    ACETAMINOPHEN PO Take 500 mg by mouth every morning (Patient not taking: Reported on 3/15/2024)       Allergies   Allergen Reactions    Cats     Hylan G-F 20     Lisinopril Cough    Seasonal Allergies     Vioxx      Recent Labs   Lab Test 03/15/24  0939 06/02/23  0939 03/14/23  1218 01/14/22  1053 01/14/22  1053 07/08/21  0808 12/15/20  0943 06/10/20  1218   *  --  134*  --  130*  --  112*  --      --  114  --  106  --  102  --    TRIG 50  --  46  --  42  --  41  --    ALT 21 22 21  --  28   < > 24  --    CR 0.69 0.71 0.68  --  0.73   < > 0.68 0.64   GFRESTIMATED 86 85 87  --  83   < > 83 85   GFRESTBLACK  --   --   --   --   --   --  >90 >90   POTASSIUM 3.9 4.1 4.5   < > 4.7  --  4.3 3.5   TSH 2.26  --  2.16  --  1.73   < > 1.53  --     < > = values in this interval not displayed.      Current providers sharing in care for this patient include:  Patient Care Team:  Tiffany Messer MD as PCP - General (Family Practice)  Mirna Manning MD as MD (INTERNAL MEDICINE - ENDOCRINOLOGY, DIABETES & METABOLISM)  Mary Bustos RPH as Pharmacist (Pharmacist)  Tiffany Messer MD as Assigned PCP  Mary Bustos RPH as Assigned [facility] Pharmacist  Moises Barksdale PA-C as Physician Assistant (Gastroenterology)    The following health maintenance items are reviewed in Epic and correct as of today:  Health Maintenance   Topic Date Due    LIPID  03/14/2024    ANNUAL REVIEW OF HM ORDERS  03/14/2024    TSH W/FREE T4 REFLEX  03/14/2024    MEDICARE ANNUAL WELLNESS VISIT  03/15/2025    FALL RISK ASSESSMENT  03/15/2025    DEXA  01/13/2026    ADVANCE CARE PLANNING  03/14/2028    COLORECTAL CANCER SCREENING  10/31/2028    DTAP/TDAP/TD IMMUNIZATION (3 - Td or  "Tdap) 03/14/2033    PHQ-2 (once per calendar year)  Completed    INFLUENZA VACCINE  Completed    Pneumococcal Vaccine: 65+ Years  Completed    ZOSTER IMMUNIZATION  Completed    RSV VACCINE (Pregnancy & 60+)  Completed    COVID-19 Vaccine  Completed    IPV IMMUNIZATION  Aged Out    HPV IMMUNIZATION  Aged Out    MENINGITIS IMMUNIZATION  Aged Out    RSV MONOCLONAL ANTIBODY  Aged Out    MAMMO SCREENING  Discontinued         Review of Systems  Constitutional, HEENT, cardiovascular, pulmonary, GI, , musculoskeletal, neuro, skin, endocrine and psych systems are negative, except as otherwise noted.     Objective    Exam  There were no vitals taken for this visit.   Estimated body mass index is 24.03 kg/m  as calculated from the following:    Height as of 9/29/23: 1.605 m (5' 3.2\").    Weight as of 6/2/23: 61.9 kg (136 lb 8 oz).    Physical Exam  GENERAL: alert and no distress  EYES: Eyes grossly normal to inspection, PERRL and conjunctivae and sclerae normal  HENT: ear canals and TM's normal, nose and mouth without ulcers or lesions  NECK: no adenopathy, no asymmetry, masses, or scars  RESP: lungs clear to auscultation - no rales, rhonchi or wheezes  CV: regular rate and rhythm, normal S1 S2, no S3 or S4, no murmur, click or rub, no peripheral edema  ABDOMEN: soft, nontender, no hepatosplenomegaly, no masses and bowel sounds normal  MS: no gross musculoskeletal defects noted, no edema  SKIN: no suspicious lesions or rashes  NEURO: Normal strength and tone, mentation intact and speech normal  PSYCH: mentation appears normal, affect normal/bright         No data to display                       Signed Electronically by: Tiffany Messer MD    "

## 2024-03-15 NOTE — PATIENT INSTRUCTIONS
Preventive Care Advice   This is general advice given by our system to help you stay healthy. However, your care team may have specific advice just for you. Please talk to your care team about your preventive care needs.  Nutrition  Eat 5 or more servings of fruits and vegetables each day.  Try wheat bread, brown rice and whole grain pasta (instead of white bread, rice, and pasta).  Get enough calcium and vitamin D. Check the label on foods and aim for 100% of the RDA (recommended daily allowance).  Lifestyle  Exercise at least 150 minutes each week   (30 minutes a day, 5 days a week).  Do muscle strengthening activities 2 days a week. These help control your weight and prevent disease.  No smoking.  Wear sunscreen to prevent skin cancer.  Have a dental exam and cleaning every 6 months.  Yearly exams  See your health care team every year to talk about:  Any changes in your health.  Any medicines your care team has prescribed.  Preventive care, family planning, and ways to prevent chronic diseases.  Shots (vaccines)   HPV shots (up to age 26), if you've never had them before.  Hepatitis B shots (up to age 59), if you've never had them before.  COVID-19 shot: Get this shot when it's due.  Flu shot: Get a flu shot every year.  Tetanus shot: Get a tetanus shot every 10 years.  Pneumococcal, hepatitis A, and RSV shots: Ask your care team if you need these based on your risk.  Shingles shot (for age 50 and up).  General health tests  Diabetes screening:  Starting at age 35, Get screened for diabetes at least every 3 years.  If you are younger than age 35, ask your care team if you should be screened for diabetes.  Cholesterol test: At age 39, start having a cholesterol test every 5 years, or more often if advised.  Bone density scan (DEXA): At age 50, ask your care team if you should have this scan for osteoporosis (brittle bones).  Hepatitis C: Get tested at least once in your life.  STIs (sexually transmitted  infections)  Before age 24: Ask your care team if you should be screened for STIs.  After age 24: Get screened for STIs if you're at risk. You are at risk for STIs (including HIV) if:  You are sexually active with more than one person.  You don't use condoms every time.  You or a partner was diagnosed with a sexually transmitted infection.  If you are at risk for HIV, ask about PrEP medicine to prevent HIV.  Get tested for HIV at least once in your life, whether you are at risk for HIV or not.  Cancer screening tests  Cervical cancer screening: If you have a cervix, begin getting regular cervical cancer screening tests at age 21. Most people who have regular screenings with normal results can stop after age 65. Talk about this with your provider.  Breast cancer scan (mammogram): If you've ever had breasts, begin having regular mammograms starting at age 40. This is a scan to check for breast cancer.  Colon cancer screening: It is important to start screening for colon cancer at age 45.  Have a colonoscopy test every 10 years (or more often if you're at risk) Or, ask your provider about stool tests like a FIT test every year or Cologuard test every 3 years.  To learn more about your testing options, visit: https://[email or web address]/466488.pdf.  For help making a decision, visit: https://bit.ly/qi54167.  Prostate cancer screening test: If you have a prostate and are age 55 to 69, ask your provider if you would benefit from a yearly prostate cancer screening test.  Lung cancer screening: If you are a current or former smoker age 50 to 80, ask your care team if ongoing lung cancer screenings are right for you.  For informational purposes only. Not to replace the advice of your health care provider. Copyright   2023 [employer]. All rights reserved. Clinically reviewed by the [facility]. ROBAUTO 934366 - REV 01/24.    Learning About Stress  What is stress?     Stress is your  body's response to a hard situation. Your body can have a physical, emotional, or mental response. Stress is a fact of life for most people, and it affects everyone differently. What causes stress for you may not be stressful for someone else.  A lot of things can cause stress. You may feel stress when you go on a job interview, take a test, or run a race. This kind of short-term stress is normal and even useful. It can help you if you need to work hard or react quickly. For example, stress can help you finish an important job on time.  Long-term stress is caused by ongoing stressful situations or events. Examples of long-term stress include long-term health problems, ongoing problems at work, or conflicts in your family. Long-term stress can harm your health.  How does stress affect your health?  When you are stressed, your body responds as though you are in danger. It makes hormones that speed up your heart, make you breathe faster, and give you a burst of energy. This is called the fight-or-flight stress response. If the stress is over quickly, your body goes back to normal and no harm is done.  But if stress happens too often or lasts too long, it can have bad effects. Long-term stress can make you more likely to get sick, and it can make symptoms of some diseases worse. If you tense up when you are stressed, you may develop neck, shoulder, or low back pain. Stress is linked to high blood pressure and heart disease.  Stress also harms your emotional health. It can make you beal, tense, or depressed. Your relationships may suffer, and you may not do well at work or school.  What can you do to manage stress?  You can try these things to help manage stress:   Do something active. Exercise or activity can help reduce stress. Walking is a great way to get started. Even everyday activities such as housecleaning or yard work can help.  Try yoga or taylor chi. These techniques combine exercise and meditation. You may need  some training at first to learn them.  Do something you enjoy. For example, listen to music or go to a movie. Practice your hobby or do volunteer work.  Meditate. This can help you relax, because you are not worrying about what happened before or what may happen in the future.  Do guided imagery. Imagine yourself in any setting that helps you feel calm. You can use online videos, books, or a teacher to guide you.  Do breathing exercises. For example:  From a standing position, bend forward from the waist with your knees slightly bent. Let your arms dangle close to the floor.  Breathe in slowly and deeply as you return to a standing position. Roll up slowly and lift your head last.  Hold your breath for just a few seconds in the standing position.  Breathe out slowly and bend forward from the waist.  Let your feelings out. Talk, laugh, cry, and express anger when you need to. Talking with supportive friends or family, a counselor, or a macy leader about your feelings is a healthy way to relieve stress. Avoid discussing your feelings with people who make you feel worse.  Write. It may help to write about things that are bothering you. This helps you find out how much stress you feel and what is causing it. When you know this, you can find better ways to cope.  What can you do to prevent stress?  You might try some of these things to help prevent stress:  Manage your time. This helps you find time to do the things you want and need to do.  Get enough sleep. Your body recovers from the stresses of the day while you are sleeping.  Get support. Your family, friends, and community can make a difference in how you experience stress.  Limit your news feed. Avoid or limit time on social media or news that may make you feel stressed.  Do something active. Exercise or activity can help reduce stress. Walking is a great way to get started.  Where can you learn more?  Go to https://www.healthwise.net/patiented  Enter N032 in the  "search box to learn more about \"Learning About Stress.\"  Current as of: October 24, 2023               Content Version: 14.0    7218-6687 [employer].   Care instructions adapted under license by your healthcare professional. If you have questions about a medical condition or this instruction, always ask your healthcare professional. [employer] disclaims any warranty or liability for your use of this information.      Learning About Sleeping Well  What does sleeping well mean?     Sleeping well means getting enough sleep to feel good and stay healthy. How much sleep is enough varies among people.  The number of hours you sleep and how you feel when you wake up are both important. If you do not feel refreshed, you probably need more sleep. Another sign of not getting enough sleep is feeling tired during the day.  Experts recommend that adults get at least 7 or more hours of sleep per day. Children and older adults need more sleep.  Why is getting enough sleep important?  Getting enough quality sleep is a basic part of good health. When your sleep suffers, your physical health, mood, and your thoughts can suffer too. You may find yourself feeling more grumpy or stressed. Not getting enough sleep also can lead to serious problems, including injury, accidents, anxiety, and depression.  What might cause poor sleeping?  Many things can cause sleep problems, including:  Changes to your sleep schedule.  Stress. Stress can be caused by fear about a single event, such as giving a speech. Or you may have ongoing stress, such as worry about work or school.  Depression, anxiety, and other mental or emotional conditions.  Changes in your sleep habits or surroundings. This includes changes that happen where you sleep, such as noise, light, or sleeping in a different bed. It also includes changes in your sleep pattern, such as having jet lag or working a late shift.  Health problems, such as pain, " "breathing problems, and restless legs syndrome.  Lack of regular exercise.  Using alcohol, nicotine, or caffeine before bed.  How can you help yourself?  Here are some tips that may help you sleep more soundly and wake up feeling more refreshed.  Your sleeping area   Use your bedroom only for sleeping and sex. A bit of light reading may help you fall asleep. But if it doesn't, do your reading elsewhere in the house. Try not to use your TV, computer, smartphone, or tablet while you are in bed.  Be sure your bed is big enough to stretch out comfortably, especially if you have a sleep partner.  Keep your bedroom quiet, dark, and cool. Use curtains, blinds, or a sleep mask to block out light. To block out noise, use earplugs, soothing music, or a \"white noise\" machine.  Your evening and bedtime routine   Create a relaxing bedtime routine. You might want to take a warm shower or bath, or listen to soothing music.  Go to bed at the same time every night. And get up at the same time every morning, even if you feel tired.  What to avoid   Limit caffeine (coffee, tea, caffeinated sodas) during the day, and don't have any for at least 6 hours before bedtime.  Avoid drinking alcohol before bedtime. Alcohol can cause you to wake up more often during the night.  Try not to smoke or use tobacco, especially in the evening. Nicotine can keep you awake.  Limit naps during the day, especially close to bedtime.  Avoid lying in bed awake for too long. If you can't fall asleep or if you wake up in the middle of the night and can't get back to sleep within about 20 minutes, get out of bed and go to another room until you feel sleepy.  Avoid taking medicine right before bed that may keep you awake or make you feel hyper or energized. Your doctor can tell you if your medicine may do this and if you can take it earlier in the day.  If you can't sleep   Imagine yourself in a peaceful, pleasant scene. Focus on the details and feelings of " "being in a place that is relaxing.  Get up and do a quiet or boring activity until you feel sleepy.  Avoid drinking any liquids before going to bed to help prevent waking up often to use the bathroom.  Where can you learn more?  Go to https://www.[employer].net/patiented  Enter J942 in the search box to learn more about \"Learning About Sleeping Well.\"  Current as of: July 10, 2023               Content Version: 14.0    [id number] [email or web address].   Care instructions adapted under license by your healthcare professional. If you have questions about a medical condition or this instruction, always ask your healthcare professional. [email or web address] disclaims any warranty or liability for your use of this information.      "

## 2024-03-17 RX ORDER — LOSARTAN POTASSIUM 100 MG/1
100 TABLET ORAL DAILY
Qty: 90 TABLET | Refills: 1 | Status: SHIPPED | OUTPATIENT
Start: 2024-03-17

## 2024-03-17 RX ORDER — TRIAMTERENE AND HYDROCHLOROTHIAZIDE 37.5; 25 MG/1; MG/1
1 CAPSULE ORAL DAILY
Qty: 90 CAPSULE | Refills: 1 | Status: SHIPPED | OUTPATIENT
Start: 2024-03-17

## 2024-03-17 RX ORDER — AMLODIPINE BESYLATE 5 MG/1
5 TABLET ORAL DAILY
Qty: 90 TABLET | Refills: 1 | Status: SHIPPED | OUTPATIENT
Start: 2024-03-17 | End: 2024-09-03

## 2024-03-17 RX ORDER — LEVOTHYROXINE SODIUM 100 UG/1
TABLET ORAL
Qty: 90 TABLET | Refills: 1 | Status: SHIPPED | OUTPATIENT
Start: 2024-03-17

## 2024-03-21 ENCOUNTER — MYC MEDICAL ADVICE (OUTPATIENT)
Dept: FAMILY MEDICINE | Facility: CLINIC | Age: 84
End: 2024-03-21
Payer: COMMERCIAL

## 2024-03-21 DIAGNOSIS — E78.00 HYPERCHOLESTEROLEMIA: Primary | ICD-10-CM

## 2024-04-12 ENCOUNTER — TRANSFERRED RECORDS (OUTPATIENT)
Dept: HEALTH INFORMATION MANAGEMENT | Facility: CLINIC | Age: 84
End: 2024-04-12
Payer: COMMERCIAL

## 2024-04-16 ENCOUNTER — MYC MEDICAL ADVICE (OUTPATIENT)
Dept: FAMILY MEDICINE | Facility: CLINIC | Age: 84
End: 2024-04-16
Payer: COMMERCIAL

## 2024-04-16 RX ORDER — ATORVASTATIN CALCIUM 10 MG/1
10 TABLET, FILM COATED ORAL DAILY
Qty: 60 TABLET | Refills: 1 | Status: SHIPPED | OUTPATIENT
Start: 2024-04-16

## 2024-04-16 NOTE — TELEPHONE ENCOUNTER
LS,  Please see below [employer] message and advise.  Was not routed to basket.  Thanks,  Chandni VANCE RN

## 2024-05-14 ENCOUNTER — TRANSFERRED RECORDS (OUTPATIENT)
Dept: HEALTH INFORMATION MANAGEMENT | Facility: CLINIC | Age: 84
End: 2024-05-14
Payer: COMMERCIAL

## 2024-05-30 ENCOUNTER — TRANSFERRED RECORDS (OUTPATIENT)
Dept: HEALTH INFORMATION MANAGEMENT | Facility: CLINIC | Age: 84
End: 2024-05-30
Payer: COMMERCIAL

## 2024-06-21 ENCOUNTER — DOCUMENTATION ONLY (OUTPATIENT)
Dept: FAMILY MEDICINE | Facility: CLINIC | Age: 84
End: 2024-06-21
Payer: COMMERCIAL

## 2024-06-21 DIAGNOSIS — E78.00 HYPERCHOLESTEREMIA: Primary | ICD-10-CM

## 2024-06-24 ENCOUNTER — LAB (OUTPATIENT)
Dept: LAB | Facility: CLINIC | Age: 84
End: 2024-06-24
Payer: COMMERCIAL

## 2024-06-24 DIAGNOSIS — E78.00 HYPERCHOLESTEREMIA: ICD-10-CM

## 2024-06-24 LAB
ALBUMIN SERPL BCG-MCNC: 4.5 G/DL (ref 3.5–5.2)
ALP SERPL-CCNC: 68 U/L (ref 40–150)
ALT SERPL W P-5'-P-CCNC: 17 U/L (ref 0–50)
ANION GAP SERPL CALCULATED.3IONS-SCNC: 8 MMOL/L (ref 7–15)
AST SERPL W P-5'-P-CCNC: 47 U/L (ref 0–45)
BILIRUB SERPL-MCNC: 1.1 MG/DL
BUN SERPL-MCNC: 12.1 MG/DL (ref 8–23)
CALCIUM SERPL-MCNC: 10 MG/DL (ref 8.8–10.2)
CHLORIDE SERPL-SCNC: 97 MMOL/L (ref 98–107)
CHOLEST SERPL-MCNC: 202 MG/DL
CREAT SERPL-MCNC: 0.79 MG/DL (ref 0.51–0.95)
DEPRECATED HCO3 PLAS-SCNC: 27 MMOL/L (ref 22–29)
EGFRCR SERPLBLD CKD-EPI 2021: 74 ML/MIN/1.73M2
FASTING STATUS PATIENT QL REPORTED: YES
FASTING STATUS PATIENT QL REPORTED: YES
GLUCOSE SERPL-MCNC: 85 MG/DL (ref 70–99)
HDLC SERPL-MCNC: 103 MG/DL
LDLC SERPL CALC-MCNC: 92 MG/DL
NONHDLC SERPL-MCNC: 99 MG/DL
POTASSIUM SERPL-SCNC: 4.7 MMOL/L (ref 3.4–5.3)
PROT SERPL-MCNC: 7.1 G/DL (ref 6.4–8.3)
SODIUM SERPL-SCNC: 132 MMOL/L (ref 135–145)
TRIGL SERPL-MCNC: 33 MG/DL

## 2024-06-24 PROCEDURE — 36415 COLL VENOUS BLD VENIPUNCTURE: CPT

## 2024-06-24 PROCEDURE — 80061 LIPID PANEL: CPT

## 2024-06-24 PROCEDURE — 80053 COMPREHEN METABOLIC PANEL: CPT

## 2024-07-01 ENCOUNTER — MYC MEDICAL ADVICE (OUTPATIENT)
Dept: FAMILY MEDICINE | Facility: CLINIC | Age: 84
End: 2024-07-01
Payer: COMMERCIAL

## 2024-07-01 DIAGNOSIS — E78.00 HYPERCHOLESTEREMIA: Primary | ICD-10-CM

## 2024-07-01 RX ORDER — ATORVASTATIN CALCIUM 10 MG/1
10 TABLET, FILM COATED ORAL DAILY
Qty: 90 TABLET | Refills: 1 | Status: SHIPPED | OUTPATIENT
Start: 2024-07-01

## 2024-07-18 ENCOUNTER — MYC MEDICAL ADVICE (OUTPATIENT)
Dept: FAMILY MEDICINE | Facility: CLINIC | Age: 84
End: 2024-07-18
Payer: COMMERCIAL

## 2024-07-18 DIAGNOSIS — R05.3 CHRONIC COUGH: Primary | ICD-10-CM

## 2024-07-28 NOTE — TELEPHONE ENCOUNTER
She will need to be seen, chest x-ray alone would not give me much information, we need to discuss her symptoms.  Can you let her know that?  Thanks

## 2024-07-29 DIAGNOSIS — R05.3 CHRONIC COUGH: Primary | ICD-10-CM

## 2024-07-29 NOTE — Clinical Note
Future Appointments 10/25/2024 10:00 AM   [facility] PULMON[facility]      [facility]               10/25/2024 11:00 AM   [facility]

## 2024-09-03 DIAGNOSIS — I10 ESSENTIAL HYPERTENSION WITH GOAL BLOOD PRESSURE LESS THAN 140/90: ICD-10-CM

## 2024-09-03 RX ORDER — AMLODIPINE BESYLATE 5 MG/1
5 TABLET ORAL DAILY
Qty: 90 TABLET | Refills: 1 | Status: SHIPPED | OUTPATIENT
Start: 2024-09-03

## 2024-10-07 ENCOUNTER — TELEPHONE (OUTPATIENT)
Dept: PULMONOLOGY | Facility: CLINIC | Age: 84
End: 2024-10-07
Payer: COMMERCIAL

## 2024-10-07 NOTE — TELEPHONE ENCOUNTER
LVM regarding CXR need to be completed prior to seeing provider. Imaging number provided [phone].

## 2024-10-18 DIAGNOSIS — I10 ESSENTIAL HYPERTENSION WITH GOAL BLOOD PRESSURE LESS THAN 140/90: ICD-10-CM

## 2024-10-18 RX ORDER — LOSARTAN POTASSIUM 100 MG/1
100 TABLET ORAL DAILY
Qty: 90 TABLET | Refills: 0 | Status: SHIPPED | OUTPATIENT
Start: 2024-10-18

## 2024-10-22 ENCOUNTER — ANCILLARY PROCEDURE (OUTPATIENT)
Dept: GENERAL RADIOLOGY | Facility: CLINIC | Age: 84
End: 2024-10-22
Attending: STUDENT IN AN ORGANIZED HEALTH CARE EDUCATION/TRAINING PROGRAM
Payer: COMMERCIAL

## 2024-10-22 DIAGNOSIS — R05.3 CHRONIC COUGH: ICD-10-CM

## 2024-10-22 PROCEDURE — 71046 X-RAY EXAM CHEST 2 VIEWS: CPT | Mod: TC | Performed by: RADIOLOGY

## 2024-10-24 NOTE — PROGRESS NOTES
"Pulmonary Clinic Note    Date of Service: 10/25/2024     Chief Complaint   Patient presents with    Consult       A/P:  84F SVT, HTN, hypothyroidism, allergic rhinitis being seen for chronic cough. Normal PFTs and chest imaging. Evidence of chronic nasopharyngitis on last scope by ENT, but symptoms reportedly pre-date this. I think her cough is multifactorial; sinus disease and possible cough-variant asthma.     - start ICS-LABA (Symbicort) twice daily, rinse mouth out after use  - continue fluticasone nasal spray  - OK to substitute medications based on formulary     History:  84F SVT, HTN, hypothyroidism, allergic rhinitis being seen for chronic cough. Saw outside ENT, evidence of chronic nasopharyngitis, given topical and systemic ABx. She is prescribed prn albuterol. She is not on ACE inhibitor. Has had cough for years, occasionally productive of thick sputum. No nocturnal cough. No clear inciting factor. Cough is frequent, \"drives my  and kids crazy.\" Has not found any helpful remedies. Albuterol has not helped. Using Flonase nasal sprays. ABx given by ENT did not help. Denies GERD. Seasonal post-nasal gtt, does not use OTC allergy meds. BANERJEE w/ strenuous activity. Breathing does not limit day to day activities. Notices cough more w/ activity. No SOB at rest. No chest pain or tightness. Does hear wheezing.     Smoking: social in college, second hand smoke exposure  Bird exposure: no             Animal exposure: no        Inhalation exposure: no                            10 point review of systems negative, aside from that mentioned in HPI.    /71   Pulse 80   Wt 61.2 kg (135 lb)   LMP  (LMP Unknown)   SpO2 97%   BMI 23.54 kg/m    Gen: well-appearing  HEENT: Mallampati I  Card: RRR  Pulm: clear bilaterally   Abd: soft  MSK: no edema, no acute joint abnormality   Skin: no obvious rash  Psych: normal affect  Neuro: alert and oriented     Labs:  Personally reviewed  Abs eos (3/2017) - " 400    Imaging/Studies: Personally reviewed  PFTs (10/2024) - normal pulmonary function  CXR (10/2024) - no acute infiltrates     Past Medical History:   Diagnosis Date    Allergic rhinitis, cause unspecified     Arthritis     Personal history of alcoholism (H)     Sleep apnea     SVT (supraventricular tachycardia) (H)     s/p atrial tachycardia ablation near CS os    Unspecified essential hypertension     Unspecified hypothyroidism      Past Surgical History:   Procedure Laterality Date    COLONOSCOPY N/A 10/31/2018    Procedure: COMBINED COLONOSCOPY, SINGLE OR MULTIPLE BIOPSY/POLYPECTOMY BY BIOPSY;  Surgeon: Leon Cosme MD;  Location:  GI    EP ABLATION SVT N/A 10/8/2019    Procedure: EP Ablation SVT;  Surgeon: Laney Arriaza MD;  Location:  HEART CARDIAC CATH LAB    EP ABLATION SVT N/A 6/10/2020    Procedure: EP Ablation SVT;  Surgeon: Laney Arriaza MD;  Location:  HEART CARDIAC CATH LAB    ESOPHAGOSCOPY, GASTROSCOPY, DUODENOSCOPY (EGD), COMBINED N/A 10/31/2018    Procedure: GASTROSCOPY;  Surgeon: Leon Cosme MD;  Location:  GI    GYN SURGERY      HYSTERECTOMY      JOINT REPLACEMENT Bilateral     knee replacement Bilateral     ORTHOPEDIC SURGERY      ZZC NONSPECIFIC PROCEDURE  1974    s/p Vaginal hysterectomy     Family History   Problem Relation Age of Onset    Cancer Sister     Heart Disease Father     Hypertension Father     Thyroid Disease Father     Other Cancer Other          age 65    Other Cancer Other          age 56    Other Cancer Niece          age 53    Other Cancer Sister          age 51     Social History     Socioeconomic History    Marital status:      Spouse name: Not on file    Number of children: Not on file    Years of education: Not on file    Highest education level: Not on file   Occupational History    Not on file   Tobacco Use    Smoking status: Never    Smokeless tobacco: Never   Vaping Use    Vaping status: Never Used   Substance and  Sexual Activity    Alcohol use: No     Alcohol/week: 0.0 standard drinks of alcohol    Drug use: No    Sexual activity: Yes     Partners: Male   Other Topics Concern     Service No    Blood Transfusions No    Caffeine Concern No    Occupational Exposure No    Hobby Hazards No    Sleep Concern Yes    Stress Concern No    Weight Concern No    Special Diet No    Back Care Yes    Exercise No    Bike Helmet Yes    Seat Belt Yes    Self-Exams Yes    Parent/sibling w/ CABG, MI or angioplasty before 65F 55M? Yes   Social History Narrative    Social Documentation:4/10        Balanced Diet: YES    Calcium intake: supplement per day    Caffeine: 4cups per day    Exercise:  type of activity varies;  3 times per week    Sunscreen: Yes    Seatbelts:  Yes    Self Breast Exam:  Yes    Self Testicular Exam: No - na    Physical/Emotional/Sexual Abuse: No -     Do you feel safe in your environment? Yes        Cholesterol screen up to date: yes    Eye Exam up to date: Yes    Dental Exam up to date: Yes    Pap smear up to date: Does Not Apply    Mammogram up to date: utd    Dexa Scan up to date: Yes    Colonoscopy up to date: Yes    Immunizations up to date: Yes    Glucose screen if over 40:  No -     Fredy Carroll ma             Social Drivers of Health     Financial Resource Strain: Low Risk  (3/15/2024)    Financial Resource Strain     Within the past 12 months, have you or your family members you live with been unable to get utilities (heat, electricity) when it was really needed?: No   Food Insecurity: Low Risk  (3/15/2024)    Food Insecurity     Within the past 12 months, did you worry that your food would run out before you got money to buy more?: No     Within the past 12 months, did the food you bought just not last and you didn t have money to get more?: No   Transportation Needs: Low Risk  (3/15/2024)    Transportation Needs     Within the past 12 months, has lack of transportation kept you from medical  appointments, getting your medicines, non-medical meetings or appointments, work, or from getting things that you need?: No   Physical Activity: Sufficiently Active (3/15/2024)    Exercise Vital Sign     Days of Exercise per Week: 6 days     Minutes of Exercise per Session: 60 min   Stress: No Stress Concern Present (3/15/2024)    [employer] [place] [employer] - Occupational Stress Questionnaire     Feeling of Stress : Only a little   Social Connections: Unknown (3/15/2024)    Social Connection and Isolation Panel [NHANES]     Frequency of Communication with Friends and Family: Not on file     Frequency of Social Gatherings with Friends and Family: More than three times a week     Attends [employer] Services: Not on file     Active Member of Clubs or Organizations: Not on file     Attends Club or Organization Meetings: Not on file     Marital Status: Not on file   Interpersonal Safety: Low Risk  (3/15/2024)    Interpersonal Safety     Do you feel physically and emotionally safe where you currently live?: Yes     Within the past 12 months, have you been hit, slapped, kicked or otherwise physically hurt by someone?: No     Within the past 12 months, have you been humiliated or emotionally abused in other ways by your partner or ex-partner?: No   Housing Stability: Low Risk  (3/15/2024)    Housing Stability     Do you have housing? : Yes     Are you worried about losing your housing?: No       50 minutes spent reviewing chart, reviewing test results, talking with and examining patient, formulating plan, and documentation on the day of the encounter.    Sav Lake MD  Pulmonary and Critical Care Medicine  [facility]

## 2024-10-25 ENCOUNTER — OFFICE VISIT (OUTPATIENT)
Dept: PULMONOLOGY | Facility: CLINIC | Age: 84
End: 2024-10-25
Payer: COMMERCIAL

## 2024-10-25 VITALS
DIASTOLIC BLOOD PRESSURE: 71 MMHG | HEART RATE: 80 BPM | BODY MASS INDEX: 23.54 KG/M2 | SYSTOLIC BLOOD PRESSURE: 130 MMHG | WEIGHT: 135 LBS | OXYGEN SATURATION: 97 %

## 2024-10-25 DIAGNOSIS — R05.3 CHRONIC COUGH: Primary | ICD-10-CM

## 2024-10-25 DIAGNOSIS — R05.3 CHRONIC COUGH: ICD-10-CM

## 2024-10-25 PROCEDURE — 94729 DIFFUSING CAPACITY: CPT | Performed by: INTERNAL MEDICINE

## 2024-10-25 PROCEDURE — 99204 OFFICE O/P NEW MOD 45 MIN: CPT | Mod: 25 | Performed by: STUDENT IN AN ORGANIZED HEALTH CARE EDUCATION/TRAINING PROGRAM

## 2024-10-25 PROCEDURE — 94150 VITAL CAPACITY TEST: CPT | Performed by: INTERNAL MEDICINE

## 2024-10-25 PROCEDURE — 94375 RESPIRATORY FLOW VOLUME LOOP: CPT | Performed by: INTERNAL MEDICINE

## 2024-10-25 PROCEDURE — 94726 PLETHYSMOGRAPHY LUNG VOLUMES: CPT | Performed by: INTERNAL MEDICINE

## 2024-10-25 RX ORDER — BUDESONIDE AND FORMOTEROL FUMARATE DIHYDRATE 160; 4.5 UG/1; UG/1
2 AEROSOL RESPIRATORY (INHALATION) 2 TIMES DAILY
Qty: 10.2 G | Refills: 3 | Status: SHIPPED | OUTPATIENT
Start: 2024-10-25

## 2024-10-25 ASSESSMENT — PAIN SCALES - GENERAL: PAINLEVEL_OUTOF10: NO PAIN (0)

## 2024-10-25 NOTE — LETTER
"10/25/2024      Mely Guerra  [address]   [facility] [place] [address]      Dear Colleague,    Thank you for referring your patient, Mely Guerra, to the [facility] [place]. Please see a copy of my visit note below.    Pulmonary Clinic Note    Date of Service: 10/25/2024     Chief Complaint   Patient presents with     Consult       A/P:  84F SVT, HTN, hypothyroidism, allergic rhinitis being seen for chronic cough. Normal PFTs and chest imaging. Evidence of chronic nasopharyngitis on last scope by ENT, but symptoms reportedly pre-date this. I think her cough is multifactorial; sinus disease and possible cough-variant asthma.     - start ICS-LABA (Symbicort) twice daily, rinse mouth out after use  - continue fluticasone nasal spray  - OK to substitute medications based on formulary     History:  84F SVT, HTN, hypothyroidism, allergic rhinitis being seen for chronic cough. Saw outside ENT, evidence of chronic nasopharyngitis, given topical and systemic ABx. She is prescribed prn albuterol. She is not on ACE inhibitor. Has had cough for years, occasionally productive of thick sputum. No nocturnal cough. No clear inciting factor. Cough is frequent, \"drives my  and kids crazy.\" Has not found any helpful remedies. Albuterol has not helped. Using Flonase nasal sprays. ABx given by ENT did not help. Denies GERD. Seasonal post-nasal gtt, does not use OTC allergy meds. BANERJEE w/ strenuous activity. Breathing does not limit day to day activities. Notices cough more w/ activity. No SOB at rest. No chest pain or tightness. Does hear wheezing.     Smoking: social in college, second hand smoke exposure  Bird exposure: no             Animal exposure: no        Inhalation exposure: no                            10 point review of systems negative, aside from that mentioned in HPI.    /71   Pulse 80   Wt 61.2 kg (135 lb)   LMP  (LMP Unknown)   SpO2 97%   BMI 23.54 kg/m    Gen: " well-appearing  HEENT: Mallampati I  Card: RRR  Pulm: clear bilaterally   Abd: soft  MSK: no edema, no acute joint abnormality   Skin: no obvious rash  Psych: normal affect  Neuro: alert and oriented     Labs:  Personally reviewed  Abs eos (3/2017) - 400    Imaging/Studies: Personally reviewed  PFTs (10/2024) - normal pulmonary function  CXR (10/2024) - no acute infiltrates     Past Medical History:   Diagnosis Date     Allergic rhinitis, cause unspecified      Arthritis      Personal history of alcoholism (H)      Sleep apnea      SVT (supraventricular tachycardia) (H)     s/p atrial tachycardia ablation near CS os     Unspecified essential hypertension      Unspecified hypothyroidism      Past Surgical History:   Procedure Laterality Date     COLONOSCOPY N/A 10/31/2018    Procedure: COMBINED COLONOSCOPY, SINGLE OR MULTIPLE BIOPSY/POLYPECTOMY BY BIOPSY;  Surgeon: Leon Cosme MD;  Location:  GI     EP ABLATION SVT N/A 10/8/2019    Procedure: EP Ablation SVT;  Surgeon: Laney Arriaza MD;  Location:  HEART CARDIAC CATH LAB     EP ABLATION SVT N/A 6/10/2020    Procedure: EP Ablation SVT;  Surgeon: Laney Arriaza MD;  Location:  HEART CARDIAC CATH LAB     ESOPHAGOSCOPY, GASTROSCOPY, DUODENOSCOPY (EGD), COMBINED N/A 10/31/2018    Procedure: GASTROSCOPY;  Surgeon: Leon Cosme MD;  Location:  GI     GYN SURGERY       HYSTERECTOMY       JOINT REPLACEMENT Bilateral      knee replacement Bilateral      ORTHOPEDIC SURGERY       ZZC NONSPECIFIC PROCEDURE  1974    s/p Vaginal hysterectomy     Family History   Problem Relation Age of Onset     Cancer Sister      Heart Disease Father      Hypertension Father      Thyroid Disease Father      Other Cancer Other          age 65     Other Cancer Other          age 56     Other Cancer Niece          age 53     Other Cancer Sister          age 51     Social History     Socioeconomic History     Marital status:      Spouse name: Not on file      Number of children: Not on file     Years of education: Not on file     Highest education level: Not on file   Occupational History     Not on file   Tobacco Use     Smoking status: Never     Smokeless tobacco: Never   Vaping Use     Vaping status: Never Used   Substance and Sexual Activity     Alcohol use: No     Alcohol/week: 0.0 standard drinks of alcohol     Drug use: No     Sexual activity: Yes     Partners: Male   Other Topics Concern      Service No     Blood Transfusions No     Caffeine Concern No     Occupational Exposure No     Hobby Hazards No     Sleep Concern Yes     Stress Concern No     Weight Concern No     Special Diet No     Back Care Yes     Exercise No     Bike Helmet Yes     Seat Belt Yes     Self-Exams Yes     Parent/sibling w/ CABG, MI or angioplasty before 65F 55M? Yes   Social History Narrative    Social Documentation:4/10        Balanced Diet: YES    Calcium intake: supplement per day    Caffeine: 4cups per day    Exercise:  type of activity varies;  3 times per week    Sunscreen: Yes    Seatbelts:  Yes    Self Breast Exam:  Yes    Self Testicular Exam: No - na    Physical/Emotional/Sexual Abuse: No -     Do you feel safe in your environment? Yes        Cholesterol screen up to date: yes    Eye Exam up to date: Yes    Dental Exam up to date: Yes    Pap smear up to date: Does Not Apply    Mammogram up to date: utd    Dexa Scan up to date: Yes    Colonoscopy up to date: Yes    Immunizations up to date: Yes    Glucose screen if over 40:  No -     Fredy Carroll ma             Social Drivers of Health     Financial Resource Strain: Low Risk  (3/15/2024)    Financial Resource Strain      Within the past 12 months, have you or your family members you live with been unable to get utilities (heat, electricity) when it was really needed?: No   Food Insecurity: Low Risk  (3/15/2024)    Food Insecurity      Within the past 12 months, did you worry that your food would run out before you  got money to buy more?: No      Within the past 12 months, did the food you bought just not last and you didn t have money to get more?: No   Transportation Needs: Low Risk  (3/15/2024)    Transportation Needs      Within the past 12 months, has lack of transportation kept you from medical appointments, getting your medicines, non-medical meetings or appointments, work, or from getting things that you need?: No   Physical Activity: Sufficiently Active (3/15/2024)    Exercise Vital Sign      Days of Exercise per Week: 6 days      Minutes of Exercise per Session: 60 min   Stress: No Stress Concern Present (3/15/2024)    [employer] [place] [employer] - Occupational Stress Questionnaire      Feeling of Stress : Only a little   Social Connections: Unknown (3/15/2024)    Social Connection and Isolation Panel [NHANES]      Frequency of Communication with Friends and Family: Not on file      Frequency of Social Gatherings with Friends and Family: More than three times a week      Attends Protestant Services: Not on file      Active Member of Clubs or Organizations: Not on file      Attends Club or Organization Meetings: Not on file      Marital Status: Not on file   Interpersonal Safety: Low Risk  (3/15/2024)    Interpersonal Safety      Do you feel physically and emotionally safe where you currently live?: Yes      Within the past 12 months, have you been hit, slapped, kicked or otherwise physically hurt by someone?: No      Within the past 12 months, have you been humiliated or emotionally abused in other ways by your partner or ex-partner?: No   Housing Stability: Low Risk  (3/15/2024)    Housing Stability      Do you have housing? : Yes      Are you worried about losing your housing?: No       50 minutes spent reviewing chart, reviewing test results, talking with and examining patient, formulating plan, and documentation on the day of the encounter.    Sav Lake MD  Pulmonary and Critical Care  [facility]       Again, thank you for allowing me to participate in the care of your patient.        Sincerely,        Sav Lake MD

## 2024-10-25 NOTE — PATIENT INSTRUCTIONS
Thank you for coming to pulmonary clinic. Your pulmonary function tests are normal. Your chest imaging is normal. I think your cough is related to sinus disease and possible cough-variant asthma. I would like you to start Symbicort twice daily, rinse your mouth out after use. I will see you back in 3 months.

## 2024-10-25 NOTE — PROGRESS NOTES
Mely Guerra comes into clinic today at the request of Dr. Lake, Ordering Provider for PFT      This service provided today was under the supervising provider of the day Dr. Lake, who was available if needed.    Antonio Mcdonald, RT

## 2024-10-28 LAB
DLCOUNC-%PRED-PRE: 100 %
DLCOUNC-PRE: 18.08 ML/MIN/MMHG
DLCOUNC-PRED: 17.92 ML/MIN/MMHG
ERV-%PRED-PRE: 41 %
ERV-PRE: 0.33 L
ERV-PRED: 0.8 L
EXPTIME-PRE: 4.4 SEC
FEF2575-%PRED-PRE: 118 %
FEF2575-PRE: 1.65 L/SEC
FEF2575-PRED: 1.39 L/SEC
FEFMAX-%PRED-PRE: 119 %
FEFMAX-PRE: 5.33 L/SEC
FEFMAX-PRED: 4.45 L/SEC
FEV1-%PRED-PRE: 103 %
FEV1-PRE: 1.77 L
FEV1FEV6-PRE: 80 %
FEV1FEV6-PRED: 77 %
FEV1FVC-PRE: 80 %
FEV1FVC-PRED: 78 %
FEV1SVC-PRE: 83 %
FEV1SVC-PRED: 60 %
FIFMAX-PRE: 4.37 L/SEC
FRCPLETH-%PRED-PRE: 112 %
FRCPLETH-PRE: 3.03 L
FRCPLETH-PRED: 2.7 L
FVC-%PRED-PRE: 98 %
FVC-PRE: 2.21 L
FVC-PRED: 2.23 L
IC-%PRED-PRE: 111 %
IC-PRE: 1.79 L
IC-PRED: 1.61 L
RVPLETH-%PRED-PRE: 120 %
RVPLETH-PRE: 2.7 L
RVPLETH-PRED: 2.24 L
TLCPLETH-%PRED-PRE: 99 %
TLCPLETH-PRE: 4.83 L
TLCPLETH-PRED: 4.86 L
VA-%PRED-PRE: 91 %
VA-PRE: 3.99 L
VC-%PRED-PRE: 74 %
VC-PRE: 2.13 L
VC-PRED: 2.87 L

## 2025-01-01 DIAGNOSIS — E78.00 HYPERCHOLESTEREMIA: ICD-10-CM

## 2025-01-02 RX ORDER — ATORVASTATIN CALCIUM 10 MG/1
10 TABLET, FILM COATED ORAL DAILY
Qty: 90 TABLET | Refills: 0 | Status: SHIPPED | OUTPATIENT
Start: 2025-01-02

## 2025-01-04 DIAGNOSIS — E03.9 ACQUIRED HYPOTHYROIDISM: ICD-10-CM

## 2025-01-06 RX ORDER — LEVOTHYROXINE SODIUM 100 UG/1
TABLET ORAL
Qty: 90 TABLET | Refills: 0 | Status: SHIPPED | OUTPATIENT
Start: 2025-01-06

## 2025-01-09 ENCOUNTER — LAB (OUTPATIENT)
Dept: LAB | Facility: CLINIC | Age: 85
End: 2025-01-09
Payer: COMMERCIAL

## 2025-01-09 ENCOUNTER — VIRTUAL VISIT (OUTPATIENT)
Dept: FAMILY MEDICINE | Facility: CLINIC | Age: 85
End: 2025-01-09
Payer: COMMERCIAL

## 2025-01-09 DIAGNOSIS — R19.7 DIARRHEA OF PRESUMED INFECTIOUS ORIGIN: ICD-10-CM

## 2025-01-09 DIAGNOSIS — R19.7 DIARRHEA OF PRESUMED INFECTIOUS ORIGIN: Primary | ICD-10-CM

## 2025-01-09 PROCEDURE — 98006 SYNCH AUDIO-VIDEO EST MOD 30: CPT | Performed by: FAMILY MEDICINE

## 2025-01-09 NOTE — PROGRESS NOTES
Mely is a 84 year old who is being evaluated via a billable telephone visit.    What phone number would you like to be contacted at? [phone]   How would you like to obtain your AVS? Joyce  Originating Location (pt. Location): Home  Distant Location (provider location):  Off-site  Telephone visit completed due to {audio only reason:126639}    {PROVIDER CHARTING PREFERENCE:702807}    Subjective   Mely is a 84 year old, presenting for the following health issues:  Gastrointestinal Problem        1/9/2025     9:03 AM   Additional Questions   Roomed by Charlie BAL     History of Present Illness       Reason for visit:  GI issues - Neurovirus concerns  Symptom onset:  3-4 weeks ago  Symptoms include:  Diarrhea  Symptom intensity:  Moderate  Symptom progression:  Staying the same  Had these symptoms before:  No  What makes it worse:  N/A  What makes it better:  Watching food consumption   She is taking medications regularly.   Diarrhea -   Onset: 3 week(s) ago  12/23/2024. Daughter had the norovirus and she recovered without any compilicats  Description:   Consistency of stool: watery and runny  Blood in stool: No  Do symptoms wake you at night: No  Normal number of BM's/day: 5  Number of loose stools in past 24 hours: 5  Intensity: moderate  History:          Exposure to anyone else with similar symptoms: YES        Have you had previous episodes of diarrhea: no - 8 yrsa go. She had a similar symptoms and it was diagnosed        Recent use of antibiotics: No                 Recent medication-new or changes(Rx or OTC): No        Recent travels: No         Have any tests/studies been done: none  Accompanying Signs & Symptoms:   Fever: {:490024}  Nausea or vomiting; {:779359}  Abdominal pain: {:313862}  Episodes of constipation: {:955199}  Weight loss: {:952540}  Is diarrhea associated to dairy products: {:627347}  Family History of Crohn's Disease or Ulcerative Colitis: {:837787}  Family history of Colon Cancer:  Subjective:       Patient ID: Jose Bliss is a 55 y.o. male.    Chief Complaint: Nasal Congestion    The patient location is: home  The chief complaint leading to consultation is: congestion    Visit type: audiovisual    Face to Face time with patient: 4 minutes (chart review started 12:32 pm; video started 12:35 pm; video ended 12:39 pm)  10 minutes of total time spent on the encounter, which includes face to face time and non-face to face time preparing to see the patient (eg, review of tests), Obtaining and/or reviewing separately obtained history, Documenting clinical information in the electronic or other health record, Independently interpreting results (not separately reported) and communicating results to the patient/family/caregiver, or Care coordination (not separately reported).     Each patient to whom he or she provides medical services by telemedicine is:  (1) informed of the relationship between the physician and patient and the respective role of any other health care provider with respect to management of the patient; and (2) notified that he or she may decline to receive medical services by telemedicine and may withdraw from such care at any time.    PCP: Dr. Moncada    Patient is new to me. Virtual visit for congestion post covid. Reports sore throat, congestion, and blood tinged nasal drainage started over the weekend. Tested positive for covid 11/21.  Took paxlovid and felt better by following Friday. No fever. No SOB or CP. Patient is otherwise without concerns today.        Sore Throat   This is a new problem. The current episode started in the past 7 days. The problem has been unchanged. Neither side of throat is experiencing more pain than the other. There has been no fever. The fever has been present for Less than 1 day. The pain is at a severity of 5/10. The pain is mild. Associated symptoms include congestion, coughing, headaches, a hoarse voice, a plugged ear sensation and trouble  swallowing. Pertinent negatives include no abdominal pain, diarrhea, drooling, ear discharge, ear pain, neck pain, shortness of breath, stridor, swollen glands or vomiting. He has had no exposure to strep or mono. He has tried acetaminophen for the symptoms. The treatment provided mild relief.   Review of Systems   HENT:  Positive for congestion, hoarse voice, sore throat and trouble swallowing. Negative for drooling, ear discharge and ear pain.    Respiratory:  Positive for cough. Negative for shortness of breath and stridor.    Gastrointestinal:  Negative for abdominal pain, diarrhea and vomiting.   Musculoskeletal:  Negative for neck pain.   Neurological:  Positive for headaches.       Objective:      Physical Exam  Constitutional:       General: He is not in acute distress.     Appearance: He is well-developed.   HENT:      Head: Normocephalic and atraumatic.   Eyes:      General: Lids are normal. No scleral icterus.     Extraocular Movements: Extraocular movements intact.      Conjunctiva/sclera: Conjunctivae normal.   Pulmonary:      Effort: Pulmonary effort is normal.      Comments: Able to speak in complete sentences without difficulty.  Neurological:      Mental Status: He is alert and oriented to person, place, and time.   Psychiatric:         Mood and Affect: Mood and affect normal.       Assessment:       1. Acute bacterial sinusitis        Plan:   1. Acute bacterial sinusitis  -     doxycycline (VIBRAMYCIN) 100 MG Cap; Take 1 capsule (100 mg total) by mouth 2 (two) times daily. for 7 days  Dispense: 14 capsule; Refill: 0    Advised to re-isolate for possible rebound covid after taking paxlovid.  Will treat for possible secondary sinusitis as well.  Advised follow up if worse/persistent.  Patient expressed understanding and agreement with plan.     {:109308}  Therapies tried: {:802261} with {:063418} relief    Loperamide.     {SUPERLIST (Optional):448807}    {additonal problems for provider to add (Optional):248190}    {ROS Picklists (Optional):926885}      Objective    Vitals - Patient Reported  Pain Score: No Pain (0)      Vitals:  No vitals were obtained today due to virtual visit.    Physical Exam   General: Alert and no distress //Respiratory: No audible wheeze, cough, or shortness of breath // Psychiatric:  Appropriate affect, tone, and pace of words      {Diagnostic Test Results (Optional):523607}      Phone call duration: *** minutes  Signed Electronically by: Butch Hernández MD  {Email feedback regarding this note to [email or web address]   :413182}

## 2025-01-13 ENCOUNTER — MYC MEDICAL ADVICE (OUTPATIENT)
Dept: FAMILY MEDICINE | Facility: CLINIC | Age: 85
End: 2025-01-13
Payer: COMMERCIAL

## 2025-01-13 ENCOUNTER — TELEPHONE (OUTPATIENT)
Dept: FAMILY MEDICINE | Facility: CLINIC | Age: 85
End: 2025-01-13
Payer: COMMERCIAL

## 2025-01-13 DIAGNOSIS — K52.9 CHRONIC DIARRHEA: Primary | ICD-10-CM

## 2025-01-13 LAB
O+P STL MICRO: ABNORMAL
SPECIMEN TYPE: ABNORMAL

## 2025-01-13 NOTE — TELEPHONE ENCOUNTER
FS,    Patient calling to discuss test result from 01/09  Appears routine parasite results finalized with abnormal results but no provider comment yet  Advised would send message to provider to review when they are able     Angelita REA RN

## 2025-01-14 NOTE — TELEPHONE ENCOUNTER
Called and spoke with patient regarding labs  Assisted with scheduling lab only     Angelita REA RN

## 2025-01-14 NOTE — PROGRESS NOTES
Mely is a 84 year old who is being evaluated via a billable video visit.    How would you like to obtain your AVS? MyChart  If the video visit is dropped, the invitation should be resent by: Text to cell phone: [phone]  Will anyone else be joining your video visit? No    Assessment & Plan     Mely was seen today for gastrointestinal problem.    Diagnoses and all orders for this visit:    Diarrhea of presumed infectious origin  Assessment and plan: Diarrhea with prolonged course.  Ddx inc viral gastroenteritis, food poisoning, parasitic infection including such as giardia, amoebas. Other ddx includes enteric bacterial infections and viruses.   Continue over-the-counter treatment, and anticipatory guidance.    Started workup today.     -     Enteric Bacteria and Virus Panel by ANNIE Stool; Future  -     Ova and Parasite Exam Routine; Future  Consider additional labs if symptoms persisted  - Clostridium difficile Toxin B PCR  - Cryptosporidium Antigen by EIA Stool    Other orders  -     REVIEW OF HEALTH MAINTENANCE PROTOCOL ORDERS    Follow-up:  Follow-up visit if symptoms fail to improve      Subjective   Mely is a 84 year old, presenting for the following health issues:  Gastrointestinal Problem        1/9/2025     9:03 AM   Additional Questions   Roomed by Charlie BAL       Video Start Time: 09:47 AM    History of Present Illness       Reason for visit:  GI issues - Neurovirus concerns  Symptom onset:  3-4 weeks ago  Symptoms include:  Diarrhea  Symptom intensity:  Moderate  Symptom progression:  Staying the same  Had these symptoms before:  No  What makes it worse:  N/A  What makes it better:  Watching food consumption   She is taking medications regularly.      Diarrhea -   Onset: 3 week(s) ago  12/23/2024. Daughter had the norovirus and she recovered without any compilicats  Description:   Consistency of stool: watery and runny  Blood in stool: No  Do symptoms wake you at night: No  Normal number of BM's/day:  5  Number of loose stools in past 24 hours: 5  Intensity: moderate  History:          Exposure to anyone else with similar symptoms: YES        Have you had previous episodes of diarrhea: no - 8 yrs ago. She had a similar symptoms and it was diagnosed        Recent use of antibiotics: No                 Recent medication-new or changes(Rx or OTC): No        Recent travels: No         Have any tests/studies been done: none  Accompanying Signs & Symptoms:   Fever: No  Nausea or vomiting; No  Abdominal pain: No  Episodes of constipation: No  Weight loss: No  Is diarrhea associated to dairy products: No  Family History of Crohn's Disease or Ulcerative Colitis: No  Family history of Colon Cancer: No  Therapies tried: Imodium AD with minor relief    Review of Systems  Constitutional, neuro, ENT, endocrine, pulmonary, cardiac, gastrointestinal, genitourinary, musculoskeletal, integument and psychiatric systems are negative, except as otherwise noted.      Objective       Vitals:  No vitals were obtained today due to virtual visit.    Physical Exam   GENERAL: alert and no distress  EYES: Eyes grossly normal to inspection.  No discharge or erythema, or obvious scleral/conjunctival abnormalities.  RESP: No audible wheeze, cough, or visible cyanosis.    SKIN: Visible skin clear. No significant rash, abnormal pigmentation or lesions.  NEURO: Cranial nerves grossly intact.  Mentation and speech appropriate for age.  PSYCH: Appropriate affect, tone, and pace of words    Results for orders placed or performed in visit on 01/09/25   Enteric Bacteria and Virus Panel by ANNIE Stool     Status: Normal    Specimen: Per Rectum; Stool   Result Value Ref Range    Campylobacter species Negative Negative    Salmonella species Negative Negative    Vibrio species Negative Negative    Vibrio cholerae Negative Negative    Yersinia enterocolitica Negative Negative    Enteropathogenic E. coli (EPEC) Negative Negative, NA    Shiga-like  toxin-producing E. coli (STEC) Negative Negative    Shigella/Enteroinvasive E. coli (EIEC) Negative Negative    Cryptosporidium species Negative Negative    Giardia lamblia Negative Negative    Norovirus Gl/Gll Negative Negative    Rotavirus A Negative Negative    Plesiomonas shigelloides Negative Negative    Enteroaggregative E. coli (EAEC) Negative Negative    Enterotoxigenic E. coli (ETEC) Negative Negative    E. coli O157 NA Negative, NA    Cyclospora cayetanensis Negative Negative    Entamoeba histolytica Negative Negative    Adenovirus F40/41 Negative Negative    Astrovirus Negative Negative    Sapovirus Negative Negative    Narrative    Assay performed using the FDA-cleared FilmArray GI Panel from [employer], [place].  A negative result should not rule out infection in patients with a probability for gastrointestinal infection. The assay does not test for all potential infectious agents of diarrheal disease.  Positive results do not distinguish between a viable or replicating organism and the presence of a nonviable organism or nucleic acid, nor do they exclude the possibility of coinfection by organisms not in the panel.  Results are intended to aid in the diagnosis of illness and are meant to be used in conjunction with other clinical findings.  This test has been verified and is performed by the Infectious Diseases Diagnostic Laboratory at [facility]. This laboratory is certified under the Clinical Laboratory Improvement Amendments of 1988 (CLIA-88) as qualified to perform high complexity clinical laboratory testing.   Ova and Parasite Exam Routine     Status: Abnormal    Specimen: Per Rectum; Stool   Result Value Ref Range    OVA AND PARASITE EXAM Endolimax uziel trophozoites (A) Negative    Parasitology Exam Specimen Type Stool     Narrative    Cryptosporidium, Cyclospora and Microsporidia are not readily detected by this method.         Video-Visit Details    Type of service:  Video Visit    Video End Time:11:13 AM  Originating Location (pt. Location): Home  Distant Location (provider location):  On-site  Platform used for Video Visit: Ermias  Signed Electronically by: Butch Hernández MD

## 2025-01-15 ENCOUNTER — LAB (OUTPATIENT)
Dept: LAB | Facility: CLINIC | Age: 85
End: 2025-01-15
Payer: COMMERCIAL

## 2025-01-15 DIAGNOSIS — I10 ESSENTIAL HYPERTENSION WITH GOAL BLOOD PRESSURE LESS THAN 140/90: ICD-10-CM

## 2025-01-15 DIAGNOSIS — K52.9 CHRONIC DIARRHEA: ICD-10-CM

## 2025-01-15 LAB
ERYTHROCYTE [DISTWIDTH] IN BLOOD BY AUTOMATED COUNT: 12.2 % (ref 10–15)
HCT VFR BLD AUTO: 38.4 % (ref 35–47)
HGB BLD-MCNC: 12.9 G/DL (ref 11.7–15.7)
MCH RBC QN AUTO: 29.8 PG (ref 26.5–33)
MCHC RBC AUTO-ENTMCNC: 33.6 G/DL (ref 31.5–36.5)
MCV RBC AUTO: 89 FL (ref 78–100)
PLATELET # BLD AUTO: 217 10E3/UL (ref 150–450)
RBC # BLD AUTO: 4.33 10E6/UL (ref 3.8–5.2)
WBC # BLD AUTO: 6.1 10E3/UL (ref 4–11)

## 2025-01-15 PROCEDURE — 85027 COMPLETE CBC AUTOMATED: CPT

## 2025-01-15 PROCEDURE — 80053 COMPREHEN METABOLIC PANEL: CPT

## 2025-01-15 PROCEDURE — 36415 COLL VENOUS BLD VENIPUNCTURE: CPT

## 2025-01-15 RX ORDER — LOSARTAN POTASSIUM 100 MG/1
100 TABLET ORAL DAILY
Qty: 90 TABLET | Refills: 0 | OUTPATIENT
Start: 2025-01-15

## 2025-01-15 RX ORDER — LOSARTAN POTASSIUM 100 MG/1
100 TABLET ORAL DAILY
Qty: 90 TABLET | Refills: 0 | Status: SHIPPED | OUTPATIENT
Start: 2025-01-15

## 2025-01-16 LAB
ALBUMIN SERPL BCG-MCNC: 4.5 G/DL (ref 3.5–5.2)
ALP SERPL-CCNC: 67 U/L (ref 40–150)
ALT SERPL W P-5'-P-CCNC: 20 U/L (ref 0–50)
ANION GAP SERPL CALCULATED.3IONS-SCNC: 11 MMOL/L (ref 7–15)
AST SERPL W P-5'-P-CCNC: 48 U/L (ref 0–45)
BILIRUB SERPL-MCNC: 0.8 MG/DL
BUN SERPL-MCNC: 11.2 MG/DL (ref 8–23)
C DIFF TOX B STL QL: NEGATIVE
CALCIUM SERPL-MCNC: 10.3 MG/DL (ref 8.8–10.4)
CHLORIDE SERPL-SCNC: 92 MMOL/L (ref 98–107)
CREAT SERPL-MCNC: 0.74 MG/DL (ref 0.51–0.95)
EGFRCR SERPLBLD CKD-EPI 2021: 80 ML/MIN/1.73M2
GLUCOSE SERPL-MCNC: 95 MG/DL (ref 70–99)
HCO3 SERPL-SCNC: 27 MMOL/L (ref 22–29)
LACTOFERRIN STL QL IA: NEGATIVE
POTASSIUM SERPL-SCNC: 4.1 MMOL/L (ref 3.4–5.3)
PROT SERPL-MCNC: 7.2 G/DL (ref 6.4–8.3)
SODIUM SERPL-SCNC: 130 MMOL/L (ref 135–145)

## 2025-01-20 ENCOUNTER — TELEPHONE (OUTPATIENT)
Dept: GASTROENTEROLOGY | Facility: CLINIC | Age: 85
End: 2025-01-20
Payer: COMMERCIAL

## 2025-01-20 NOTE — TELEPHONE ENCOUNTER
[facility] Call Center    Phone Message    May a detailed message be left on voicemail: Yes    Reason for Call: Other: Patient is currently scheduled on 3-3-25, as visit type New GI Urgent. This is outside the expected timeline for this referral. Patient has been added to the waitlist.      Action Taken: Message routed to:  Other: GI REFERRAL TRIAGE POOL     Travel Screening: Not Applicable

## 2025-01-21 NOTE — TELEPHONE ENCOUNTER
REFERRAL INFORMATION:  Referring Provider:  Butch Marinelli MD   Referring Clinic:  [facility]   Reason for Visit/Diagnosis: Functional diarrhea // Per pt // Ref: BUTCH MARINELLI // Recs: No outside      FUTURE VISIT INFORMATION:  Appointment Date: 3/3/25     NOTES STATUS DETAILS   OFFICE NOTE from Referring Provider Internal 1/9/25 OV- Butch Marinelli MD    MEDICATION LIST Internal    PROCEDURES     ENDOSCOPY  Internal 12/3/18, 10/31/118   COLONOSCOPY Internal 10/31/18   STOOL TESTING     H. PYLORI N/A    C. DIFF Internal 1/16/25   ENTERIC BACTERIA Internal 1/16/25   OVA + PARASITE N/A    LABS     PERTINENT LABS Internal    IMAGES     ULTRASOUND Internal 6/15/23 US abd

## 2025-01-27 NOTE — TELEPHONE ENCOUNTER
Pt called back. Pt accepted an appointment with Marva Mullen on 1/30/2025 at 10 AM for an in-person clinic visit.

## 2025-01-30 ENCOUNTER — TELEPHONE (OUTPATIENT)
Dept: GASTROENTEROLOGY | Facility: CLINIC | Age: 85
End: 2025-01-30

## 2025-01-30 ENCOUNTER — OFFICE VISIT (OUTPATIENT)
Dept: GASTROENTEROLOGY | Facility: CLINIC | Age: 85
End: 2025-01-30
Attending: FAMILY MEDICINE
Payer: COMMERCIAL

## 2025-01-30 VITALS
DIASTOLIC BLOOD PRESSURE: 74 MMHG | WEIGHT: 134.9 LBS | SYSTOLIC BLOOD PRESSURE: 154 MMHG | HEART RATE: 58 BPM | OXYGEN SATURATION: 99 % | BODY MASS INDEX: 23.52 KG/M2

## 2025-01-30 DIAGNOSIS — K52.9 CHRONIC DIARRHEA: Primary | ICD-10-CM

## 2025-01-30 ASSESSMENT — PAIN SCALES - GENERAL: PAINLEVEL_OUTOF10: MODERATE PAIN (4)

## 2025-01-30 NOTE — PATIENT INSTRUCTIONS
It was a pleasure taking care of you today.  I've included a brief summary of our discussion and care plan from today's visit below.  Please review this information with your primary care provider.  _______________________________________________________________________    My recommendations are summarized as follows:    Start daily Gas-X (simethicone is active ingredient)  Start daily Citrucel powder. 1 tsp daily x 2 weeks and increase by a tsp weekly for a goal of 1-2 tbsp a day   Substitute the tylenol for Celebrex in the morning. If you still have pain, OK to try the Celebrex in the afternoon. Then take the tylenol at night.   Order a colonoscopy - please call to schedule . Then call to schedule follow up with me 2 weeks after the procedure   CT scan of abdomen and pelvis ordered - call Imaging number     Please call our clinic or send a [employer] message to us if you have any questions or concerns. [employer] messages are answered by your nurse or doctor typically within 24 hours.  Please allow extra time on weekends and holidays    Return to GI Clinic in 1-2 weeks after scope to review your progress.    _______________________________________________________________________    How do I schedule labs, imaging studies, or procedures that were ordered in clinic today?      Labs: To schedule lab appointment at [facility], use my chart or call [phone]. If you have a [place] lab closer to home where you are regularly seen you can give them a call.      Procedures: If a colonoscopy, upper endoscopy, breath test, esophageal manometry, or pH impedence was ordered today, our endoscopy team will call you to schedule this. If you have not heard from our endoscopy team within a week, please call [phone] option 2 to schedule.      Imaging Studies: If you were scheduled for a CT scan, X-ray, MRI, ultrasound, HIDA scan, EKG or other imaging study, please call [phone] to have this scheduled.       Referral: If a referral to another specialty was ordered, expect a phone call or follow instructions above. If you have not heard from anyone regarding your referral in a week, please call our clinic to check the status.      Who do I call with any questions after my visit?  Please be in touch if there are any further questions that arise following today's visit.  There are multiple ways to contact your gastroenterology care team.       During business hours, you may reach a Gastroenterology nurse at [phone]     To schedule or reschedule an appointment, please call [phone].      You can always send a secure message through [employer].  [employer] messages are answered by your nurse or doctor typically within 24 hours.  Please allow extra time on weekends and holidays.       For urgent/emergent questions after business hours, you may reach the on-call GI Fellow by contacting the [facility]  at [phone].     How will I get the results of any tests ordered?    You will receive all of your results.  If you have signed up for [employer], any tests ordered at your visit will be available to you after your physician reviews them.  Typically this takes 1-2 weeks.  If there are urgent results that require a change in your care plan, your physician or nurse will call you to discuss the next steps.       What is [employer]?  [employer] is a secure way for you to access all of your healthcare records from the [facility].  It is a web based computer program, so you can sign on to it from any location.  It also allows you to send secure messages to your care team.  I recommend signing up for [employer] access if you have not already done so and are comfortable with using a computer.       How to I schedule a follow-up visit?  If you did not schedule a follow-up visit today, please call [phone] to schedule a follow-up office visit.      Sincerely,    Marva Mullen PA-C  Gastroenterology

## 2025-01-30 NOTE — NURSING NOTE
Do you have a history of colon cancer in your immediate family? NO    If yes who: negative     And what age  were they diagnosed:       Chief Complaint   Patient presents with    New Patient       Vitals:    01/30/25 0952   BP: (!) 154/74   Pulse: 58   SpO2: 99%   Weight: 61.2 kg (134 lb 14.4 oz)       Body mass index is 23.52 kg/m .    Angelica Marti MA

## 2025-01-30 NOTE — LETTER
1/30/2025      Mely Guerra  [address]   [facility] [address]      Dear Colleague,    Thank you for referring your patient, Mely Guerra, to the [facility] [place]. Please see a copy of my visit note below.      GI CLINIC VISIT    CC/REFERRING MD:  Butch Hernández  REASON FOR CONSULTATION: diarrhea    ASSESSMENT/PLAN:  Mely Guerra is a 84 year old year old female with PMHx of chronic joint pain (on daily Celebrex), Osteoporosis,  following with the [facility] GI group for diarrhea x 7 weeks.     She had similar sx in 2018 when she was taking daily Ibuprofen. Bidirectional scopes done, Cscope with TI evaluation showing stenotic IC valve and IC  ulceration (bx - active inflammation / ulceration w/o chronicity; comment - self-limited med induced changes). EGD was unremarkable with normal biopsies.  PillCam  -Limited exam as cam did not leave the SB but of visualized mucosa, appeared WNL. Enteric panel was negative. She told me she stopped Ibuprofen at that time and slowly her sx improved.     As of 6 mo ago, she is taking celebrex. She is reporting frequent, urgent and very watery diarrhea with  a lot of gas throughout the day. She has had an associated 3-4# weight loss but no bloody stools. Recent labs without anemia. Renal function intact and albumin normal.     FHX unknown     #diarrhea x 7 wk  Highly suspicious of microscopic colitis (risk - celebrex use) - she likely had a similar episode back in 2018; random biopsies were not taken so unclear if this was the actual diagnosis. We reviewed her 2018 work up. Recent c. diff, enteric panel were NEG (including giardia) and fecal calpro was normal and fecal lactoferrin was NEG. Ddx includes functional diarrhea, malabsorption (SIBO vs other).     -Cscope with TI eval and random colon biopsies  --indication, R/B and potential complications explained to patient. Agreeable.   -CTAP W contrast ordered   -start of daily  fiber  -advised to stop scheduled celebrex and make it daily PRN   -advised to start APAP 1 gram BID     Future consideration  1.GI RD consult  2. Breath test    Orders Placed This Encounter   Procedures     CT Abdomen Pelvis w/o & w Contrast     Adult GI  Referral - Procedure Only     RTC - few weeks after scope     Thank you for this consultation.  It was a pleasure to participate in the care of this patient; please contact me with any further questions.     Marva Mullen PA-C    Follow up: As planned above. Today, I personally spent 40 minutes in direct face to face time with the patient, of which greater than 50% of the time was spent in patient education and counseling as described above. Approximately 20 minutes were spent on indirect care associated with the patient's consultation including but not limited to review of: patient medical records to date, clinic visits, hospital records, lab results, imaging studies, procedural documentation, and coordinating care with other providers. The findings from this review are summarized in the above note. All of the above accounted for a cumulative time of 60 minutes and was performed on the date of service.     HPI  Mely Guerra is a 84 year old year old female with PMHx of chronic joint pain (on daily Celebrex), Osteoporosis,  following with the [facility] GI group for diarrhea.     1. Chronic diarrhea x 7 wk   -shares she had similar sx about 7 years and had scopes and pill came. On a very BLAND diet and slowly with time, she started feeling better. Then things were OK until 7 wk ago    She at first thought she had norovirus, went to a market and 2 days later, felt nauseated w/o emesis and abd pain and diarrhea.     Premorbid stooling - regular, twice day, typically a formed log with an occassional looser stool depending on what she ate     Now she's having 10-12x a day, a lot of gas and tiny particles of stool. At 4am, wakes up with fecal urgency. Multiple  episodes of nocturnal stooling.   -reporting a change in frequency, consistency, a lot of urgency, and new nocturnal stooling  -no bloody or black stools  -no oily droplets in toilet  -can have some mucus    Meds - tried imodium, pepto bismol which does help but she uses them for a short period of time only     Lower abdomen pain - worse at night in prone position. Does worsen with eating and need to defecate. Does improve after defecation.   -10-15 min after eating, will need to defecate   -Having some b/l lower back pain - tender to touch. No trauma.     Appetite is poor. Lost 3-4# throughout this time. Felt lightheaded yesterday. Not needed to go to ER for IVF.     She's keeping active but the diarrhea is disruptive and she has not been able to partake in social gatherings she normally would have done     About 6 mo ago she substituted APAP 1 gram AM for Celebrex 200 mg. She was previously taking Tylenol 1 g twice daily.  She has used Celebrex daily for the past 6 months about 7 years ago when she had similar symptoms, she was using multiple doses of ibuprofen    2018 -  Colonoscopy -ulceration to IC valve, stricture at the IC Valve.  normal terminal ileum normal macro colon. biopsy of IC ulceration -active colitis with ulceration granulation tissue  without chronicity. The findings may represent an acute self-limited process or medication induced injury.  Clinical correlation is suggested.     EGD for heartburn -normal macro exam.  Gastric biopsy - WNL w/o h.pylori. Duodenum biopsy unremarkable without celiac disease    PillCam -limited exam as cam stayed in the small intestines; of visualized mucosa, SB appeared normal      ROS  Denies fevers, chills, emesis, dysphagia, odynophagia, dheartburn, regurgitation, bloating/fullness, post prandial bloating, black tarrying stools, bloody stools    Family Hx  Her parents passed young    P Aunt - pancreatic cancer   No other known family history or GI related malignancy  (esophageal, gastric, pancreatic, liver or colon) or family history of IBD/celiac disease.     Social Hx   Yes Tylenol  Yes NSAIDs - Celebrex     No ETOH  No tobacco products   No recreational drug use     PROBLEM LIST  Patient Active Problem List    Diagnosis Date Noted     Dysphagia 08/30/2017     Priority: Medium     Age-related osteoporosis without current pathological fracture 07/10/2017     Priority: Medium     Disorder of bone and cartilage 07/10/2017     Priority: Medium     2014:  Lumbar Spine (L1-L4) T-score: -0.4   Significant degenerative and/or osteosclerotic changes are present, falsely improving result.   Forearm (radius 33%) T-score: -1.1    Lumbar (L1-L4) BMD: 1.142 Previous: 1.125   Total Hip Mean BMD: NA metal Previous: 0.793        2017:  Lumbar Spine (L1-L4)      T-score:  -0.2    Significant degenerative and/or osteosclerotic changes are present, falsely improving result.                Left Femoral Neck            T-score:  NA metal               Right Femoral Neck         T-score:  NA Metal               Forearm (radius 33%)      T-score:  -1.9       Rosacea 05/18/2017     Priority: Medium     Dermatitis 08/10/2016     Priority: Medium     Sleep apnea 06/03/2014     Priority: Medium     Otitis externa 04/02/2012     Priority: Medium     Essential hypertension with goal blood pressure less than 140/90 03/09/2011     Priority: Medium     Hyperlipidemia LDL goal <160 06/15/2009     Priority: Medium     Osteoarthritis 12/21/2004     Priority: Medium     Problem list name updated by automated process. Provider to review       Osteoporosis 04/13/2004     Priority: Medium     Osteopenia 2017  Lumbar Spine (L1-L4)      T-score:  -0.2    Significant degenerative and/or osteosclerotic changes are present, falsely improving result.                Left Femoral Neck            T-score:  NA metal               Right Femoral Neck         T-score:  NA Metal               Forearm (radius 33%)      T-score:   -1.9       Allergic rhinitis 02/18/2004     Priority: Medium     Problem list name updated by automated process. Provider to review       Acquired hypothyroidism 10/14/2003     Priority: Medium     Problem list name updated by automated process. Provider to review         PERTINENT PAST MEDICAL HISTORY:  Past Medical History:   Diagnosis Date     Allergic rhinitis, cause unspecified      Arthritis      Personal history of alcoholism (H)      Sleep apnea      SVT (supraventricular tachycardia)     s/p atrial tachycardia ablation near CS os     Unspecified essential hypertension      Unspecified hypothyroidism        PREVIOUS SURGERIES:  Past Surgical History:   Procedure Laterality Date     COLONOSCOPY N/A 10/31/2018    Procedure: COMBINED COLONOSCOPY, SINGLE OR MULTIPLE BIOPSY/POLYPECTOMY BY BIOPSY;  Surgeon: Leon Cosme MD;  Location:  GI     EP ABLATION SVT N/A 10/8/2019    Procedure: EP Ablation SVT;  Surgeon: Laney Arriaza MD;  Location:  HEART CARDIAC CATH LAB     EP ABLATION SVT N/A 6/10/2020    Procedure: EP Ablation SVT;  Surgeon: Laney Arriaza MD;  Location:  HEART CARDIAC CATH LAB     ESOPHAGOSCOPY, GASTROSCOPY, DUODENOSCOPY (EGD), COMBINED N/A 10/31/2018    Procedure: GASTROSCOPY;  Surgeon: Leon Cosme MD;  Location:  GI     GYN SURGERY       HYSTERECTOMY       JOINT REPLACEMENT Bilateral      knee replacement Bilateral      ORTHOPEDIC SURGERY       ZZC NONSPECIFIC PROCEDURE  1974    s/p Vaginal hysterectomy       ALLERGIES:     Allergies   Allergen Reactions     Cats      Hylan G-F 20      Lisinopril Cough     Seasonal Allergies      Vioxx        PERTINENT MEDICATIONS:    Current Outpatient Medications:      ACETAMINOPHEN PO, Take 500 mg by mouth every morning., Disp: , Rfl:      amLODIPine (NORVASC) 5 MG tablet, TAKE ONE TABLET BY MOUTH ONCE DAILY, Disp: 90 tablet, Rfl: 1     atorvastatin (LIPITOR) 10 MG tablet, TAKE ONE TABLET BY MOUTH ONCE DAILY, Disp: 90 tablet, Rfl: 0      atorvastatin (LIPITOR) 10 MG tablet, Take 1 tablet (10 mg) by mouth daily, Disp: 60 tablet, Rfl: 1     budesonide-formoterol (SYMBICORT) 160-4.5 MCG/ACT Inhaler, Inhale 2 puffs into the lungs 2 times daily., Disp: 10.2 g, Rfl: 3     Calcium Citrate-Vitamin D 250-200 MG-UNIT TABS, Take 1 tablet by mouth daily (with breakfast), Disp: , Rfl:      celecoxib (CELEBREX) 200 MG capsule, Take 200 mg by mouth 2 times daily, Disp: , Rfl:      doxylamine (UNISOM) 25 MG TABS tablet, Take 25 mg by mouth at bedtime, Disp: , Rfl:      fluticasone (FLONASE) 50 MCG/ACT nasal spray, Spray 1 spray into both nostrils 2 times daily, Disp: 16 g, Rfl: 0     guaiFENesin (MUCINEX) 600 MG 12 hr tablet, Take 1,200 mg by mouth 2 times daily. As needed, Disp: , Rfl:      levothyroxine (SYNTHROID/LEVOTHROID) 100 MCG tablet, TAKE ONE TABLET BY MOUTH ONCE DAILY, Disp: 90 tablet, Rfl: 0     loratadine (CLARITIN) 10 MG tablet, Take 10 mg by mouth daily , Disp: , Rfl:      losartan (COZAAR) 100 MG tablet, TAKE 1 TABLET BY MOUTH DAILY, Disp: 90 tablet, Rfl: 0     Multiple Vitamins-Minerals (EYE VITAMINS & MINERALS) TABS, Take 2 capsules by mouth daily EyePromise AREDS2 Plus, Disp: , Rfl:      Multiple Vitamins-Minerals (MULTIVITAMIN ADULT PO), Take 1 tablet by mouth daily, Disp: , Rfl:      Spacer/Aero-Holding Chambers (AEROCHAMBER MV) MISC, One Aerochamber or Optichamber to assist with albuterol use., Disp: 1 each, Rfl: 0     triamterene-HCTZ (DYAZIDE) 37.5-25 MG capsule, Take 1 capsule by mouth daily, Disp: 90 capsule, Rfl: 1     albuterol (PROAIR HFA/PROVENTIL HFA/VENTOLIN HFA) 108 (90 Base) MCG/ACT inhaler, Inhale 2 puffs into the lungs every 6 hours as needed for shortness of breath, wheezing or cough, Disp: 18 g, Rfl: 1    SOCIAL HISTORY:  Social History     Socioeconomic History     Marital status:      Spouse name: Not on file     Number of children: Not on file     Years of education: Not on file     Highest education level: Not on  file   Occupational History     Not on file   Tobacco Use     Smoking status: Never     Smokeless tobacco: Never   Vaping Use     Vaping status: Never Used   Substance and Sexual Activity     Alcohol use: No     Alcohol/week: 0.0 standard drinks of alcohol     Drug use: No     Sexual activity: Yes     Partners: Male   Other Topics Concern      Service No     Blood Transfusions No     Caffeine Concern No     Occupational Exposure No     Hobby Hazards No     Sleep Concern Yes     Stress Concern No     Weight Concern No     Special Diet No     Back Care Yes     Exercise No     Bike Helmet Yes     Seat Belt Yes     Self-Exams Yes     Parent/sibling w/ CABG, MI or angioplasty before 65F 55M? Yes   Social History Narrative    Social Documentation:4/10        Balanced Diet: YES    Calcium intake: supplement per day    Caffeine: 4cups per day    Exercise:  type of activity varies;  3 times per week    Sunscreen: Yes    Seatbelts:  Yes    Self Breast Exam:  Yes    Self Testicular Exam: No - na    Physical/Emotional/Sexual Abuse: No -     Do you feel safe in your environment? Yes        Cholesterol screen up to date: yes    Eye Exam up to date: Yes    Dental Exam up to date: Yes    Pap smear up to date: Does Not Apply    Mammogram up to date: utd    Dexa Scan up to date: Yes    Colonoscopy up to date: Yes    Immunizations up to date: Yes    Glucose screen if over 40:  No -     Fredy Carroll ma             Social Drivers of Health     Financial Resource Strain: Low Risk  (3/15/2024)    Financial Resource Strain      Within the past 12 months, have you or your family members you live with been unable to get utilities (heat, electricity) when it was really needed?: No   Food Insecurity: Low Risk  (3/15/2024)    Food Insecurity      Within the past 12 months, did you worry that your food would run out before you got money to buy more?: No      Within the past 12 months, did the food you bought just not last and you  didn t have money to get more?: No   Transportation Needs: Low Risk  (3/15/2024)    Transportation Needs      Within the past 12 months, has lack of transportation kept you from medical appointments, getting your medicines, non-medical meetings or appointments, work, or from getting things that you need?: No   Physical Activity: Sufficiently Active (3/15/2024)    Exercise Vital Sign      Days of Exercise per Week: 6 days      Minutes of Exercise per Session: 60 min   Stress: No Stress Concern Present (3/15/2024)    [employer] - Occupational Stress Questionnaire      Feeling of Stress : Only a little   Social Connections: Unknown (3/15/2024)    Social Connection and Isolation Panel [NHANES]      Frequency of Communication with Friends and Family: Not on file      Frequency of Social Gatherings with Friends and Family: More than three times a week      Attends Sabianist Services: Not on file      Active Member of Clubs or Organizations: Not on file      Attends Club or Organization Meetings: Not on file      Marital Status: Not on file   Interpersonal Safety: Low Risk  (3/15/2024)    Interpersonal Safety      Do you feel physically and emotionally safe where you currently live?: Yes      Within the past 12 months, have you been hit, slapped, kicked or otherwise physically hurt by someone?: No      Within the past 12 months, have you been humiliated or emotionally abused in other ways by your partner or ex-partner?: No   Housing Stability: Low Risk  (3/15/2024)    Housing Stability      Do you have housing? : Yes      Are you worried about losing your housing?: No       FAMILY HISTORY:  Family History   Problem Relation Age of Onset     Cancer Sister      Heart Disease Father      Hypertension Father      Thyroid Disease Father      Other Cancer Other          age 65     Other Cancer Other          age 56     Other Cancer Niece          age 53     Other Cancer Sister           age 51       Past/family/social history reviewed and no changes    PHYSICAL EXAMINATION:  Vitals reviewed: BP (!) 154/74   Pulse 58   Wt 61.2 kg (134 lb 14.4 oz)   LMP  (LMP Unknown)   SpO2 99%   BMI 23.52 kg/m    Constitutional: aaox3, cooperative, pleasant, not dyspneic/diaphoretic, no acute distress  Eyes: Sclera anicteric/injected  Ears/nose/mouth/throat: hearing intact  Neck: supple, active ROM w/o limitation or pain   CV: No edema  Respiratory: Unlabored breathing  Abd:  Nondistended, soft, nontender, no peritoneal signs, neg Llamas's sign  Skin: warm, perfused, no jaundice  Psych: Normal affect  MSK: Normal gait     PERTINENT STUDIES:    Lab on 01/15/2025   Component Date Value Ref Range Status     C Difficile Toxin B by PCR 2025 Negative  Negative Final     WBC Count 01/15/2025 6.1  4.0 - 11.0 10e3/uL Final     RBC Count 01/15/2025 4.33  3.80 - 5.20 10e6/uL Final     Hemoglobin 01/15/2025 12.9  11.7 - 15.7 g/dL Final     Hematocrit 01/15/2025 38.4  35.0 - 47.0 % Final     MCV 01/15/2025 89  78 - 100 fL Final     MCH 01/15/2025 29.8  26.5 - 33.0 pg Final     MCHC 01/15/2025 33.6  31.5 - 36.5 g/dL Final     RDW 01/15/2025 12.2  10.0 - 15.0 % Final     Platelet Count 01/15/2025 217  150 - 450 10e3/uL Final     Sodium 01/15/2025 130 (L)  135 - 145 mmol/L Final     Potassium 01/15/2025 4.1  3.4 - 5.3 mmol/L Final     Carbon Dioxide (CO2) 01/15/2025 27  22 - 29 mmol/L Final     Anion Gap 01/15/2025 11  7 - 15 mmol/L Final     Urea Nitrogen 01/15/2025 11.2  8.0 - 23.0 mg/dL Final     Creatinine 01/15/2025 0.74  0.51 - 0.95 mg/dL Final     GFR Estimate 01/15/2025 80  >60 mL/min/1.73m2 Final     Calcium 01/15/2025 10.3  8.8 - 10.4 mg/dL Final     Chloride 01/15/2025 92 (L)  98 - 107 mmol/L Final     Glucose 01/15/2025 95  70 - 99 mg/dL Final     Alkaline Phosphatase 01/15/2025 67  40 - 150 U/L Final     AST 01/15/2025 48 (H)  0 - 45 U/L Final     ALT 01/15/2025 20  0 - 50 U/L Final     Protein Total  01/15/2025 7.2  6.4 - 8.3 g/dL Final     Albumin 01/15/2025 4.5  3.5 - 5.2 g/dL Final     Bilirubin Total 01/15/2025 0.8  <=1.2 mg/dL Final     Calprotectin Feces 01/16/2025 42.1  0.0 - 49.9 mg/kg Final     Fecal Lactoferrin 01/16/2025 Negative  Negative Final        Lab Results   Component Value Date    WBC 6.1 01/15/2025    WBC 4.7 06/02/2023    WBC 6.4 06/10/2020    HGB 12.9 01/15/2025    HGB 13.4 06/02/2023    HGB 12.7 06/10/2020     01/15/2025     06/02/2023     06/10/2020    CHOL 202 (H) 06/24/2024    CHOL 242 (H) 03/15/2024    CHOL 257 (H) 03/14/2023    TRIG 33 06/24/2024    TRIG 50 03/15/2024    TRIG 46 03/14/2023     06/24/2024     03/15/2024     03/14/2023    ALT 20 01/15/2025    ALT 17 06/24/2024    ALT 21 03/15/2024    AST 48 (H) 01/15/2025    AST 47 (H) 06/24/2024    AST 56 (H) 03/15/2024     (L) 01/15/2025     (L) 06/24/2024     (L) 03/15/2024    BUN 11.2 01/15/2025    BUN 12.1 06/24/2024    BUN 9.0 03/15/2024    CO2 27 01/15/2025    CO2 27 06/24/2024    CO2 25 03/15/2024    TSH 2.26 03/15/2024    TSH 2.16 03/14/2023    TSH 1.73 01/14/2022    INR 1.06 06/02/2023    INR 1.7 02/12/2013    INR 1.9 02/08/2013        Liver Function Studies -   Recent Labs   Lab Test 01/15/25  1559   PROTTOTAL 7.2   ALBUMIN 4.5   BILITOTAL 0.8   ALKPHOS 67   AST 48*   ALT 20        PREVIOUS ENDOSCOPY    Results for orders placed or performed during the hospital encounter of 10/31/18   COLONOSCOPY    Collection Time: 10/31/18  1:31 PM   Result Value Ref Range    COLONOSCOPY       [facility] Endoscopy Department  _______________________________________________________________________________  Patient Name: Mely Persauderickachencho           Procedure Date: 10/31/2018 1:31 PM  MRN: [id number]                       Account Number: [id number]  YOB: 1940              Admit Type: Outpatient  Age: 78                               Room: 3  Note  Status: Finalized                Attending MD: Leon Cosme MD  Total Sedation Time:                  Instrument Name: 302 PCF- Colonoscope  _______________________________________________________________________________     Procedure:                Colonoscopy  Indications:              Abdominal pain, Clinically significant diarrhea of                             unexplained origin  Providers:                Leon Cosme MD, Daria Doyle, ANASTACIO, Kailey Ba RN  Referring MD:             Nette Burk MD  Medicines:                See the other pro cedure note for documentation of                             the administered medications  Complications:            No immediate complications.  _______________________________________________________________________________  Procedure:                Pre-Anesthesia Assessment:                            - Prior to the procedure, a History and Physical                             was performed, and patient medications and                             allergies were reviewed. The patient is competent.                             The risks and benefits of the procedure and the                             sedation options and risks were discussed with the                             patient. All questions were answered and informed                             consent was obtained. Patient identification and                             proposed procedure were verified by the physician                             in the pre-procedure area. Mental Status                             Examinati on: alert and oriented. Airway                             Examination: normal oropharyngeal airway and neck                             mobility. Respiratory Examination: clear to                             auscultation. CV Examination: normal. Prophylactic                             Antibiotics: The patient does not require                              prophylactic antibiotics. Prior Anticoagulants: The                             patient has taken no previous anticoagulant or                             antiplatelet agents. ASA Grade Assessment: II - A                             patient with mild systemic disease. After reviewing                             the risks and benefits, the patient was deemed in                             satisfactory condition to undergo the procedure.                             The anesthesia plan was to use moderate sedation /                             analgesia (conscious sedation). Immediately prior                             to administration  of medications, the patient was                             re-assessed for adequacy to receive sedatives. The                             heart rate, respiratory rate, oxygen saturations,                             blood pressure, adequacy of pulmonary ventilation,                             and response to care were monitored throughout the                             procedure. The physical status of the patient was                             re-assessed after the procedure.                            After obtaining informed consent, the colonoscope                             was passed under direct vision. Throughout the                             procedure, the patient's blood pressure, pulse, and                             oxygen saturations were monitored continuously. The                             Colonoscope was introduced through the anus and                             advanced to the terminal ileum.                                                                                    Findings:       The perianal and digital rectal examinations were normal.       Multiple seven mm ulcers were found at the ileocecal valve. No bleeding        was present. Biopsies were taken with a cold forceps for histology.       A benign-appearing, intrinsic  moderate stenosis measuring less than one        cm (in length) was found at the ileocecal valve and was non-traversed.        Biopsies were taken with a cold forceps for histology.       The terminal ileum appeared normal.       No other significant abnormalities were identified in a careful        examination of the remainder of the colon.       Internal Hemorrhoids seen on retroflexion                                                                                   Impression:               - Multiple ulcers at the ileocecal valve. Biopsied.                            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 Biopsied.                            - The examined portion of the ileum was normal.  Recommendation:            - Low residue diet.                            - I will contact patient with Biopsy result in 1-2                             weeks. Please contact our Office at  at                             [facility] if ther is any questions,                            - To visualize the small bowel, perform video                             capsule endoscopy.                                                                                   Procedure Code(s):       --- Professional ---       42786, Colonoscopy, flexible; with biopsy, single or multiple  Diagnosis Code(s):       --- Professional ---       K63.3, Ulcer of intestine       K56.699, Other intestinal obstruction unspecified as to partial versus        complete obstruction       R10.9, Unspecified abdominal pain       R19.7, Diarrhea, unspecified    CPT copyright 2017 [employer]. All rights reserved.    The codes documented in this report are preliminary and upon  review may   be rev ised to meet current compliance requirements.    __________________  Leon Cosme MD  10/31/2018 2:30:03 PM  I was physically present for the entire viewing portion of the exam.  Leon Cosme MD  Number of Addenda:  0    Note Initiated On: 10/31/2018 1:31 PM  MRN:                      [id number]  Procedure Date:           10/31/2018 1:31:40 PM  Scope Withdrawal Time: 0 hours 11 minutes 31 seconds   Total Procedure Duration: 0 hours 18 minutes 3 seconds   Estimated Blood Loss:       Scope In: 1:54:21 PM  Scope Out: 2:12:24 PM       Again, thank you for allowing me to participate in the care of your patient.        Sincerely,        Marva Mullen PA-C    Electronically signed

## 2025-01-30 NOTE — TELEPHONE ENCOUNTER
"Pre Assessment RN Review    Focused Assessments     notes FLAVIA and wants to know if could get patient is sooner with moderate sedation if Marva Mullen changed the order.    FLAVIA Hx  Estimated body mass index is 23.52 kg/m  as calculated from the following:    Height as of 3/15/24: 1.613 m (5' 3.5\").    Weight as of an earlier encounter on 1/30/25: 61.2 kg (134 lb 14.4 oz).     Patient has reported / documented history FLAVIA and reports she does use a device for sleep.     Device: CPAP    Severity Assessment    Sleep Study: 12/2/2013  Diagnosis/Severity: Severe  AHI: \"severely elevated at 62.1\"      Scheduling Status & Recommendations    Sedation: MAC/Deep Sedation - Per order.  Location Type: Hospital - Per RN assessment. Severe FLAVIA.    Patient has a personal history of alcoholism but looks like she tolerated conscious/moderate sedation for the previous colonoscopy & EGD procedure in 2018. Advised  to try to schedule with MAC per the order and if she cannot get in anywhere for MAC, then could reach out to Marva Mullen to see if appropriate to switch to moderate sedation.      Sherly Fischer RN  Endoscopy Procedure Pre-Assessment RN  [phone], option 2  "

## 2025-01-30 NOTE — PROGRESS NOTES
GI CLINIC VISIT    CC/REFERRING MD:  Butch Hernández  REASON FOR CONSULTATION: diarrhea    ASSESSMENT/PLAN:  Mely Guerra is a 84 year old year old female with PMHx of chronic joint pain (on daily Celebrex), Osteoporosis,  following with the [facility] GI group for diarrhea x 7 weeks.     She had similar sx in 2018 when she was taking daily Ibuprofen. Bidirectional scopes done, Cscope with TI evaluation showing stenotic IC valve and IC  ulceration (bx - active inflammation / ulceration w/o chronicity; comment - self-limited med induced changes). EGD was unremarkable with normal biopsies.  PillCam  -Limited exam as cam did not leave the SB but of visualized mucosa, appeared WNL. Enteric panel was negative. She told me she stopped Ibuprofen at that time and slowly her sx improved.     As of 6 mo ago, she is taking celebrex. She is reporting frequent, urgent and very watery diarrhea with  a lot of gas throughout the day. She has had an associated 3-4# weight loss but no bloody stools. Recent labs without anemia. Renal function intact and albumin normal.     FHX unknown     #diarrhea x 7 wk  Highly suspicious of microscopic colitis (risk - celebrex use) - she likely had a similar episode back in 2018; random biopsies were not taken so unclear if this was the actual diagnosis. We reviewed her 2018 work up. Recent c. diff, enteric panel were NEG (including giardia) and fecal calpro was normal and fecal lactoferrin was NEG. Ddx includes functional diarrhea, malabsorption (SIBO vs other).     -Cscope with TI eval and random colon biopsies  --indication, R/B and potential complications explained to patient. Agreeable.   -CTAP W contrast ordered   -start of daily fiber  -advised to stop scheduled celebrex and make it daily PRN   -advised to start APAP 1 gram BID     Future consideration  1.GI RD consult  2. Breath test    Orders Placed This Encounter   Procedures    CT Abdomen Pelvis w/o & w Contrast    Adult GI   Referral - Procedure Only     RTC - few weeks after scope     Thank you for this consultation.  It was a pleasure to participate in the care of this patient; please contact me with any further questions.     Marva Mullen PA-C    Follow up: As planned above. Today, I personally spent 40 minutes in direct face to face time with the patient, of which greater than 50% of the time was spent in patient education and counseling as described above. Approximately 20 minutes were spent on indirect care associated with the patient's consultation including but not limited to review of: patient medical records to date, clinic visits, hospital records, lab results, imaging studies, procedural documentation, and coordinating care with other providers. The findings from this review are summarized in the above note. All of the above accounted for a cumulative time of 60 minutes and was performed on the date of service.     CLIFF Guerra is a 84 year old year old female with PMHx of chronic joint pain (on daily Celebrex), Osteoporosis,  following with the [facility] GI group for diarrhea.     1. Chronic diarrhea x 7 wk   -shares she had similar sx about 7 years and had scopes and pill came. On a very BLAND diet and slowly with time, she started feeling better. Then things were OK until 7 wk ago    She at first thought she had norovirus, went to a market and 2 days later, felt nauseated w/o emesis and abd pain and diarrhea.     Premorbid stooling - regular, twice day, typically a formed log with an occassional looser stool depending on what she ate     Now she's having 10-12x a day, a lot of gas and tiny particles of stool. At 4am, wakes up with fecal urgency. Multiple episodes of nocturnal stooling.   -reporting a change in frequency, consistency, a lot of urgency, and new nocturnal stooling  -no bloody or black stools  -no oily droplets in toilet  -can have some mucus    Meds - tried imodium, pepto bismol which does help but she uses  them for a short period of time only     Lower abdomen pain - worse at night in prone position. Does worsen with eating and need to defecate. Does improve after defecation.   -10-15 min after eating, will need to defecate   -Having some b/l lower back pain - tender to touch. No trauma.     Appetite is poor. Lost 3-4# throughout this time. Felt lightheaded yesterday. Not needed to go to ER for IVF.     She's keeping active but the diarrhea is disruptive and she has not been able to partake in social gatherings she normally would have done     About 6 mo ago she substituted APAP 1 gram AM for Celebrex 200 mg. She was previously taking Tylenol 1 g twice daily.  She has used Celebrex daily for the past 6 months about 7 years ago when she had similar symptoms, she was using multiple doses of ibuprofen    2018 -  Colonoscopy -ulceration to IC valve, stricture at the IC Valve.  normal terminal ileum normal macro colon. biopsy of IC ulceration -active colitis with ulceration granulation tissue  without chronicity. The findings may represent an acute self-limited process or medication induced injury.  Clinical correlation is suggested.     EGD for heartburn -normal macro exam.  Gastric biopsy - WNL w/o h.pylori. Duodenum biopsy unremarkable without celiac disease    PillCam -limited exam as cam stayed in the small intestines; of visualized mucosa, SB appeared normal      ROS  Denies fevers, chills, emesis, dysphagia, odynophagia, dheartburn, regurgitation, bloating/fullness, post prandial bloating, black tarrying stools, bloody stools    Family Hx  Her parents passed young    P Aunt - pancreatic cancer   No other known family history or GI related malignancy (esophageal, gastric, pancreatic, liver or colon) or family history of IBD/celiac disease.     Social Hx   Yes Tylenol  Yes NSAIDs - Celebrex     No ETOH  No tobacco products   No recreational drug use     PROBLEM LIST  Patient Active Problem List    Diagnosis Date Noted     Dysphagia 08/30/2017     Priority: Medium    Age-related osteoporosis without current pathological fracture 07/10/2017     Priority: Medium    Disorder of bone and cartilage 07/10/2017     Priority: Medium     2014:  Lumbar Spine (L1-L4) T-score: -0.4   Significant degenerative and/or osteosclerotic changes are present, falsely improving result.   Forearm (radius 33%) T-score: -1.1    Lumbar (L1-L4) BMD: 1.142 Previous: 1.125   Total Hip Mean BMD: NA metal Previous: 0.793        2017:  Lumbar Spine (L1-L4)      T-score:  -0.2    Significant degenerative and/or osteosclerotic changes are present, falsely improving result.                Left Femoral Neck            T-score:  NA metal               Right Femoral Neck         T-score:  NA Metal               Forearm (radius 33%)      T-score:  -1.9      Rosacea 05/18/2017     Priority: Medium    Dermatitis 08/10/2016     Priority: Medium    Sleep apnea 06/03/2014     Priority: Medium    Otitis externa 04/02/2012     Priority: Medium    Essential hypertension with goal blood pressure less than 140/90 03/09/2011     Priority: Medium    Hyperlipidemia LDL goal <160 06/15/2009     Priority: Medium    Osteoarthritis 12/21/2004     Priority: Medium     Problem list name updated by automated process. Provider to review      Osteoporosis 04/13/2004     Priority: Medium     Osteopenia 2017  Lumbar Spine (L1-L4)      T-score:  -0.2    Significant degenerative and/or osteosclerotic changes are present, falsely improving result.                Left Femoral Neck            T-score:  NA metal               Right Femoral Neck         T-score:  NA Metal               Forearm (radius 33%)      T-score:  -1.9      Allergic rhinitis 02/18/2004     Priority: Medium     Problem list name updated by automated process. Provider to review      Acquired hypothyroidism 10/14/2003     Priority: Medium     Problem list name updated by automated process. Provider to review         PERTINENT  PAST MEDICAL HISTORY:  Past Medical History:   Diagnosis Date    Allergic rhinitis, cause unspecified     Arthritis     Personal history of alcoholism (H)     Sleep apnea     SVT (supraventricular tachycardia)     s/p atrial tachycardia ablation near CS os    Unspecified essential hypertension     Unspecified hypothyroidism        PREVIOUS SURGERIES:  Past Surgical History:   Procedure Laterality Date    COLONOSCOPY N/A 10/31/2018    Procedure: COMBINED COLONOSCOPY, SINGLE OR MULTIPLE BIOPSY/POLYPECTOMY BY BIOPSY;  Surgeon: Leon Cosme MD;  Location:  GI    EP ABLATION SVT N/A 10/8/2019    Procedure: EP Ablation SVT;  Surgeon: Laney Arriaza MD;  Location:  HEART CARDIAC CATH LAB    EP ABLATION SVT N/A 6/10/2020    Procedure: EP Ablation SVT;  Surgeon: Laney Arriaza MD;  Location:  HEART CARDIAC CATH LAB    ESOPHAGOSCOPY, GASTROSCOPY, DUODENOSCOPY (EGD), COMBINED N/A 10/31/2018    Procedure: GASTROSCOPY;  Surgeon: Leon Cosme MD;  Location:  GI    GYN SURGERY      HYSTERECTOMY      JOINT REPLACEMENT Bilateral     knee replacement Bilateral     ORTHOPEDIC SURGERY      ZZC NONSPECIFIC PROCEDURE  1974    s/p Vaginal hysterectomy       ALLERGIES:     Allergies   Allergen Reactions    Cats     Hylan G-F 20     Lisinopril Cough    Seasonal Allergies     Vioxx        PERTINENT MEDICATIONS:    Current Outpatient Medications:     ACETAMINOPHEN PO, Take 500 mg by mouth every morning., Disp: , Rfl:     amLODIPine (NORVASC) 5 MG tablet, TAKE ONE TABLET BY MOUTH ONCE DAILY, Disp: 90 tablet, Rfl: 1    atorvastatin (LIPITOR) 10 MG tablet, TAKE ONE TABLET BY MOUTH ONCE DAILY, Disp: 90 tablet, Rfl: 0    atorvastatin (LIPITOR) 10 MG tablet, Take 1 tablet (10 mg) by mouth daily, Disp: 60 tablet, Rfl: 1    budesonide-formoterol (SYMBICORT) 160-4.5 MCG/ACT Inhaler, Inhale 2 puffs into the lungs 2 times daily., Disp: 10.2 g, Rfl: 3    Calcium Citrate-Vitamin D 250-200 MG-UNIT TABS, Take 1 tablet by mouth daily  (with breakfast), Disp: , Rfl:     celecoxib (CELEBREX) 200 MG capsule, Take 200 mg by mouth 2 times daily, Disp: , Rfl:     doxylamine (UNISOM) 25 MG TABS tablet, Take 25 mg by mouth at bedtime, Disp: , Rfl:     fluticasone (FLONASE) 50 MCG/ACT nasal spray, Spray 1 spray into both nostrils 2 times daily, Disp: 16 g, Rfl: 0    guaiFENesin (MUCINEX) 600 MG 12 hr tablet, Take 1,200 mg by mouth 2 times daily. As needed, Disp: , Rfl:     levothyroxine (SYNTHROID/LEVOTHROID) 100 MCG tablet, TAKE ONE TABLET BY MOUTH ONCE DAILY, Disp: 90 tablet, Rfl: 0    loratadine (CLARITIN) 10 MG tablet, Take 10 mg by mouth daily , Disp: , Rfl:     losartan (COZAAR) 100 MG tablet, TAKE 1 TABLET BY MOUTH DAILY, Disp: 90 tablet, Rfl: 0    Multiple Vitamins-Minerals (EYE VITAMINS & MINERALS) TABS, Take 2 capsules by mouth daily EyePromise AREDS2 Plus, Disp: , Rfl:     Multiple Vitamins-Minerals (MULTIVITAMIN ADULT PO), Take 1 tablet by mouth daily, Disp: , Rfl:     Spacer/Aero-Holding Chambers (AEROCHAMBER MV) MISC, One Aerochamber or Optichamber to assist with albuterol use., Disp: 1 each, Rfl: 0    triamterene-HCTZ (DYAZIDE) 37.5-25 MG capsule, Take 1 capsule by mouth daily, Disp: 90 capsule, Rfl: 1    albuterol (PROAIR HFA/PROVENTIL HFA/VENTOLIN HFA) 108 (90 Base) MCG/ACT inhaler, Inhale 2 puffs into the lungs every 6 hours as needed for shortness of breath, wheezing or cough, Disp: 18 g, Rfl: 1    SOCIAL HISTORY:  Social History     Socioeconomic History    Marital status:      Spouse name: Not on file    Number of children: Not on file    Years of education: Not on file    Highest education level: Not on file   Occupational History    Not on file   Tobacco Use    Smoking status: Never    Smokeless tobacco: Never   Vaping Use    Vaping status: Never Used   Substance and Sexual Activity    Alcohol use: No     Alcohol/week: 0.0 standard drinks of alcohol    Drug use: No    Sexual activity: Yes     Partners: Male   Other Topics  Concern     Service No    Blood Transfusions No    Caffeine Concern No    Occupational Exposure No    Hobby Hazards No    Sleep Concern Yes    Stress Concern No    Weight Concern No    Special Diet No    Back Care Yes    Exercise No    Bike Helmet Yes    Seat Belt Yes    Self-Exams Yes    Parent/sibling w/ CABG, MI or angioplasty before 65F 55M? Yes   Social History Narrative    Social Documentation:4/10        Balanced Diet: YES    Calcium intake: supplement per day    Caffeine: 4cups per day    Exercise:  type of activity varies;  3 times per week    Sunscreen: Yes    Seatbelts:  Yes    Self Breast Exam:  Yes    Self Testicular Exam: No - na    Physical/Emotional/Sexual Abuse: No -     Do you feel safe in your environment? Yes        Cholesterol screen up to date: yes    Eye Exam up to date: Yes    Dental Exam up to date: Yes    Pap smear up to date: Does Not Apply    Mammogram up to date: utd    Dexa Scan up to date: Yes    Colonoscopy up to date: Yes    Immunizations up to date: Yes    Glucose screen if over 40:  No -     Fredy Carroll ma             Social Drivers of Health     Financial Resource Strain: Low Risk  (3/15/2024)    Financial Resource Strain     Within the past 12 months, have you or your family members you live with been unable to get utilities (heat, electricity) when it was really needed?: No   Food Insecurity: Low Risk  (3/15/2024)    Food Insecurity     Within the past 12 months, did you worry that your food would run out before you got money to buy more?: No     Within the past 12 months, did the food you bought just not last and you didn t have money to get more?: No   Transportation Needs: Low Risk  (3/15/2024)    Transportation Needs     Within the past 12 months, has lack of transportation kept you from medical appointments, getting your medicines, non-medical meetings or appointments, work, or from getting things that you need?: No   Physical Activity: Sufficiently Active  (3/15/2024)    Exercise Vital Sign     Days of Exercise per Week: 6 days     Minutes of Exercise per Session: 60 min   Stress: No Stress Concern Present (3/15/2024)    [employer] [place] [employer] - Occupational Stress Questionnaire     Feeling of Stress : Only a little   Social Connections: Unknown (3/15/2024)    Social Connection and Isolation Panel [NHANES]     Frequency of Communication with Friends and Family: Not on file     Frequency of Social Gatherings with Friends and Family: More than three times a week     Attends Rastafarian Services: Not on file     Active Member of Clubs or Organizations: Not on file     Attends Club or Organization Meetings: Not on file     Marital Status: Not on file   Interpersonal Safety: Low Risk  (3/15/2024)    Interpersonal Safety     Do you feel physically and emotionally safe where you currently live?: Yes     Within the past 12 months, have you been hit, slapped, kicked or otherwise physically hurt by someone?: No     Within the past 12 months, have you been humiliated or emotionally abused in other ways by your partner or ex-partner?: No   Housing Stability: Low Risk  (3/15/2024)    Housing Stability     Do you have housing? : Yes     Are you worried about losing your housing?: No       FAMILY HISTORY:  Family History   Problem Relation Age of Onset    Cancer Sister     Heart Disease Father     Hypertension Father     Thyroid Disease Father     Other Cancer Other          age 65    Other Cancer Other          age 56    Other Cancer Niece          age 53    Other Cancer Sister          age 51       Past/family/social history reviewed and no changes    PHYSICAL EXAMINATION:  Vitals reviewed: BP (!) 154/74   Pulse 58   Wt 61.2 kg (134 lb 14.4 oz)   LMP  (LMP Unknown)   SpO2 99%   BMI 23.52 kg/m    Constitutional: aaox3, cooperative, pleasant, not dyspneic/diaphoretic, no acute distress  Eyes: Sclera anicteric/injected  Ears/nose/mouth/throat:  hearing intact  Neck: supple, active ROM w/o limitation or pain   CV: No edema  Respiratory: Unlabored breathing  Abd:  Nondistended, soft, nontender, no peritoneal signs, neg Llamas's sign  Skin: warm, perfused, no jaundice  Psych: Normal affect  MSK: Normal gait     PERTINENT STUDIES:    Lab on 01/15/2025   Component Date Value Ref Range Status    C Difficile Toxin B by PCR 01/16/2025 Negative  Negative Final    WBC Count 01/15/2025 6.1  4.0 - 11.0 10e3/uL Final    RBC Count 01/15/2025 4.33  3.80 - 5.20 10e6/uL Final    Hemoglobin 01/15/2025 12.9  11.7 - 15.7 g/dL Final    Hematocrit 01/15/2025 38.4  35.0 - 47.0 % Final    MCV 01/15/2025 89  78 - 100 fL Final    MCH 01/15/2025 29.8  26.5 - 33.0 pg Final    MCHC 01/15/2025 33.6  31.5 - 36.5 g/dL Final    RDW 01/15/2025 12.2  10.0 - 15.0 % Final    Platelet Count 01/15/2025 217  150 - 450 10e3/uL Final    Sodium 01/15/2025 130 (L)  135 - 145 mmol/L Final    Potassium 01/15/2025 4.1  3.4 - 5.3 mmol/L Final    Carbon Dioxide (CO2) 01/15/2025 27  22 - 29 mmol/L Final    Anion Gap 01/15/2025 11  7 - 15 mmol/L Final    Urea Nitrogen 01/15/2025 11.2  8.0 - 23.0 mg/dL Final    Creatinine 01/15/2025 0.74  0.51 - 0.95 mg/dL Final    GFR Estimate 01/15/2025 80  >60 mL/min/1.73m2 Final    Calcium 01/15/2025 10.3  8.8 - 10.4 mg/dL Final    Chloride 01/15/2025 92 (L)  98 - 107 mmol/L Final    Glucose 01/15/2025 95  70 - 99 mg/dL Final    Alkaline Phosphatase 01/15/2025 67  40 - 150 U/L Final    AST 01/15/2025 48 (H)  0 - 45 U/L Final    ALT 01/15/2025 20  0 - 50 U/L Final    Protein Total 01/15/2025 7.2  6.4 - 8.3 g/dL Final    Albumin 01/15/2025 4.5  3.5 - 5.2 g/dL Final    Bilirubin Total 01/15/2025 0.8  <=1.2 mg/dL Final    Calprotectin Feces 01/16/2025 42.1  0.0 - 49.9 mg/kg Final    Fecal Lactoferrin 01/16/2025 Negative  Negative Final        Lab Results   Component Value Date    WBC 6.1 01/15/2025    WBC 4.7 06/02/2023    WBC 6.4 06/10/2020    HGB 12.9 01/15/2025    HGB  13.4 06/02/2023    HGB 12.7 06/10/2020     01/15/2025     06/02/2023     06/10/2020    CHOL 202 (H) 06/24/2024    CHOL 242 (H) 03/15/2024    CHOL 257 (H) 03/14/2023    TRIG 33 06/24/2024    TRIG 50 03/15/2024    TRIG 46 03/14/2023     06/24/2024     03/15/2024     03/14/2023    ALT 20 01/15/2025    ALT 17 06/24/2024    ALT 21 03/15/2024    AST 48 (H) 01/15/2025    AST 47 (H) 06/24/2024    AST 56 (H) 03/15/2024     (L) 01/15/2025     (L) 06/24/2024     (L) 03/15/2024    BUN 11.2 01/15/2025    BUN 12.1 06/24/2024    BUN 9.0 03/15/2024    CO2 27 01/15/2025    CO2 27 06/24/2024    CO2 25 03/15/2024    TSH 2.26 03/15/2024    TSH 2.16 03/14/2023    TSH 1.73 01/14/2022    INR 1.06 06/02/2023    INR 1.7 02/12/2013    INR 1.9 02/08/2013        Liver Function Studies -   Recent Labs   Lab Test 01/15/25  1559   PROTTOTAL 7.2   ALBUMIN 4.5   BILITOTAL 0.8   ALKPHOS 67   AST 48*   ALT 20        PREVIOUS ENDOSCOPY    Results for orders placed or performed during the hospital encounter of 10/31/18   COLONOSCOPY    Collection Time: 10/31/18  1:31 PM   Result Value Ref Range    COLONOSCOPY       [facility] Endoscopy Department  _______________________________________________________________________________  Patient Name: Mely Guerra           Procedure Date: 10/31/2018 1:31 PM  MRN: [id number]                       Account Number: [id number]  YOB: 1940              Admit Type: Outpatient  Age: 78                               Room: 3  Note Status: Finalized                Attending MD: Leon Cosme MD  Total Sedation Time:                  Instrument Name: 302 PCF- Colonoscope  _______________________________________________________________________________     Procedure:                Colonoscopy  Indications:              Abdominal pain, Clinically significant diarrhea of                             unexplained  origin  Providers:                Leon Cosme MD, Daria Doyle, ANASTACIO, Kailey Ba RN  Referring MD:             Nette Burk MD  Medicines:                See the other pro cedure note for documentation of                             the administered medications  Complications:            No immediate complications.  _______________________________________________________________________________  Procedure:                Pre-Anesthesia Assessment:                            - Prior to the procedure, a History and Physical                             was performed, and patient medications and                             allergies were reviewed. The patient is competent.                             The risks and benefits of the procedure and the                             sedation options and risks were discussed with the                             patient. All questions were answered and informed                             consent was obtained. Patient identification and                             proposed procedure were verified by the physician                             in the pre-procedure area. Mental Status                             Examinati on: alert and oriented. Airway                             Examination: normal oropharyngeal airway and neck                             mobility. Respiratory Examination: clear to                             auscultation. CV Examination: normal. Prophylactic                             Antibiotics: The patient does not require                             prophylactic antibiotics. Prior Anticoagulants: The                             patient has taken no previous anticoagulant or                             antiplatelet agents. ASA Grade Assessment: II - A                             patient with mild systemic disease. After reviewing                             the risks and benefits, the patient was deemed in                              satisfactory condition to undergo the procedure.                             The anesthesia plan was to use moderate sedation /                             analgesia (conscious sedation). Immediately prior                             to administration  of medications, the patient was                             re-assessed for adequacy to receive sedatives. The                             heart rate, respiratory rate, oxygen saturations,                             blood pressure, adequacy of pulmonary ventilation,                             and response to care were monitored throughout the                             procedure. The physical status of the patient was                             re-assessed after the procedure.                            After obtaining informed consent, the colonoscope                             was passed under direct vision. Throughout the                             procedure, the patient's blood pressure, pulse, and                             oxygen saturations were monitored continuously. The                             Colonoscope was introduced through the anus and                             advanced to the terminal ileum.                                                                                    Findings:       The perianal and digital rectal examinations were normal.       Multiple seven mm ulcers were found at the ileocecal valve. No bleeding        was present. Biopsies were taken with a cold forceps for histology.       A benign-appearing, intrinsic moderate stenosis measuring less than one        cm (in length) was found at the ileocecal valve and was non-traversed.        Biopsies were taken with a cold forceps for histology.       The terminal ileum appeared normal.       No other significant abnormalities were identified in a careful        examination of the remainder of the colon.       Internal Hemorrhoids seen on retroflexion                                                                                    Impression:               - Multiple ulcers at the ileocecal valve. Biopsied.                            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 Biopsied.                            - The examined portion of the ileum was normal.  Recommendation:            - Low residue diet.                            - I will contact patient with Biopsy result in 1-2                             weeks. Please contact our Office at  at                             [facility] if ther is any questions,                            - To visualize the small bowel, perform video                             capsule endoscopy.                                                                                   Procedure Code(s):       --- Professional ---       99764, Colonoscopy, flexible; with biopsy, single or multiple  Diagnosis Code(s):       --- Professional ---       K63.3, Ulcer of intestine       K56.699, Other intestinal obstruction unspecified as to partial versus        complete obstruction       R10.9, Unspecified abdominal pain       R19.7, Diarrhea, unspecified    CPT copyright 2017 [employer]. All rights reserved.    The codes documented in this report are preliminary and upon  review may   be rev ised to meet current compliance requirements.    __________________  Leon Cosme MD  10/31/2018 2:30:03 PM  I was physically present for the entire viewing portion of the exam.  Leon Cosme MD  Number of Addenda: 0    Note Initiated On: 10/31/2018 1:31 PM  MRN:                      [id number]  Procedure Date:           10/31/2018 1:31:40 PM  Scope Withdrawal Time: 0 hours 11 minutes 31 seconds   Total Procedure Duration: 0 hours 18 minutes 3 seconds   Estimated Blood Loss:       Scope In: 1:54:21 PM  Scope Out: 2:12:24 PM

## 2025-01-30 NOTE — TELEPHONE ENCOUNTER
"Endoscopy Scheduling Screen    Have you had any respiratory illness or flu-like symptoms in the last 10 days?  No    What is your communication preference for Instructions and/or Bowel Prep?   MyChart    What insurance is in the chart?  Other:  [employer] medicare    Ordering/Referring Provider: Marva Mullen   (If ordering provider performs procedure, schedule with ordering provider unless otherwise instructed. )    BMI: Estimated body mass index is 23.52 kg/m  as calculated from the following:    Height as of 3/15/24: 1.613 m (5' 3.5\").    Weight as of an earlier encounter on 1/30/25: 61.2 kg (134 lb 14.4 oz).     Sedation Ordered  MAC/deep sedation.   BMI<= 45 45 < BMI <= 48 48 < BMI < = 50  BMI > 50   No Restrictions No [facility]  No [facility]  [facility] with exceptions Hospital Only OR Only       Do you have a history of malignant hyperthermia?  No    (Females) Are you currently pregnant?   No     Have you been diagnosed or told you have pulmonary hypertension?   No    Do you have an LVAD?  No    Have you been told you have moderate to severe sleep apnea?  Yes. Do you use a CPAP? Yes Where is the patient located?. (RN Review required for scheduling unless scheduling in Hospital.)     Have you been told you have COPD, asthma, or any other lung disease?  No    Do you have any heart conditions?  No     Have you ever had or are you waiting for an organ transplant?  No. Continue scheduling, no site restrictions.    Have you had a stroke or transient ischemic attack (TIA aka \"mini stroke\" in the last 6 months?   No    Have you been diagnosed with or been told you have cirrhosis of the liver?   No.    Are you currently on dialysis?   No    Do you need assistance transferring?   No    BMI: Estimated body mass index is 23.52 kg/m  as calculated from the following:    Height as of 3/15/24: 1.613 m (5' 3.5\").    Weight as of an earlier encounter on 1/30/25: 61.2 kg (134 lb 14.4 oz).     Is patients BMI > 40 and scheduling location " [facility]?  No    Do you take an injectable or oral medication for weight loss or diabetes (excluding insulin)?  No    Do you take the medication Naltrexone?  No    Do you take blood thinners?  No       Prep   Are you currently on dialysis or do you have chronic kidney disease?  No    Do you have a diagnosis of diabetes?  No    Do you have a diagnosis of cystic fibrosis (CF)?  No    On a regular basis do you go 3 -5 days between bowel movements?  No    BMI > 40?  No    Preferred Pharmacy:      [id number] - [address] [address]  Phone: [phone] Fax: [phone]      Final Scheduling Details     Procedure scheduled  Colonoscopy      Calling patient back after nurse review of location she can be scheduled with her FLAVIA

## 2025-02-05 ENCOUNTER — TELEPHONE (OUTPATIENT)
Dept: GASTROENTEROLOGY | Facility: CLINIC | Age: 85
End: 2025-02-05
Payer: COMMERCIAL

## 2025-02-05 NOTE — TELEPHONE ENCOUNTER
Pre assessment completed for upcoming procedure.   (Please see previous telephone encounter notes for complete details)    Patient returned call.       Procedure details:    Arrival time and facility location reviewed.    Designated  policy reviewed and that site requests drivers to check in and stay on campus. Instructed to have someone stay 6  hours post procedure.       Medication review:    Medications reviewed. Please see supporting documentation below. Holding recommendations discussed (if applicable).   N/A      Prep for procedure:    Procedure prep instructions reviewed.        Any additional information needed:  N/A      Patient verbalized understanding and had no questions or concerns at this time.      Tari Parikh RN  Endoscopy Procedure Pre Assessment   [phone] option 2

## 2025-02-05 NOTE — TELEPHONE ENCOUNTER
Pre visit planning completed.      Procedure details:    Patient scheduled for Colonoscopy on 2.19.25.     Arrival time: 1400. Procedure time 1500    Facility location: [facility]; [address], [address], [place] [address]. Check in location: Main entrance at registration desk.  *Disclaimer: Drivers are to check in with patient and stay on campus during procedure.     Sedation type: Conscious sedation     Pre op exam needed? No.    Indication for procedure:   Chronic diarrhea         Chart review:     Electronic implanted devices? No    Recent diagnosis of diverticulitis within the last 6 weeks? No      Medication review:    Diabetic? No    Anticoagulants? No    Weight loss medication/injectable? No.    Other medication HOLDING recommendations:  N/A      Prep for procedure:     Bowel prep recommendation: Standard Miralax.   Due to: standard bowel prep    Procedure information and instructions sent via [employer]         Gretta Ansari RN  Endoscopy Procedure Pre Assessment   [phone] option 2

## 2025-02-05 NOTE — TELEPHONE ENCOUNTER
Attempted to contact patient in order to complete pre assessment questions.     No answer. Left message to return call to [phone] option 2    Callback communication sent via [employer].      Gretta Ansari RN  Endoscopy Procedure Pre Assessment

## 2025-02-13 ENCOUNTER — ANCILLARY PROCEDURE (OUTPATIENT)
Dept: CT IMAGING | Facility: CLINIC | Age: 85
End: 2025-02-13
Attending: PHYSICIAN ASSISTANT
Payer: COMMERCIAL

## 2025-02-13 DIAGNOSIS — K52.9 CHRONIC DIARRHEA: ICD-10-CM

## 2025-02-13 PROCEDURE — 250N000009 HC RX 250: Performed by: PHYSICIAN ASSISTANT

## 2025-02-13 PROCEDURE — 250N000011 HC RX IP 250 OP 636: Performed by: PHYSICIAN ASSISTANT

## 2025-02-13 PROCEDURE — 74177 CT ABD & PELVIS W/CONTRAST: CPT

## 2025-02-13 RX ORDER — IOPAMIDOL 755 MG/ML
90 INJECTION, SOLUTION INTRAVASCULAR ONCE
Status: COMPLETED | OUTPATIENT
Start: 2025-02-13 | End: 2025-02-13

## 2025-02-13 RX ADMIN — IOPAMIDOL 90 ML: 755 INJECTION, SOLUTION INTRAVENOUS at 09:55

## 2025-02-13 RX ADMIN — SODIUM CHLORIDE 40 ML: 9 INJECTION, SOLUTION INTRAVENOUS at 09:55

## 2025-02-15 DIAGNOSIS — I10 ESSENTIAL HYPERTENSION WITH GOAL BLOOD PRESSURE LESS THAN 140/90: ICD-10-CM

## 2025-02-17 RX ORDER — AMLODIPINE BESYLATE 5 MG/1
5 TABLET ORAL DAILY
Qty: 90 TABLET | Refills: 0 | Status: SHIPPED | OUTPATIENT
Start: 2025-02-17

## 2025-02-19 ENCOUNTER — HOSPITAL ENCOUNTER (OUTPATIENT)
Facility: CLINIC | Age: 85
Discharge: HOME | End: 2025-02-19
Attending: STUDENT IN AN ORGANIZED HEALTH CARE EDUCATION/TRAINING PROGRAM | Admitting: STUDENT IN AN ORGANIZED HEALTH CARE EDUCATION/TRAINING PROGRAM
Payer: COMMERCIAL

## 2025-02-19 VITALS
OXYGEN SATURATION: 98 % | SYSTOLIC BLOOD PRESSURE: 107 MMHG | DIASTOLIC BLOOD PRESSURE: 61 MMHG | RESPIRATION RATE: 17 BRPM | HEART RATE: 63 BPM

## 2025-02-19 LAB — COLONOSCOPY: NORMAL

## 2025-02-19 PROCEDURE — G0500 MOD SEDAT ENDO SERVICE >5YRS: HCPCS | Performed by: STUDENT IN AN ORGANIZED HEALTH CARE EDUCATION/TRAINING PROGRAM

## 2025-02-19 PROCEDURE — 88305 TISSUE EXAM BY PATHOLOGIST: CPT | Mod: TC | Performed by: STUDENT IN AN ORGANIZED HEALTH CARE EDUCATION/TRAINING PROGRAM

## 2025-02-19 PROCEDURE — 88305 TISSUE EXAM BY PATHOLOGIST: CPT | Mod: 26 | Performed by: PATHOLOGY

## 2025-02-19 PROCEDURE — 99153 MOD SED SAME PHYS/QHP EA: CPT | Performed by: STUDENT IN AN ORGANIZED HEALTH CARE EDUCATION/TRAINING PROGRAM

## 2025-02-19 PROCEDURE — 45380 COLONOSCOPY AND BIOPSY: CPT | Performed by: STUDENT IN AN ORGANIZED HEALTH CARE EDUCATION/TRAINING PROGRAM

## 2025-02-19 PROCEDURE — 250N000011 HC RX IP 250 OP 636: Performed by: STUDENT IN AN ORGANIZED HEALTH CARE EDUCATION/TRAINING PROGRAM

## 2025-02-19 RX ORDER — FENTANYL CITRATE 50 UG/ML
INJECTION, SOLUTION INTRAMUSCULAR; INTRAVENOUS PRN
Status: DISCONTINUED | OUTPATIENT
Start: 2025-02-19 | End: 2025-02-19 | Stop reason: HOSPADM

## 2025-02-19 RX ORDER — ONDANSETRON 2 MG/ML
4 INJECTION INTRAMUSCULAR; INTRAVENOUS
Status: DISCONTINUED | OUTPATIENT
Start: 2025-02-19 | End: 2025-02-19 | Stop reason: HOSPADM

## 2025-02-19 RX ORDER — NALOXONE HYDROCHLORIDE 0.4 MG/ML
0.4 INJECTION, SOLUTION INTRAMUSCULAR; INTRAVENOUS; SUBCUTANEOUS
Status: CANCELLED | OUTPATIENT
Start: 2025-02-19

## 2025-02-19 RX ORDER — NALOXONE HYDROCHLORIDE 0.4 MG/ML
0.2 INJECTION, SOLUTION INTRAMUSCULAR; INTRAVENOUS; SUBCUTANEOUS
Status: CANCELLED | OUTPATIENT
Start: 2025-02-19

## 2025-02-19 RX ORDER — PROCHLORPERAZINE MALEATE 5 MG/1
5 TABLET ORAL EVERY 6 HOURS PRN
Status: CANCELLED | OUTPATIENT
Start: 2025-02-19

## 2025-02-19 RX ORDER — FLUMAZENIL 0.1 MG/ML
0.2 INJECTION, SOLUTION INTRAVENOUS
Status: CANCELLED | OUTPATIENT
Start: 2025-02-19 | End: 2025-02-20

## 2025-02-19 RX ORDER — LIDOCAINE 40 MG/G
CREAM TOPICAL
Status: DISCONTINUED | OUTPATIENT
Start: 2025-02-19 | End: 2025-02-19 | Stop reason: HOSPADM

## 2025-02-19 RX ORDER — ONDANSETRON 4 MG/1
4 TABLET, ORALLY DISINTEGRATING ORAL EVERY 6 HOURS PRN
Status: CANCELLED | OUTPATIENT
Start: 2025-02-19

## 2025-02-19 RX ORDER — ONDANSETRON 2 MG/ML
4 INJECTION INTRAMUSCULAR; INTRAVENOUS EVERY 6 HOURS PRN
Status: CANCELLED | OUTPATIENT
Start: 2025-02-19

## 2025-02-19 ASSESSMENT — ACTIVITIES OF DAILY LIVING (ADL)
ADLS_ACUITY_SCORE: 41

## 2025-02-19 NOTE — OR NURSING
Patient had colonoscopy with biopsies and polypectomy x1.  Patient tolerated procedure under conscious sedation and 2liters nasal cannula

## 2025-02-19 NOTE — H&P
Mely Guerra  [id number]  female  85 year old      Reason for procedure/surgery: Diarrhea    Patient Active Problem List   Diagnosis    Acquired hypothyroidism    Allergic rhinitis    Osteoporosis    Osteoarthritis    Hyperlipidemia LDL goal <160    Essential hypertension with goal blood pressure less than 140/90    Otitis externa    Sleep apnea    Dermatitis    Rosacea    Age-related osteoporosis without current pathological fracture    Disorder of bone and cartilage    Dysphagia       Past Surgical History:    Past Surgical History:   Procedure Laterality Date    COLONOSCOPY N/A 10/31/2018    Procedure: COMBINED COLONOSCOPY, SINGLE OR MULTIPLE BIOPSY/POLYPECTOMY BY BIOPSY;  Surgeon: Leon Cosme MD;  Location:  GI    EP ABLATION SVT N/A 10/8/2019    Procedure: EP Ablation SVT;  Surgeon: Laney Arriaza MD;  Location:  [facility]ATH [facility]    EP ABLATION SVT N/A 6/10/2020    Procedure: EP Ablation SVT;  Surgeon: Laney Arriaza MD;  Location:  [facility]ATH [facility]    ESOPHAGOSCOPY, GASTROSCOPY, DUODENOSCOPY (EGD), COMBINED N/A 10/31/2018    Procedure: GASTROSCOPY;  Surgeon: Leon Cosme MD;  Location:  GI    GYN SURGERY      HYSTERECTOMY      JOINT REPLACEMENT Bilateral     knee replacement Bilateral     ORTHOPEDIC SURGERY      ZZC NONSPECIFIC PROCEDURE  1974    s/p Vaginal hysterectomy       Past Medical History:   Past Medical History:   Diagnosis Date    Allergic rhinitis, cause unspecified     Arthritis     Personal history of alcoholism (H)     Sleep apnea     SVT (supraventricular tachycardia)     s/p atrial tachycardia ablation near CS os    Unspecified essential hypertension     Unspecified hypothyroidism        Social History:   Social History     Tobacco Use    Smoking status: Never    Smokeless tobacco: Never   Substance Use Topics    Alcohol use: No     Alcohol/week: 0.0 standard drinks of alcohol       Family History:   Family History   Problem Relation Age of Onset    Cancer  Sister     Heart Disease Father     Hypertension Father     Thyroid Disease Father     Other Cancer Other          age 65    Other Cancer Other          age 56    Other Cancer Niece          age 53    Other Cancer Sister          age 51       Allergies:   Allergies   Allergen Reactions    Cats     Hylan G-F 20     Lisinopril Cough    Seasonal Allergies     Vioxx        Active Medications:   No current outpatient medications on file.       Systemic Review:   CONSTITUTIONAL: NEGATIVE for fever, chills, change in weight  ENT/MOUTH: NEGATIVE for ear, mouth and throat problems  RESP: NEGATIVE for significant cough or SOB  CV: NEGATIVE for chest pain, palpitations or peripheral edema    Physical Examination:   Vital Signs: BP (!) 153/75   LMP  (LMP Unknown)   SpO2 100%   GENERAL: healthy, alert and no distress  NECK: no adenopathy, no asymmetry, masses, or scars  RESP: lungs clear to auscultation - no rales, rhonchi or wheezes  CV: regular rate and rhythm, normal S1 S2, no S3 or S4, no murmur, click or rub, no peripheral edema and peripheral pulses strong  ABDOMEN: soft, nontender, no hepatosplenomegaly, no masses and bowel sounds normal  MS: no gross musculoskeletal defects noted, no edema    Plan: Appropriate to proceed as scheduled.      Yonathan Martins MD  2025    PCP:  Tiffany Messer

## 2025-02-20 LAB
PATH REPORT.COMMENTS IMP SPEC: NORMAL
PATH REPORT.COMMENTS IMP SPEC: NORMAL
PATH REPORT.FINAL DX SPEC: NORMAL
PATH REPORT.GROSS SPEC: NORMAL
PATH REPORT.MICROSCOPIC SPEC OTHER STN: NORMAL
PATH REPORT.RELEVANT HX SPEC: NORMAL
PHOTO IMAGE: NORMAL

## 2025-02-24 NOTE — RESULT ENCOUNTER NOTE
The biopsies taken from the colonic mucosa did not show any evidence of microscopic colitis or any other pathological abnormality that could explain patient's presentation of diarrhea.  In addition, the polyp removed from the colon showed sessile serrated adenoma which is a precancerous lesion however it did not show any concerning features.  Usually in these cases we recommend having another colonoscopy done in 3 years for colon cancer surveillance, however given patient's advanced age, the risks and benefits of repeat procedure should be discussed between the patient and primary care physician.

## 2025-02-24 NOTE — RESULT ENCOUNTER NOTE
DARREN for patient to check her mychart account    Thank you,  KAYLAN RedN RN  [facility]  Gastroenterology

## 2025-02-27 ENCOUNTER — DOCUMENTATION ONLY (OUTPATIENT)
Dept: GASTROENTEROLOGY | Facility: CLINIC | Age: 85
End: 2025-02-27
Payer: COMMERCIAL

## 2025-02-27 NOTE — RESULT ENCOUNTER NOTE
Unable to reach patient, result letter sent in mail    Thank you,  ANA LAURA Red RN  [facility]  [facility]

## 2025-03-03 ENCOUNTER — PRE VISIT (OUTPATIENT)
Dept: GASTROENTEROLOGY | Facility: CLINIC | Age: 85
End: 2025-03-03

## 2025-03-05 ENCOUNTER — PATIENT OUTREACH (OUTPATIENT)
Dept: GASTROENTEROLOGY | Facility: CLINIC | Age: 85
End: 2025-03-05
Payer: COMMERCIAL

## 2025-03-05 ENCOUNTER — TELEPHONE (OUTPATIENT)
Dept: CARDIOLOGY | Facility: CLINIC | Age: 85
End: 2025-03-05
Payer: COMMERCIAL

## 2025-03-05 PROBLEM — D12.6 ADENOMATOUS COLON POLYP: Status: ACTIVE | Noted: 2025-03-05

## 2025-03-05 NOTE — TELEPHONE ENCOUNTER
[facility] Call Center    Phone Message    May a detailed message be left on voicemail: yes     Reason for Call: Other: Pt has been scheduled more than 30 days out. Last seen 5 years ago with Reina Kaplan. DX SVT but has been having weird chest pains that go into shoulders.      Action Taken: TE SENT    Travel Screening: Not Applicable     Date of Service:                                   Thank you!  [facility]

## 2025-03-17 NOTE — PROGRESS NOTES
Wellness Visit Notes:     {Provider able to cut/paste text below, and include in Assessment/Plan documentation. Edit to reflect final plan. Delete this text.}    -Mammogram: Last done 1/2024 (impression:  negative ). Previously discontinued due to age.   -DEXA: Last done 1/2023 (impression: osteopenia). Due 1/2026.   -PAP: no results upon chart review. Provider to review PAP recommendations.   -Colon Cancer Screening: Last done via colonoscopy on 2/2025. (Impression: 1 polyp removed, diverticulosis, mucosal resection). Due 2/2028.   -Dermatology: Pt verbalized they {Dermatology AWV:[id number]}.  -Immunizations: Patient is up to date on all vaccines.

## 2025-03-18 ENCOUNTER — OFFICE VISIT (OUTPATIENT)
Dept: FAMILY MEDICINE | Facility: CLINIC | Age: 85
End: 2025-03-18
Payer: COMMERCIAL

## 2025-03-18 VITALS
TEMPERATURE: 97.1 F | RESPIRATION RATE: 16 BRPM | OXYGEN SATURATION: 97 % | DIASTOLIC BLOOD PRESSURE: 72 MMHG | HEIGHT: 63 IN | HEART RATE: 63 BPM | WEIGHT: 132.6 LBS | SYSTOLIC BLOOD PRESSURE: 132 MMHG | BODY MASS INDEX: 23.5 KG/M2

## 2025-03-18 DIAGNOSIS — E03.9 ACQUIRED HYPOTHYROIDISM: ICD-10-CM

## 2025-03-18 DIAGNOSIS — Z00.00 ENCOUNTER FOR MEDICARE ANNUAL WELLNESS EXAM: Primary | ICD-10-CM

## 2025-03-18 DIAGNOSIS — I10 ESSENTIAL HYPERTENSION WITH GOAL BLOOD PRESSURE LESS THAN 140/90: ICD-10-CM

## 2025-03-18 DIAGNOSIS — E78.00 HYPERCHOLESTEROLEMIA: ICD-10-CM

## 2025-03-18 LAB
CHOLEST SERPL-MCNC: 170 MG/DL
FASTING STATUS PATIENT QL REPORTED: YES
HDLC SERPL-MCNC: 94 MG/DL
LDLC SERPL CALC-MCNC: 69 MG/DL
NONHDLC SERPL-MCNC: 76 MG/DL
TRIGL SERPL-MCNC: 35 MG/DL
TSH SERPL DL<=0.005 MIU/L-ACNC: 1.67 UIU/ML (ref 0.3–4.2)

## 2025-03-18 PROCEDURE — 80061 LIPID PANEL: CPT | Performed by: FAMILY MEDICINE

## 2025-03-18 PROCEDURE — 36415 COLL VENOUS BLD VENIPUNCTURE: CPT | Performed by: FAMILY MEDICINE

## 2025-03-18 PROCEDURE — 84443 ASSAY THYROID STIM HORMONE: CPT | Performed by: FAMILY MEDICINE

## 2025-03-18 RX ORDER — ATORVASTATIN CALCIUM 10 MG/1
10 TABLET, FILM COATED ORAL DAILY
Qty: 90 TABLET | Refills: 1 | Status: SHIPPED | OUTPATIENT
Start: 2025-03-18

## 2025-03-18 RX ORDER — LEVOTHYROXINE SODIUM 100 UG/1
TABLET ORAL
Qty: 90 TABLET | Refills: 0 | Status: CANCELLED | OUTPATIENT
Start: 2025-03-18

## 2025-03-18 RX ORDER — TRIAMTERENE AND HYDROCHLOROTHIAZIDE 37.5; 25 MG/1; MG/1
1 CAPSULE ORAL DAILY
Qty: 90 CAPSULE | Refills: 1 | Status: SHIPPED | OUTPATIENT
Start: 2025-03-18

## 2025-03-18 RX ORDER — LOSARTAN POTASSIUM 100 MG/1
100 TABLET ORAL DAILY
Qty: 90 TABLET | Refills: 1 | Status: SHIPPED | OUTPATIENT
Start: 2025-03-18

## 2025-03-18 SDOH — HEALTH STABILITY: PHYSICAL HEALTH: ON AVERAGE, HOW MANY DAYS PER WEEK DO YOU ENGAGE IN MODERATE TO STRENUOUS EXERCISE (LIKE A BRISK WALK)?: 5 DAYS

## 2025-03-18 SDOH — HEALTH STABILITY: PHYSICAL HEALTH: ON AVERAGE, HOW MANY MINUTES DO YOU ENGAGE IN EXERCISE AT THIS LEVEL?: 60 MIN

## 2025-03-18 ASSESSMENT — SOCIAL DETERMINANTS OF HEALTH (SDOH): HOW OFTEN DO YOU GET TOGETHER WITH FRIENDS OR RELATIVES?: MORE THAN THREE TIMES A WEEK

## 2025-03-18 ASSESSMENT — PAIN SCALES - GENERAL: PAINLEVEL_OUTOF10: NO PAIN (0)

## 2025-03-18 NOTE — PATIENT INSTRUCTIONS
Patient Education   Preventive Care Advice   This is general advice given by our system to help you stay healthy. However, your care team may have specific advice just for you. Please talk to your care team about your preventive care needs.  Nutrition  Eat 5 or more servings of fruits and vegetables each day.  Try wheat bread, brown rice and whole grain pasta (instead of white bread, rice, and pasta).  Get enough calcium and vitamin D. Check the label on foods and aim for 100% of the RDA (recommended daily allowance).  Lifestyle  Exercise at least 150 minutes each week  (30 minutes a day, 5 days a week).  Do muscle strengthening activities 2 days a week. These help control your weight and prevent disease.  No smoking.  Wear sunscreen to prevent skin cancer.  Have a dental exam and cleaning every 6 months.  Yearly exams  See your health care team every year to talk about:  Any changes in your health.  Any medicines your care team has prescribed.  Preventive care, family planning, and ways to prevent chronic diseases.  Shots (vaccines)   HPV shots (up to age 26), if you've never had them before.  Hepatitis B shots (up to age 59), if you've never had them before.  COVID-19 shot: Get this shot when it's due.  Flu shot: Get a flu shot every year.  Tetanus shot: Get a tetanus shot every 10 years.  Pneumococcal, hepatitis A, and RSV shots: Ask your care team if you need these based on your risk.  Shingles shot (for age 50 and up)  General health tests  Diabetes screening:  Starting at age 35, Get screened for diabetes at least every 3 years.  If you are younger than age 35, ask your care team if you should be screened for diabetes.  Cholesterol test: At age 39, start having a cholesterol test every 5 years, or more often if advised.  Bone density scan (DEXA): At age 50, ask your care team if you should have this scan for osteoporosis (brittle bones).  Hepatitis C: Get tested at least once in your life.  STIs (sexually  transmitted infections)  Before age 24: Ask your care team if you should be screened for STIs.  After age 24: Get screened for STIs if you're at risk. You are at risk for STIs (including HIV) if:  You are sexually active with more than one person.  You don't use condoms every time.  You or a partner was diagnosed with a sexually transmitted infection.  If you are at risk for HIV, ask about PrEP medicine to prevent HIV.  Get tested for HIV at least once in your life, whether you are at risk for HIV or not.  Cancer screening tests  Cervical cancer screening: If you have a cervix, begin getting regular cervical cancer screening tests starting at age 21.  Breast cancer scan (mammogram): If you've ever had breasts, begin having regular mammograms starting at age 40. This is a scan to check for breast cancer.  Colon cancer screening: It is important to start screening for colon cancer at age 45.  Have a colonoscopy test every 10 years (or more often if you're at risk) Or, ask your provider about stool tests like a FIT test every year or Cologuard test every 3 years.  To learn more about your testing options, visit:   .  For help making a decision, visit:   https://bit.ly/iw27795.  Prostate cancer screening test: If you have a prostate, ask your care team if a prostate cancer screening test (PSA) at age 55 is right for you.  Lung cancer screening: If you are a current or former smoker ages 50 to 80, ask your care team if ongoing lung cancer screenings are right for you.  For informational purposes only. Not to replace the advice of your health care provider. Copyright   2023 [employer]. All rights reserved. Clinically reviewed by the [facility] Transitions Program. [employer] [id number] - REV 01/24.

## 2025-03-18 NOTE — PROGRESS NOTES
Preventive Care Visit  [facility]  Tiffany Messer MD, Family Medicine  Mar 18, 2025      Assessment & Plan     Acquired hypothyroidism  Will check her TSH today as she is due , she is on synthroid 100mcg daily   Doing well on this and TSH is in goal , refilled her Rx today   - TSH WITH FREE T4 REFLEX; Future  - TSH WITH FREE T4 REFLEX    Encounter for Medicare annual wellness exam  She is doing well overall   - PRIMARY CARE FOLLOW-UP SCHEDULING; Future    Hypercholesterolemia  Fasting for labs and will check lipids today , refilled her Lipitor , no side effects and it is working well for her   - Lipid panel reflex to direct LDL Fasting; Future  - atorvastatin (LIPITOR) 10 MG tablet; Take 1 tablet (10 mg) by mouth daily.  - Lipid panel reflex to direct LDL Fasting    Essential hypertension with goal blood pressure less than 140/90  Her BP is well controlled on the current anti HTN medications   - losartan (COZAAR) 100 MG tablet; Take 1 tablet (100 mg) by mouth daily.  - triamterene-HCTZ (DYAZIDE) 37.5-25 MG capsule; Take 1 capsule by mouth daily.    Patient has been advised of split billing requirements and indicates understanding: Yes        Counseling  Appropriate preventive services were addressed with this patient via screening, questionnaire, or discussion as appropriate for fall prevention, nutrition, physical activity, Tobacco-use cessation, social engagement, weight loss and cognition.  Checklist reviewing preventive services available has been given to the patient.  Reviewed patient's diet, addressing concerns and/or questions.   She is at risk for psychosocial distress and has been provided with information to reduce risk.   Discussed possible causes of fatigue.         Raj Boone is a 85 year old, presenting for the following:  Physical (AWV)        3/18/2025     8:05 AM   Additional Questions   Roomed by ANASTACIO Samaniego   Accompanied by N/A         Wellness Visit Notes:          -Mammogram: Last done 1/2024 (impression: negative). Previously discontinued due to age.   -DEXA: Last done 1/2023 (impression: osteopenia). Due 1/2026.   -PAP: no results upon chart review. Provider to review PAP recommendations.   -Colon Cancer Screening: Last done via colonoscopy on 2/2025. (Impression: 1 polyp removed, diverticulosis, mucosal resection). Due 2/2028.   -Dermatology: Pt verbalized they do meet with dermatology regularly. Pt reports every 6 months.   -Immunizations: Patient is up to date on all vaccines.    HPI           Advance Care Planning  Patient does not have a Health Care Directive: Discussed advance care planning with patient; information given to patient to review.      3/18/2025   General Health   How would you rate your overall physical health? Good   Feel stress (tense, anxious, or unable to sleep) To some extent   (!) STRESS CONCERN      3/18/2025   Nutrition   Diet: Breakfast skipped         3/18/2025   Exercise   Days per week of moderate/strenous exercise 5 days   Average minutes spent exercising at this level 60 min         3/18/2025   Social Factors   Frequency of gathering with friends or relatives More than three times a week   Worry food won't last until get money to buy more No   Food not last or not have enough money for food? No   Do you have housing? (Housing is defined as stable permanent housing and does not include staying ouside in a car, in a tent, in an abandoned building, in an overnight shelter, or couch-surfing.) Yes   Are you worried about losing your housing? No   Lack of transportation? No   Unable to get utilities (heat,electricity)? No         3/18/2025   Fall Risk   Fallen 2 or more times in the past year? No   Trouble with walking or balance? No          3/18/2025   Activities of Daily Living- Home Safety   Needs help with the following daily activites None of the above   Safety concerns in the home None of the above         3/18/2025   Dental    Dentist two times every year? Yes         3/18/2025   Hearing Screening   Hearing concerns? None of the above         3/18/2025   Driving Risk Screening   Patient/family members have concerns about driving No         3/18/2025   General Alertness/Fatigue Screening   Have you been more tired than usual lately? (!) YES         3/18/2025   Urinary Incontinence Screening   Bothered by leaking urine in past 6 months No           Today's PHQ-2 Score:       3/18/2025     7:58 AM   PHQ-2 ( 1999 Pf[employer])   Q1: Little interest or pleasure in doing things 0   Q2: Feeling down, depressed or hopeless 1   PHQ-2 Score 1    Q1: Little interest or pleasure in doing things Not at all   Q2: Feeling down, depressed or hopeless Several days   PHQ-2 Score 1       Patient-reported           3/18/2025   Substance Use   Alcohol more than 3/day or more than 7/wk No   Do you have a current opioid prescription? No   How severe/bad is pain from 1 to 10? 0/10 (No Pain)   Do you use any other substances recreationally? No     Social History     Tobacco Use    Smoking status: Never    Smokeless tobacco: Never   Vaping Use    Vaping status: Never Used   Substance Use Topics    Alcohol use: No     Alcohol/week: 0.0 standard drinks of alcohol    Drug use: No           1/24/2024   LAST FHS-7 RESULTS   1st degree relative breast or ovarian cancer No   Any relative bilateral breast cancer No   Any male have breast cancer No   Any ONE woman have BOTH breast AND ovarian cancer No   Any woman with breast cancer before 50yrs No   2 or more relatives with breast AND/OR ovarian cancer No   2 or more relatives with breast AND/OR bowel cancer No        Mammogram Screening - After age 74- determine frequency with patient based on health status, life expectancy and patient goals              Reviewed and updated as needed this visit by Provider                    Past Medical History:   Diagnosis Date    Allergic rhinitis, cause unspecified     Arthritis      Personal history of alcoholism (H)     Sleep apnea     SVT (supraventricular tachycardia)     s/p atrial tachycardia ablation near CS os    Unspecified essential hypertension     Unspecified hypothyroidism      Past Surgical History:   Procedure Laterality Date    COLONOSCOPY N/A 10/31/2018    Procedure: COMBINED COLONOSCOPY, SINGLE OR MULTIPLE BIOPSY/POLYPECTOMY BY BIOPSY;  Surgeon: Leon Cosme MD;  Location:  GI    COLONOSCOPY N/A 2/19/2025    Procedure: COLONOSCOPY, WITH POLYPECTOMY AND BIOPSY;  Surgeon: Yonathan Martins MD;  Location: [facility]    EP ABLATION SVT N/A 10/8/2019    Procedure: EP Ablation SVT;  Surgeon: Laney Arriaza MD;  Location:  HEART CARDIAC CATH LAB    EP ABLATION SVT N/A 6/10/2020    Procedure: EP Ablation SVT;  Surgeon: Laney Arriaza MD;  Location:  HEART [facility]ARDIAC CATH LAB    ESOPHAGOSCOPY, GASTROSCOPY, DUODENOSCOPY (EGD), COMBINED N/A 10/31/2018    Procedure: GASTROSCOPY;  Surgeon: Leon Cosme MD;  Location:  GI    GYN SURGERY      HYSTERECTOMY      JOINT REPLACEMENT Bilateral     knee replacement Bilateral     ORTHOPEDIC SURGERY      ZZC NONSPECIFIC PROCEDURE  1974    s/p Vaginal hysterectomy     Lab work is in process  Labs reviewed in EPIC  BP Readings from Last 3 Encounters:   03/18/25 132/72   02/19/25 107/61   02/07/25 126/71    Wt Readings from Last 3 Encounters:   03/18/25 60.1 kg (132 lb 9.6 oz)   02/07/25 60.8 kg (134 lb)   01/30/25 61.2 kg (134 lb 14.4 oz)                  Patient Active Problem List   Diagnosis    Acquired hypothyroidism    Allergic rhinitis    Osteoporosis    Osteoarthritis    Hyperlipidemia LDL goal <160    Essential hypertension with goal blood pressure less than 140/90    Otitis externa    Sleep apnea    Dermatitis    Rosacea    Age-related osteoporosis without current pathological fracture    Disorder of bone and cartilage    Dysphagia    Adenomatous colon polyp     Past Surgical History:   Procedure Laterality Date    COLONOSCOPY N/A  10/31/2018    Procedure: COMBINED COLONOSCOPY, SINGLE OR MULTIPLE BIOPSY/POLYPECTOMY BY BIOPSY;  Surgeon: Leon Cosme MD;  Location:  GI    COLONOSCOPY N/A 2025    Procedure: COLONOSCOPY, WITH POLYPECTOMY AND BIOPSY;  Surgeon: Yonathan Martins MD;  Location: [facility]    EP ABLATION SVT N/A 10/8/2019    Procedure: EP Ablation SVT;  Surgeon: Laney Arriaza MD;  Location:  HEART CARDIAC CATH LAB    EP ABLATION SVT N/A 6/10/2020    Procedure: EP Ablation SVT;  Surgeon: Laney Arriaza MD;  Location:  HEART CARDIAC CATH LAB    ESOPHAGOSCOPY, GASTROSCOPY, DUODENOSCOPY (EGD), COMBINED N/A 10/31/2018    Procedure: GASTROSCOPY;  Surgeon: Leon Cosme MD;  Location:  GI    GYN SURGERY      HYSTERECTOMY      JOINT REPLACEMENT Bilateral     knee replacement Bilateral     ORTHOPEDIC SURGERY      ZZC NONSPECIFIC PROCEDURE  1974    s/p Vaginal hysterectomy       Social History     Tobacco Use    Smoking status: Never    Smokeless tobacco: Never   Substance Use Topics    Alcohol use: No     Alcohol/week: 0.0 standard drinks of alcohol     Family History   Problem Relation Age of Onset    Cancer Sister     Heart Disease Father     Hypertension Father     Thyroid Disease Father     Other Cancer Other          age 65    Other Cancer Other          age 56    Other Cancer Niece          age 53    Other Cancer Sister          age 51         Current Outpatient Medications   Medication Sig Dispense Refill    ACETAMINOPHEN PO Take 500 mg by mouth every morning.      amLODIPine (NORVASC) 5 MG tablet TAKE ONE TABLET BY MOUTH ONCE DAILY 90 tablet 0    atorvastatin (LIPITOR) 10 MG tablet Take 1 tablet (10 mg) by mouth daily. 90 tablet 1    atorvastatin (LIPITOR) 10 MG tablet TAKE ONE TABLET BY MOUTH ONCE DAILY 90 tablet 0    budesonide-formoterol (SYMBICORT) 160-4.5 MCG/ACT Inhaler Inhale 2 puffs into the lungs 2 times daily. 10.2 g 3    Calcium Citrate-Vitamin D 250-200 MG-UNIT TABS Take 1 tablet by mouth  daily (with breakfast)      doxylamine (UNISOM) 25 MG TABS tablet Take 25 mg by mouth at bedtime      fluticasone (FLONASE) 50 MCG/ACT nasal spray Spray 1 spray into both nostrils 2 times daily 16 g 0    guaiFENesin (MUCINEX) 600 MG 12 hr tablet Take 1,200 mg by mouth 2 times daily. As needed      levothyroxine (SYNTHROID/LEVOTHROID) 100 MCG tablet TAKE ONE TABLET BY MOUTH ONCE DAILY 90 tablet 3    loratadine (CLARITIN) 10 MG tablet Take 10 mg by mouth daily       losartan (COZAAR) 100 MG tablet Take 1 tablet (100 mg) by mouth daily. 90 tablet 1    Multiple Vitamins-Minerals (EYE VITAMINS & MINERALS) TABS Take 2 capsules by mouth daily EyePromise AREDS2 Plus      Multiple Vitamins-Minerals (MULTIVITAMIN ADULT PO) Take 1 tablet by mouth daily      Spacer/Aero-Holding Chambers (AEROCHAMBER MV) MISC One Aerochamber or Optichamber to assist with albuterol use. 1 each 0    triamterene-HCTZ (DYAZIDE) 37.5-25 MG capsule Take 1 capsule by mouth daily. 90 capsule 1     Allergies   Allergen Reactions    Cats     Hylan G-F 20     Lisinopril Cough    Seasonal Allergies     Vioxx      Recent Labs   Lab Test 03/18/25  0844 01/15/25  1559 06/24/24  1028 03/15/24  0939 07/08/21  0808 12/15/20  0943 06/10/20  1218   LDL 69  --  92 126*   < > 112*  --    HDL 94  --  103 106   < > 102  --    TRIG 35  --  33 50   < > 41  --    ALT  --  20 17 21   < > 24  --    CR  --  0.74 0.79 0.69   < > 0.68 0.64   GFRESTIMATED  --  80 74 86   < > 83 85   GFRESTBLACK  --   --   --   --   --  >90 >90   POTASSIUM  --  4.1 4.7 3.9   < > 4.3 3.5   TSH 1.67  --   --  2.26   < > 1.53  --     < > = values in this interval not displayed.           Current providers sharing in care for this patient include:  Patient Care Team:  Tiffany Messer MD as PCP - General (Family Practice)  Mirna Manning MD as MD (INTERNAL MEDICINE - ENDOCRINOLOGY, DIABETES & METABOLISM)  Mary Bustos [facility] as Pharmacist (Pharmacist)  Tiffany Messer MD as Assigned  "PCP  Mary Bustos, [facility] as Assigned MTM Pharmacist  Moises Barksdale PA-C as Physician Assistant (Gastroenterology)  Sav Lake MD as MD (Critical Care)  Sav Lake MD as Assigned Pulmonology Provider  Marva Mullen PA-C as Physician Assistant (Physician Assistant - Medical)  Butch Hernández MD as MD (Family Medicine)  Marva Mullen PA-C as Assigned Gastroenterology Provider  Chavo Barber MD as MD (Cardiovascular Disease)    The following health maintenance items are reviewed in Epic and correct as of today:  Health Maintenance   Topic Date Due    TSH W/FREE T4 REFLEX  03/15/2025    COVID-19 Vaccine (10 - 2024-25 season) 05/14/2025    LIPID  06/24/2025    ANNUAL REVIEW OF HM ORDERS  01/09/2026    DEXA  01/13/2026    BMP  01/15/2026    MEDICARE ANNUAL WELLNESS VISIT  03/18/2026    FALL RISK ASSESSMENT  03/18/2026    COLORECTAL CANCER SCREENING  02/19/2028    ADVANCE CARE PLANNING  03/15/2029    DTAP/TDAP/TD IMMUNIZATION (3 - Td or Tdap) 03/14/2033    PHQ-2 (once per calendar year)  Completed    INFLUENZA VACCINE  Completed    Pneumococcal Vaccine: 50+ Years  Completed    ZOSTER IMMUNIZATION  Completed    RSV VACCINE  Completed    HPV IMMUNIZATION  Aged Out    MENINGITIS IMMUNIZATION  Aged Out    MAMMO SCREENING  Discontinued         Review of Systems  Constitutional, HEENT, cardiovascular, pulmonary, GI, , musculoskeletal, neuro, skin, endocrine and psych systems are negative, except as otherwise noted.     Objective    Exam  LMP  (LMP Unknown)    Estimated body mass index is 23.36 kg/m  as calculated from the following:    Height as of 3/15/24: 1.613 m (5' 3.5\").    Weight as of 2/7/25: 60.8 kg (134 lb).    Physical Exam  GENERAL: alert and no distress  EYES: Eyes grossly normal to inspection, PERRL and conjunctivae and sclerae normal  HENT: ear canals and TM's normal, nose and mouth without ulcers or lesions  NECK: no adenopathy, no asymmetry, masses, or " scars  RESP: lungs clear to auscultation - no rales, rhonchi or wheezes  CV: regular rate and rhythm, normal S1 S2, no S3 or S4, no murmur, click or rub, no peripheral edema  ABDOMEN: soft, nontender, no hepatosplenomegaly, no masses and bowel sounds normal  MS: no gross musculoskeletal defects noted, no edema  SKIN: no suspicious lesions or rashes  NEURO: Normal strength and tone, mentation intact and speech normal  PSYCH: mentation appears normal, affect normal/bright         3/18/2025   Mini Cog   Clock Draw Score 2 Normal   3 Item Recall 3 objects recalled   Mini Cog Total Score 5              Signed Electronically by: Tiffany Messer MD

## 2025-03-19 RX ORDER — LEVOTHYROXINE SODIUM 100 UG/1
TABLET ORAL
Qty: 90 TABLET | Refills: 3 | Status: SHIPPED | OUTPATIENT
Start: 2025-03-19

## 2025-04-08 ENCOUNTER — TELEPHONE (OUTPATIENT)
Dept: CARDIOLOGY | Facility: CLINIC | Age: 85
End: 2025-04-08
Payer: COMMERCIAL

## 2025-04-08 DIAGNOSIS — I47.10 SVT (SUPRAVENTRICULAR TACHYCARDIA): Primary | ICD-10-CM

## 2025-04-08 NOTE — TELEPHONE ENCOUNTER
"04/08/25 Spoke w pt to gain more information for upcoming visit. Pt last seen by Reina Kaplan PA-C 2020, hx SVT Ablations 6/2020 and 10/2019 w Dr Arriaza. In the past few weeks she has noted to become more breathless and \" feeling funny\" in the chest with exertion. She denies pain or pressure but has a hard time describing the sensation. She states this happens a few times/week, lasting for a few seconds and then resolves on their own. She does not have a way to check an EKG from home. With her hx of SVT she thought it was time to get things checked out again since it has been 5 years since she has been in  Will check w Dr Barber on a monitor and call pt back   KHerroRN 330 pm  "

## 2025-04-09 ENCOUNTER — ORDERS ONLY (AUTO-RELEASED) (OUTPATIENT)
Dept: CARDIOLOGY | Facility: CLINIC | Age: 85
End: 2025-04-09
Payer: COMMERCIAL

## 2025-04-09 DIAGNOSIS — I47.10 SVT (SUPRAVENTRICULAR TACHYCARDIA): ICD-10-CM

## 2025-04-09 NOTE — TELEPHONE ENCOUNTER
04/09/25 Msg recd from Dr Barber    Yes, 14-day Zio please.     Spoke w pt and explained recommendations . She is in agreement and prefers mail out. Address verified. OV for 4/14 cancelled and rescheduled for 5/16 at 1015. Scheduling messaged  David 11 am

## 2025-04-29 ENCOUNTER — OFFICE VISIT (OUTPATIENT)
Dept: PHARMACY | Facility: CLINIC | Age: 85
End: 2025-04-29
Payer: COMMERCIAL

## 2025-04-29 VITALS — BODY MASS INDEX: 22.8 KG/M2 | SYSTOLIC BLOOD PRESSURE: 134 MMHG | WEIGHT: 134.9 LBS | DIASTOLIC BLOOD PRESSURE: 80 MMHG

## 2025-04-29 DIAGNOSIS — R05.9 COUGH, UNSPECIFIED TYPE: ICD-10-CM

## 2025-04-29 DIAGNOSIS — E03.9 ACQUIRED HYPOTHYROIDISM: ICD-10-CM

## 2025-04-29 DIAGNOSIS — G47.00 INSOMNIA, UNSPECIFIED TYPE: ICD-10-CM

## 2025-04-29 DIAGNOSIS — J30.9 ALLERGIC RHINITIS, UNSPECIFIED SEASONALITY, UNSPECIFIED TRIGGER: ICD-10-CM

## 2025-04-29 DIAGNOSIS — K59.1 FUNCTIONAL DIARRHEA: ICD-10-CM

## 2025-04-29 DIAGNOSIS — I10 HYPERTENSION GOAL BP (BLOOD PRESSURE) < 140/90: Primary | ICD-10-CM

## 2025-04-29 DIAGNOSIS — M15.0 PRIMARY OSTEOARTHRITIS INVOLVING MULTIPLE JOINTS: ICD-10-CM

## 2025-04-29 DIAGNOSIS — E78.00 HYPERCHOLESTEROLEMIA: ICD-10-CM

## 2025-04-29 DIAGNOSIS — Z78.9 TAKES DIETARY SUPPLEMENTS: ICD-10-CM

## 2025-04-29 PROCEDURE — 3075F SYST BP GE 130 - 139MM HG: CPT

## 2025-04-29 PROCEDURE — 99607 MTMS BY PHARM ADDL 15 MIN: CPT

## 2025-04-29 PROCEDURE — 99605 MTMS BY PHARM NP 15 MIN: CPT

## 2025-04-29 PROCEDURE — 3079F DIAST BP 80-89 MM HG: CPT

## 2025-04-29 RX ORDER — SIMETHICONE 125 MG
125 TABLET,CHEWABLE ORAL 2 TIMES DAILY
COMMUNITY

## 2025-04-29 NOTE — LETTER
"Recommended To-Do List      Prepared on: Apr 29, 2025       You can get the best results from your medications by completing the items on this \"To-Do List.\"      Bring your To-Do List when you go to your doctor. And, share it with your family or caregivers.    My To-Do List:  What we talked about: What I should do:    What my medicines are for, how to know if my medicines are working, made sure my medicines are safe for me and reviewed how to take my medicines.    Take my medicines every day              "

## 2025-04-29 NOTE — PATIENT INSTRUCTIONS
"Recommendations from today's MTM visit:                                                    MTM (medication therapy management) is a service provided by a clinical pharmacist designed to help you get the most of out of your medicines.   Today we reviewed what your medicines are for, how to know if they are working, that your medicines are safe and how to make your medicine regimen as easy as possible.      Take your colestipol with your evening medications - we want to separate this by at least 4 hours from your levothyroxine and hydrochlorothiazide.  Watch out for lightheadedness/dizziness - let us know if you start feeling this way.    Follow-up: 1 year, sooner if needed    It was great speaking with you today.  I value your experience and would be very thankful for your time in providing feedback in our clinic survey. In the next few days, you may receive an email or text message from [employer] with a link to a survey related to your  clinical pharmacist.\"     To schedule another MTM appointment, please call the clinic directly or you may call the MTM scheduling line at [phone] or toll-free at [phone].     My Clinical Pharmacist's contact information:                                                      Please feel free to contact me with any questions or concerns you have.      Trish Traylor, Shanique  Medication Therapy Management Resident, [facility]  Phone: [phone]    Mary Bustos, Pharm.D., M.B.A., [facility]  Medication Therapy Management Pharmacist, [facility]  Phone: [phone]   "

## 2025-04-29 NOTE — LETTER
_  Medication List        Prepared on: Apr 29, 2025     Bring your Medication List when you go to the doctor, hospital, or   emergency room. And, share it with your family or caregivers.     Note any changes to how you take your medications.  Cross out medications when you no longer use them.    Medication How I take it Why I use it Prescriber   ACETAMINOPHEN PO Take 500 mg by mouth every morning.  Arthritis Patient Reported   amLODIPine (NORVASC) 5 MG tablet TAKE ONE TABLET BY MOUTH ONCE DAILY Essential hypertension with goal blood pressure less than 140/90 Tiffany Messer MD   atorvastatin (LIPITOR) 10 MG tablet Take 1 tablet (10 mg) by mouth daily. Hypercholesterolemia Tiffany Messer MD   budesonide-formoterol (SYMBICORT) 160-4.5 MCG/ACT Inhaler Inhale 2 puffs into the lungs 2 times daily. Chronic Cough Sav Lake MD   Calcium Citrate-Vitamin D 250-200 MG-UNIT TABS Take 1 tablet by mouth daily (with breakfast) Age-related osteoporosis without current pathological fracture Tiffany Messer MD   colestipol (COLESTID) 1 g tablet Take 1 tablet (1 g) by mouth 2 times daily. Functional Diarrhea Marva Mullen PA-C   diphenhydrAMINE-acetaminophen (TYLENOL PM)  MG tablet Take 1 tablet by mouth at bedtime.  Insomnia Patient Reported   doxylamine (UNISOM) 25 MG TABS tablet Take 25 mg by mouth nightly as needed.  Insomnia Patient Reported   fluticasone (FLONASE) 50 MCG/ACT nasal spray Spray 1 spray into both nostrils 2 times daily Seasonal allergic rhinitis due to pollen Tiffany Messer MD   levothyroxine (SYNTHROID/LEVOTHROID) 100 MCG tablet TAKE ONE TABLET BY MOUTH ONCE DAILY Acquired Hypothyroidism Tiffany Messer MD   loratadine (CLARITIN) 10 MG tablet Take 10 mg by mouth daily as needed.  Allergies Patient Reported   losartan (COZAAR) 100 MG tablet Take 1 tablet (100 mg) by mouth daily. Essential hypertension with goal blood pressure less than 140/90 Tiffany Messer MD   Multiple Vitamins-Minerals (EYE VITAMINS &  MINERALS) TABS Take 2 capsules by mouth daily EyePromise AREDS2 Plus  General Health Patient Reported   Multiple Vitamins-Minerals (MULTIVITAMIN ADULT PO) Take 1 tablet by mouth daily  General Health Patient Reported   Peppermint Oil 90 MG CPCR Take 90 mg by mouth daily.  Diarrhea Patient Reported   simethicone (MYLICON) 125 MG chewable tablet Take 125 mg by mouth 2 times daily.  Diarrhea Patient Reported   triamterene-HCTZ (DYAZIDE) 37.5-25 MG capsule Take 1 capsule by mouth daily. Essential hypertension with goal blood pressure less than 140/90 Tiffany Messer MD         Add new medications, over-the-counter drugs, herbals, vitamins, or  minerals in the blank rows below.    Medication How I take it Why I use it Prescriber                                      Allergies:      - Cats  - Hylan G-f 20  - Lisinopril - Cough  - Seasonal Allergies  - Vioxx        Side effects I have had:      Not on File        Other Information:              My notes and questions:

## 2025-04-29 NOTE — LETTER
April 29, 2025  Mely Guerra  [address] [place] [address]    Dear Ms. Guerra, [facility]     Thank you for talking with me on Apr 29, 2025 about your health and medications. As a follow-up to our conversation, I have included two documents:      Your Recommended To-Do List has steps you should take to get the best results from your medications.  Your Medication List will help you keep track of your medications and how to take them.    If you want to talk about these documents, please call Trish Traylor RPH at phone: [phone], Monday-Friday 8-4:30pm.    I look forward to working with you and your doctors to make sure your medications work well for you.    Sincerely,  Trish Traylor RPH  [facility] Pharmacist, [facility]

## 2025-04-29 NOTE — LETTER
"Recommended To-Do List      Prepared on: Apr 29, 2025       You can get the best results from your medications by completing the items on this \"To-Do List.\"      Bring your To-Do List when you go to your doctor. And, share it with your family or caregivers.    My To-Do List:  What we talked about: What I should do:   Your medications interacting with each other    Change when you are taking colestipol to the evening          What we talked about: What I should do:                     "

## 2025-04-29 NOTE — PROGRESS NOTES
Medication Therapy Management (MTM) Encounter    ASSESSMENT:                            Medication Adherence/Access: No issues identified.    Hypertension   Blood pressure meeting goal <140/90 mmHg and near goal <130/80 mmHg.    Hyperlipidemia   LDL at goal <100 mg/dL.     Allergy /Cough  Stable.     Insomnia   Continue to recommend patient not use either diphenhydramine or doxylamine but especially not both together, but again declines to stop these medications. Recommend patient watch closely for anticholinergic and CNS side effects.     Hypothyroidism   Last TSH was within normal limits.     Supplements   Stable.     Pain   Stable.    Diarrhea  Seems to be improving, plan in place to continue following with GI. If peppermint oil is not effective, could discuss discontinuation with GI provider.  Colestipol can interfere with absorption of hydrochlorothiazide and levothyroxine, general recommendation is to separate colestipol from these medications by at least 4 hours.    PLAN:                            Take your colestipol with your evening medications - we want to separate this by at least 4 hours from your levothyroxine and hydrochlorothiazide.  Watch out for lightheadedness/dizziness - let us know if you start feeling this way.     Follow-up: 1 year, sooner if needed    SUBJECTIVE/OBJECTIVE:                          Mely Guerra is a 85 year old female coming in for a follow-up visit.       Reason for visit: Annual medication review.    Allergies/ADRs: Reviewed in chart  Past Medical History: Reviewed in chart  Tobacco: She reports that she has never smoked. She has never used smokeless tobacco.  Alcohol: none  Caffeine: 3 mugs of coffee per day  Medication Adherence/Access: no issues reported.    Hypertension   Amlodipine 5 mg daily  Losartan 100 mg daily   Triamterene-hydrochlorothiazide 37.5-25 mg daily   Patient reports no current medication side effects  Self monitors blood pressure occasionally,  "typically around 130/75 mmHg     Hyperlipidemia   Atorvastatin 10 mg daily  Patient reports no significant myalgias or other side effects.     Allergy /Cough  Loratadine 10 mg once daily as needed - uses during allergy season  Flonase (fluticasone) nasal spray - 1 spray(s) each nostril twice daily  Symbicort 160/4.5 mcg 2 puffs twice daily - usually only remembers once daily  Patient reports that she gets a little bit dry from loratadine, but otherwise no side effects.  Patient feels that current therapy is effective.      Insomnia   Tylenol PM at bedtime  Doxylamine 25 mg at bedtime as needed - uses very infrequently  Patient reports no side effects, denies any drowsiness/grogginess.      Hypothyroidism   Levothyroxine 100 mcg daily.   Patient is having the following symptoms: none     Supplements   Calcium/vitamin D 250 mg/200 unit 1 tablet daily  AREDS2 2 capsules daily  Multivitamin daily  No reported issues at this time.      Pain   Acetaminophen 500 mg every morning  Patient reports no concerns, works well    Diarrhea  Colestipol 1 g twice daily - only taking once daily and in the morning with her other daily medications  Simethicone 125 mg twice daily  Peppermint oil 90 mg daily - unsure if this has been helpful  Patient reports when she was taking colestipol taking twice daily it made her constipated. Feels that her current regimen is about \"50% effective.\" Working with GI.    Today's Vitals: /80   Wt 134 lb 14.4 oz (61.2 kg)   LMP  (LMP Unknown)   BMI 22.80 kg/m    ----------------      I spent 40 minutes with this patient today. All changes were made via collaborative practice agreement with Tiffany Messer MD. A copy of the visit note was provided to the patient's provider(s).    A summary of these recommendations was given to the patient.    Trish Traylor, [place]  Medication Therapy Management Pharmacy Resident  [facility]    Preceptor cosignature: Mely Guerra was seen " independently by Dr. Traylor. I have reviewed the assessment and plan. Mary Bustos, PharmD, SYEDA, BCACP     Medication Therapy Recommendations  Acquired hypothyroidism   1 Current Medication: levothyroxine (SYNTHROID/LEVOTHROID) 100 MCG tablet   Current Medication Sig: TAKE ONE TABLET BY MOUTH ONCE DAILY   Rationale: Medication interaction - Dosage too low - Effectiveness   Recommendation: Change Administration Time   Status: Accepted per CPA   Identified Date: 4/29/2025 Completed Date: 4/29/2025   Note: Change colestipol to evening to avoid decreasing levothyroxine and hctz absorption         Insomnia, unspecified type   1 Current Medication: doxylamine (UNISOM) 25 MG TABS tablet   Current Medication Sig: Take 25 mg by mouth nightly as needed.   Rationale: Duplicate Therapy - Unnecessary medication therapy - Indication   Recommendation: Discontinue Medication   Status: Declined per Patient   Identified Date: 4/29/2025 Completed Date: 4/29/2025

## 2025-05-07 ENCOUNTER — RESULTS FOLLOW-UP (OUTPATIENT)
Dept: LAB | Facility: CLINIC | Age: 85
End: 2025-05-07

## 2025-05-07 LAB — CV ZIO PRELIM RESULTS: NORMAL

## 2025-05-15 ENCOUNTER — TRANSFERRED RECORDS (OUTPATIENT)
Dept: HEALTH INFORMATION MANAGEMENT | Facility: CLINIC | Age: 85
End: 2025-05-15

## 2025-06-04 ENCOUNTER — MYC MEDICAL ADVICE (OUTPATIENT)
Dept: PULMONOLOGY | Facility: CLINIC | Age: 85
End: 2025-06-04
Payer: COMMERCIAL

## 2025-06-04 DIAGNOSIS — R05.3 CHRONIC COUGH: Primary | ICD-10-CM

## 2025-06-05 ENCOUNTER — OFFICE VISIT (OUTPATIENT)
Dept: GASTROENTEROLOGY | Facility: CLINIC | Age: 85
End: 2025-06-05
Attending: PHYSICIAN ASSISTANT
Payer: COMMERCIAL

## 2025-06-05 VITALS
OXYGEN SATURATION: 95 % | WEIGHT: 129 LBS | BODY MASS INDEX: 21.49 KG/M2 | HEIGHT: 65 IN | DIASTOLIC BLOOD PRESSURE: 71 MMHG | SYSTOLIC BLOOD PRESSURE: 137 MMHG | HEART RATE: 69 BPM

## 2025-06-05 DIAGNOSIS — R19.8 IRREGULAR BOWEL HABITS: ICD-10-CM

## 2025-06-05 DIAGNOSIS — R10.31 RIGHT LOWER QUADRANT PAIN: ICD-10-CM

## 2025-06-05 DIAGNOSIS — R10.32 ABDOMINAL PAIN, LEFT LOWER QUADRANT: ICD-10-CM

## 2025-06-05 DIAGNOSIS — R14.0 BLOATING: ICD-10-CM

## 2025-06-05 DIAGNOSIS — R19.5 LOOSE STOOLS: Primary | ICD-10-CM

## 2025-06-05 PROCEDURE — 3078F DIAST BP <80 MM HG: CPT | Performed by: PHYSICIAN ASSISTANT

## 2025-06-05 PROCEDURE — 1126F AMNT PAIN NOTED NONE PRSNT: CPT | Performed by: PHYSICIAN ASSISTANT

## 2025-06-05 PROCEDURE — 99215 OFFICE O/P EST HI 40 MIN: CPT | Performed by: PHYSICIAN ASSISTANT

## 2025-06-05 PROCEDURE — 3075F SYST BP GE 130 - 139MM HG: CPT | Performed by: PHYSICIAN ASSISTANT

## 2025-06-05 RX ORDER — FLUTICASONE PROPIONATE AND SALMETEROL 500; 50 UG/1; UG/1
1 POWDER RESPIRATORY (INHALATION) EVERY 12 HOURS
Qty: 60 EACH | Refills: 5 | Status: SHIPPED | OUTPATIENT
Start: 2025-06-05

## 2025-06-05 ASSESSMENT — PAIN SCALES - GENERAL: PAINLEVEL_OUTOF10: NO PAIN (0)

## 2025-06-05 NOTE — LETTER
6/5/2025      Mely Guerra  [address] [place] [address]      Dear Colleague,    Thank you for referring your patient, Mely Guerra, to the [facility] [place]. Please see a copy of my visit note below.      GI CLINIC VISIT    ASSESSMENT/PLAN:  Mely Guerra is a 85 year old year old female with PMHx of chronic joint pain (on daily Celebrex), Osteoporosis,  following with the [facility] GI group for diarrhea that started in early December 2024.     #diarrhea x Dec 2024  She had similar sx in 2018 when she was taking daily Ibuprofen. Bidirectional scopes done, Cscope with TI evaluation showing stenotic IC valve and IC  ulceration (bx - active inflammation / ulceration w/o chronicity; comment - self-limited med induced changes). EGD was unremarkable with normal biopsies.  PillCam  -Limited exam as cam did not leave the SB but of visualized mucosa, appeared WNL. Enteric panel was negative. She told me she stopped Ibuprofen at that time and slowly her sx improved.     With most recent episode she reported frequent, urgent and very watery diarrhea with a lot of gas. She had an associated 3-4# weight loss but no bloody stools. Recent labs without anemia and intact renal function. As she had been taking daily celebrex for pain x 6 mo, we proceeded with colonoscopy with colon biopsies and CT scan for more evaluation.     Interestingly the colonoscopy with random colon biopsies was unremarkable without evidence of inflammation or microscopic colitis. The TI looked normal too. CTE without evidence of small bowel disease. Fecal calpro was normal. Infectious stool studies have been negative (enteric, O&P, c.diff). TSH WNL.     The start of citrucel 2 tbsp has been helpful as she was still going 7-8 x/daily, we added colestipol.   Overall this is thought to be functional diarrhea with ddx to include malabsorption (SIBO), less likely NET vs other      Today   -colestipol is  helpful, states she feels 50% better than before it was added. Still having 5x/d, movements are coupled.   -increase colestipol to 2 gram BID, gradually   --we could consider switch to cholestyramine which is more effective.  She would prefer a tablet for now  --OK to continue citrucel 2 tbsp in PM   -OK to continue 1 cap gasX in AM, OK to add in PM too   - Okay to stop IBgard which did not help  - We discussed pelvic floor dysfunction.  There could be a component of this.  She agrees.  Referral to physical therapy for evaluation  - Weight loss - a few lb loss though shares she is more active in warmer weather. I advised she keep an eye on the weight and if she loses more, to make an appt with primary to evaluate   - She prefers med management and no further testing at this time     Plan  - Increase colestipol 2 g twice daily, gradually  - Continue Citrucel 2 tablespoons in AM.  Can add 1-2 tbsp in PM too   - Pelvic floor dysfunction evaluation  - Continue Gas-X  - monitor weight, if loss continues, make appt with PCP for eval     Future consideration  1.GI RD consult  2. Breath test  3. Hormone secreting tumors work up (eg.,  fasting serum gastrin, serum calcitonin, somatostatin, vasoactive intestinal polypeptide, 24-hour urine 5-HIAA)  4. Laboratory studies to assess for malabsorption can include albumin, RBC folate, serum iron, total iron-binding capacity, B12, calcium, magnesium, carotene, vitamin D    #upper abdominal pain  Intermittent sharp pains connected to Bms - features of IBS present. Some tenderness on palpation but no guarding or rebound. VS stable. Recommended RUQ US and eval though she declined. ER precaution reviewed.     Orders Placed This Encounter   Procedures     Physical Therapy  Referral     RTC - 4 mo or sooner PRN     Thank you for this consultation.  It was a pleasure to participate in the care of this patient; please contact me with any further questions.     Marva Mullen,  MAGDALENA  Follow up: As planned above. Today, I personally spent 43  minutes spent on the date of the encounter doing chart review, history and exam, documentation and further activities per the note.      HPI  Mely Guerra is a 85 year old year old female with PMHx of chronic joint pain (on daily Celebrex), Osteoporosis,  following with the [facility] GI group for diarrhea.     Initial appt w/ me 1/30/2025  1. Chronic diarrhea x 7 wk   -shares she had similar sx about 7 years and had scopes and pill came. On a very BLAND diet and slowly with time, she started feeling better. Then things were OK until 7 wk ago    She at first thought she had norovirus, went to a market and 2 days later, felt nauseated w/o emesis and abd pain and diarrhea.     Premorbid stooling - regular, twice day, typically a formed log with an occassional looser stool depending on what she ate     Now she's having 10-12x a day, a lot of gas and tiny particles of stool. At 4am, wakes up with fecal urgency. Multiple episodes of nocturnal stooling.   -reporting a change in frequency, consistency, a lot of urgency, and new nocturnal stooling  -no bloody or black stools  -no oily droplets in toilet  -can have some mucus    Meds - tried imodium, pepto bismol which does help but she uses them for a short period of time only     Lower abdomen pain - worse at night in prone position. Does worsen with eating and need to defecate. Does improve after defecation.   -10-15 min after eating, will need to defecate   -Having some b/l lower back pain - tender to touch. No trauma.     Appetite is poor. Lost 3-4# throughout this time. Felt lightheaded yesterday. Not needed to go to ER for IVF.     She's keeping active but the diarrhea is disruptive and she has not been able to partake in social gatherings she normally would have done     About 6 mo ago she substituted APAP 1 gram AM for Celebrex 200 mg. She was previously taking Tylenol 1 g twice daily.  She has used  Celebrex daily for the past 6 months about 7 years ago when she had similar symptoms, she was using multiple doses of ibuprofen    2018 -  Colonoscopy -ulceration to IC valve, stricture at the IC Valve.  normal terminal ileum normal macro colon. biopsy of IC ulceration -active colitis with ulceration granulation tissue  without chronicity. The findings may represent an acute self-limited process or medication induced injury.  Clinical correlation is suggested.     EGD for heartburn -normal macro exam.  Gastric biopsy - WNL w/o h.pylori. Duodenum biopsy unremarkable without celiac disease    PillCam -limited exam as cam stayed in the small intestines; of visualized mucosa, SB appeared normal      4/4/2025  She did well with scope and recovery - had a good experience with endoscopy team.   Shares she is still dealing w/ diarrhea. Every time she goes to urinate, she has a small amount of stool passed too. She still also struggles with unpredictable stools as well  -estimates she's going 7-8x/d, varying volume. Consistency is soft and she feels incompletely evacuated   -no bloody or black stools   She's doing citrucel 2 tbsp a day - finds it helpful.   Appetite varies, normally will eat more food   -on EPIC weigh trend, weight is stable   Not having nausea/vomiting, fever, chills, heartburn, dysphagia, bloating  Shares she structures her whole life around her stooling pattern     Today June 5, 2025  Wake up from 4-6, gas/diarrhea then go back to sleep   Feeling about 50% better  5x/d, first in AM (watery/loose) and later in day, haing some little finger stools, incomplete evcauation   -no bloody or black stools   -gasX BID, helps cut down on gas   -Citrucel 2 Tbsp (daily)  -IBGard did not help   -restarted salads/greens and a bit of fried foods w/o affecting GI tract   -Colestiopl 1 gram BID helped with frequent stools, decreased abd pain   -no oily, partially digested food, mucus   80-90% of  time she goes to urinate,  she has a small amount of stool passed too. Used to be 100%.   Some days some bad abd pain, somedays no pain. Typically last few min to 20 min.   -no urgency with time  -goes away on its own  -more predominate upper abd pain (sharper) >> pain than lower; random, 1-2x a week she has this pain  -can have some connection to BM but not consistent either, doesn't seem to improve w/ defecation   -denies connection of eating to this pain  -no NSAIDs  Appetite - fair, finishing her meals   Weight is normally 134-135, now 129-130#; she feels good, she's biking but increasing her activity too (biking x2 a week and walking more too)   -she tends to lose weight in summer, 131-132# is her normal range   Would prefer to avoid more testing     Wt Readings from Last 10 Encounters:   06/05/25 58.5 kg (129 lb)   05/16/25 60.4 kg (133 lb 1.6 oz)   04/29/25 61.2 kg (134 lb 14.4 oz)   04/04/25 61.1 kg (134 lb 12.8 oz)   03/18/25 60.1 kg (132 lb 9.6 oz)   02/07/25 60.8 kg (134 lb)   01/30/25 61.2 kg (134 lb 14.4 oz)   10/25/24 61.2 kg (135 lb)   03/15/24 61.3 kg (135 lb 1.6 oz)   06/02/23 61.9 kg (136 lb 8 oz)     Family Hx  Her parents passed young    P Aunt - pancreatic cancer   No one in her family requiring GB disease   No other known family history or GI related malignancy (esophageal, gastric, pancreatic, liver or colon) or family history of IBD/celiac disease.     Social Hx   Yes Tylenol  Yes NSAIDs - Celebrex     No ETOH  No tobacco products   No recreational drug use     PROBLEM LIST  Patient Active Problem List    Diagnosis Date Noted     Adenomatous colon polyp 03/05/2025     Priority: Medium     Dysphagia 08/30/2017     Priority: Medium     Age-related osteoporosis without current pathological fracture 07/10/2017     Priority: Medium     Disorder of bone and cartilage 07/10/2017     Priority: Medium     2014:  Lumbar Spine (L1-L4) T-score: -0.4   Significant degenerative and/or osteosclerotic changes are present, falsely  improving result.   Forearm (radius 33%) T-score: -1.1    Lumbar (L1-L4) BMD: 1.142 Previous: 1.125   Total Hip Mean BMD: NA metal Previous: 0.793        2017:  Lumbar Spine (L1-L4)      T-score:  -0.2    Significant degenerative and/or osteosclerotic changes are present, falsely improving result.                Left Femoral Neck            T-score:  NA metal               Right Femoral Neck         T-score:  NA Metal               Forearm (radius 33%)      T-score:  -1.9       Rosacea 05/18/2017     Priority: Medium     Dermatitis 08/10/2016     Priority: Medium     Sleep apnea 06/03/2014     Priority: Medium     Otitis externa 04/02/2012     Priority: Medium     Essential hypertension with goal blood pressure less than 140/90 03/09/2011     Priority: Medium     Hypercholesterolemia 06/15/2009     Priority: Medium     Osteoarthritis 12/21/2004     Priority: Medium     Problem list name updated by automated process. Provider to review       Osteoporosis 04/13/2004     Priority: Medium     Osteopenia 2017  Lumbar Spine (L1-L4)      T-score:  -0.2    Significant degenerative and/or osteosclerotic changes are present, falsely improving result.                Left Femoral Neck            T-score:  NA metal               Right Femoral Neck         T-score:  NA Metal               Forearm (radius 33%)      T-score:  -1.9       Allergic rhinitis 02/18/2004     Priority: Medium     Problem list name updated by automated process. Provider to review       Acquired hypothyroidism 10/14/2003     Priority: Medium     Problem list name updated by automated process. Provider to review         PERTINENT PAST MEDICAL HISTORY:  Past Medical History:   Diagnosis Date     Allergic rhinitis, cause unspecified      Arthritis      Personal history of alcoholism (H)      Sleep apnea      SVT (supraventricular tachycardia)     s/p atrial tachycardia ablation near CS os     Unspecified essential hypertension      Unspecified hypothyroidism         PREVIOUS SURGERIES:  Past Surgical History:   Procedure Laterality Date     COLONOSCOPY N/A 10/31/2018    Procedure: COMBINED COLONOSCOPY, SINGLE OR MULTIPLE BIOPSY/POLYPECTOMY BY BIOPSY;  Surgeon: Leon Cosme MD;  Location:  GI     COLONOSCOPY N/A 2/19/2025    Procedure: COLONOSCOPY, WITH POLYPECTOMY AND BIOPSY;  Surgeon: Yonathan Martins MD;  Location: [facility]     EP ABLATION SVT N/A 10/8/2019    Procedure: EP Ablation SVT;  Surgeon: Laney Arriaza MD;  Location:  HEART CARDIAC CATH LAB     EP ABLATION SVT N/A 6/10/2020    Procedure: EP Ablation SVT;  Surgeon: Laney Arriaza MD;  Location:  HEART CARDIAC CATH LAB     ESOPHAGOSCOPY, GASTROSCOPY, DUODENOSCOPY (EGD), COMBINED N/A 10/31/2018    Procedure: GASTROSCOPY;  Surgeon: eLon Cosme MD;  Location:  GI     GYN SURGERY       HYSTERECTOMY       JOINT REPLACEMENT Bilateral      knee replacement Bilateral      ORTHOPEDIC SURGERY       ZZC NONSPECIFIC PROCEDURE  1974    s/p Vaginal hysterectomy       ALLERGIES:     Allergies   Allergen Reactions     Cats      Hylan G-F 20      Lisinopril Cough     Seasonal Allergies      Vioxx        PERTINENT MEDICATIONS:    Current Outpatient Medications:      ACETAMINOPHEN PO, Take 500 mg by mouth every morning., Disp: , Rfl:      amLODIPine (NORVASC) 5 MG tablet, TAKE ONE TABLET BY MOUTH ONCE DAILY, Disp: 90 tablet, Rfl: 0     atorvastatin (LIPITOR) 10 MG tablet, Take 1 tablet (10 mg) by mouth daily., Disp: 90 tablet, Rfl: 1     budesonide-formoterol (SYMBICORT) 160-4.5 MCG/ACT Inhaler, Inhale 2 puffs into the lungs 2 times daily. (Patient taking differently: Inhale 2 puffs into the lungs daily.), Disp: 10.2 g, Rfl: 3     Calcium Citrate-Vitamin D 250-200 MG-UNIT TABS, Take 1 tablet by mouth daily (with breakfast), Disp: , Rfl:      colestipol (COLESTID) 1 g tablet, Take 1 tablet (1 g) by mouth 2 times daily. (Patient taking differently: Take 1 g by mouth daily.), Disp: 60 tablet, Rfl: 2      diphenhydrAMINE-acetaminophen (TYLENOL PM)  MG tablet, Take 1 tablet by mouth at bedtime., Disp: , Rfl:      doxylamine (UNISOM) 25 MG TABS tablet, Take 25 mg by mouth nightly as needed., Disp: , Rfl:      fluticasone (FLONASE) 50 MCG/ACT nasal spray, Spray 1 spray into both nostrils 2 times daily, Disp: 16 g, Rfl: 0     levothyroxine (SYNTHROID/LEVOTHROID) 100 MCG tablet, TAKE ONE TABLET BY MOUTH ONCE DAILY, Disp: 90 tablet, Rfl: 3     loratadine (CLARITIN) 10 MG tablet, Take 10 mg by mouth daily as needed., Disp: , Rfl:      losartan (COZAAR) 100 MG tablet, Take 1 tablet (100 mg) by mouth daily., Disp: 90 tablet, Rfl: 1     Multiple Vitamins-Minerals (EYE VITAMINS & MINERALS) TABS, Take 2 capsules by mouth daily EyePromise AREDS2 Plus, Disp: , Rfl:      Multiple Vitamins-Minerals (MULTIVITAMIN ADULT PO), Take 1 tablet by mouth daily, Disp: , Rfl:      Peppermint Oil 90 MG CPCR, Take 90 mg by mouth daily., Disp: , Rfl:      simethicone (MYLICON) 125 MG chewable tablet, Take 125 mg by mouth 2 times daily., Disp: , Rfl:      triamterene-HCTZ (DYAZIDE) 37.5-25 MG capsule, Take 1 capsule by mouth daily., Disp: 90 capsule, Rfl: 1    SOCIAL HISTORY:  Social History     Socioeconomic History     Marital status:      Spouse name: Not on file     Number of children: Not on file     Years of education: Not on file     Highest education level: Not on file   Occupational History     Not on file   Tobacco Use     Smoking status: Never     Smokeless tobacco: Never   Vaping Use     Vaping status: Never Used   Substance and Sexual Activity     Alcohol use: No     Alcohol/week: 0.0 standard drinks of alcohol     Drug use: No     Sexual activity: Yes     Partners: Male   Other Topics Concern      Service No     Blood Transfusions No     Caffeine Concern No     Occupational Exposure No     Hobby Hazards No     Sleep Concern Yes     Stress Concern No     Weight Concern No     Special Diet No     Back Care Yes      Exercise No     Bike Helmet Yes     Seat Belt Yes     Self-Exams Yes     Parent/sibling w/ CABG, MI or angioplasty before 65F 55M? Yes   Social History Narrative    Social Documentation:4/10        Balanced Diet: YES    Calcium intake: supplement per day    Caffeine: 4cups per day    Exercise:  type of activity varies;  3 times per week    Sunscreen: Yes    Seatbelts:  Yes    Self Breast Exam:  Yes    Self Testicular Exam: No - na    Physical/Emotional/Sexual Abuse: No -     Do you feel safe in your environment? Yes        Cholesterol screen up to date: yes    Eye Exam up to date: Yes    Dental Exam up to date: Yes    Pap smear up to date: Does Not Apply    Mammogram up to date: utd    Dexa Scan up to date: Yes    Colonoscopy up to date: Yes    Immunizations up to date: Yes    Glucose screen if over 40:  No -     Fredy Carroll ma             Social Drivers of Health     Financial Resource Strain: Low Risk  (3/18/2025)    Financial Resource Strain      Within the past 12 months, have you or your family members you live with been unable to get utilities (heat, electricity) when it was really needed?: No   Food Insecurity: Low Risk  (3/18/2025)    Food Insecurity      Within the past 12 months, did you worry that your food would run out before you got money to buy more?: No      Within the past 12 months, did the food you bought just not last and you didn t have money to get more?: No   Transportation Needs: Low Risk  (3/18/2025)    Transportation Needs      Within the past 12 months, has lack of transportation kept you from medical appointments, getting your medicines, non-medical meetings or appointments, work, or from getting things that you need?: No   Physical Activity: Sufficiently Active (3/18/2025)    Exercise Vital Sign      Days of Exercise per Week: 5 days      Minutes of Exercise per Session: 60 min   Stress: Stress Concern Present (3/18/2025)    [employer] - Occupational  "Stress Questionnaire      Feeling of Stress : To some extent   Social Connections: Unknown (3/18/2025)    Social Connection and Isolation Panel [NHANES]      Frequency of Communication with Friends and Family: Not on file      Frequency of Social Gatherings with Friends and Family: More than three times a week      Attends Taoism Services: Not on file      Active Member of Clubs or Organizations: Not on file      Attends Club or Organization Meetings: Not on file      Marital Status: Not on file   Interpersonal Safety: Low Risk  (3/18/2025)    Interpersonal Safety      Do you feel physically and emotionally safe where you currently live?: Yes      Within the past 12 months, have you been hit, slapped, kicked or otherwise physically hurt by someone?: No      Within the past 12 months, have you been humiliated or emotionally abused in other ways by your partner or ex-partner?: No   Housing Stability: Low Risk  (3/18/2025)    Housing Stability      Do you have housing? : Yes      Are you worried about losing your housing?: No       FAMILY HISTORY:  Family History   Problem Relation Age of Onset     Cancer Sister      Heart Disease Father      Hypertension Father      Thyroid Disease Father      Other Cancer Other          age 65     Other Cancer Other          age 56     Other Cancer Niece          age 53     Other Cancer Sister          age 51       Past/family/social history reviewed and no changes    PHYSICAL EXAMINATION:  Vitals reviewed: /71   Pulse 69   Ht 1.638 m (5' 4.5\")   Wt 58.5 kg (129 lb)   LMP  (LMP Unknown)   SpO2 95%   BMI 21.80 kg/m    Constitutional: aaox3, cooperative, pleasant, not dyspneic/diaphoretic, no acute distress  Eyes: Sclera anicteric/injected  Ears/nose/mouth/throat: hearing intact  Neck: supple, active ROM w/o limitation or pain   CV: No edema  Respiratory: Unlabored breathing  Abd:  Nondistended, soft, mild pain to upper abdomen, no peritoneal signs, neg " Llamas's sign  Skin: warm, perfused, no jaundice  Psych: Normal affect  MSK: Normal gait     PERTINENT STUDIES:    Lab on 01/15/2025   Component Date Value Ref Range Status     C Difficile Toxin B by PCR 01/16/2025 Negative  Negative Final     WBC Count 01/15/2025 6.1  4.0 - 11.0 10e3/uL Final     RBC Count 01/15/2025 4.33  3.80 - 5.20 10e6/uL Final     Hemoglobin 01/15/2025 12.9  11.7 - 15.7 g/dL Final     Hematocrit 01/15/2025 38.4  35.0 - 47.0 % Final     MCV 01/15/2025 89  78 - 100 fL Final     MCH 01/15/2025 29.8  26.5 - 33.0 pg Final     MCHC 01/15/2025 33.6  31.5 - 36.5 g/dL Final     RDW 01/15/2025 12.2  10.0 - 15.0 % Final     Platelet Count 01/15/2025 217  150 - 450 10e3/uL Final     Sodium 01/15/2025 130 (L)  135 - 145 mmol/L Final     Potassium 01/15/2025 4.1  3.4 - 5.3 mmol/L Final     Carbon Dioxide (CO2) 01/15/2025 27  22 - 29 mmol/L Final     Anion Gap 01/15/2025 11  7 - 15 mmol/L Final     Urea Nitrogen 01/15/2025 11.2  8.0 - 23.0 mg/dL Final     Creatinine 01/15/2025 0.74  0.51 - 0.95 mg/dL Final     GFR Estimate 01/15/2025 80  >60 mL/min/1.73m2 Final     Calcium 01/15/2025 10.3  8.8 - 10.4 mg/dL Final     Chloride 01/15/2025 92 (L)  98 - 107 mmol/L Final     Glucose 01/15/2025 95  70 - 99 mg/dL Final     Alkaline Phosphatase 01/15/2025 67  40 - 150 U/L Final     AST 01/15/2025 48 (H)  0 - 45 U/L Final     ALT 01/15/2025 20  0 - 50 U/L Final     Protein Total 01/15/2025 7.2  6.4 - 8.3 g/dL Final     Albumin 01/15/2025 4.5  3.5 - 5.2 g/dL Final     Bilirubin Total 01/15/2025 0.8  <=1.2 mg/dL Final     Calprotectin Feces 01/16/2025 42.1  0.0 - 49.9 mg/kg Final     Fecal Lactoferrin 01/16/2025 Negative  Negative Final        Lab Results   Component Value Date    WBC 6.1 01/15/2025    WBC 4.7 06/02/2023    WBC 6.4 06/10/2020    HGB 12.9 01/15/2025    HGB 13.4 06/02/2023    HGB 12.7 06/10/2020     01/15/2025     06/02/2023     06/10/2020    CHOL 170 03/18/2025    CHOL 202 (H)  06/24/2024    CHOL 242 (H) 03/15/2024    TRIG 35 03/18/2025    TRIG 33 06/24/2024    TRIG 50 03/15/2024    HDL 94 03/18/2025     06/24/2024     03/15/2024    ALT 20 01/15/2025    ALT 17 06/24/2024    ALT 21 03/15/2024    AST 48 (H) 01/15/2025    AST 47 (H) 06/24/2024    AST 56 (H) 03/15/2024     (L) 01/15/2025     (L) 06/24/2024     (L) 03/15/2024    BUN 11.2 01/15/2025    BUN 12.1 06/24/2024    BUN 9.0 03/15/2024    CO2 27 01/15/2025    CO2 27 06/24/2024    CO2 25 03/15/2024    TSH 1.67 03/18/2025    TSH 2.26 03/15/2024    TSH 2.16 03/14/2023    INR 1.06 06/02/2023    INR 1.7 02/12/2013    INR 1.9 02/08/2013        Liver Function Studies -   Recent Labs   Lab Test 01/15/25  1559   PROTTOTAL 7.2   ALBUMIN 4.5   BILITOTAL 0.8   ALKPHOS 67   AST 48*   ALT 20        PREVIOUS ENDOSCOPY    Results for orders placed or performed during the hospital encounter of 02/19/25   COLONOSCOPY    Collection Time: 02/19/25 12:01 PM   Result Value Ref Range    COLONOSCOPY       [address] [place] [address] [phone]     Endoscopy Department  _______________________________________________________________________________  Patient Name: Mely Guerra           Procedure Date: 2/19/2025 12:01 PM  MRN: [id number]                       Account Number: [id number]  YOB: 1940              Admit Type: Outpatient  Age: 85                               Room: [facility] 01  Gender: Female                        Note Status: Finalized  Attending MD: SKYE BERRIOS , ,          Total Sedation Time:   _______________________________________________________________________________     Procedure:             Colonoscopy  Indications:           Chronic diarrhea  Providers:             Saul RAM, ANASTACIO  Referring MD:          KRISH DILLON  Medicines:             Midazolam 9 mg IV, Fentanyl 100 micrograms IV  Complications:         No immediate  complications. Estimated blood loss:                           Minimal.  _______________________________________________________________________________  Procedure:             Pre-Anesthesia Assessment:                         - Prior to the procedure, a History and Physical was                          performed, and patient medications and allergies were                          reviewed. The patient is competent. The risks and                          benefits of the procedure and the sedation options and                          risks were discussed with the patient. All questions                          were answered and informed consent was obtained.                          Patient identification and proposed procedure were                          verified by the physician in the endoscopy suite.                          Mental Status Examination: alert and oriented. ASA                          Grade Assessment: III - A patient with severe systemic                          disease. After reviewing the risks and benefits, the                           patient was deemed in satisfactory condition to                          undergo the procedure. The anesthesia plan was to use                          moderate sedation / analgesia (conscious sedation).                          Immediately prior to administration of medications,                          the patient was re-assessed for adequacy to receive                          sedatives. The heart rate, respiratory rate, oxygen                          saturations, blood pressure, adequacy of pulmonary                          ventilation, and response to care were monitored                          throughout the procedure. The physical status of the                          patient was re-assessed after the procedure.                         After obtaining informed consent, the colonoscope was                          passed under direct vision.  Throughout the procedure,                          the patient's blood pressure, pulse, and oxygen                           saturations were monitored continuously. The                          Colonoscope was introduced through the anus and                          advanced to the terminal ileum. The colonoscopy was                          performed without difficulty. The patient tolerated                          the procedure well. The quality of the bowel                          preparation was good. The terminal ileum, ileocecal                          valve, appendiceal orifice, and rectum were                          photographed.                                                                                   Findings:       Hemorrhoids were found on perianal exam.       The terminal ileum appeared normal.       Normal mucosa was found in the entire colon. Biopsies for histology were        taken with a cold forceps from the entire colon for evaluation of        microscopic colitis. Verification of patient identification for the        specimen was done by the physician and nurse using the  patient's name.        Estimated blood loss was minimal.       A 10 to 15 mm polyp was found in the proximal ascending colon. The polyp        was granular lateral spreading. Preparations were made for mucosal        resection. Demarcation of the lesion was performed with narrow band        imaging to clearly identify the boundaries of the lesion. Ascendo was        injected to raise the lesion. Snare mucosal resection was performed.        Resection and retrieval were complete. Resected tissue margins were        examined and clear of polyp tissue. Verification of patient        identification for the specimen was done by the physician and nurse        using the patient's name. Estimated blood loss was minimal.       A few small-mouthed diverticula were found in the left colon.       The exam was otherwise without  abnormality on direct and retroflexion        views.                                                                                   Impression:            - Hemorrhoids  found on perianal exam.                         - The examined portion of the ileum was normal.                         - Normal mucosa in the entire examined colon. Biopsied.                         - One 10 to 15 mm polyp in the proximal ascending                          colon, removed with mucosal resection. Resected and                          retrieved.                         - Diverticulosis in the left colon.                         - The examination was otherwise normal on direct and                          retroflexion views.                         - Mucosal resection was performed. Resection and                          retrieval were complete.  Recommendation:        - Patient has a contact number available for                          emergencies. The signs and symptoms of potential                          delayed complications were discussed with the patient.                          Return to normal activities tomorrow. Written                          discharge ins tructions were provided to the patient.                         - Resume previous diet.                         - Continue present medications.                         - Await pathology results.                         - Return to referring physician as previously                          scheduled.                                                                                       _____________  SKYE BERRIOS,   2/19/2025 1:23:54 PM  I was physically present for the entire viewing portion of the exam.  __________________________  Signature of teaching physician  Promise/[id number] YASH  Number of Addenda: 0    Note Initiated On: 2/19/2025 12:01 PM  Scope In: 12:43:52 PM  Scope Out: 1:15:24 PM       Final Diagnosis   A. COLON, RANDOM BIOPSIES  Colonic mucosa with no  evidence of microscopic or chronic colitis or other significant histologic abnormality     B. 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        Sincerely,        Marva Mullen PA-C    Electronically signed

## 2025-06-05 NOTE — PROGRESS NOTES
GI CLINIC VISIT    ASSESSMENT/PLAN:  Mely Guerra is a 85 year old year old female with PMHx of chronic joint pain (on daily Celebrex), Osteoporosis,  following with the [facility] GI group for diarrhea that started in early December 2024.     #diarrhea x Dec 2024  She had similar sx in 2018 when she was taking daily Ibuprofen. Bidirectional scopes done, Cscope with TI evaluation showing stenotic IC valve and IC  ulceration (bx - active inflammation / ulceration w/o chronicity; comment - self-limited med induced changes). EGD was unremarkable with normal biopsies.  PillCam  -Limited exam as cam did not leave the SB but of visualized mucosa, appeared WNL. Enteric panel was negative. She told me she stopped Ibuprofen at that time and slowly her sx improved.     With most recent episode she reported frequent, urgent and very watery diarrhea with a lot of gas. She had an associated 3-4# weight loss but no bloody stools. Recent labs without anemia and intact renal function. As she had been taking daily celebrex for pain x 6 mo, we proceeded with colonoscopy with colon biopsies and CT scan for more evaluation.     Interestingly the colonoscopy with random colon biopsies was unremarkable without evidence of inflammation or microscopic colitis. The TI looked normal too. CTE without evidence of small bowel disease. Fecal calpro was normal. Infectious stool studies have been negative (enteric, O&P, c.diff). TSH WNL.     The start of citrucel 2 tbsp has been helpful as she was still going 7-8 x/daily, we added colestipol.   Overall this is thought to be functional diarrhea with ddx to include malabsorption (SIBO), less likely NET vs other      Today   -colestipol is helpful, states she feels 50% better than before it was added. Still having 5x/d, movements are coupled.   -increase colestipol to 2 gram BID, gradually   --we could consider switch to cholestyramine which is more effective.  She would prefer a tablet for now  --OK  to continue citrucel 2 tbsp in PM   -OK to continue 1 cap gasX in AM, OK to add in PM too   - Okay to stop IBgard which did not help  - We discussed pelvic floor dysfunction.  There could be a component of this.  She agrees.  Referral to physical therapy for evaluation  - Weight loss - a few lb loss though shares she is more active in warmer weather. I advised she keep an eye on the weight and if she loses more, to make an appt with primary to evaluate   - She prefers med management and no further testing at this time     Plan  - Increase colestipol 2 g twice daily, gradually  - Continue Citrucel 2 tablespoons in AM.  Can add 1-2 tbsp in PM too   - Pelvic floor dysfunction evaluation  - Continue Gas-X  - monitor weight, if loss continues, make appt with PCP for eval     Future consideration  1.GI RD consult  2. Breath test  3. Hormone secreting tumors work up (eg.,  fasting serum gastrin, serum calcitonin, somatostatin, vasoactive intestinal polypeptide, 24-hour urine 5-HIAA)  4. Laboratory studies to assess for malabsorption can include albumin, RBC folate, serum iron, total iron-binding capacity, B12, calcium, magnesium, carotene, vitamin D    #upper abdominal pain  Intermittent sharp pains connected to Bms - features of IBS present. Some tenderness on palpation but no guarding or rebound. VS stable. Recommended RUQ US and eval though she declined. ER precaution reviewed.     Orders Placed This Encounter   Procedures    Physical Therapy  Referral     RTC - 4 mo or sooner PRN     Thank you for this consultation.  It was a pleasure to participate in the care of this patient; please contact me with any further questions.     Marva Mullen PA-C  Follow up: As planned above. Today, I personally spent 43  minutes spent on the date of the encounter doing chart review, history and exam, documentation and further activities per the note.      HPI  Mely Guerra is a 85 year old year old female with PMHx of chronic  joint pain (on daily Celebrex), Osteoporosis,  following with the [facility] GI group for diarrhea.     Initial appt w/ me 1/30/2025  1. Chronic diarrhea x 7 wk   -shares she had similar sx about 7 years and had scopes and pill came. On a very BLAND diet and slowly with time, she started feeling better. Then things were OK until 7 wk ago    She at first thought she had norovirus, went to a market and 2 days later, felt nauseated w/o emesis and abd pain and diarrhea.     Premorbid stooling - regular, twice day, typically a formed log with an occassional looser stool depending on what she ate     Now she's having 10-12x a day, a lot of gas and tiny particles of stool. At 4am, wakes up with fecal urgency. Multiple episodes of nocturnal stooling.   -reporting a change in frequency, consistency, a lot of urgency, and new nocturnal stooling  -no bloody or black stools  -no oily droplets in toilet  -can have some mucus    Meds - tried imodium, pepto bismol which does help but she uses them for a short period of time only     Lower abdomen pain - worse at night in prone position. Does worsen with eating and need to defecate. Does improve after defecation.   -10-15 min after eating, will need to defecate   -Having some b/l lower back pain - tender to touch. No trauma.     Appetite is poor. Lost 3-4# throughout this time. Felt lightheaded yesterday. Not needed to go to ER for IVF.     She's keeping active but the diarrhea is disruptive and she has not been able to partake in social gatherings she normally would have done     About 6 mo ago she substituted APAP 1 gram AM for Celebrex 200 mg. She was previously taking Tylenol 1 g twice daily.  She has used Celebrex daily for the past 6 months about 7 years ago when she had similar symptoms, she was using multiple doses of ibuprofen    2018 -  Colonoscopy -ulceration to IC valve, stricture at the IC Valve.  normal terminal ileum normal macro colon. biopsy of IC ulceration -active  colitis with ulceration granulation tissue  without chronicity. The findings may represent an acute self-limited process or medication induced injury.  Clinical correlation is suggested.     EGD for heartburn -normal macro exam.  Gastric biopsy - WNL w/o h.pylori. Duodenum biopsy unremarkable without celiac disease    PillCam -limited exam as cam stayed in the small intestines; of visualized mucosa, SB appeared normal      4/4/2025  She did well with scope and recovery - had a good experience with endoscopy team.   Shares she is still dealing w/ diarrhea. Every time she goes to urinate, she has a small amount of stool passed too. She still also struggles with unpredictable stools as well  -estimates she's going 7-8x/d, varying volume. Consistency is soft and she feels incompletely evacuated   -no bloody or black stools   She's doing citrucel 2 tbsp a day - finds it helpful.   Appetite varies, normally will eat more food   -on EPIC weigh trend, weight is stable   Not having nausea/vomiting, fever, chills, heartburn, dysphagia, bloating  Shares she structures her whole life around her stooling pattern     Today June 5, 2025  Wake up from 4-6, gas/diarrhea then go back to sleep   Feeling about 50% better  5x/d, first in AM (watery/loose) and later in day, haing some little finger stools, incomplete evcauation   -no bloody or black stools   -gasX BID, helps cut down on gas   -Citrucel 2 Tbsp (daily)  -IBGard did not help   -restarted salads/greens and a bit of fried foods w/o affecting GI tract   -Colestiopl 1 gram BID helped with frequent stools, decreased abd pain   -no oily, partially digested food, mucus   80-90% of  time she goes to urinate, she has a small amount of stool passed too. Used to be 100%.   Some days some bad abd pain, somedays no pain. Typically last few min to 20 min.   -no urgency with time  -goes away on its own  -more predominate upper abd pain (sharper) >> pain than lower; random, 1-2x a week she  has this pain  -can have some connection to BM but not consistent either, doesn't seem to improve w/ defecation   -denies connection of eating to this pain  -no NSAIDs  Appetite - fair, finishing her meals   Weight is normally 134-135, now 129-130#; she feels good, she's biking but increasing her activity too (biking x2 a week and walking more too)   -she tends to lose weight in summer, 131-132# is her normal range   Would prefer to avoid more testing     Wt Readings from Last 10 Encounters:   06/05/25 58.5 kg (129 lb)   05/16/25 60.4 kg (133 lb 1.6 oz)   04/29/25 61.2 kg (134 lb 14.4 oz)   04/04/25 61.1 kg (134 lb 12.8 oz)   03/18/25 60.1 kg (132 lb 9.6 oz)   02/07/25 60.8 kg (134 lb)   01/30/25 61.2 kg (134 lb 14.4 oz)   10/25/24 61.2 kg (135 lb)   03/15/24 61.3 kg (135 lb 1.6 oz)   06/02/23 61.9 kg (136 lb 8 oz)     Family Hx  Her parents passed young    P Aunt - pancreatic cancer   No one in her family requiring GB disease   No other known family history or GI related malignancy (esophageal, gastric, pancreatic, liver or colon) or family history of IBD/celiac disease.     Social Hx   Yes Tylenol  Yes NSAIDs - Celebrex     No ETOH  No tobacco products   No recreational drug use     PROBLEM LIST  Patient Active Problem List    Diagnosis Date Noted    Adenomatous colon polyp 03/05/2025     Priority: Medium    Dysphagia 08/30/2017     Priority: Medium    Age-related osteoporosis without current pathological fracture 07/10/2017     Priority: Medium    Disorder of bone and cartilage 07/10/2017     Priority: Medium     2014:  Lumbar Spine (L1-L4) T-score: -0.4   Significant degenerative and/or osteosclerotic changes are present, falsely improving result.   Forearm (radius 33%) T-score: -1.1    Lumbar (L1-L4) BMD: 1.142 Previous: 1.125   Total Hip Mean BMD: NA metal Previous: 0.793        2017:  Lumbar Spine (L1-L4)      T-score:  -0.2    Significant degenerative and/or osteosclerotic changes are present, falsely  improving result.                Left Femoral Neck            T-score:  NA metal               Right Femoral Neck         T-score:  NA Metal               Forearm (radius 33%)      T-score:  -1.9      Rosacea 05/18/2017     Priority: Medium    Dermatitis 08/10/2016     Priority: Medium    Sleep apnea 06/03/2014     Priority: Medium    Otitis externa 04/02/2012     Priority: Medium    Essential hypertension with goal blood pressure less than 140/90 03/09/2011     Priority: Medium    Hypercholesterolemia 06/15/2009     Priority: Medium    Osteoarthritis 12/21/2004     Priority: Medium     Problem list name updated by automated process. Provider to review      Osteoporosis 04/13/2004     Priority: Medium     Osteopenia 2017  Lumbar Spine (L1-L4)      T-score:  -0.2    Significant degenerative and/or osteosclerotic changes are present, falsely improving result.                Left Femoral Neck            T-score:  NA metal               Right Femoral Neck         T-score:  NA Metal               Forearm (radius 33%)      T-score:  -1.9      Allergic rhinitis 02/18/2004     Priority: Medium     Problem list name updated by automated process. Provider to review      Acquired hypothyroidism 10/14/2003     Priority: Medium     Problem list name updated by automated process. Provider to review         PERTINENT PAST MEDICAL HISTORY:  Past Medical History:   Diagnosis Date    Allergic rhinitis, cause unspecified     Arthritis     Personal history of alcoholism (H)     Sleep apnea     SVT (supraventricular tachycardia)     s/p atrial tachycardia ablation near CS os    Unspecified essential hypertension     Unspecified hypothyroidism        PREVIOUS SURGERIES:  Past Surgical History:   Procedure Laterality Date    COLONOSCOPY N/A 10/31/2018    Procedure: COMBINED COLONOSCOPY, SINGLE OR MULTIPLE BIOPSY/POLYPECTOMY BY BIOPSY;  Surgeon: Leon Cosme MD;  Location:  GI    COLONOSCOPY N/A 2/19/2025    Procedure:  COLONOSCOPY, WITH POLYPECTOMY AND BIOPSY;  Surgeon: Yonathan Martins MD;  Location:  GI    EP ABLATION SVT N/A 10/8/2019    Procedure: EP Ablation SVT;  Surgeon: Laney Arriaza MD;  Location:  HEART CARDI[facility] CATH LAB    EP ABLATION SVT N/A 6/10/2020    Procedure: EP Ablation SVT;  Surgeon: Laney Arriaza MD;  Location:  HEART CARDIAC CATH LAB    ESOPHAGOSCOPY, GASTROSCOPY, DUODENOSCOPY (EGD), COMBINED N/A 10/31/2018    Procedure: GASTROSCOPY;  Surgeon: Leon Cosme MD;  Location:  GI    GYN SURGERY      HYSTERECTOMY      JOINT REPLACEMENT Bilateral     knee replacement Bilateral     ORTHOPEDIC SURGERY      ZZC NONSPECIFIC PROCEDURE  1974    s/p Vaginal hysterectomy       ALLERGIES:     Allergies   Allergen Reactions    Cats     Hylan G-F 20     Lisinopril Cough    Seasonal Allergies     Vioxx        PERTINENT MEDICATIONS:    Current Outpatient Medications:     ACETAMINOPHEN PO, Take 500 mg by mouth every morning., Disp: , Rfl:     amLODIPine (NORVASC) 5 MG tablet, TAKE ONE TABLET BY MOUTH ONCE DAILY, Disp: 90 tablet, Rfl: 0    atorvastatin (LIPITOR) 10 MG tablet, Take 1 tablet (10 mg) by mouth daily., Disp: 90 tablet, Rfl: 1    budesonide-formoterol (SYMBICORT) 160-4.5 MCG/ACT Inhaler, Inhale 2 puffs into the lungs 2 times daily. (Patient taking differently: Inhale 2 puffs into the lungs daily.), Disp: 10.2 g, Rfl: 3    Calcium Citrate-Vitamin D 250-200 MG-UNIT TABS, Take 1 tablet by mouth daily (with breakfast), Disp: , Rfl:     colestipol (COLESTID) 1 g tablet, Take 1 tablet (1 g) by mouth 2 times daily. (Patient taking differently: Take 1 g by mouth daily.), Disp: 60 tablet, Rfl: 2    diphenhydrAMINE-acetaminophen (TYLENOL PM)  MG tablet, Take 1 tablet by mouth at bedtime., Disp: , Rfl:     doxylamine (UNISOM) 25 MG TABS tablet, Take 25 mg by mouth nightly as needed., Disp: , Rfl:     fluticasone (FLONASE) 50 MCG/ACT nasal spray, Spray 1 spray into both nostrils 2 times daily, Disp: 16 g, Rfl: 0     levothyroxine (SYNTHROID/LEVOTHROID) 100 MCG tablet, TAKE ONE TABLET BY MOUTH ONCE DAILY, Disp: 90 tablet, Rfl: 3    loratadine (CLARITIN) 10 MG tablet, Take 10 mg by mouth daily as needed., Disp: , Rfl:     losartan (COZAAR) 100 MG tablet, Take 1 tablet (100 mg) by mouth daily., Disp: 90 tablet, Rfl: 1    Multiple Vitamins-Minerals (EYE VITAMINS & MINERALS) TABS, Take 2 capsules by mouth daily EyePromise AREDS2 Plus, Disp: , Rfl:     Multiple Vitamins-Minerals (MULTIVITAMIN ADULT PO), Take 1 tablet by mouth daily, Disp: , Rfl:     Peppermint Oil 90 MG CPCR, Take 90 mg by mouth daily., Disp: , Rfl:     simethicone (MYLICON) 125 MG chewable tablet, Take 125 mg by mouth 2 times daily., Disp: , Rfl:     triamterene-HCTZ (DYAZIDE) 37.5-25 MG capsule, Take 1 capsule by mouth daily., Disp: 90 capsule, Rfl: 1    SOCIAL HISTORY:  Social History     Socioeconomic History    Marital status:      Spouse name: Not on file    Number of children: Not on file    Years of education: Not on file    Highest education level: Not on file   Occupational History    Not on file   Tobacco Use    Smoking status: Never    Smokeless tobacco: Never   Vaping Use    Vaping status: Never Used   Substance and Sexual Activity    Alcohol use: No     Alcohol/week: 0.0 standard drinks of alcohol    Drug use: No    Sexual activity: Yes     Partners: Male   Other Topics Concern     Service No    Blood Transfusions No    Caffeine Concern No    Occupational Exposure No    Hobby Hazards No    Sleep Concern Yes    Stress Concern No    Weight Concern No    Special Diet No    Back Care Yes    Exercise No    Bike Helmet Yes    Seat Belt Yes    Self-Exams Yes    Parent/sibling w/ CABG, MI or angioplasty before 65F 55M? Yes   Social History Narrative    Social Documentation:4/10        Balanced Diet: YES    Calcium intake: supplement per day    Caffeine: 4cups per day    Exercise:  type of activity varies;  3 times per week    Sunscreen: Yes     Seatbelts:  Yes    Self Breast Exam:  Yes    Self Testicular Exam: No - na    Physical/Emotional/Sexual Abuse: No -     Do you feel safe in your environment? Yes        Cholesterol screen up to date: yes    Eye Exam up to date: Yes    Dental Exam up to date: Yes    Pap smear up to date: Does Not Apply    Mammogram up to date: utd    Dexa Scan up to date: Yes    Colonoscopy up to date: Yes    Immunizations up to date: Yes    Glucose screen if over 40:  No -     Fredy Carroll ma             Social Drivers of Health     Financial Resource Strain: Low Risk  (3/18/2025)    Financial Resource Strain     Within the past 12 months, have you or your family members you live with been unable to get utilities (heat, electricity) when it was really needed?: No   Food Insecurity: Low Risk  (3/18/2025)    Food Insecurity     Within the past 12 months, did you worry that your food would run out before you got money to buy more?: No     Within the past 12 months, did the food you bought just not last and you didn t have money to get more?: No   Transportation Needs: Low Risk  (3/18/2025)    Transportation Needs     Within the past 12 months, has lack of transportation kept you from medical appointments, getting your medicines, non-medical meetings or appointments, work, or from getting things that you need?: No   Physical Activity: Sufficiently Active (3/18/2025)    Exercise Vital Sign     Days of Exercise per Week: 5 days     Minutes of Exercise per Session: 60 min   Stress: Stress Concern Present (3/18/2025)    [employer] - Occupational Stress Questionnaire     Feeling of Stress : To some extent   Social Connections: Unknown (3/18/2025)    Social Connection and Isolation Panel [NHANES]     Frequency of Communication with Friends and Family: Not on file     Frequency of Social Gatherings with Friends and Family: More than three times a week     Attends Gnosticist Services: Not on file     Active  "Member of Clubs or Organizations: Not on file     Attends Club or Organization Meetings: Not on file     Marital Status: Not on file   Interpersonal Safety: Low Risk  (3/18/2025)    Interpersonal Safety     Do you feel physically and emotionally safe where you currently live?: Yes     Within the past 12 months, have you been hit, slapped, kicked or otherwise physically hurt by someone?: No     Within the past 12 months, have you been humiliated or emotionally abused in other ways by your partner or ex-partner?: No   Housing Stability: Low Risk  (3/18/2025)    Housing Stability     Do you have housing? : Yes     Are you worried about losing your housing?: No       FAMILY HISTORY:  Family History   Problem Relation Age of Onset    Cancer Sister     Heart Disease Father     Hypertension Father     Thyroid Disease Father     Other Cancer Other          age 65    Other Cancer Other          age 56    Other Cancer Niece          age 53    Other Cancer Sister          age 51       Past/family/social history reviewed and no changes    PHYSICAL EXAMINATION:  Vitals reviewed: /71   Pulse 69   Ht 1.638 m (5' 4.5\")   Wt 58.5 kg (129 lb)   LMP  (LMP Unknown)   SpO2 95%   BMI 21.80 kg/m    Constitutional: aaox3, cooperative, pleasant, not dyspneic/diaphoretic, no acute distress  Eyes: Sclera anicteric/injected  Ears/nose/mouth/throat: hearing intact  Neck: supple, active ROM w/o limitation or pain   CV: No edema  Respiratory: Unlabored breathing  Abd:  Nondistended, soft, mild pain to upper abdomen, no peritoneal signs, neg Llamas's sign  Skin: warm, perfused, no jaundice  Psych: Normal affect  MSK: Normal gait     PERTINENT STUDIES:    Lab on 01/15/2025   Component Date Value Ref Range Status    C Difficile Toxin B by PCR 2025 Negative  Negative Final    WBC Count 01/15/2025 6.1  4.0 - 11.0 10e3/uL Final    RBC Count 01/15/2025 4.33  3.80 - 5.20 10e6/uL Final    Hemoglobin 01/15/2025 12.9  11.7 " - 15.7 g/dL Final    Hematocrit 01/15/2025 38.4  35.0 - 47.0 % Final    MCV 01/15/2025 89  78 - 100 fL Final    MCH 01/15/2025 29.8  26.5 - 33.0 pg Final    MCHC 01/15/2025 33.6  31.5 - 36.5 g/dL Final    RDW 01/15/2025 12.2  10.0 - 15.0 % Final    Platelet Count 01/15/2025 217  150 - 450 10e3/uL Final    Sodium 01/15/2025 130 (L)  135 - 145 mmol/L Final    Potassium 01/15/2025 4.1  3.4 - 5.3 mmol/L Final    Carbon Dioxide (CO2) 01/15/2025 27  22 - 29 mmol/L Final    Anion Gap 01/15/2025 11  7 - 15 mmol/L Final    Urea Nitrogen 01/15/2025 11.2  8.0 - 23.0 mg/dL Final    Creatinine 01/15/2025 0.74  0.51 - 0.95 mg/dL Final    GFR Estimate 01/15/2025 80  >60 mL/min/1.73m2 Final    Calcium 01/15/2025 10.3  8.8 - 10.4 mg/dL Final    Chloride 01/15/2025 92 (L)  98 - 107 mmol/L Final    Glucose 01/15/2025 95  70 - 99 mg/dL Final    Alkaline Phosphatase 01/15/2025 67  40 - 150 U/L Final    AST 01/15/2025 48 (H)  0 - 45 U/L Final    ALT 01/15/2025 20  0 - 50 U/L Final    Protein Total 01/15/2025 7.2  6.4 - 8.3 g/dL Final    Albumin 01/15/2025 4.5  3.5 - 5.2 g/dL Final    Bilirubin Total 01/15/2025 0.8  <=1.2 mg/dL Final    Calprotectin Feces 01/16/2025 42.1  0.0 - 49.9 mg/kg Final    Fecal Lactoferrin 01/16/2025 Negative  Negative Final        Lab Results   Component Value Date    WBC 6.1 01/15/2025    WBC 4.7 06/02/2023    WBC 6.4 06/10/2020    HGB 12.9 01/15/2025    HGB 13.4 06/02/2023    HGB 12.7 06/10/2020     01/15/2025     06/02/2023     06/10/2020    CHOL 170 03/18/2025    CHOL 202 (H) 06/24/2024    CHOL 242 (H) 03/15/2024    TRIG 35 03/18/2025    TRIG 33 06/24/2024    TRIG 50 03/15/2024    HDL 94 03/18/2025     06/24/2024     03/15/2024    ALT 20 01/15/2025    ALT 17 06/24/2024    ALT 21 03/15/2024    AST 48 (H) 01/15/2025    AST 47 (H) 06/24/2024    AST 56 (H) 03/15/2024     (L) 01/15/2025     (L) 06/24/2024     (L) 03/15/2024    BUN 11.2 01/15/2025    BUN 12.1  06/24/2024    BUN 9.0 03/15/2024    CO2 27 01/15/2025    CO2 27 06/24/2024    CO2 25 03/15/2024    TSH 1.67 03/18/2025    TSH 2.26 03/15/2024    TSH 2.16 03/14/2023    INR 1.06 06/02/2023    INR 1.7 02/12/2013    INR 1.9 02/08/2013        Liver Function Studies -   Recent Labs   Lab Test 01/15/25  1559   PROTTOTAL 7.2   ALBUMIN 4.5   BILITOTAL 0.8   ALKPHOS 67   AST 48*   ALT 20        PREVIOUS ENDOSCOPY    Results for orders placed or performed during the hospital encounter of 02/19/25   COLONOSCOPY    Collection Time: 02/19/25 12:01 PM   Result Value Ref Range    COLONOSCOPY       [address] [place] [address] [phone]     Endoscopy Department  _______________________________________________________________________________  Patient Name: Mely Guerra           Procedure Date: 2/19/2025 12:01 PM  MRN: [id number]                       Account Number: [id number]  YOB: 1940              Admit Type: Outpatient  Age: 85                               Room: [place] [address]  Gender: Female                        Note Status: Finalized  Attending MD: SKYE BERRIOS , ,          Total Sedation Time:   _______________________________________________________________________________     Procedure:             Colonoscopy  Indications:           Chronic diarrhea  Providers:             Saul RAM RN  Referring MD:          KRISH DILLON  Medicines:             Midazolam 9 mg IV, Fentanyl 100 micrograms IV  Complications:         No immediate complications. Estimated blood loss:                           Minimal.  _______________________________________________________________________________  Procedure:             Pre-Anesthesia Assessment:                         - Prior to the procedure, a History and Physical was                          performed, and patient medications and allergies were                          reviewed. The patient is  competent. The risks and                          benefits of the procedure and the sedation options and                          risks were discussed with the patient. All questions                          were answered and informed consent was obtained.                          Patient identification and proposed procedure were                          verified by the physician in the endoscopy suite.                          Mental Status Examination: alert and oriented. ASA                          Grade Assessment: III - A patient with severe systemic                          disease. After reviewing the risks and benefits, the                           patient was deemed in satisfactory condition to                          undergo the procedure. The anesthesia plan was to use                          moderate sedation / analgesia (conscious sedation).                          Immediately prior to administration of medications,                          the patient was re-assessed for adequacy to receive                          sedatives. The heart rate, respiratory rate, oxygen                          saturations, blood pressure, adequacy of pulmonary                          ventilation, and response to care were monitored                          throughout the procedure. The physical status of the                          patient was re-assessed after the procedure.                         After obtaining informed consent, the colonoscope was                          passed under direct vision. Throughout the procedure,                          the patient's blood pressure, pulse, and oxygen                           saturations were monitored continuously. The                          Colonoscope was introduced through the anus and                          advanced to the terminal ileum. The colonoscopy was                          performed without difficulty. The patient tolerated                           the procedure well. The quality of the bowel                          preparation was good. The terminal ileum, ileocecal                          valve, appendiceal orifice, and rectum were                          photographed.                                                                                   Findings:       Hemorrhoids were found on perianal exam.       The terminal ileum appeared normal.       Normal mucosa was found in the entire colon. Biopsies for histology were        taken with a cold forceps from the entire colon for evaluation of        microscopic colitis. Verification of patient identification for the        specimen was done by the physician and nurse using the  patient's name.        Estimated blood loss was minimal.       A 10 to 15 mm polyp was found in the proximal ascending colon. The polyp        was granular lateral spreading. Preparations were made for mucosal        resection. Demarcation of the lesion was performed with narrow band        imaging to clearly identify the boundaries of the lesion. Ascendo was        injected to raise the lesion. Snare mucosal resection was performed.        Resection and retrieval were complete. Resected tissue margins were        examined and clear of polyp tissue. Verification of patient        identification for the specimen was done by the physician and nurse        using the patient's name. Estimated blood loss was minimal.       A few small-mouthed diverticula were found in the left colon.       The exam was otherwise without abnormality on direct and retroflexion        views.                                                                                   Impression:            - Hemorrhoids  found on perianal exam.                         - The examined portion of the ileum was normal.                         - Normal mucosa in the entire examined colon. Biopsied.                         - One 10 to 15 mm polyp in the proximal  ascending                          colon, removed with mucosal resection. Resected and                          retrieved.                         - Diverticulosis in the left colon.                         - The examination was otherwise normal on direct and                          retroflexion views.                         - Mucosal resection was performed. Resection and                          retrieval were complete.  Recommendation:        - Patient has a contact number available for                          emergencies. The signs and symptoms of potential                          delayed complications were discussed with the patient.                          Return to normal activities tomorrow. Written                          discharge ins tructions were provided to the patient.                         - Resume previous diet.                         - Continue present medications.                         - Await pathology results.                         - Return to referring physician as previously                          scheduled.                                                                                       _____________  SKYE BERRIOS,   2/19/2025 1:23:54 PM  I was physically present for the entire viewing portion of the exam.  __________________________  Signature of teaching physician  Promise/[id number] BERRIOS  Number of Addenda: 0    Note Initiated On: 2/19/2025 12:01 PM  Scope In: 12:43:52 PM  Scope Out: 1:15:24 PM       Final Diagnosis   A. COLON, RANDOM BIOPSIES  Colonic mucosa with no evidence of microscopic or chronic colitis or other significant histologic abnormality     B. PROXIMAL ASCENDING COLON, POLYP, BIOPSY:  Sessile serrated adenoma; no cytologic dysplasia

## 2025-06-05 NOTE — NURSING NOTE
"Do you have a history of colon cancer in your immediate family? NO    If yes who: negative     And what age  were they diagnosed: n.a      Chief Complaint   Patient presents with    Follow Up       Vitals:    06/05/25 1041   BP: 137/71   Pulse: 69   SpO2: 95%   Weight: 58.5 kg (129 lb)   Height: 1.638 m (5' 4.5\")       Body mass index is 21.8 kg/m .    Obdulia Rosales MA    "

## 2025-06-05 NOTE — PATIENT INSTRUCTIONS
It was a pleasure taking care of you today.  I've included a brief summary of our discussion and care plan from today's visit below.  Please review this information with your primary care provider.  _______________________________________________________________________    My recommendations are summarized as follows:    Take Colestipol 2 gram in the morning with 1 gram in the evening   -do this for several weeks, if you do OK on this, increase to 2 gram twice a day   Continue Citrucel, you can increase this to 3 Tbsp per day (introducing a 1 Tbsp in the evening to see if we can target the morning stools)  OK to hold IbGard   If the weight continues to come off, please make an appt with your primary to discuss and review to see if you need more evaluation   Physical therapy consult for pelvic floor dysfunction   [email or web address]  We also discussed breath testing, will plan to revisit it at our future appt   Upper abdomen pain - we discussed an ultrasound to check. The plan is to hold on the ultrasound for now. If symptoms get back (persistent pain that does not resolve, severe pain, nausea/vomiting, fevers), please go to ER     Return to GI Clinic in 4 months to review your progress.     Please see below for any additional questions and scheduling guidelines.    Sign up for [employer]: [employer] patient portal serves as a secure platform for accessing your medical records from the [facility]. Additionally, [employer] facilitates easy, timely, and secure messaging with your care team. If you have not signed up, you may do so by using the provided code or calling [phone].    Coordinating your care after your visit:  There are multiple options for scheduling your follow-up care based on your provider's recommendation.    How do I schedule a follow-up clinic appointment:   After your appointment, you may receive scheduling assistance with the Clinic  Coordinators by having a seat in the waiting room and a Clinic Coordinator will call you up to schedule.  Virtual visits or after you leave the clinic:  Your provider has placed a follow-up order in the [employer] portal for scheduling your return appointment. A member of the scheduling team will contact you to schedule.  [employer] Scheduling: Timely scheduling through [employer] is advised to ensure appointment availability.   Call to schedule: You may schedule your follow-up appointment(s) by calling [phone], option 1.    How do I schedule my endoscopy or colonoscopy procedure:  If a procedure, such as a colonoscopy or upper endoscopy was ordered by your provider, the scheduling team will contact you to schedule this procedure. Or you may choose to call to schedule at   [phone], option 1.  Please allow 20-30 minutes when scheduling a procedure.    How do I get my blood work done? To get your blood work done, you need to schedule a lab appointment at an [facility]. There are multiple ways to schedule:   At the clinic: The Clinic Coordinator you meet after your visit can help you schedule a lab appointment.   [employer] scheduling: [employer] offers online lab scheduling at all [facility] laboratory locations.   Call to schedule: You can call [phone] to schedule your lab appointment.    How do I schedule my imaging study: To schedule imaging studies, such as CT scans, ultrasounds, MRIs, or X-rays, contact Imaging Services at [phone].    How do I schedule a referral to another doctor: If your provider recommended a referral to another specialist(s), the referral order was placed by your provider. You will receive a phone call to schedule this referral, or you may choose to call the number attached to the referral to self-schedule.    For Post-Visit Question(s):  For any inquiries following today's visit:  Please utilize [employer] messaging and allow 48 hours for reply or contact the Call  Center during normal business hours at [phone], option 3.  For Emergent After-hours questions, contact the On-Call GI Fellow through the [facility]  at [phone].  In addition, you may contact your Nurse directly using the provided contact information.    Test Results:  Test results will be accessible via [employer] in compliance with the 21st Century Cures Act. This means that your results will be available to you at the same time as your provider. Often you may see your results before your provider does. Results are reviewed by staff within two weeks with communication follow-up. Results may be released in the patient portal prior to your care team review.    Prescription Refill(s):  Medication prescribed by your provider will be addressed during your visit. For future refills, please coordinate with your pharmacy. If you have not had a recent clinic visit or routine labs, for your safety, your provider may not be able to refill your prescription.     Sincerely,    Marva Mullen PA-C  Gastroenterology

## 2025-06-17 ENCOUNTER — TELEPHONE (OUTPATIENT)
Dept: GASTROENTEROLOGY | Facility: CLINIC | Age: 85
End: 2025-06-17
Payer: COMMERCIAL

## 2025-06-17 NOTE — TELEPHONE ENCOUNTER
Left Voicemail (1st Attempt) and Sent Mychart (1st Attempt) for the patient to call back and schedule the following:    Appointment type: Return GI   Provider: JOSE M Pisano   Return date: ~ 10/5 Approx.   Specialty phone number: [phone]

## 2025-06-19 ENCOUNTER — TRANSFERRED RECORDS (OUTPATIENT)
Dept: FAMILY MEDICINE | Facility: CLINIC | Age: 85
End: 2025-06-19

## 2025-07-02 DIAGNOSIS — R19.7 DIARRHEA: Primary | ICD-10-CM

## 2025-07-02 DIAGNOSIS — K59.1 FUNCTIONAL DIARRHEA: ICD-10-CM

## 2025-07-02 RX ORDER — COLESTIPOL HYDROCHLORIDE 1 G/1
1 TABLET ORAL 2 TIMES DAILY
Qty: 60 TABLET | Refills: 3 | Status: SHIPPED | OUTPATIENT
Start: 2025-07-02

## 2025-07-02 NOTE — TELEPHONE ENCOUNTER
"Last Visit Date: 6/5/2025 [facility] [place]  Future Visit Date: none  ----------------------  Medication Requested: colestipol (COLESTID) 1 g tablet   Last Written Prescription: 4/4/2025 Disp:60 R:2  ---------------------  [x]  Refill decision: Medication unable to be refilled by RN due to:     []    Pt not seen within past 12 months;  No FOV;  or FOV exceeds timeframe per protocol requirements  []    Compliance - lapse in therapy/gap in refills; No Shows; Cancellations  []    Verification - order discrepancy, clarification needed, Sig modification needed  []    Controlled medication  []    Medication not included in refill protocol policy  []    Abnormal labs/test:  []    Overdue labs/test:    []    Medication not active on Pt's med list  []    Drug interaction Warning  []    Medication - Last script is Reported/Historical/Transitional  []    Advanced refill request  [x]    Review Needed: Rx dose change?  Rx last ordered take 1 gm BID and plan last GI visit 6/5/2025 \"increase colestipol to 2 gram BID, gradually\"   []    Other:     Request from pharmacy:  Requested Prescriptions   Pending Prescriptions Disp Refills    colestipol (COLESTID) 1 g tablet 60 tablet 2     Sig: Take 1 tablet (1 g) by mouth 2 times daily.       Bile Acid Sequestrant Agents Passed - 7/2/2025  7:53 AM        Passed - Lipid panel on file in past 12 mos     Recent Labs   Lab Test 03/18/25  0844   CHOL 170   TRIG 35   HDL 94   LDL 69   NHDL 76               Passed - Medication is active on med list and the sig matches. RN to manually verify dose and sig if red X/fail.     If the protocol passes (green check), you do not need to verify med dose and sig.    A prescription matches if they are the same clinical intention.    For Example: once daily and every morning are the same.    The protocol can not identify upper and lower case letters as matching and will fail.     For Example: Take 1 tablet (50 mg) by mouth " daily     TAKE 1 TABLET (50 MG) BY MOUTH DAILY    For all fails (red x), verify dose and sig.    If the refill does match what is on file, the RN can still proceed to approve the refill request.       If they do not match, route to the appropriate provider.             Passed - Recent (6 month) or future (90 days) visit with the authorizing provider's specialty (provided they have been seen in the past 9 months)     The patient must have completed an in-person or virtual visit within the past 6 months or has a future visit scheduled within the next 90 days with the authorizing provider s specialty.  Urgent care and e-visits do not quality as an office visit for this protocol.          Passed - Medication indicated for associated diagnosis     Medication is associated with one or more of the following diagnoses:     Hypercholesterolemia    Atherosclerosis    Bile acid malabsorption    Diarrhea            Passed - Patient is age 18 years or older        Passed - No active pregnancy on record        Passed - No positive pregnancy test in past 12 months

## 2025-07-10 ENCOUNTER — THERAPY VISIT (OUTPATIENT)
Age: 85
End: 2025-07-10
Attending: PHYSICIAN ASSISTANT
Payer: COMMERCIAL

## 2025-07-10 DIAGNOSIS — R14.0 BLOATING: ICD-10-CM

## 2025-07-10 DIAGNOSIS — R10.32 ABDOMINAL PAIN, LEFT LOWER QUADRANT: ICD-10-CM

## 2025-07-10 DIAGNOSIS — R10.31 RIGHT LOWER QUADRANT PAIN: ICD-10-CM

## 2025-07-10 DIAGNOSIS — R19.8 IRREGULAR BOWEL HABITS: ICD-10-CM

## 2025-07-10 DIAGNOSIS — M62.89 PELVIC FLOOR DYSFUNCTION: Primary | ICD-10-CM

## 2025-07-10 NOTE — PROGRESS NOTES
PHYSICAL THERAPY EVALUATION  Type of Visit: Evaluation       Fall Risk Screen:  Have you fallen 2 or more times in the past year?: No  Have you fallen and had an injury in the past year?: No    Subjective   Mely reports had norovirus last December and ever since then having frequent, loose stools. Has extensive medical workup and all negative. Managing w/ medication/supplements. Lower abdominal pain, is not constant but also not predicable. Maybe comes on after eating. Doesn't last long and doesn't know if anything makes it better. No other abdominal discomfort.   Is able to start urine stream relaxed, pushing at the end to make sure it all comes out. When pushing she will then have a small bowel movement. By 10 AM she is okay but having frequent Bms in the morning.   For exercise, she enjoys cycling and walking her dog about 2 miles.    Diet: Banana, peach, tangerine, and muffin for breakfast. 1 cup of coffee. Water in AM w/ medications. Lunch: usually mid-afternoon. Fruit and cheese, half a sandwich. Sometimes has trail mix/bar snacks. Dinner: chicken on toast w/ lettuce, jello w/ fruit. Drinks about 8 glasses water daily. Sometimes has a second cup of coffee in AM. Avoids fried foods and soda. She has played around w/ diet and not found any food triggers.         Presenting condition or subjective complaint: some bowel with every urination when pressing to empty bladder  Date of onset: 06/05/25    Relevant medical history:     Dates & types of surgery: knees 05 07  hips 05 07    Prior diagnostic imaging/testing results: MRI; CT scan; Other     Prior therapy history for the same diagnosis, illness or injury: No      Living Environment  Social support:     Type of home:     Stairs to enter the home:         Ramp:     Stairs inside the home: Yes   Is there a railing: Yes     Help at home:    Equipment owned:       Employment: No    Hobbies/Interests:      Patient goals for therapy: leave home in the morning without  worrying about being near toilet first 2 hrs  of day    Pain assessment: See objective evaluation for additional pain details     Objective      PELVIC EVALUATION  ADDITIONAL HISTORY:  Sex assigned at birth:    Gender identity:      Pronouns:        Bladder History:  Feels bladder filling:   Yes  Triggers for feeling of inability to wait to go to the bathroom:     denies urgency  How long can you wait to urinate:    Gets up at night to urinate:     thinks she voids every few hours. Denies urinary frequency. Gets up once a night around 3 AM to urinate/BM.   Can stop the flow of urine when urinating:    Volume of urine usually released:    normal  Other issues:   straining at end of stream to finish emptying bladder  Number of bladder infections in last 12 months:   none  Fluid intake per day:      1-2 cups of coffee, 8+ glasses of water  Medications taken for bladder:       Activities causing urine leak:     no leakage  Amount of urine typically leaked:    Pads used to help with leaking:          Bowel History:  Will have an urge for bowel movement after eating breakfast and this stool is more formed.   Frequency of bowel movement:   4  Consistency of stool:      Type 6  Ignores the urge to defecate:    Other bowel issues:   small bowel movements. Come out when she is pushing to urinate. Bowel urgency early in the morning. No fecal incontinence any more. Does feel like she completely empties bowels after breakfast when has that BM. No pushing/straining.   Length of time spent trying to have a bowel movement:      Sexual Function History:  Sexual orientation:      Sexually active:    Lubrication used:      Pelvic pain:     denies pelvic pain   Pain or difficulty with orgasms/erection/ejaculation:      State of menopause:    Hormone medications:              Discussed reason for referral regarding pelvic health needs and external/internal pelvic floor muscle examination with patient/guardian.  Opportunity provided to  ask questions and verbal consent for assessment and intervention was given.    PAIN: Pain Location: lower abdominals  Pain is Exacerbated By: eating, unsure  Pain is Relieved By: none  POSTURE:   LUMBAR SCREEN: AROM WNL  PROM WNL  L hip PROM is painful today, bart flexion and IR  HIP SCREEN:  Strength:   Pain: - none + mild ++ moderate +++ severe  Strength Scale: 0-5/5 Left Right   Hip Flexion 3+, + (mild) 5   Hip Extension 4+ 4+   Hip Abduction 3+, + (mild) 4   Hip Adduction     Hip Internal Rotation 4, + (mild) 5   Hip External Rotation 4, + (mild) 5   Knee Flexion     Knee Extension        Functional Strength Testing: Double Leg Squat: Good technique/no significant findings    BREATHING SYMMETRY: Decreased rib cage mobility    PELVIC EXAM  External Visual Inspection:  At rest: Normal  With voluntary pelvic floor contraction: Perineal elevation, Present  Relaxation of PFM: Yes  With intra-abdominal pressure: Cough: Perineal descent  Bearing down as defecation: Perineal descent    Integumentary:   Anal: WNL    External Digital Palpation per Perineum:       Internal Digital Palpation:  Per Vagina:      Per Rectum:  Myofascial Resistance to Palpation: Soft  Digital Muscle Performance: P (Power): 3/5  E (Endurance): 8 seconds  R (Repetitions): 5  Compensations: Gluts, Breath holding  Relaxation Post-Contraction: Partial/delayed relaxation  When asked to bear down patient demonstrates good lengthening of PFM with EAS relaxation.      Pelvic Organ Prolapse:       ABDOMINAL ASSESSMENT  Diastasis Rectus Abdominis (TRISH):  TRISH presence: No    Abdominal Activation/Strength: Lower abdominals 1B.     Scar:   Location/Type: laparscopic incisions  Mobility: WNL    Fascial Tension/Restriction: WNL    Assessment & Plan   CLINICAL IMPRESSIONS  Medical Diagnosis: Abdominal pain, left lower quadrant  Bloating  Right lower quadrant pain  Irregular bowel habits    Treatment Diagnosis: Abdominal pain, pelvic floor dysfunction    Impression/Assessment: Patient is a 85 year old female with pelvic floor and bowel complaints.  The following significant findings have been identified: Pain, Decreased ROM/flexibility, Decreased strength, Impaired muscle performance, and Decreased activity tolerance. These impairments interfere with their ability to perform self care tasks, recreational activities, household chores, household mobility, and community mobility as compared to previous level of function.     Clinical Decision Making (Complexity):  Clinical Presentation: Stable/Uncomplicated  Clinical Presentation Rationale: based on medical and personal factors listed in PT evaluation  Clinical Decision Making (Complexity): Low complexity    PLAN OF CARE  Treatment Interventions:  Modalities: Biofeedback  Interventions: Manual Therapy, Neuromuscular Re-education, Therapeutic Activity, Therapeutic Exercise, Self-Care/Home Management    Long Term Goals     PT Goal 1  Goal Identifier: Bowel movements  Goal Description: Patient will have no more than two bowel movements a day w/out urgency  Rationale: to maximize safety and independence with performance of ADLs and functional tasks;to maximize safety and independence within the home;to maximize safety and independence within the community;to maximize safety and independence with transportation;to maximize safety and independence with self cares  Target Date: 10/07/25      Frequency of Treatment: 1x/wk weaning to every-other wk  Duration of Treatment: 3 mo    Recommended Referrals to Other Professionals:   Education Assessment:   Learner/Method: Patient;Listening;Demonstration;Pictures/Video;No Barriers to Learning    Risks and benefits of evaluation/treatment have been explained.   Patient/Family/caregiver agrees with Plan of Care.     Evaluation Time:     PT Eval, Low Complexity Minutes (21415): 30       Signing Clinician: ROBE BEAN PT        [facility]                                                                                    OUTPATIENT PHYSICAL THERAPY      PLAN OF TREATMENT FOR OUTPATIENT REHABILITATION   Patient's Last Name, First Name, Mely Phillips YOB: 1940   Provider's Name   [facility]   Medical Record No.  [id number]     Onset Date: 06/05/25  Start of Care Date: 07/10/25     Medical Diagnosis:  Abdominal pain, left lower quadrant  Bloating  Right lower quadrant pain  Irregular bowel habits      PT Treatment Diagnosis:  Abdominal pain, pelvic floor dysfunction Plan of Treatment  Frequency/Duration: 1x/wk weaning to every-other wk/ 3 mo    Certification date from 07/10/25 to 10/07/25         See note for plan of treatment details and functional goals     ROBE BEAN, PT                         I CERTIFY THE NEED FOR THESE SERVICES FURNISHED UNDER        THIS PLAN OF TREATMENT AND WHILE UNDER MY CARE     (Physician attestation of this document indicates review and certification of the therapy plan).              Referring Provider:  Marva Mullen    Initial Assessment  See Epic Evaluation- Start of Care Date: 07/10/25

## 2025-07-23 ENCOUNTER — OFFICE VISIT (OUTPATIENT)
Dept: FAMILY MEDICINE | Facility: CLINIC | Age: 85
End: 2025-07-23
Payer: COMMERCIAL

## 2025-07-23 ENCOUNTER — TELEPHONE (OUTPATIENT)
Dept: FAMILY MEDICINE | Facility: CLINIC | Age: 85
End: 2025-07-23

## 2025-07-23 VITALS
HEIGHT: 63 IN | HEART RATE: 72 BPM | DIASTOLIC BLOOD PRESSURE: 64 MMHG | BODY MASS INDEX: 23.39 KG/M2 | RESPIRATION RATE: 16 BRPM | OXYGEN SATURATION: 97 % | WEIGHT: 132 LBS | TEMPERATURE: 97.7 F | SYSTOLIC BLOOD PRESSURE: 118 MMHG

## 2025-07-23 DIAGNOSIS — L03.115 CELLULITIS OF RIGHT LOWER EXTREMITY: ICD-10-CM

## 2025-07-23 DIAGNOSIS — I10 ESSENTIAL HYPERTENSION WITH GOAL BLOOD PRESSURE LESS THAN 140/90: Primary | ICD-10-CM

## 2025-07-23 DIAGNOSIS — R79.89 ELEVATED LIVER FUNCTION TESTS: ICD-10-CM

## 2025-07-23 LAB
ERYTHROCYTE [DISTWIDTH] IN BLOOD BY AUTOMATED COUNT: 11.9 % (ref 10–15)
HCT VFR BLD AUTO: 38 % (ref 35–47)
HGB BLD-MCNC: 13.3 G/DL (ref 11.7–15.7)
MCH RBC QN AUTO: 30.9 PG (ref 26.5–33)
MCHC RBC AUTO-ENTMCNC: 35 G/DL (ref 31.5–36.5)
MCV RBC AUTO: 88 FL (ref 78–100)
PLATELET # BLD AUTO: 264 10E3/UL (ref 150–450)
RBC # BLD AUTO: 4.31 10E6/UL (ref 3.8–5.2)
WBC # BLD AUTO: 7.3 10E3/UL (ref 4–11)

## 2025-07-23 PROCEDURE — 36415 COLL VENOUS BLD VENIPUNCTURE: CPT | Performed by: FAMILY MEDICINE

## 2025-07-23 PROCEDURE — 99214 OFFICE O/P EST MOD 30 MIN: CPT | Performed by: FAMILY MEDICINE

## 2025-07-23 PROCEDURE — 1125F AMNT PAIN NOTED PAIN PRSNT: CPT | Performed by: FAMILY MEDICINE

## 2025-07-23 PROCEDURE — 85027 COMPLETE CBC AUTOMATED: CPT | Performed by: FAMILY MEDICINE

## 2025-07-23 PROCEDURE — 3074F SYST BP LT 130 MM HG: CPT | Performed by: FAMILY MEDICINE

## 2025-07-23 PROCEDURE — 3078F DIAST BP <80 MM HG: CPT | Performed by: FAMILY MEDICINE

## 2025-07-23 PROCEDURE — 80053 COMPREHEN METABOLIC PANEL: CPT | Performed by: FAMILY MEDICINE

## 2025-07-23 RX ORDER — TRIAMTERENE AND HYDROCHLOROTHIAZIDE 37.5; 25 MG/1; MG/1
1 CAPSULE ORAL DAILY
Qty: 90 CAPSULE | Refills: 1 | Status: SHIPPED | OUTPATIENT
Start: 2025-07-23

## 2025-07-23 RX ORDER — CEPHALEXIN 500 MG/1
500 CAPSULE ORAL 3 TIMES DAILY
Qty: 21 CAPSULE | Refills: 0 | Status: SHIPPED | OUTPATIENT
Start: 2025-07-23

## 2025-07-23 RX ORDER — LOSARTAN POTASSIUM 100 MG/1
100 TABLET ORAL DAILY
Qty: 90 TABLET | Refills: 1 | Status: SHIPPED | OUTPATIENT
Start: 2025-07-23

## 2025-07-23 ASSESSMENT — PAIN SCALES - GENERAL: PAINLEVEL_OUTOF10: MILD PAIN (3)

## 2025-07-23 NOTE — TELEPHONE ENCOUNTER
Intr[employer]et Back Up And Received the Lab Resuts and gave to LS to Review  sent for scanning  [facility] Unit Coordinator

## 2025-07-23 NOTE — TELEPHONE ENCOUNTER
Incoming call from [facility]  Caller was wondering if we received their lab results fax.   Advised caller that our internet/phones/fax are not functioning currently.     Caller wanted patient's provider to be made aware of the following lab results that returned out of range:    Sodium 132.   Bilirubin 0.4  AST 73  Monocytes 13  Eosinophils 7    Pt is scheduled for appt this afternoon with .   Routing as FYI to .     Stacy WARE RN

## 2025-07-23 NOTE — PATIENT INSTRUCTIONS
Use Advil /ibuprofen at 200 mg or 400 mg at one time and can repeat every 6 to 8 hrs with food for the pain in joints

## 2025-07-23 NOTE — PROGRESS NOTES
Assessment & Plan     Essential hypertension with goal blood pressure less than 140/90  Comprehensive metabolic panel (BMP + Alb, Alk Phos, ALT, AST, Total. Bili, TP); Future  Her BP is well controlled on the current medications and I have refilled them , no side effects     Elevated liver function tests  She had those checked  a couple of months ago when she was at her eye specialist and the AST was elevated at 73 and also had elevated bilirubin , we discussed re checking those today   - CBC with platelets; Future  - Comprehensive metabolic panel (BMP + Alb, Alk Phos, ALT, AST, Total. Bili, TP); Future    Cellulitis of right lower extremity  Has a wound on the front of the right shin and it looks infected , she has bumped her leg against a table three weeks ago , there is a scab over the wound but erythema around the scab in total of over 15 cm , we discussed Abx treatment and I have sent the Rx for this   - REVIEW OF HEALTH MAINTENANCE PROTOCOL ORDERS  - cephALEXin (KEFLEX) 500 MG capsule; Take 1 capsule (500 mg) by mouth 3 times daily.  Would need to let us know if no improvement in three days or if any worsening symptoms   RTC if no improving or worsening.  Pt is aware  and comfortable with the current plan.      Raj Boone is a 85 year old, presenting for the following health issues:  Results      7/23/2025     1:43 PM   Additional Questions   Roomed by Charlie BAL     History of Present Illness       Reason for visit:  Bloods test comments from Doctor.   She is taking medications regularly.      Wound on lower leg      1) sodium 132   2)Bilirubin 0.4 and AST 73   3) monocytes 13  and eosinophiles 7       Review of Systems  Constitutional, HEENT, cardiovascular, pulmonary, GI, , musculoskeletal, neuro, skin, endocrine and psych systems are negative, except as otherwise noted.      Objective    LMP  (LMP Unknown)   There is no height or weight on file to calculate BMI.  Physical Exam   GENERAL: alert and  no distress  EYES: Eyes grossly normal to inspection, PERRL and conjunctivae and sclerae normal  NECK: no adenopathy, no asymmetry, masses, or scars  RESP: lungs clear to auscultation - no rales, rhonchi or wheezes  CV: regular rate and rhythm, normal S1 S2, no S3 or S4, no murmur, click or rub, no peripheral edema  ABDOMEN: soft, nontender, no hepatosplenomegaly, no masses and bowel sounds normal  MS: no gross musculoskeletal defects noted, no edema  SKIN: there is scab from an wound on the right mid shin area and an area of erythema around it in total of about 15 cm indiameter , there is no drainage , some tenderness with palpation, no edema     No results found for this or any previous visit (from the past 24 hours).        Signed Electronically by: Tiffany Messer MD

## 2025-07-24 ENCOUNTER — THERAPY VISIT (OUTPATIENT)
Age: 85
End: 2025-07-24
Payer: COMMERCIAL

## 2025-07-24 DIAGNOSIS — R10.31 RIGHT LOWER QUADRANT PAIN: ICD-10-CM

## 2025-07-24 DIAGNOSIS — R14.0 BLOATING: ICD-10-CM

## 2025-07-24 DIAGNOSIS — R10.32 ABDOMINAL PAIN, LEFT LOWER QUADRANT: Primary | ICD-10-CM

## 2025-07-24 DIAGNOSIS — R19.8 IRREGULAR BOWEL HABITS: ICD-10-CM

## 2025-07-24 DIAGNOSIS — M62.89 PELVIC FLOOR DYSFUNCTION: ICD-10-CM

## 2025-07-24 LAB
ALBUMIN SERPL BCG-MCNC: 4.4 G/DL (ref 3.5–5.2)
ALP SERPL-CCNC: 70 U/L (ref 40–150)
ALT SERPL W P-5'-P-CCNC: 27 U/L (ref 0–50)
ANION GAP SERPL CALCULATED.3IONS-SCNC: 11 MMOL/L (ref 7–15)
AST SERPL W P-5'-P-CCNC: 58 U/L (ref 0–45)
BILIRUB SERPL-MCNC: 0.7 MG/DL
BUN SERPL-MCNC: 11 MG/DL (ref 8–23)
CALCIUM SERPL-MCNC: 10 MG/DL (ref 8.8–10.4)
CHLORIDE SERPL-SCNC: 93 MMOL/L (ref 98–107)
CREAT SERPL-MCNC: 0.75 MG/DL (ref 0.51–0.95)
EGFRCR SERPLBLD CKD-EPI 2021: 79 ML/MIN/1.73M2
GLUCOSE SERPL-MCNC: 111 MG/DL (ref 70–99)
HCO3 SERPL-SCNC: 26 MMOL/L (ref 22–29)
POTASSIUM SERPL-SCNC: 4.2 MMOL/L (ref 3.4–5.3)
PROT SERPL-MCNC: 7.1 G/DL (ref 6.4–8.3)
SODIUM SERPL-SCNC: 130 MMOL/L (ref 135–145)

## 2025-08-06 ENCOUNTER — OFFICE VISIT (OUTPATIENT)
Dept: URGENT CARE | Facility: URGENT CARE | Age: 85
End: 2025-08-06
Payer: COMMERCIAL

## 2025-08-06 VITALS
HEART RATE: 65 BPM | BODY MASS INDEX: 23.21 KG/M2 | DIASTOLIC BLOOD PRESSURE: 73 MMHG | TEMPERATURE: 98.5 F | RESPIRATION RATE: 16 BRPM | HEIGHT: 63 IN | OXYGEN SATURATION: 97 % | WEIGHT: 131 LBS | SYSTOLIC BLOOD PRESSURE: 124 MMHG

## 2025-08-06 DIAGNOSIS — S81.801A OPEN WOUND OF LOWER LIMB, RIGHT, INITIAL ENCOUNTER: Primary | ICD-10-CM

## 2025-08-06 PROCEDURE — 3078F DIAST BP <80 MM HG: CPT | Performed by: FAMILY MEDICINE

## 2025-08-06 PROCEDURE — 3074F SYST BP LT 130 MM HG: CPT | Performed by: FAMILY MEDICINE

## 2025-08-06 PROCEDURE — 99213 OFFICE O/P EST LOW 20 MIN: CPT | Performed by: FAMILY MEDICINE

## 2025-08-26 ENCOUNTER — OFFICE VISIT (OUTPATIENT)
Dept: URGENT CARE | Facility: URGENT CARE | Age: 85
End: 2025-08-26
Payer: COMMERCIAL

## 2025-08-26 VITALS
RESPIRATION RATE: 17 BRPM | TEMPERATURE: 98.3 F | DIASTOLIC BLOOD PRESSURE: 78 MMHG | OXYGEN SATURATION: 97 % | HEIGHT: 63 IN | BODY MASS INDEX: 23.6 KG/M2 | SYSTOLIC BLOOD PRESSURE: 139 MMHG | HEART RATE: 74 BPM | WEIGHT: 133.2 LBS

## 2025-08-26 DIAGNOSIS — L01.00 IMPETIGO: Primary | ICD-10-CM

## 2025-08-26 PROCEDURE — 99213 OFFICE O/P EST LOW 20 MIN: CPT

## 2025-08-26 PROCEDURE — 3075F SYST BP GE 130 - 139MM HG: CPT

## 2025-08-26 PROCEDURE — 3078F DIAST BP <80 MM HG: CPT

## 2025-08-26 RX ORDER — MUPIROCIN 2 %
OINTMENT (GRAM) TOPICAL DAILY
Qty: 30 G | Refills: 0 | Status: SHIPPED | OUTPATIENT
Start: 2025-08-26 | End: 2025-09-05

## 2025-08-28 ENCOUNTER — THERAPY VISIT (OUTPATIENT)
Age: 85
End: 2025-08-28
Payer: COMMERCIAL

## 2025-08-28 DIAGNOSIS — R10.32 ABDOMINAL PAIN, LEFT LOWER QUADRANT: Primary | ICD-10-CM

## 2025-08-28 DIAGNOSIS — R19.8 IRREGULAR BOWEL HABITS: ICD-10-CM

## 2025-08-28 DIAGNOSIS — R10.31 RIGHT LOWER QUADRANT PAIN: ICD-10-CM

## 2025-08-28 DIAGNOSIS — R14.0 BLOATING: ICD-10-CM

## 2025-08-28 DIAGNOSIS — M62.89 PELVIC FLOOR DYSFUNCTION: ICD-10-CM

## 2025-09-03 DIAGNOSIS — E78.00 HYPERCHOLESTEROLEMIA: ICD-10-CM

## 2025-09-03 RX ORDER — ATORVASTATIN CALCIUM 10 MG/1
10 TABLET, FILM COATED ORAL DAILY
Qty: 90 TABLET | Refills: 1 | Status: SHIPPED | OUTPATIENT
Start: 2025-09-03

## (undated) DEVICE — CATH EP 110CM 7FR HALO XP 2-1

## (undated) DEVICE — DEFIB PRO-PADZ LVP LQD GEL ADULT 8900-2105-01

## (undated) DEVICE — CATH EP 120CM 6FR RSPN 2-5-2MM 401261

## (undated) DEVICE — CATH EP 60CM 5FR 2-8-2MM SPC-

## (undated) DEVICE — PACK EP SRG PROC LF DISP SAN32EPFSR

## (undated) DEVICE — PATCH CARTO 3 EXTERNAL REFERENCE 3D MAPPING CREFP6

## (undated) DEVICE — INTRO CATH 12CM 8.5FR FST-CATH

## (undated) DEVICE — CATH EP EZ STEER NAV 4MMX115CM 1-7-4 J-J CURVE BN7TCJJ4L

## (undated) DEVICE — INTRO SHEATH 7FRX10CM PINNACLE RSS702

## (undated) DEVICE — INTRODUCER SHEATH GREEN 6.5FRX11CM .038IN PSI-6F-11-038ACT

## (undated) DEVICE — CATH EP CATH EP REPRO DAIG RSPN FX CRV DX EP C

## (undated) RX ORDER — LIDOCAINE HYDROCHLORIDE 10 MG/ML
INJECTION, SOLUTION EPIDURAL; INFILTRATION; INTRACAUDAL; PERINEURAL
Status: DISPENSED
Start: 2020-06-10

## (undated) RX ORDER — FENTANYL CITRATE 50 UG/ML
INJECTION, SOLUTION INTRAMUSCULAR; INTRAVENOUS
Status: DISPENSED
Start: 2019-10-08

## (undated) RX ORDER — FENTANYL CITRATE 50 UG/ML
INJECTION, SOLUTION INTRAMUSCULAR; INTRAVENOUS
Status: DISPENSED
Start: 2018-10-31

## (undated) RX ORDER — FENTANYL CITRATE 50 UG/ML
INJECTION, SOLUTION INTRAMUSCULAR; INTRAVENOUS
Status: DISPENSED
Start: 2025-02-19

## (undated) RX ORDER — FENTANYL CITRATE 50 UG/ML
INJECTION, SOLUTION INTRAMUSCULAR; INTRAVENOUS
Status: DISPENSED
Start: 2020-06-10

## (undated) RX ORDER — HEPARIN SODIUM 1000 [USP'U]/ML
INJECTION, SOLUTION INTRAVENOUS; SUBCUTANEOUS
Status: DISPENSED
Start: 2019-10-08

## (undated) RX ORDER — HEPARIN SODIUM 1000 [USP'U]/ML
INJECTION, SOLUTION INTRAVENOUS; SUBCUTANEOUS
Status: DISPENSED
Start: 2020-06-10

## (undated) RX ORDER — LIDOCAINE HYDROCHLORIDE 10 MG/ML
INJECTION, SOLUTION EPIDURAL; INFILTRATION; INTRACAUDAL; PERINEURAL
Status: DISPENSED
Start: 2019-10-08